# Patient Record
Sex: FEMALE | Race: WHITE | Employment: FULL TIME | URBAN - METROPOLITAN AREA
[De-identification: names, ages, dates, MRNs, and addresses within clinical notes are randomized per-mention and may not be internally consistent; named-entity substitution may affect disease eponyms.]

---

## 2017-02-07 ENCOUNTER — OFFICE VISIT (OUTPATIENT)
Dept: FAMILY MEDICINE CLINIC | Age: 22
End: 2017-02-07

## 2017-02-07 VITALS
OXYGEN SATURATION: 98 % | SYSTOLIC BLOOD PRESSURE: 120 MMHG | BODY MASS INDEX: 46.61 KG/M2 | TEMPERATURE: 98.2 F | RESPIRATION RATE: 18 BRPM | WEIGHT: 290 LBS | DIASTOLIC BLOOD PRESSURE: 82 MMHG | HEIGHT: 66 IN | HEART RATE: 78 BPM

## 2017-02-07 DIAGNOSIS — J20.9 ACUTE BRONCHITIS, UNSPECIFIED ORGANISM: ICD-10-CM

## 2017-02-07 DIAGNOSIS — R68.89 FLU-LIKE SYMPTOMS: Primary | ICD-10-CM

## 2017-02-07 LAB
FLUAV+FLUBV AG NOSE QL IA.RAPID: NEGATIVE POS/NEG
FLUAV+FLUBV AG NOSE QL IA.RAPID: NEGATIVE POS/NEG
VALID INTERNAL CONTROL?: YES

## 2017-02-07 RX ORDER — DOXYCYCLINE 100 MG/1
100 CAPSULE ORAL 2 TIMES DAILY
Qty: 20 CAP | Refills: 0 | Status: SHIPPED | OUTPATIENT
Start: 2017-02-07 | End: 2017-02-17

## 2017-02-07 NOTE — PROGRESS NOTES
Pt here c/o cough, congestion, and sinus pressure x 4 days. Reports that cough has been productive with thick mucus. Also c/o chills, body aches, and fatigue. Has been taking Dayquil and Theraflu. Also completed Zpack last week.

## 2017-02-07 NOTE — PROGRESS NOTES
Pt here c/o cough, congestion, and sinus pressure x 4 days. Reports that cough has been productive with thick mucus. Also c/o chills, body aches, and fatigue. Has been taking Dayquil and Theraflu. Also completed Zpack last week. Subjective:   Arjun Brantley is a 24 y.o. female who complains of congestion, sore throat and generalized sinus pain for 4 days, gradually worsening since that time. She denies a history of shortness of breath and wheezing. Evaluation to date: pt finished a zpak last week  Treatment to date: OTC products. Patient does not smoke cigarettes. Relevant PMH: No pertinent additional PMH. Patient Active Problem List   Diagnosis Code    Obesity E66.9    Dysmenorrhea N94.6    Constipation K59.00    Chronic pelvic pain in female R10.2, G89.29    Panic attacks F41.0    Galactorrhea O92.6    Hyperprolactinemia (HCC) E22.1    Pituitary adenoma ([facility]) D35.2     Allergies   Allergen Reactions    Latex Swelling     Skin peels    Aspirin Nausea and Vomiting    Betadine [Povidone-Iodine] Other (comments)     Skin peels        Review of Systems  Pertinent items are noted in HPI. Objective:     Visit Vitals    /82 (BP 1 Location: Right arm, BP Patient Position: Sitting)    Pulse 78    Temp 98.2 °F (36.8 °C) (Oral)    Resp 18    Ht 5' 6\" (1.676 m)    Wt 290 lb (131.5 kg)    SpO2 98%    BMI 46.81 kg/m2     General:  alert, cooperative, no distress   Eyes: conjunctivae/corneas clear. PERRL, EOM's intact. Fundi benign   Ears: normal TM's and external ear canals AU   Sinuses: tenderness over bilateral maxillary and frontal   Mouth:  Lips, mucosa, and tongue normal. Teeth and gums normal   Neck: supple, symmetrical, trachea midline and no adenopathy. Heart: S1 and S2 normal, no murmurs noted. Lungs: clear to auscultation bilaterally        Assessment/Plan:   sinusitis and bronchitis  Suggested symptomatic OTC remedies. Antibiotics per orders. RTC prn.     ICD-10-CM ICD-9-CM    1. Flu-like symptoms R68.89 780.99 AMB POC ALINE INFLUENZA A/B TEST   2. Acute bronchitis, unspecified organism J20.9 466.0 ROBITUSSIN A-C  mg/5 mL syrup      doxycycline (MONODOX) 100 mg capsule     Encounter Diagnoses   Name Primary?  Flu-like symptoms Yes    Acute bronchitis, unspecified organism      Orders Placed This Encounter    AMB POC ALINE INFLUENZA A/B TEST    ROBITUSSIN A-C  mg/5 mL syrup    doxycycline (MONODOX) 100 mg capsule   .

## 2017-02-07 NOTE — MR AVS SNAPSHOT
Visit Information Date & Time Provider Department Dept. Phone Encounter #  
 2/7/2017 10:20 AM Chavo Low MD [address] [facility] [phone] [id number] Your Appointments 5/4/2017 10:45 AM  
LAB with CDE NURSE [facility] Appt Note: LABS  
 [address] [facility] [address]  
[phone]  
  
   
 [address] [facility] [address]  
  
    
 5/11/2017 10:45 AM  
ROUTINE CARE with Ignacia Mancilla MD  
[facility] Appt Note: 6MO FUDM  
 [address] [facility] [address]  
[phone]  
  
   
 [address] [place] [address] Upcoming Health Maintenance Date Due  
 HPV AGE 9Y-34Y (1 of 3 - Female 3 Dose Series) 11/28/2006 Pneumococcal 19-64 Medium Risk (1 of 1 - PPSV23) 11/28/2014 INFLUENZA AGE 9 TO ADULT 8/1/2016 DTaP/Tdap/Td series (1 - Tdap) 11/28/2016 PAP AKA CERVICAL CYTOLOGY 11/28/2016 Allergies as of 2/7/2017  Review Complete On: 2/7/2017 By: Chavo Low MD  
  
 Severity Noted Reaction Type Reactions Latex  07/15/2010    Swelling Skin peels Aspirin  06/27/2011    Nausea and Vomiting Betadine [Povidone-iodine]  05/26/2011    Other (comments) Skin peels Current Immunizations  Never Reviewed No immunizations on file. Not reviewed this visit You Were Diagnosed With   
  
 Codes Comments Flu-like symptoms    -  Primary ICD-10-CM: R68.89 ICD-9-CM: 780.99 Acute bronchitis, unspecified organism     ICD-10-CM: J20.9 ICD-9-CM: 466.0 Vitals BP Pulse Temp Resp Height(growth percentile) Weight(growth percentile) 120/82 (BP 1 Location: Right arm, BP Patient Position: Sitting) 78 98.2 °F (36.8 °C) (Oral) 18 5' 6\" (1.676 m) 290 lb (131.5 kg) SpO2 BMI OB Status Smoking Status 98% 46.81 kg/m2 Injection Current Every Day Smoker Vitals History BMI and BSA Data Body Mass Index Body Surface Area  
 46.81 kg/m 2 2.47 m 2 Preferred Pharmacy Pharmacy Name Phone [facility] [address]  [phone] Your Updated Medication List  
  
   
This list is accurate as of: 2/7/17 11:06 AM.  Always use your most recent med list.  
  
  
  
  
 albuterol 2.5 mg /3 mL (0.083 %) nebulizer solution Commonly known as:  PROVENTIL VENTOLIN  
3 mL by Nebulization route every four (4) hours as needed for Wheezing. aspirin-acetaminophen-caffeine 250-250-65 mg per tablet Commonly known as:  EXCEDRIN ES Take 1 Tab by mouth. azithromycin 250 mg tablet Commonly known as:  Vikram Pinto Take 2 tablets today, then take 1 tablet daily  
  
 clonazePAM 1 mg tablet Commonly known as:  Dixon Boop Take 1 Tab by mouth daily as needed. Must last 30 days. Ok to fill 1/21/17 cyclobenzaprine 10 mg tablet Commonly known as:  FLEXERIL Take 1 Tab by mouth nightly. diphenhydrAMINE 50 mg tablet Commonly known as:  BENADRYL Take 1 tablet 1 hr prior to MRI  
  
 doxycycline 100 mg capsule Commonly known as:  Amanda Brian Take 1 Cap by mouth two (2) times a day for 10 days. lisinopril 10 mg tablet Commonly known as:  PRINIVIL, ZESTRIL  
TAKE ONE TABLET BY MOUTH DAILY  
  
 loratadine 10 mg Cap Take 10 mg by mouth daily. medroxyPROGESTERone 150 mg/mL Syrg Commonly known as:  DEPO-PROVERA  
150 mg by IntraMUSCular route once. nystatin-triamcinolone topical cream  
Commonly known as:  MYCOLOG II  
APPLY TO AFFECTED AREA(S) TWO TIMES A DAY  
  
 omeprazole 10 mg capsule Commonly known as:  PRILOSEC  
TAKE ONE CAPSULE BY MOUTH ONCE A DAY  
  
 propranolol 20 mg tablet Commonly known as:  INDERAL  
TAKE ONE TABLET BY MOUTH TWICE A DAY  
  
 ROBITUSSIN A-C  mg/5 mL syrup Generic drug:  codeine-guaiFENesin Take 10 mL by mouth every four (4) hours as needed for Cough. Max Daily Amount: 60 mL. Prescriptions Printed Refills ROBITUSSIN A-C  mg/5 mL syrup 1 Sig: Take 10 mL by mouth every four (4) hours as needed for Cough. Max Daily Amount: 60 mL. Class: Print Route: Oral  
  
Prescriptions Sent to Pharmacy Refills  
 doxycycline (MONODOX) 100 mg capsule 0 Sig: Take 1 Cap by mouth two (2) times a day for 10 days. Class: Normal  
 [facility] [address], [address] Ph #: [phone] Route: Oral  
  
We Performed the Following AMB POC ALINE INFLUENZA A/B TEST [33474 CPT(R)] Introducing [facility] SERVICES! Dear Nichelle : 
Thank you for requesting a [employer] account. Our records indicate that you already have an active [employer] account. You can access your account anytime at [email or web address] Did you know that you can access your hospital and ER discharge instructions at any time in Mir[employer]? You can also review all of your test results from your hospital stay or ER visit. Additional Information If you have questions, please visit the Frequently Asked Questions section of the [employer] website at [email or web address] Remember, [employer] is NOT to be used for urgent needs. For medical emergencies, dial 911. Now available from your iPhone and Android! Please provide this summary of care documentation to your next provider. Your primary care clinician is listed as Rylee Goldberg. If you have any questions after today's visit, please call [phone].

## 2017-02-14 RX ORDER — FLUCONAZOLE 150 MG/1
150 TABLET ORAL DAILY
Qty: 2 TAB | Refills: 0 | Status: SHIPPED | OUTPATIENT
Start: 2017-02-14 | End: 2017-03-20 | Stop reason: ALTCHOICE

## 2017-02-23 ENCOUNTER — OFFICE VISIT (OUTPATIENT)
Dept: OBGYN CLINIC | Age: 22
End: 2017-02-23

## 2017-02-23 VITALS
DIASTOLIC BLOOD PRESSURE: 80 MMHG | HEIGHT: 66 IN | BODY MASS INDEX: 47.09 KG/M2 | WEIGHT: 293 LBS | SYSTOLIC BLOOD PRESSURE: 130 MMHG

## 2017-02-23 DIAGNOSIS — Z20.2 POSSIBLE EXPOSURE TO STD: ICD-10-CM

## 2017-02-23 DIAGNOSIS — Z01.419 WELL WOMAN EXAM WITH ROUTINE GYNECOLOGICAL EXAM: Primary | ICD-10-CM

## 2017-02-23 DIAGNOSIS — Z72.51 UNPROTECTED SEXUAL INTERCOURSE: ICD-10-CM

## 2017-02-23 DIAGNOSIS — N89.8 VAGINAL ITCHING: ICD-10-CM

## 2017-02-23 LAB
HCG URINE, QL. (POC): NEGATIVE
VALID INTERNAL CONTROL?: YES

## 2017-02-23 RX ORDER — MEDROXYPROGESTERONE ACETATE 150 MG/ML
150 INJECTION, SUSPENSION INTRAMUSCULAR ONCE
Qty: 1 SYRINGE | Refills: 4
Start: 2017-02-23 | End: 2017-02-23

## 2017-02-23 NOTE — PATIENT INSTRUCTIONS
Exposure to Sexually Transmitted Infections: Care Instructions  Your Care Instructions  Sexually transmitted infections (STIs) are those diseases spread by sexual contact. There are at least 20 different STIs, including chlamydia, gonorrhea, syphilis, and human immunodeficiency virus (HIV), which causes AIDS. Bacteria-caused STIs can be treated and cured. STIs caused by viruses, such as HIV, can be treated but not cured. Some STIs can reduce a woman's chances of getting pregnant in the future. STIs are spread during sexual contact, such as vaginal intercourse and oral or anal sex. Follow-up care is a key part of your treatment and safety. Be sure to make and go to all appointments, and call your doctor if you are having problems. Its also a good idea to know your test results and keep a list of the medicines you take. How can you care for yourself at home? · Your doctor may have given you a shot of antibiotics. If your doctor prescribed antibiotic pills, take them as directed. Do not stop taking them just because you feel better. You need to take the full course of antibiotics. · Do not have sexual contact while you have symptoms of an STI or are being treated for an STI. · Tell your sex partner (or partners) that he or she will need treatment. · If you are a woman, do not douche. Douching changes the normal balance of bacteria in the vagina and may spread an infection up into your reproductive organs. To prevent exposure to STIs in the future  · Use latex condoms every time you have sex. Use them from the beginning to the end of sexual contact. · Talk to your partner before you have sex. Find out if he or she has or is at risk for any STI. Keep in mind that a person may be able to spread an STI even if he or she does not have symptoms. · Do not have sex if you are being treated for an STI. · Do not have sex with anyone who has symptoms of an STI, such as sores on the genitals or mouth.   · Having one sex partner (who does not have STIs and does not have sex with anyone else) is a good way to avoid STIs. When should you call for help? Call your doctor now or seek immediate medical care if:  · You have new pain in your belly or pelvis. · You have symptoms of a urinary tract infection. These may include:  ¨ Pain or burning when you urinate. ¨ A frequent need to urinate without being able to pass much urine. ¨ Pain in the flank, which is just below the rib cage and above the waist on either side of the back. ¨ Blood in your urine. ¨ A fever. · You have new or worsening pain or swelling in the scrotum. Watch closely for changes in your health, and be sure to contact your doctor if:  · You have unusual vaginal bleeding. · You have a discharge from the vagina or penis. · You have any new symptoms, such as sores, bumps, rashes, blisters, or warts. · You have itching, tingling, pain, or burning in the genital or anal area. · You think you may have an STI. Where can you learn more? Go to http://sathya-bianka.info/. Enter Y109 in the search box to learn more about \"Exposure to Sexually Transmitted Infections: Care Instructions. \"  Current as of: May 27, 2016  Content Version: 11.1  © 4614-9025 [employer]. Care instructions adapted under license by [email or web address] (which disclaims liability or warranty for this information). If you have questions about a medical condition or this instruction, always ask your healthcare professional. [place] [address] any warranty or liability for your use of this information.

## 2017-02-23 NOTE — MR AVS SNAPSHOT
Visit Information Date & Time Provider Department Dept. Phone Encounter #  
 2/23/2017 10:20 AM Boo Linda MD [facility] [phone] [id number] Your Appointments 5/4/2017 10:45 AM  
LAB with CDE NURSE [facility]) Appt Note: LABS  
 [address] [facility] [address]  
[phone]  
  
   
 [phone]  
  
    
 5/11/2017 10:45 AM  
ROUTINE CARE with Ryan Almeida MD  
[facility]) Appt Note: 6MO FUDM  
 [address] [facility] [address]  
[phone]  
  
   
 [address] [place] [address] Upcoming Health Maintenance Date Due  
 HPV AGE 9Y-34Y (1 of 3 - Female 3 Dose Series) 11/28/2006 INFLUENZA AGE 9 TO ADULT 8/1/2016 PAP AKA CERVICAL CYTOLOGY 11/28/2016 Allergies as of 2/23/2017  Review Complete On: 2/23/2017 By: Boo Linda MD  
  
 Severity Noted Reaction Type Reactions Latex  07/15/2010    Swelling Skin peels Aspirin  06/27/2011    Nausea and Vomiting Betadine [Povidone-iodine]  05/26/2011    Other (comments) Skin peels Current Immunizations  Never Reviewed No immunizations on file. Not reviewed this visit You Were Diagnosed With   
  
 Codes Comments Well woman exam with routine gynecological exam    -  Primary ICD-10-CM: L57.806 ICD-9-CM: V72.31 Possible exposure to STD     ICD-10-CM: Z20.2 ICD-9-CM: V01.6 Unprotected sexual intercourse     ICD-10-CM: Z72.51 
ICD-9-CM: V69.2 Vaginal itching     ICD-10-CM: L29.8 ICD-9-CM: 698.1 Vitals BP  
  
  
  
  
  
 130/80 BMI and BSA Data Body Mass Index Body Surface Area  
 47.29 kg/m 2 2.49 m 2 Preferred Pharmacy Pharmacy Name Phone [facility] [address], [address] [phone] Your Updated Medication List  
  
   
This list is accurate as of: 2/23/17 10:38 AM.  Always use your most recent med list.  
  
  
  
  
 albuterol 2.5 mg /3 mL (0.083 %) nebulizer solution Commonly known as:  PROVENTIL VENTOLIN  
3 mL by Nebulization route every four (4) hours as needed for Wheezing. aspirin-acetaminophen-caffeine 250-250-65 mg per tablet Commonly known as:  EXCEDRIN ES Take 1 Tab by mouth. azithromycin 250 mg tablet Commonly known as:  Shaniqua Abdirahman Take 2 tablets today, then take 1 tablet daily  
  
 clonazePAM 1 mg tablet Commonly known as:  Padmaja Blaze Take 1 Tab by mouth daily as needed. Must last 30 days. Ok to fill 1/21/17 cyclobenzaprine 10 mg tablet Commonly known as:  FLEXERIL Take 1 Tab by mouth nightly. diphenhydrAMINE 50 mg tablet Commonly known as:  BENADRYL Take 1 tablet 1 hr prior to MRI  
  
 fluconazole 150 mg tablet Commonly known as:  DIFLUCAN Take 1 Tab by mouth daily. Repeat in 3 days if needed. lisinopril 10 mg tablet Commonly known as:  PRINIVIL, ZESTRIL  
TAKE ONE TABLET BY MOUTH DAILY  
  
 loratadine 10 mg Cap Take 10 mg by mouth daily. medroxyPROGESTERone 150 mg/mL Syrg Commonly known as:  DEPO-PROVERA  
150 mg by IntraMUSCular route once. nystatin-triamcinolone topical cream  
Commonly known as:  MYCOLOG II  
APPLY TO AFFECTED AREA(S) TWO TIMES A DAY  
  
 omeprazole 10 mg capsule Commonly known as:  PRILOSEC  
TAKE ONE CAPSULE BY MOUTH ONCE A DAY  
  
 propranolol 20 mg tablet Commonly known as:  INDERAL  
TAKE ONE TABLET BY MOUTH TWICE A DAY  
  
 ROBITUSSIN A-C  mg/5 mL syrup Generic drug:  codeine-guaiFENesin Take 10 mL by mouth every four (4) hours as needed for Cough. Max Daily Amount: 60 mL. We Performed the Following AMB POC URINE PREGNANCY TEST, VISUAL COLOR COMPARISON [21010 CPT(R)] HEP B SURFACE AG [id number] CPT(R)] HEPATITIS C AB [60200 CPT(R)] HIV 1/2 AG/AB, 4TH GENERATION,W RFLX CONFIRM [[id number] Custom] NUSWAB VAGINOSIS + CANDIDA [[id number] Custom] PAP IG, CT-NG TV [address] HPV [id number], [id number]) [id number] CPT(R)] RPR [73932 CPT(R)] Patient Instructions Exposure to Sexually Transmitted Infections: Care Instructions Your Care Instructions Sexually transmitted infections (STIs) are those diseases spread by sexual contact. There are at least 20 different STIs, including chlamydia, gonorrhea, syphilis, and human immunodeficiency virus (HIV), which causes AIDS. Bacteria-caused STIs can be treated and cured. STIs caused by viruses, such as HIV, can be treated but not cured. Some STIs can reduce a woman's chances of getting pregnant in the future. STIs are spread during sexual contact, such as vaginal intercourse and oral or anal sex. Follow-up care is a key part of your treatment and safety. Be sure to make and go to all appointments, and call your doctor if you are having problems. Its also a good idea to know your test results and keep a list of the medicines you take. How can you care for yourself at home? · Your doctor may have given you a shot of antibiotics. If your doctor prescribed antibiotic pills, take them as directed. Do not stop taking them just because you feel better. You need to take the full course of antibiotics. · Do not have sexual contact while you have symptoms of an STI or are being treated for an STI. · Tell your sex partner (or partners) that he or she will need treatment. · If you are a woman, do not douche. Douching changes the normal balance of bacteria in the vagina and may spread an infection up into your reproductive organs. To prevent exposure to STIs in the future · Use latex condoms every time you have sex. Use them from the beginning to the end of sexual contact. · Talk to your partner before you have sex.  Find out if he or she has or is at risk for any STI. Keep in mind that a person may be able to spread an STI even if he or she does not have symptoms. · Do not have sex if you are being treated for an STI. · Do not have sex with anyone who has symptoms of an STI, such as sores on the genitals or mouth. · Having one sex partner (who does not have STIs and does not have sex with anyone else) is a good way to avoid STIs. When should you call for help? Call your doctor now or seek immediate medical care if: 
· You have new pain in your belly or pelvis. · You have symptoms of a urinary tract infection. These may include: 
¨ Pain or burning when you urinate. ¨ A frequent need to urinate without being able to pass much urine. ¨ Pain in the flank, which is just below the rib cage and above the waist on either side of the back. ¨ Blood in your urine. ¨ A fever. · You have new or worsening pain or swelling in the scrotum. Watch closely for changes in your health, and be sure to contact your doctor if: 
· You have unusual vaginal bleeding. · You have a discharge from the vagina or penis. · You have any new symptoms, such as sores, bumps, rashes, blisters, or warts. · You have itching, tingling, pain, or burning in the genital or anal area. · You think you may have an STI. Where can you learn more? Go to http://sathya-bianka.info/. Enter M927 in the search box to learn more about \"Exposure to Sexually Transmitted Infections: Care Instructions. \" Current as of: May 27, 2016 Content Version: 11.1 © 6750-4809 Healthwise, Incorporated. Care instructions adapted under license by [employer] (which disclaims liability or warranty for this information). If you have questions about a medical condition or this instruction, always ask your healthcare professional. [address] any warranty or liability for your use of this information. Introducing [facility] SERVICES! Dear Alvina Johnson: Thank you for requesting a [employer] account. Our records indicate that you already have an active [employer] account. You can access your account anytime at [email or web address] Did you know that you can access your hospital and ER discharge instructions at any time in [employer]? You can also review all of your test results from your hospital stay or ER visit. Additional Information If you have questions, please visit the Frequently Asked Questions section of the [employer] website at [email or web address] Remember, [employer] is NOT to be used for urgent needs. For medical emergencies, dial 911. Now available from your iPhone and Android! Please provide this summary of care documentation to your next provider. Your primary care clinician is listed as Rylee Goldberg. If you have any questions after today's visit, please call [phone].

## 2017-02-23 NOTE — PROGRESS NOTES
Annual exam ages 21-44    Baldev Sorensen is a No obstetric history on file. ,  24 y.o. female WHITE [place]  No LMP recorded. Patient has had an injection. .    She presents for her annual checkup. She is having significant vaginal itchinf, std exposure, and she wants a UPT today . With regard to the Gardasil vaccine, she has received all 3 injections. Menstrual status:    Her periods are spotting in flow. She is using essentially none pads or tampons per day, minimal to none using Depoprovera. She denies dysmenorrhea. She reports no premenstrual symptoms. Contraception:    The current method of family planning is Depo-Provera injections. Sexual history:     She  reports that she currently engages in sexual activity and has had male partners. She reports using the following method of birth control/protection: Injection. .    Medical conditions:    Since her last annual GYN exam about one year ago, she has not the following changes in her health history: none. Pap and Mammogram History:    She has never had a pap smear. The patient has never had a mammogram.    Breast Cancer History/Substance Abuse: negative    Past Medical History:   Diagnosis Date    Dysmenorrhea     Major depressive disorder, single episode, unspecified     Morbid obesity ([address]     Pituitary abnormality ([facility] [address]     Seasonal allergies     Unspecified symptom associated with female genital organs      Past Surgical History:   Procedure Laterality Date    HX WISDOM TEETH EXTRACTION         Current Outpatient Prescriptions   Medication Sig Dispense Refill    clonazePAM (KLONOPIN) 1 mg tablet Take 1 Tab by mouth daily as needed. Must last 30 days. Ok to fill 1/21/17 30 Tab 2    medroxyPROGESTERone (DEPO-PROVERA) 150 mg/mL syrg 150 mg by IntraMUSCular route once.       omeprazole (PRILOSEC) 10 mg capsule TAKE ONE CAPSULE BY MOUTH ONCE A DAY 30 capsule 4    fluconazole (DIFLUCAN) 150 mg tablet Take 1 Tab by mouth daily. Repeat in 3 days if needed. 2 Tab 0    ROBITUSSIN A-C  mg/5 mL syrup Take 10 mL by mouth every four (4) hours as needed for Cough. Max Daily Amount: 60 mL. 200 mL 1    azithromycin (ZITHROMAX) 250 mg tablet Take 2 tablets today, then take 1 tablet daily 6 Tab 0    diphenhydrAMINE (BENADRYL) 50 mg tablet Take 1 tablet 1 hr prior to MRI 1 Tab 0    aspirin-acetaminophen-caffeine (EXCEDRIN ES) 250-250-65 mg per tablet Take 1 Tab by mouth.  nystatin-triamcinolone (MYCOLOG II) topical cream APPLY TO AFFECTED AREA(S) TWO TIMES A DAY 30 g 0    albuterol (PROVENTIL VENTOLIN) 2.5 mg /3 mL (0.083 %) nebulizer solution 3 mL by Nebulization route every four (4) hours as needed for Wheezing. 24 Each 1    loratadine 10 mg cap Take 10 mg by mouth daily.  cyclobenzaprine (FLEXERIL) 10 mg tablet Take 1 Tab by mouth nightly. (Patient taking differently: Take 10 mg by mouth as needed.) 30 Tab 1    lisinopril (PRINIVIL, ZESTRIL) 10 mg tablet TAKE ONE TABLET BY MOUTH DAILY 30 Tab 3    propranolol (INDERAL) 20 mg tablet TAKE ONE TABLET BY MOUTH TWICE A DAY 60 Tab 4     Allergies: Latex; Aspirin; and Betadine [povidone-iodine]     Tobacco History:  reports that she has been smoking Cigarettes. She has a 0.50 pack-year smoking history. She has never used smokeless tobacco.  Alcohol Abuse:  reports that she does not drink alcohol. Drug Abuse:  reports that she does not use illicit drugs.     Family Medical/Cancer History:   Family History   Problem Relation Age of Onset    Alcohol abuse Father     Cancer Paternal Grandmother     Other Mother      endometriosis   [facility] Migraines Mother      tumer in brain        Review of Systems - History obtained from the patient  Constitutional: negative for weight loss, fever, night sweats  HEENT: negative for hearing loss, earache, congestion, snoring, sorethroat  CV: negative for chest pain, palpitations, edema  Resp: negative for cough, shortness of breath, wheezing  GI: negative for change in bowel habits, abdominal pain, black or bloody stools  : negative for frequency, dysuria, hematuria, vaginal discharge  MSK: negative for back pain, joint pain, muscle pain  Breast: negative for breast lumps, nipple discharge, galactorrhea  Skin :negative for itching, rash, hives  Neuro: negative for dizziness, headache, confusion, weakness  Psych: negative for anxiety, depression, change in mood  Heme/lymph: negative for bleeding, bruising, pallor    Physical Exam    Visit Vitals    /80    Ht 5' 6\" (1.676 m)    Wt 293 lb (132.9 kg)    BMI 47.29 kg/m2       Constitutional  · Appearance: well-nourished, well developed, alert, in no acute distress    HENT  · Head and Face: appears normal    Neck  · Inspection/Palpation: normal appearance, no masses or tenderness  · Lymph Nodes: no lymphadenopathy present  · Thyroid: gland size normal, nontender, no nodules or masses present on palpation    Chest  · Respiratory Effort: breathing unlabored    Breasts  · Inspection of Breasts: breasts symmetrical, no skin changes, no discharge present, nipple appearance normal, no skin retraction present  · Palpation of Breasts and Axillae: no masses present on palpation, no breast tenderness  · Axillary Lymph Nodes: no lymphadenopathy present    Gastrointestinal  · Abdominal Examination: abdomen non-tender to palpation, normal bowel sounds, no masses present  · Liver and spleen: no hepatomegaly present, spleen not palpable  · Hernias: no hernias identified    Genitourinary  · External Genitalia: normal appearance for age, + discharge present, no tenderness present, no inflammatory lesions present, no masses present, no atrophy present  · Vagina: normal vaginal vault without central or paravaginal defects, + discharge present, no inflammatory lesions present, no masses present  · Bladder: non-tender to palpation  · Urethra: appears normal  · Cervix: normal   · Uterus: normal size, shape and consistency  · Adnexa: no adnexal tenderness present, no adnexal masses present  · Perineum: perineum within normal limits, no evidence of trauma, no rashes or skin lesions present  · Anus: anus within normal limits, no hemorrhoids present  · Inguinal Lymph Nodes: no lymphadenopathy present    Skin  · General Inspection: no rash, no lesions identified    Neurologic/Psychiatric  · Mental Status:  · Orientation: grossly oriented to person, place and time  · Mood and Affect: mood normal, affect appropriate    . Assessment:  Routine gynecologic examination  Her current medical status is satisfactory with no evidence of significant gynecologic issues.   STD testing  + discharge- will f/u with nuswab    Plan:  Counseled re: diet, exercise, healthy lifestyle  Return for yearly wellness visits

## 2017-02-24 LAB
HBV SURFACE AG SERPL QL IA: NEGATIVE
HCG INTACT+B SERPL-ACNC: <1 MIU/ML
HCV AB S/CO SERPL IA: <0.1 S/CO RATIO (ref 0–0.9)
HIV 1+2 AB+HIV1 P24 AG SERPL QL IA: NON REACTIVE
RPR SER QL: NON REACTIVE

## 2017-02-26 LAB
C TRACH RRNA CVX QL NAA+PROBE: NEGATIVE
CYTOLOGIST CVX/VAG CYTO: NORMAL
CYTOLOGY CVX/VAG DOC THIN PREP: NORMAL
DX ICD CODE: NORMAL
LABCORP, 190119: NORMAL
Lab: NORMAL
N GONORRHOEA RRNA CVX QL NAA+PROBE: NEGATIVE
OTHER STN SPEC: NORMAL
PATH REPORT.FINAL DX SPEC: NORMAL
STAT OF ADQ CVX/VAG CYTO-IMP: NORMAL
T VAGINALIS RRNA SPEC QL NAA+PROBE: NEGATIVE

## 2017-02-27 LAB
A VAGINAE DNA VAG QL NAA+PROBE: NORMAL SCORE
BVAB2 DNA VAG QL NAA+PROBE: NORMAL SCORE
C ALBICANS DNA VAG QL NAA+PROBE: NEGATIVE
C GLABRATA DNA VAG QL NAA+PROBE: NEGATIVE
MEGA1 DNA VAG QL NAA+PROBE: NORMAL SCORE

## 2017-03-07 ENCOUNTER — OFFICE VISIT (OUTPATIENT)
Dept: OBGYN CLINIC | Age: 22
End: 2017-03-07

## 2017-03-07 DIAGNOSIS — Z20.2 POSSIBLE EXPOSURE TO STD: ICD-10-CM

## 2017-03-07 DIAGNOSIS — N89.8 VAGINAL LESION: ICD-10-CM

## 2017-03-07 DIAGNOSIS — N89.8 VAGINAL DISCHARGE: Primary | ICD-10-CM

## 2017-03-07 NOTE — PROGRESS NOTES
Chief Complaint   Vaginitis; Vaginal Discharge; and Exposure to STD      HPI  24 y.o. female complains of clear vaginal discharge for a few days. .No LMP recorded. Patient has had an injection. She admits to additional symptoms at this time. Itching and burning when the urine hits the outside  The patient  denies aggravating factors  She denies exposure to new chemicals ot hygenic agents  Previous treatment included:  None  Pt wants std testing today    Past Medical History:   Diagnosis Date    Dysmenorrhea     Major depressive disorder, single episode, unspecified     Morbid obesity ([address]     Pituitary abnormality ([address]     Seasonal allergies     Unspecified symptom associated with female genital organs      Past Surgical History:   Procedure Laterality Date    HX WISDOM TEETH EXTRACTION       Social History     Occupational History    Not on file. Social History Main Topics    Smoking status: Current Every Day Smoker     Packs/day: 0.50     Years: 1.00     Types: Cigarettes    Smokeless tobacco: Never Used    Alcohol use No      Comment: former user just sips now and then   [place] Drug use: No    Sexual activity: Yes     Partners: Male     Birth control/ protection: Injection     Family History   Problem Relation Age of Onset    Alcohol abuse Father     Cancer Paternal Grandmother     Other Mother      endometriosis    Migraines Mother      tumer in brain        Allergies   Allergen Reactions    Latex Swelling     Skin peels    Aspirin Nausea and Vomiting    Betadine [Povidone-Iodine] Other (comments)     Skin peels     Prior to Admission medications    Medication Sig Start Date End Date Taking? Authorizing Provider   fluconazole (DIFLUCAN) 150 mg tablet Take 1 Tab by mouth daily. Repeat in 3 days if needed. 2/14/17   Lexis Goldberg MD   ROBITUSSIN A-C  mg/5 mL syrup Take 10 mL by mouth every four (4) hours as needed for Cough.  Max Daily Amount: 60 mL. 2/7/17   Lexis Solano MD Yusuf   azithromycin (ZITHROMAX) 250 mg tablet Take 2 tablets today, then take 1 tablet daily 1/20/17   Sj Ballesteros NP   clonazePAM (KLONOPIN) 1 mg tablet Take 1 Tab by mouth daily as needed. Must last 30 days. Ok to fill 1/21/17 12/22/16   Sj Ballesteros NP   diphenhydrAMINE (BENADRYL) 50 mg tablet Take 1 tablet 1 hr prior to MRI 10/18/16   Anastacia Kiran MD   aspirin-acetaminophen-caffeine [facility]) 232-233-00 mg per tablet Take 1 Tab by mouth. Historical Provider   nystatin-triamcinolone (MYCOLOG II) topical cream APPLY TO AFFECTED AREA(S) TWO TIMES A DAY 8/31/16   Yolanda Goldberg MD   albuterol (PROVENTIL VENTOLIN) 2.5 mg /3 mL (0.083 %) nebulizer solution 3 mL by Nebulization route every four (4) hours as needed for Wheezing. 2/25/16   Sj Ballesteros NP   loratadine 10 mg cap Take 10 mg by mouth daily. Historical Provider   cyclobenzaprine (FLEXERIL) 10 mg tablet Take 1 Tab by mouth nightly. Patient taking differently: Take 10 mg by mouth as needed.  8/25/15   Yolanda Goldberg MD   lisinopril (PRINIVIL, ZESTRIL) 10 mg tablet TAKE ONE TABLET BY MOUTH DAILY 7/13/15   Yolanda Goldberg MD   propranolol (INDERAL) 20 mg tablet TAKE ONE TABLET BY MOUTH TWICE A DAY 6/10/15   Nancy Oseguera MD   omeprazole (PRILOSEC) 10 mg capsule TAKE ONE CAPSULE BY MOUTH ONCE A DAY 1/1/15   Nancy Oseguera MD                      Review of Systems - History obtained from the patient  Constitutional: negative for weight loss, fever, night sweats  Breast: negative for breast lumps, nipple discharge, galactorrhea  GI: negative for change in bowel habits, abdominal pain, black or bloody stools  : negative for frequency, dysuria, hematuria  MSK: negative for back pain, joint pain, muscle pain  Skin: negative for itching, rash, hives  Neuro: negative for dizziness, headache, confusion, weakness  Psych: negative for anxiety, depression, change in mood  Heme/lymph: negative for bleeding, bruising, pallor       Objective:    Physical Exam:   PHYSICAL EXAMINATION    Constitutional  · Appearance: well-nourished, well developed, alert, in no acute distress    HENT  · Head and Face: appears normal    Genitourinary  · External Genitalia: normal appearance for age, + discharge present, no tenderness present, no inflammatory lesions present, no masses present, no atrophy present  · Vagina:  + discharge present, otherwise normal vaginal vault without central or paravaginal defects, no inflammatory lesions present, no masses present  · Bladder: non-tender to palpation  · Urethra: appears normal  · Cervix: normal   · Uterus: normal size, shape and consistency  · Adnexa: no adnexal tenderness present, no adnexal masses present  · Perineum: perineum within normal limits, no evidence of trauma, no rashes or skin lesions present  · Anus: anus within normal limits, no hemorrhoids present  · Inguinal Lymph Nodes: no lymphadenopathy present    Skin  · General Inspection: no rash, no lesions identified    Neurologic/Psychiatric  · Mental Status:  · Orientation: grossly oriented to person, place and time  · Mood and Affect: mood normal, affect appropriate    Assessment:   Vaginitis  Vulvar lesion- likely trauma from recent intercourse, will send hsv culture, rec bid sitz baths, abstinence x 2 weeks while area heals. Vaginal irritation/discharge- will f/u with nuswab and gc/[facility]     Plan:     ROV prn if symptoms persist or worsen.

## 2017-03-07 NOTE — PATIENT INSTRUCTIONS
Pelvic Exam: Care Instructions  Your Care Instructions    When your doctor examines all of your pelvic organs, it's called a pelvic exam. Two good reasons to have this kind of exam are to check for sexually transmitted infections (STIs) and to get a Pap test. A Pap test is also called a Pap smear. It checks for early changes that can lead to cancer of the cervix. Sometimes a pelvic exam is part of a regular checkup. In this case, you can do some things to make your test results as accurate as possible. · Try to schedule the exam when you don't have your period. · Don't use douches, tampons, or vaginal medicines, sprays, or powders for 24 hours before your exam.  · Don't have sex for 24 hours before your exam.  Other times, women have this kind of exam at any time of the month. This is because they have pelvic pain, bleeding, or discharge. Or they may have another pelvic problem. Before your exam, it's important to share some information with your doctor. For example, if you are a survivor of rape or sexual abuse, you can talk about any concerns you may have. Your doctor will also want to know if you are pregnant or use birth control. And he or she will want to hear about any problems, surgeries, or procedures you have had in your pelvic area. You will also need to tell your doctor when your last period was. Follow-up care is a key part of your treatment and safety. Be sure to make and go to all appointments, and call your doctor if you are having problems. It's also a good idea to know your test results and keep a list of the medicines you take. How is a pelvic exam done? · During a pelvic exam, you will:  ¨ Take off your clothes below the waist. You will get a paper or cloth cover to put over the lower half of your body. Aubrey Gale on your back on an exam table. Your feet will be raised above you. Stirrups will support your feet. · The doctor will:  Gabriella Marcos you to relax your knees.  Your knees need to lean out, toward the walls. ¨ Check the opening of your vagina for sores or swelling. ¨ Gently put a tool called a speculum into your vagina. It opens the vagina a little bit. You will feel some pressure. But if you are relaxed, it will not hurt. It lets your doctor see inside the vagina. ¨ Use a small brush, spatula, or swab to get a sample of cells, if you are having a Pap test or culture. The doctor then removes the speculum. ¨ Put on gloves and put one or two fingers of one hand into your vagina. The other hand goes on your lower belly. This lets your doctor feel your pelvic organs. You will probably feel some pressure. Try to stay relaxed. ¨ Put one gloved finger into your rectum and one into your vagina, if needed. This can also help check your pelvic organs. This exam takes about 10 minutes. At the end, you will get a washcloth or tissue to clean your vaginal area. It's normal to have some discharge after this exam. You can then get dressed. Some test results may be ready right away. But results from a culture or a Pap test may take several days or a few weeks. Why should you have a pelvic exam?  · You want to have recommended screening tests. This includes a Pap test.  · You think you have a vaginal infection. Signs include itching, burning, or unusual discharge. · You might have been exposed to a sexually transmitted infection (STI), such as chlamydia or herpes. · You have vaginal bleeding that is not part of your normal menstrual period. · You have pain in your belly or pelvis. · You have been sexually assaulted. A pelvic exam lets your doctor collect evidence and check for STIs. · You are pregnant. · You are having trouble getting pregnant. What are the risks of a pelvic exam?  There are no risks from a pelvic exam.  When should you call for help?   Watch closely for changes in your health, and be sure to contact your doctor if:  · You have heavy bleeding or discharge from your vagina after the exam.  Where can you learn more? Go to http://sathya-bianka.info/. Enter Z307 in the search box to learn more about \"Pelvic Exam: Care Instructions. \"  Current as of: February 25, 2016  Content Version: 11.1  © 0857-2281 [employer]. Care instructions adapted under license by [employer] (which disclaims liability or warranty for this information). If you have questions about a medical condition or this instruction, always ask your healthcare professional. [address] any warranty or liability for your use of this information.

## 2017-03-07 NOTE — MR AVS SNAPSHOT
Visit Information Date & Time Provider Department Dept. Phone Encounter #  
 3/7/2017 10:40 AM MD Andrea Mensah [phone] [id number] Your Appointments 5/4/2017 10:45 AM  
LAB with CDE NURSE [facility] [address] [place] [address]) Appt Note: LABS  
 [address] [facility] [address]  
[phone]  
  
   
 [phone]  
  
    
 5/11/2017 10:45 AM  
ROUTINE CARE with Alba Garces MD  
[facility] [address] [place] [address]) Appt Note: 6MO FUDM  
 [address] [facility] 506 [facility]  
  
   
 [phone]  
  
    
 2/26/2018  9:00 AM  
ESTABLISHED PATIENT with Tamia Barley, MD  
[place] ([address] [facility]) Appt Note: ae   FW  
 [address] [facility] 305 [facility] [address]  
[facility] [address] Upcoming Health Maintenance Date Due  
 HPV AGE 9Y-34Y (1 of 3 - Female 3 Dose Series) 11/28/2006 INFLUENZA AGE 9 TO ADULT 8/1/2016 PAP AKA CERVICAL CYTOLOGY 2/23/2020 Allergies as of 3/7/2017  Review Complete On: 3/7/2017 By: Kal Sheridan Severity Noted Reaction Type Reactions Latex  07/15/2010    Swelling Skin peels Aspirin  06/27/2011    Nausea and Vomiting Betadine [Povidone-iodine]  05/26/2011    Other (comments) Skin peels Current Immunizations  Never Reviewed No immunizations on file. Not reviewed this visit You Were Diagnosed With   
  
 Codes Comments Vaginal discharge    -  Primary ICD-10-CM: N89.8 ICD-9-CM: 623.5 Possible exposure to STD     ICD-10-CM: Z20.2 ICD-9-CM: V01.6 Vitals OB Status Smoking Status Injection Current Every Day Smoker Preferred Pharmacy Pharmacy Name Phone SEGUNDO JULES [facility] [address], [address] [phone] Your Updated Medication List  
  
   
This list is accurate as of: 3/7/17 10:56 AM.  Always use your most recent med list.  
  
  
  
  
 albuterol 2.5 mg /3 mL (0.083 %) nebulizer solution Commonly known as:  PROVENTIL VENTOLIN  
3 mL by Nebulization route every four (4) hours as needed for Wheezing. aspirin-acetaminophen-caffeine 250-250-65 mg per tablet Commonly known as:  EXCEDRIN ES Take 1 Tab by mouth. azithromycin 250 mg tablet Commonly known as:  Zohra Bunkers Take 2 tablets today, then take 1 tablet daily  
  
 clonazePAM 1 mg tablet Commonly known as:  Milka Lamar Take 1 Tab by mouth daily as needed. Must last 30 days. Ok to fill 1/21/17 cyclobenzaprine 10 mg tablet Commonly known as:  FLEXERIL Take 1 Tab by mouth nightly. diphenhydrAMINE 50 mg tablet Commonly known as:  BENADRYL Take 1 tablet 1 hr prior to MRI  
  
 fluconazole 150 mg tablet Commonly known as:  DIFLUCAN Take 1 Tab by mouth daily. Repeat in 3 days if needed. lisinopril 10 mg tablet Commonly known as:  PRINIVIL, ZESTRIL  
TAKE ONE TABLET BY MOUTH DAILY  
  
 loratadine 10 mg Cap Take 10 mg by mouth daily. nystatin-triamcinolone topical cream  
Commonly known as:  MYCOLOG II  
APPLY TO AFFECTED AREA(S) TWO TIMES A DAY  
  
 omeprazole 10 mg capsule Commonly known as:  PRILOSEC  
TAKE ONE CAPSULE BY MOUTH ONCE A DAY  
  
 propranolol 20 mg tablet Commonly known as:  INDERAL  
TAKE ONE TABLET BY MOUTH TWICE A DAY  
  
 ROBITUSSIN A-C  mg/5 mL syrup Generic drug:  codeine-guaiFENesin Take 10 mL by mouth every four (4) hours as needed for Cough. Max Daily Amount: 60 mL. We Performed the Following AMB POC URINALYSIS DIP STICK AUTO W/O MICRO [94180 CPT(R)] [address] [id number] Custom] Patient Instructions Pelvic Exam: Care Instructions Your Care Instructions When your doctor examines all of your pelvic organs, it's called a pelvic exam. Two good reasons to have this kind of exam are to check for sexually transmitted infections (STIs) and to get a Pap test. A Pap test is also called a Pap smear. It checks for early changes that can lead to cancer of the cervix. Sometimes a pelvic exam is part of a regular checkup. In this case, you can do some things to make your test results as accurate as possible. · Try to schedule the exam when you don't have your period. · Don't use douches, tampons, or vaginal medicines, sprays, or powders for 24 hours before your exam. 
· Don't have sex for 24 hours before your exam. 
Other times, women have this kind of exam at any time of the month. This is because they have pelvic pain, bleeding, or discharge. Or they may have another pelvic problem. Before your exam, it's important to share some information with your doctor. For example, if you are a survivor of rape or sexual abuse, you can talk about any concerns you may have. Your doctor will also want to know if you are pregnant or use birth control. And he or she will want to hear about any problems, surgeries, or procedures you have had in your pelvic area. You will also need to tell your doctor when your last period was. Follow-up care is a key part of your treatment and safety. Be sure to make and go to all appointments, and call your doctor if you are having problems. It's also a good idea to know your test results and keep a list of the medicines you take. How is a pelvic exam done? · During a pelvic exam, you will: ¨ Take off your clothes below the waist. You will get a paper or cloth cover to put over the lower half of your body. Chris [place]aton[place] on your back on an exam table. Your feet will be raised above you. Stirrups will support your feet. · The doctor will: ¨ Ask you to relax your knees. Your knees need to lean out, toward the walls. ¨ Check the opening of your vagina for sores or swelling. ¨ Gently put a tool called a speculum into your vagina. It opens the vagina a little bit. You will feel some pressure. But if you are relaxed, it will not hurt. It lets your doctor see inside the vagina. ¨ Use a small brush, spatula, or swab to get a sample of cells, if you are having a Pap test or culture. The doctor then removes the speculum. ¨ Put on gloves and put one or two fingers of one hand into your vagina. The other hand goes on your lower belly. This lets your doctor feel your pelvic organs. You will probably feel some pressure. Try to stay relaxed. ¨ Put one gloved finger into your rectum and one into your vagina, if needed. This can also help check your pelvic organs. This exam takes about 10 minutes. At the end, you will get a washcloth or tissue to clean your vaginal area. It's normal to have some discharge after this exam. You can then get dressed. Some test results may be ready right away. But results from a culture or a Pap test may take several days or a few weeks. Why should you have a pelvic exam? 
· You want to have recommended screening tests. This includes a Pap test. 
· You think you have a vaginal infection. Signs include itching, burning, or unusual discharge. · You might have been exposed to a sexually transmitted infection (STI), such as chlamydia or herpes. · You have vaginal bleeding that is not part of your normal menstrual period. · You have pain in your belly or pelvis. · You have been sexually assaulted. A pelvic exam lets your doctor collect evidence and check for STIs. · You are pregnant. · You are having trouble getting pregnant. What are the risks of a pelvic exam? 
There are no risks from a pelvic exam. 
When should you call for help?  
Watch closely for changes in your health, and be sure to contact your doctor if: 
· You have heavy bleeding or discharge from your vagina after the exam. 
 Where can you learn more? Go to http://sathya-bianka.info/. Enter C398 in the search box to learn more about \"Pelvic Exam: Care Instructions. \" Current as of: February 25, 2016 Content Version: 11.1 © 7364-3595 Healthwise, Incorporated. Care instructions adapted under license by [employer] (which disclaims liability or warranty for this information). If you have questions about a medical condition or this instruction, always ask your healthcare professional. [address] any warranty or liability for your use of this information. Introducing [facility]! Dear Mercedes Elliott: 
Thank you for requesting a NICO account. Our records indicate that you already have an active NICO account. You can access your account anytime at [email or web address] [email or web address] Did you know that you can access your hospital and ER discharge instructions at any time in NICO? You can also review all of your test results from your hospital stay or ER visit. Additional Information If you have questions, please visit the Frequently Asked Questions section of the NICO website at [email or web address] Remember, NICO is NOT to be used for urgent needs. For medical emergencies, dial 911. Now available from your iPhone and Android! Please provide this summary of care documentation to your next provider. Your primary care clinician is listed as Rylee Goldberg. If you have any questions after today's visit, please call [phone].

## 2017-03-08 LAB
BILIRUB UR QL STRIP: NEGATIVE
GLUCOSE UR-MCNC: NEGATIVE MG/DL
KETONES P FAST UR STRIP-MCNC: NEGATIVE MG/DL
PH UR STRIP: 4.6 [PH] (ref 4.6–8)
PROT UR QL STRIP: NEGATIVE MG/DL
SP GR UR STRIP: 1 (ref 1–1.03)
UA UROBILINOGEN AMB POC: NORMAL (ref 0.2–1)
URINALYSIS CLARITY POC: NORMAL
URINALYSIS COLOR POC: NORMAL
URINE BLOOD POC: NEGATIVE
URINE LEUKOCYTES POC: NEGATIVE
URINE NITRITES POC: NEGATIVE

## 2017-03-09 ENCOUNTER — PATIENT MESSAGE (OUTPATIENT)
Dept: OBGYN CLINIC | Age: 22
End: 2017-03-09

## 2017-03-09 LAB
C TRACH RRNA SPEC QL NAA+PROBE: NEGATIVE
N GONORRHOEA RRNA SPEC QL NAA+PROBE: NEGATIVE
T VAGINALIS RRNA SPEC QL NAA+PROBE: POSITIVE

## 2017-03-09 RX ORDER — METRONIDAZOLE 500 MG/1
2000 TABLET ORAL
Qty: 4 TAB | Refills: 0 | Status: SHIPPED | OUTPATIENT
Start: 2017-03-09 | End: 2017-03-09

## 2017-03-16 LAB — HSV SPEC CULT: NORMAL

## 2017-03-16 RX ORDER — AMOXICILLIN 500 MG/1
500 CAPSULE ORAL 3 TIMES DAILY
Qty: 30 CAP | Refills: 0 | Status: SHIPPED | OUTPATIENT
Start: 2017-03-16 | End: 2017-03-26

## 2017-03-17 ENCOUNTER — PATIENT MESSAGE (OUTPATIENT)
Dept: FAMILY MEDICINE CLINIC | Age: 22
End: 2017-03-17

## 2017-03-19 LAB
HSV1 DNA SPEC QL NAA+PROBE: NEGATIVE
HSV2 DNA SPEC QL NAA+PROBE: NEGATIVE
SPECIMEN STATUS REPORT, ROLRST: NORMAL

## 2017-03-20 ENCOUNTER — TELEPHONE (OUTPATIENT)
Dept: OBGYN CLINIC | Age: 22
End: 2017-03-20

## 2017-03-20 RX ORDER — AZITHROMYCIN 1 G/1
1 POWDER, FOR SUSPENSION ORAL
Qty: 1 PACKET | Refills: 0 | Status: SHIPPED | OUTPATIENT
Start: 2017-03-20 | End: 2017-03-20

## 2017-03-20 NOTE — TELEPHONE ENCOUNTER
This nurse advised Lorrie at the pharmacy that it is ok per MD order to change to two 500 mg tabs instead of the powder. Lorrie verified the change.

## 2017-03-20 NOTE — TELEPHONE ENCOUNTER
Message left at 10:21am      24year old patient last seen in the office on 3/17/17    [employer] [place] calling to say that they do not have the powder form for the Zithromax and would it be ok to have the patient take two 500 mg tabs instead.  ( it would also save the patient $20.00) Please advise

## 2017-03-23 RX ORDER — FLUCONAZOLE 150 MG/1
150 TABLET ORAL DAILY
Qty: 2 TAB | Refills: 0 | Status: SHIPPED | OUTPATIENT
Start: 2017-03-23 | End: 2017-04-10

## 2017-03-24 ENCOUNTER — PATIENT MESSAGE (OUTPATIENT)
Dept: OBGYN CLINIC | Age: 22
End: 2017-03-24

## 2017-04-10 RX ORDER — CIPROFLOXACIN 500 MG/1
500 TABLET ORAL 2 TIMES DAILY
Qty: 6 TAB | Refills: 0 | Status: SHIPPED | OUTPATIENT
Start: 2017-04-10 | End: 2017-04-13

## 2017-04-10 NOTE — TELEPHONE ENCOUNTER
From: Celestine Boone  Sent: 2017 2:35 PM EDT  To: Volodymyr Valentin NP  Subject: RE: RE: RE: Non-Urgent Medical Question    Jeffrey rodriguez! Hope all is well! Can you please send me in a script for a uti im having frequent urination and some burning with urination.     ----- Message -----  From: Volodymyr Valentin NP  Sent: 3/17/17, 12:36 PM  To: Mala Mejia  Subject: RE: RE: Non-Urgent Medical Question    Done! Hope you guys feel better! Volodymyr Valentin NP      ----- Message -----   From: Celestine Boone   Sent: 3/17/2017 11:14 AM EDT   To: Volodymyr Valentin NP  Subject: RE: RE: Non-Urgent Medical Question    Haha no problem i understand completely its Stapleton Gale and her  is [birth year]   ----- Message -----  From: Volodymyr Valentin NP  Sent: 3/17/17, 11:13 AM  To: Mala Mejia  Subject: RE: Non-Urgent Medical Question    [id number] [address] What is her name and ? I know you guys by sight but can't remember those types of details :(   Thanks,  Volodymyr Valentin NP        ----- Message -----   From: Celestine Boone   Sent: 3/17/2017 10:52 AM EDT   To: Volodymyr Valentin NP  Subject: Non-Urgent Medical Question    Goodmorning! Yarelis Valenzuela called me in a script for amoxicillin because im not feeling well at all . my mom has all the same things wrong but hers is more progessed then i am. She has fatigue sore throat cough alot of mucus.  I had messaged  asking the same but i forgot she doesnt work

## 2017-04-17 ENCOUNTER — OFFICE VISIT (OUTPATIENT)
Dept: OBGYN CLINIC | Age: 22
End: 2017-04-17

## 2017-04-17 DIAGNOSIS — N89.8 VAGINAL ITCHING: ICD-10-CM

## 2017-04-17 DIAGNOSIS — Z20.2 POSSIBLE EXPOSURE TO STD: Primary | ICD-10-CM

## 2017-04-17 RX ORDER — FLUCONAZOLE 150 MG/1
150 TABLET ORAL DAILY
Qty: 3 TAB | Refills: 0 | Status: SHIPPED | OUTPATIENT
Start: 2017-04-17 | End: 2017-04-20

## 2017-04-17 NOTE — MR AVS SNAPSHOT
Visit Information Date & Time Provider Department Dept. Phone Encounter #  
 4/17/2017 10:30 AM Lamberto Lang MD [employer] [phone] [id number] Your Appointments 5/4/2017 10:45 AM  
LAB with CDE [facility]UR[facility] [address] [place] [address]) Appt Note: LABS  
 [address] [facility] [address]  
[phone]  
  
   
 [address] [facility] [address]  
  
    
 5/11/2017 10:45 AM  
ROUTINE CARE with Michael Burdick MD  
[facility] [address] [place] [address]) Appt Note: 6MO FUDM  
 [address] [facility] 506 [facility]  
  
   
 [address] [facility] [address]  
  
    
 2/26/2018  9:00 AM  
ESTABLISHED PATIENT with Lamberto Lang MD  
[employer] ([address] [place] [address]) Appt Note: ae   FW  
 [address] [facility] [address] [facility] [phone]  
[address] 70 [facility] Upcoming Health Maintenance Date Due  
 HPV AGE 9Y-34Y (1 of 3 - Female 3 Dose Series) 11/28/2006 INFLUENZA AGE 9 TO ADULT 8/1/2016 PAP AKA CERVICAL CYTOLOGY 2/23/2020 Allergies as of 4/17/2017  Review Complete On: 4/17/2017 By: Alvarado Meade Severity Noted Reaction Type Reactions Latex  07/15/2010    Swelling Skin peels Aspirin  06/27/2011    Nausea and Vomiting Betadine [Povidone-iodine]  05/26/2011    Other (comments) Skin peels Current Immunizations  Never Reviewed No immunizations on file. Not reviewed this visit You Were Diagnosed With   
  
 Codes Comments Possible exposure to STD    -  Primary ICD-10-CM: Z20.2 ICD-9-CM: V01.6 Vaginal itching     ICD-10-CM: L29.8 ICD-9-CM: 698.1 Vitals OB Status Smoking Status Injection Current Every Day Smoker Preferred Pharmacy Pharmacy Name Phone [facility] [address], [address] [phone] Your Updated Medication List  
  
   
This list is accurate as of: 4/17/17 11:20 AM.  Always use your most recent med list.  
  
  
  
  
 albuterol 2.5 mg /3 mL (0.083 %) nebulizer solution Commonly known as:  PROVENTIL VENTOLIN  
3 mL by Nebulization route every four (4) hours as needed for Wheezing. aspirin-acetaminophen-caffeine 250-250-65 mg per tablet Commonly known as:  EXCEDRIN ES Take 1 Tab by mouth.  
  
 clonazePAM 1 mg tablet Commonly known as:  Jeani Moulding Take 1 Tab by mouth daily as needed. Must last 30 days. Ok to fill 1/21/17 cyclobenzaprine 10 mg tablet Commonly known as:  FLEXERIL Take 1 Tab by mouth nightly. diphenhydrAMINE 50 mg tablet Commonly known as:  BENADRYL Take 1 tablet 1 hr prior to MRI  
  
 lisinopril 10 mg tablet Commonly known as:  PRINIVIL, ZESTRIL  
TAKE ONE TABLET BY MOUTH DAILY  
  
 loratadine 10 mg Cap Take 10 mg by mouth daily. nystatin-triamcinolone topical cream  
Commonly known as:  MYCOLOG II  
APPLY TO AFFECTED AREA(S) TWO TIMES A DAY  
  
 omeprazole 10 mg capsule Commonly known as:  PRILOSEC  
TAKE ONE CAPSULE BY MOUTH ONCE A DAY  
  
 propranolol 20 mg tablet Commonly known as:  INDERAL  
TAKE ONE TABLET BY MOUTH TWICE A DAY  
  
 ROBITUSSIN A-C  mg/5 mL syrup Generic drug:  codeine-guaiFENesin Take 10 mL by mouth every four (4) hours as needed for Cough. Max Daily Amount: 60 mL. We Performed the Following [address] [id number] Custom] Introducing [facility]! Dear Ely Spencer: 
Thank you for requesting a [employer] account. Our records indicate that you already have an active [employer] account. You can access your account anytime at [email or web address] Did you know that you can access your hospital and ER discharge instructions at any time in [employer]? You can also review all of your test results from your hospital stay or ER visit. Additional Information If you have questions, please visit the Frequently Asked Questions section of the [employer] website at [email or web address] Remember, [employer] is NOT to be used for urgent needs. For medical emergencies, dial 911. Now available from your iPhone and Android! Please provide this summary of care documentation to your next provider. Your primary care clinician is listed as Rylee Goldberg. If you have any questions after today's visit, please call [phone].

## 2017-04-17 NOTE — PROGRESS NOTES
Chief Complaint   Vaginitis and Exposure to STD      HPI  Hilton Fang is a 24 y.o. female who presents for the evaluation of possible STI. No LMP- Amenorrhea on Depo  She also reports vaginal itching. She denies  symptoms at this time. The patient  admits to risk factors for sexually-transmitted infection. She admits to a history of having unprotected intercourse. STD exposure: partner previously diagnosed with chlamydia  Previous STD history: trichomonas    Past Medical History:   Diagnosis Date    Dysmenorrhea     Major depressive disorder, single episode, unspecified     Morbid obesity ([address]     Pituitary abnormality ([address]     Seasonal allergies     Unspecified symptom associated with female genital organs      Past Surgical History:   Procedure Laterality Date    HX WISDOM TEETH EXTRACTION       Social History     Occupational History    Not on file. Social History Main Topics    Smoking status: Current Every Day Smoker     Packs/day: 0.50     Years: 1.00     Types: Cigarettes    Smokeless tobacco: Never Used    Alcohol use No      Comment: former user just sips now and then   [facility] Drug use: No    Sexual activity: Yes     Partners: Male     Birth control/ protection: Injection     Family History   Problem Relation Age of Onset    Alcohol abuse Father     Cancer Paternal Grandmother     Other Mother      endometriosis    Migraines Mother      tumer in brain       Allergies   Allergen Reactions    Latex Swelling     Skin peels    Aspirin Nausea and Vomiting    Betadine [Povidone-Iodine] Other (comments)     Skin peels     Prior to Admission medications    Medication Sig Start Date End Date Taking? Authorizing Provider   ROBITUSSIN A-C  mg/5 mL syrup Take 10 mL by mouth every four (4) hours as needed for Cough. Max Daily Amount: 60 mL. 2/7/17   Jigna Goldberg MD   clonazePAM (KLONOPIN) 1 mg tablet Take 1 Tab by mouth daily as needed. Must last 30 days.   Ok to fill 1/21/17 12/22/16   Fredi Rodriguez NP   diphenhydrAMINE (BENADRYL) 50 mg tablet Take 1 tablet 1 hr prior to MRI 10/18/16   Kevin Teague MD   aspirin-acetaminophen-caffeine [facility]) [phone] mg per tablet Take 1 Tab by mouth. Historical Provider   nystatin-triamcinolone (MYCOLOG II) topical cream APPLY TO AFFECTED AREA(S) TWO TIMES A DAY 8/31/16   Caprice Goldberg MD   albuterol (PROVENTIL VENTOLIN) 2.5 mg /3 mL (0.083 %) nebulizer solution 3 mL by Nebulization route every four (4) hours as needed for Wheezing. 2/25/16   Fredi Rodriguez NP   loratadine 10 mg cap Take 10 mg by mouth daily. Historical Provider   cyclobenzaprine (FLEXERIL) 10 mg tablet Take 1 Tab by mouth nightly. Patient taking differently: Take 10 mg by mouth as needed. 8/25/15   Caprice Goldberg MD   lisinopril (PRINIVIL, ZESTRIL) 10 mg tablet TAKE ONE TABLET BY MOUTH DAILY 7/13/15   Caprice Goldberg MD   propranolol (INDERAL) 20 mg tablet TAKE ONE TABLET BY MOUTH TWICE A DAY 6/10/15   Naomi Beckman MD   omeprazole (PRILOSEC) 10 mg capsule TAKE ONE CAPSULE BY MOUTH ONCE A DAY 1/1/15   Naomi Beckman MD        Review of Systems: History obtained from the patient  Constitutional: negative for weight loss, fever, night sweats  Breast: negative for breast lumps, nipple discharge, galactorrhea  GI: negative for change in bowel habits, abdominal pain, black or bloody stools  : negative for frequency, dysuria, hematuria, vaginal discharge  MSK: negative for back pain, joint pain, muscle pain  Skin: negative for itching, rash, hives  Psych: negative for anxiety, depression, change in mood    Objective: There were no vitals taken for this visit.     PHYSICAL EXAMINATION    Constitutional  · Appearance: well-nourished, well developed, alert, in no acute distress    Gastrointestinal  · Abdominal Examination: abdomen non-tender to palpation, normal bowel sounds, no masses present  · Liver and spleen: no hepatomegaly present, spleen not palpable  · Hernias: no hernias identified    Genitourinary  · External Genitalia: normal appearance for age, no discharge present, no tenderness present, no inflammatory lesions present, no masses present, no atrophy present  · Vagina: normal vaginal vault without central or paravaginal defects, + discharge present, no inflammatory lesions present, no masses present  · Bladder: non-tender to palpation  · Urethra: appears normal  · Cervix: normal   · Uterus: normal size, shape and consistency  · Adnexa: no adnexal tenderness present, no adnexal masses present  · Perineum: perineum within normal limits, no evidence of trauma, no rashes or skin lesions present  · Anus: anus within normal limits, no hemorrhoids present  · Inguinal Lymph Nodes: no lymphadenopathy present    Skin  · General Inspection: no rash, no lesions identified    Neurologic/Psychiatric  · Mental Status:  · Orientation: grossly oriented to person, place and time  · Mood and Affect: mood normal, affect appropriate          Assessment:   Vaginitis, likely yeast, will treat with diflucan and f/u with nuswab    Plan:   GC and chlamydia DNA  probe sent to lab. We discussed STI issues including treatment options, the necessity of treating partners and prevention of transmission. ROV prn if symptoms persist or worsen.

## 2017-04-21 LAB
A VAGINAE DNA VAG QL NAA+PROBE: ABNORMAL SCORE
BVAB2 DNA VAG QL NAA+PROBE: ABNORMAL SCORE
C ALBICANS DNA VAG QL NAA+PROBE: NEGATIVE
C GLABRATA DNA VAG QL NAA+PROBE: NEGATIVE
C TRACH RRNA SPEC QL NAA+PROBE: NEGATIVE
MEGA1 DNA VAG QL NAA+PROBE: ABNORMAL SCORE
N GONORRHOEA RRNA SPEC QL NAA+PROBE: NEGATIVE
T VAGINALIS RRNA SPEC QL NAA+PROBE: NEGATIVE

## 2017-04-21 RX ORDER — METRONIDAZOLE 500 MG/1
500 TABLET ORAL 2 TIMES DAILY
Qty: 14 TAB | Refills: 0 | Status: SHIPPED | OUTPATIENT
Start: 2017-04-21 | End: 2017-04-28

## 2017-04-21 NOTE — TELEPHONE ENCOUNTER
From: Ilsa Knutson  To:  Shivani Kang  Sent: 3/24/2017 2:49 PM EDT  Subject: question    Dr. Viktoriya Barrera said that was fine but nothing oral

## 2017-04-28 ENCOUNTER — TELEPHONE (OUTPATIENT)
Dept: ENDOCRINOLOGY | Age: 22
End: 2017-04-28

## 2017-04-28 DIAGNOSIS — Z13.1 SCREENING FOR DIABETES MELLITUS (DM): ICD-10-CM

## 2017-04-28 DIAGNOSIS — E22.1 HYPERPROLACTINEMIA (HCC): ICD-10-CM

## 2017-04-28 DIAGNOSIS — D35.2 PITUITARY ADENOMA (HCC): Primary | ICD-10-CM

## 2017-05-02 ENCOUNTER — TELEPHONE (OUTPATIENT)
Dept: ENDOCRINOLOGY | Age: 22
End: 2017-05-02

## 2017-05-02 DIAGNOSIS — N91.2 AMENORRHEA: Primary | ICD-10-CM

## 2017-05-02 NOTE — TELEPHONE ENCOUNTER
Patient would like a pregnancy test added to lab orders. Patient would like a call once order has been placed.

## 2017-05-11 ENCOUNTER — OFFICE VISIT (OUTPATIENT)
Dept: ENDOCRINOLOGY | Age: 22
End: 2017-05-11

## 2017-05-11 VITALS
SYSTOLIC BLOOD PRESSURE: 126 MMHG | DIASTOLIC BLOOD PRESSURE: 73 MMHG | HEIGHT: 66 IN | WEIGHT: 293 LBS | TEMPERATURE: 97.5 F | BODY MASS INDEX: 47.09 KG/M2 | RESPIRATION RATE: 16 BRPM | HEART RATE: 81 BPM

## 2017-05-11 DIAGNOSIS — D35.2 PITUITARY ADENOMA (HCC): Primary | ICD-10-CM

## 2017-05-11 DIAGNOSIS — E22.1 HYPERPROLACTINEMIA (HCC): ICD-10-CM

## 2017-05-11 DIAGNOSIS — N64.3 GALACTORRHEA NOT ASSOCIATED WITH CHILDBIRTH: ICD-10-CM

## 2017-05-11 NOTE — PROGRESS NOTES
Ling Benitez MD        [address] 78 [phone] Fax [phone]               Patient Information  Date:5/11/2017  Name : Nila Mejia 24 y.o.     YOB: 1995         Referred by: Zoran Camejo MD         Chief Complaint   Patient presents with    Pituitary Problem       History of Present Illness: Deepak Quezada is a 24 y.o. female here for fu    She was referred by Zoran Camejo MD for evaluation and management of galactorrhea. She was on oral contraceptives in July 2016 for dysmenorrhea. She was switched to Medroxyprogesterone injection. MRI showed small adenoma 7 mm   She thinks she might be pregnant - on Depot provera  Early morning sickness, more galactorrhea  No new meds          Wt Readings from Last 3 Encounters:   05/11/17 300 lb 4.8 oz (136.2 kg)   02/23/17 293 lb (132.9 kg)   02/07/17 290 lb (131.5 kg)       BP Readings from Last 3 Encounters:   05/11/17 126/73   02/23/17 130/80   02/07/17 120/82           Past Medical History:   Diagnosis Date    Dysmenorrhea     Major depressive disorder, single episode, unspecified     Morbid obesity ([address]     Pituitary abnormality ([address]     Seasonal allergies     Unspecified symptom associated with female genital organs      Current Outpatient Prescriptions   Medication Sig    MELATONIN PO Take  by mouth as needed.  clonazePAM (KLONOPIN) 1 mg tablet Take 1 Tab by mouth daily as needed. Must last 30 days. Ok to fill 1/21/17    diphenhydrAMINE (BENADRYL) 50 mg tablet Take 1 tablet 1 hr prior to MRI    aspirin-acetaminophen-caffeine (EXCEDRIN ES) 250-250-65 mg per tablet Take 1 Tab by mouth.  albuterol (PROVENTIL VENTOLIN) 2.5 mg /3 mL (0.083 %) nebulizer solution 3 mL by Nebulization route every four (4) hours as needed for Wheezing.     omeprazole (PRILOSEC) 10 mg capsule TAKE ONE CAPSULE BY MOUTH ONCE A DAY    nystatin-triamcinolone (MYCOLOG II) topical cream APPLY TO AFFECTED AREA(S) TWO TIMES A DAY    loratadine 10 mg cap Take 10 mg by mouth daily.  lisinopril (PRINIVIL, ZESTRIL) 10 mg tablet TAKE ONE TABLET BY MOUTH DAILY    propranolol (INDERAL) 20 mg tablet TAKE ONE TABLET BY MOUTH TWICE A DAY     No current facility-administered medications for this visit. Allergies   Allergen Reactions    Latex Swelling     Skin peels    Aspirin Nausea and Vomiting    Betadine [Povidone-Iodine] Other (comments)     Skin peels         Review of Systems:  - Constitutional Symptoms: no fevers, no chills,   - Eyes: no blurry vision no double vision  - Cardiovascular: no chest pain ,no palpitations  - Neurological: no numbness, tingling, no  headaches  - Psychiatric: no depression no  anxiety  - Endocrine: no heat or cold intolerance    Physical Examination:   Blood pressure 126/73, pulse 81, temperature 97.5 °F (36.4 °C), temperature source Oral, resp. rate 16, height 5' 6\" (1.676 m), weight 300 lb 4.8 oz (136.2 kg). Estimated body mass index is 48.47 kg/(m^2) as calculated from the following:    Height as of this encounter: 5' 6\" (1.676 m). -   Weight as of this encounter: 300 lb 4.8 oz (136.2 kg). - General: pleasant, no distress, good eye contact  - HEENT: no pallor, no periorbital edema, EOMI  - Neck: supple,  - Cardiovascular: regular, normal rate, normal S1 and S2,   - Respiratory: clear to auscultation bilaterally  - Integumentary: ,no edema,  - Neurological: ,alert and oriented  - Psychiatric: normal mood and affect  - Skin: color, texture, turgor normal.       Data Reviewed:     [] Glucose records reviewed. [] See glucose records for details (to be scanned). [] A1C  [] Reviewed labs      Assessment/Plan:     1. Pituitary adenoma ([address]    2.  Hyperprolactinemia ([facility]    3. Galactorrhea not associated with childbirth          She was referred here for evaluation and management of hyperprolactinemia and galactorrhea. She denies pregnancy, estrogen can increase prolactin which is when she had severe galactorrhea  while on oral contraceptives. 7 mm Pituitary microadenoma - could be incidental finding, prolactin is very minimally elevated   Prolactin is stable   TSH nl   Wants to have Pregnancy test     Screening for pituitary hormones - neg            There are no Patient Instructions on file for this visit. Follow-up Disposition:  Return in about 6 months (around 11/11/2017). Thank you for allowing me to participate in the care of this patient.     Solomon Hooker MD      Patient verbalized understanding

## 2017-05-11 NOTE — MR AVS SNAPSHOT
Visit Information Date & Time Provider Department Dept. Phone Encounter #  
 5/11/2017 10:45 AM Ryan Fragoso MD [facility] [phone] [id number] Follow-up Instructions Return in about 6 months (around 11/11/2017). Your Appointments 2/26/2018  9:00 AM  
ESTABLISHED PATIENT with Becky Nickerson MD  
Andrea Bridges ([facility]) Appt Note: ae   FW  
 [address] [facility] [address] [facility] access [phone]  
[facility] [address] Upcoming Health Maintenance Date Due  
 HPV AGE 9Y-34Y (1 of 3 - Female 3 Dose Series) 11/28/2006 Pneumococcal 19-64 Medium Risk (1 of 1 - PPSV23) 11/28/2014 DTaP/Tdap/Td series (1 - Tdap) 11/28/2016 INFLUENZA AGE 9 TO ADULT 8/1/2017 PAP AKA CERVICAL CYTOLOGY 2/23/2020 Allergies as of 5/11/2017  Review Complete On: 5/11/2017 By: Yeni Arizmendi LPN Severity Noted Reaction Type Reactions Latex  07/15/2010    Swelling Skin peels Aspirin  06/27/2011    Nausea and Vomiting Betadine [Povidone-iodine]  05/26/2011    Other (comments) Skin peels Current Immunizations  Never Reviewed No immunizations on file. Not reviewed this visit You Were Diagnosed With   
  
 Codes Comments Pituitary adenoma ([facility]    -  Primary ICD-10-CM: D35.2 ICD-9-CM: 227.3 Vitals BP Pulse Temp Resp Height(growth percentile) Weight(growth percentile) 126/73 (BP 1 Location: Right arm, BP Patient Position: Sitting) 81 97.5 °F (36.4 °C) (Oral) 16 5' 6\" (1.676 m) 300 lb 4.8 oz (136.2 kg) BMI OB Status Smoking Status 48.47 kg/m2 Injection Current Every Day Smoker BMI and BSA Data Body Mass Index Body Surface Area  
 48.47 kg/m 2 2.52 m 2 Preferred Pharmacy Pharmacy Name Phone [facility] [address], [address] [phone] Your Updated Medication List  
  
   
This list is accurate as of: 5/11/17 11:34 AM.  Always use your most recent med list.  
  
  
  
  
 albuterol 2.5 mg /3 mL (0.083 %) nebulizer solution Commonly known as:  PROVENTIL VENTOLIN  
3 mL by Nebulization route every four (4) hours as needed for Wheezing. aspirin-acetaminophen-caffeine 250-250-65 mg per tablet Commonly known as:  EXCEDRIN ES Take 1 Tab by mouth.  
  
 clonazePAM 1 mg tablet Commonly known as:  Mena Ambrose Take 1 Tab by mouth daily as needed. Must last 30 days. Ok to fill 1/21/17  
  
 diphenhydrAMINE 50 mg tablet Commonly known as:  BENADRYL Take 1 tablet 1 hr prior to MRI  
  
 lisinopril 10 mg tablet Commonly known as:  PRINIVIL, ZESTRIL  
TAKE ONE TABLET BY MOUTH DAILY  
  
 loratadine 10 mg Cap Take 10 mg by mouth daily. MELATONIN PO Take  by mouth as needed. nystatin-triamcinolone topical cream  
Commonly known as:  MYCOLOG II  
APPLY TO AFFECTED AREA(S) TWO TIMES A DAY  
  
 omeprazole 10 mg capsule Commonly known as:  PRILOSEC  
TAKE ONE CAPSULE BY MOUTH ONCE A DAY  
  
 propranolol 20 mg tablet Commonly known as:  INDERAL  
TAKE ONE TABLET BY MOUTH TWICE A DAY Follow-up Instructions Return in about 6 months (around 11/11/2017). Introducing [facility] & HEALTH SERVICES! Dear Noe Claros: 
Thank you for requesting a [employer] account. Our records indicate that you already have an active YellowBrck account. You can access your account anytime at [email or web address] Did you know that you can access your hospital and ER discharge instructions at any time in [employer]Brck? You can also review all of your test results from your hospital stay or ER visit. Additional Information If you have questions, please visit the Frequently Asked Questions section of the YellowBrck website at [email or web address] Remember, YellowBrck is NOT to be used for urgent needs.  For medical emergencies, dial 911. Now available from your iPhone and Android! Please provide this summary of care documentation to your next provider. Your primary care clinician is listed as Rylee Goldberg. If you have any questions after today's visit, please call [phone].

## 2017-05-11 NOTE — PROGRESS NOTES
Taina Leija is a 24 y.o. female here for   Chief Complaint   Patient presents with    Pituitary Problem       Wt Readings from Last 3 Encounters:   02/23/17 293 lb (132.9 kg)   02/07/17 290 lb (131.5 kg)   12/22/16 297 lb (134.7 kg)     Temp Readings from Last 3 Encounters:   02/07/17 98.2 °F (36.8 °C) (Oral)   12/22/16 97.8 °F (36.6 °C)   11/11/16 97.3 °F (36.3 °C) (Oral)     BP Readings from Last 3 Encounters:   02/23/17 130/80   02/07/17 120/82   12/22/16 124/88     Pulse Readings from Last 3 Encounters:   02/07/17 78   12/22/16 88   11/11/16 83

## 2017-05-12 ENCOUNTER — PATIENT MESSAGE (OUTPATIENT)
Dept: OBGYN CLINIC | Age: 22
End: 2017-05-12

## 2017-05-12 ENCOUNTER — PATIENT MESSAGE (OUTPATIENT)
Dept: FAMILY MEDICINE CLINIC | Age: 22
End: 2017-05-12

## 2017-05-12 LAB — B-HCG SERPL QL: NEGATIVE MIU/ML

## 2017-05-12 RX ORDER — CIPROFLOXACIN 500 MG/1
500 TABLET ORAL 2 TIMES DAILY
Qty: 6 TAB | Refills: 0 | Status: SHIPPED | OUTPATIENT
Start: 2017-05-12 | End: 2017-05-15

## 2017-05-12 NOTE — TELEPHONE ENCOUNTER
From: Juan Greco  Sent: 5/12/2017 12:11 PM EDT  To: Holli Abreu NP  Subject: RE: RE: Non-Urgent Medical Question      Yes please and thank you  ----- Message -----  From: Holli Abreu NP  Sent: 5/12/17 12:10 PM  To: Mala Mejia  Subject: RE: Non-Urgent Medical Question    Sounds like it could be a UTI, should I send in a medication to the pharmacy to see if it clears it up?       ----- Message -----   From: Juan Greco   Sent: 5/12/2017 10:02 AM EDT   To: Holli Abreu NP  Subject: Non-Urgent Medical Question    Hey dr. Victor! I hope all is well! Im having a few issues at the moment. Im peeing a lot and eula had some dull back pain. And i recently got a weird pain in my clitoris (tmi sorry) but then i had to pee only a few minutes after i have no discharge or anything but my urine is dark and has a strong smell especially in the mornings im not sure if its a uti or what ?!

## 2017-05-16 ENCOUNTER — TELEPHONE (OUTPATIENT)
Dept: FAMILY MEDICINE CLINIC | Age: 22
End: 2017-05-16

## 2017-05-16 NOTE — TELEPHONE ENCOUNTER
----- Message from Amauri Estrella. Jackie sent at 5/16/2017 11:56 AM EDT -----  Regarding: Non-Urgent Medical Question  Contact: [phone]  Jeffrey Arita can you please call me. I need to talk to you.

## 2017-05-16 NOTE — TELEPHONE ENCOUNTER
Called pt, New number [phone]. Reviewed recent lab results with pt from Dr. Amanda Corral. Pt is concerned that nausea and vomiting are unexplained along with spotting after sex.

## 2017-05-17 RX ORDER — FLUCONAZOLE 150 MG/1
150 TABLET ORAL DAILY
Qty: 2 TAB | Refills: 0 | Status: SHIPPED | OUTPATIENT
Start: 2017-05-17 | End: 2017-05-18

## 2017-05-24 ENCOUNTER — TELEPHONE (OUTPATIENT)
Dept: OBGYN CLINIC | Age: 22
End: 2017-05-24

## 2017-05-31 ENCOUNTER — OFFICE VISIT (OUTPATIENT)
Dept: OBGYN CLINIC | Age: 22
End: 2017-05-31

## 2017-05-31 DIAGNOSIS — R11.2 NAUSEA AND VOMITING, INTRACTABILITY OF VOMITING NOT SPECIFIED, UNSPECIFIED VOMITING TYPE: Primary | ICD-10-CM

## 2017-05-31 LAB
HCG URINE, QL. (POC): NEGATIVE
VALID INTERNAL CONTROL?: YES

## 2017-05-31 NOTE — PROGRESS NOTES
Pelvic Pain evaluation    Teri Williamson is a 24 y.o. female who complains of pelvic pain. The pain is described as aching and cramping, and is 6/10 in intensity. Pain is located in the suprapubic without radiation. The pain started a few weeks ago. Her symptoms have been unchanged since. Aggravating factors: none  Alleviating factors: none. Associated symptoms: bloating, spotting, nausea, and vomiting. .   The patient denies diarrhea. She has a previous hisotry of chlamydia, testing less than 1 month ago was negative. Ultrasound followup    Teri Williamson is a 24 y.o. female is here today to review the results of her ultrasound evaluation. Her U/S evaluation is performed because of a previous encounter revealing cramping which was identified a few weeks ago. She is here for an initial ultrasound study. The sonogram: within normal limits. See detailed report for more information. Past Medical History:   Diagnosis Date    Dysmenorrhea     Major depressive disorder, single episode, unspecified     Morbid obesity ([address]     Pituitary abnormality ([address]     Seasonal allergies     Unspecified symptom associated with female genital organs      Past Surgical History:   Procedure Laterality Date    HX WISDOM TEETH EXTRACTION       Social History     Occupational History    Not on file.      Social History Main Topics    Smoking status: Current Every Day Smoker     Packs/day: 0.50     Years: 1.00     Types: Cigarettes    Smokeless tobacco: Never Used    Alcohol use No      Comment: former user just sips now and then   [employer] Drug use: No    Sexual activity: Yes     Partners: Male     Birth control/ protection: Injection     Family History   Problem Relation Age of Onset    Alcohol abuse Father     Cancer Paternal Grandmother     Other Mother      endometriosis    Migraines Mother      tumer in brain       Allergies   Allergen Reactions    Latex Swelling     Skin peels    Aspirin Nausea and Vomiting    Betadine [Povidone-Iodine] Other (comments)     Skin peels     Prior to Admission medications    Medication Sig Start Date End Date Taking? Authorizing Provider   MELATONIN PO Take  by mouth as needed. Historical Provider   clonazePAM (KLONOPIN) 1 mg tablet Take 1 Tab by mouth daily as needed. Must last 30 days. Ok to fill 1/21/17 12/22/16   Tod Fuentes NP   diphenhydrAMINE (BENADRYL) 50 mg tablet Take 1 tablet 1 hr prior to MRI 10/18/16   Rajeev Avalos MD   aspirin-acetaminophen-caffeine [facility]) 747-122-66 mg per tablet Take 1 Tab by mouth. Historical Provider   nystatin-triamcinolone (MYCOLOG II) topical cream APPLY TO AFFECTED AREA(S) TWO TIMES A DAY 8/31/16   Mati Goldberg MD   albuterol (PROVENTIL VENTOLIN) 2.5 mg /3 mL (0.083 %) nebulizer solution 3 mL by Nebulization route every four (4) hours as needed for Wheezing. 2/25/16   Tod Fuentes NP   loratadine 10 mg cap Take 10 mg by mouth daily. Historical Provider   lisinopril (PRINIVIL, ZESTRIL) 10 mg tablet TAKE ONE TABLET BY MOUTH DAILY 7/13/15   Mati Goldberg MD   propranolol (INDERAL) 20 mg tablet TAKE ONE TABLET BY MOUTH TWICE A DAY 6/10/15   Sarai Schwab MD   omeprazole (PRILOSEC) 10 mg capsule TAKE ONE CAPSULE BY MOUTH ONCE A DAY 1/1/15   Sarai Schwab MD        Review of Systems: History obtained from the patient  Constitutional: negative for weight loss, fever, night sweats  Breast: negative for breast lumps, nipple discharge, galactorrhea  GI: negative for change in bowel habits, abdominal pain, black or bloody stools  : negative for frequency, dysuria, hematuria, vaginal discharge  MSK: negative for back pain, joint pain, muscle pain  Skin: negative for itching, rash, hives  Psych: negative for anxiety, depression, change in mood      Objective: There were no vitals taken for this visit.     Physical Exam:     Constitutional  · Appearance: well-nourished, well developed, alert, in no acute distress    Gastrointestinal  · Abdominal Examination: abdomen non-tender to palpation, normal bowel sounds, no masses present  · Liver and spleen: no hepatomegaly present, spleen not palpable  · Hernias: no hernias identified    Genitourinary  · External Genitalia: normal appearance for age, no discharge present, no tenderness present, no inflammatory lesions present, no masses present, no atrophy present  · Vagina: normal vaginal vault without central or paravaginal defects, no discharge present, no inflammatory lesions present, no masses present  · Bladder: non-tender to palpation  · Urethra: appears normal  · Cervix: normal   · Uterus: normal size, shape and consistency  · Adnexa: no adnexal tenderness present, no adnexal masses present  · Perineum: perineum within normal limits, no evidence of trauma, no rashes or skin lesions present  · Anus: anus within normal limits, no hemorrhoids present  · Inguinal Lymph Nodes: no lymphadenopathy present    Skin  · General Inspection: no rash, no lesions identified    Neurologic/Psychiatric  · Mental Status:  · Orientation: grossly oriented to person, place and time  · Mood and Affect: mood normal, affect appropriate    Assessment/Plan:  Pelvic Pain- discussed differential.  Discussed f/u with pcp due to nausea/vomiting/etc.  Normal ultrasound. Silver nitrate applied to cervix for postcoital spotting. If the recurs then will rto for repeat gc/chl.  Rec ibuprofen/warm compresses for pelvic discomfort. RTO prn if symptoms persist or worsen. Instructions given to pt. Handouts given to pt.

## 2017-05-31 NOTE — MR AVS SNAPSHOT
Visit Information Date & Time Provider Department Dept. Phone Encounter #  
 5/31/2017  1:10 PM MD Andrea Gomez [phone] [id number] Your Appointments 2/26/2018  9:00 AM  
ESTABLISHED PATIENT with MD Andrea Gomez ([facility]) Appt Note: ae   FW  
 [address] [facility] [address] [facility] [address]  
[address] Upcoming Health Maintenance Date Due  
 HPV AGE 9Y-34Y (1 of 3 - Female 3 Dose Series) 11/28/2006 INFLUENZA AGE 9 TO ADULT 8/1/2017 PAP AKA CERVICAL CYTOLOGY 2/23/2020 Allergies as of 5/31/2017  Review Complete On: 5/11/2017 By: Ambrose Chapa MD  
  
 Severity Noted Reaction Type Reactions Latex  07/15/2010    Swelling Skin peels Aspirin  06/27/2011    Nausea and Vomiting Betadine [Povidone-iodine]  05/26/2011    Other (comments) Skin peels Current Immunizations  Never Reviewed No immunizations on file. Not reviewed this visit Vitals OB Status Smoking Status Injection Current Every Day Smoker Preferred Pharmacy Pharmacy Name Phone [facility] [address], [address] [phone] Your Updated Medication List  
  
   
This list is accurate as of: 5/31/17  1:12 PM.  Always use your most recent med list.  
  
  
  
  
 albuterol 2.5 mg /3 mL (0.083 %) nebulizer solution Commonly known as:  PROVENTIL VENTOLIN  
3 mL by Nebulization route every four (4) hours as needed for Wheezing. aspirin-acetaminophen-caffeine 250-250-65 mg per tablet Commonly known as:  EXCEDRIN ES Take 1 Tab by mouth.  
  
 clonazePAM 1 mg tablet Commonly known as:  Christie Luisana Take 1 Tab by mouth daily as needed. Must last 30 days. Ok to fill 1/21/17  
  
 diphenhydrAMINE 50 mg tablet Commonly known as:  BENADRYL Take 1 tablet 1 hr prior to MRI  
  
 lisinopril 10 mg tablet Commonly known as:  PRINIVIL, ZESTRIL  
TAKE ONE TABLET BY MOUTH DAILY  
  
 loratadine 10 mg Cap Take 10 mg by mouth daily. MELATONIN PO Take  by mouth as needed. nystatin-triamcinolone topical cream  
Commonly known as:  MYCOLOG II  
APPLY TO AFFECTED AREA(S) TWO TIMES A DAY  
  
 omeprazole 10 mg capsule Commonly known as:  PRILOSEC  
TAKE ONE CAPSULE BY MOUTH ONCE A DAY  
  
 propranolol 20 mg tablet Commonly known as:  INDERAL  
TAKE ONE TABLET BY MOUTH TWICE A DAY Introducing [facility] & [facility] SERVICES! Dear Bibiana Johnson: 
Thank you for requesting a [employer] account. Our records indicate that you already have an active [employer] account. You can access your account anytime at [email or web address] Did you know that you can access your hospital and ER discharge instructions at any time in [employer]? You can also review all of your test results from your hospital stay or ER visit. Additional Information If you have questions, please visit the Frequently Asked Questions section of the [employer] website at [email or web address] Remember, [employer] is NOT to be used for urgent needs. For medical emergencies, dial 911. Now available from your iPhone and Android! Please provide this summary of care documentation to your next provider. Your primary care clinician is listed as Rylee Goldberg. If you have any questions after today's visit, please call [phone].

## 2017-06-05 RX ORDER — FLUCONAZOLE 150 MG/1
150 TABLET ORAL DAILY
Qty: 1 TAB | Refills: 0 | Status: SHIPPED | OUTPATIENT
Start: 2017-06-05 | End: 2017-06-06

## 2017-06-05 NOTE — TELEPHONE ENCOUNTER
From: Tree Chung  Sent: 6/5/2017 9:59 AM EDT  To: Surinder Michael De La Cruz  Subject: RE: RE: RE: RE: RE: RE: RE: RE: RE: RE: Non-Urgent Medical Question      Did you ever send in the medicine for the yeast infection?  ----- Message -----  From: Tahir Harris MD  Sent: 6/1/17 10:01 AM  To: Mala Mejia  Subject: RE: RE: RE: RE: RE: RE: RE: RE: RE: Non-Urgent Medical Question    Some people may have spotting after an exam. You should not be passing tissue, it is likely your discharge that you are mistaking for tissue.    ----- Message -----   From: Tree Chung   Sent: 6/1/2017 9:16 AM EDT   To: Tahir Harris MD  Subject: RE: RE: RE: RE: RE: RE: RE: RE: RE: Non-Urgent Medical Question    Is the bleeding passing some tissue normal after the exam?     ----- Message -----  From: Lowell Robledo  Sent: 5/31/17 2:46 PM  To: Mala Mejia  Subject: RE: RE: RE: RE: RE: RE: RE: RE: Non-Urgent Medical Question    No     ----- Message -----   From: Tree Chung   Sent: 5/31/2017 2:44 PM EDT   To: Tahir Harris MD  Subject: RE: RE: RE: RE: RE: RE: RE: RE: Non-Urgent Medical Question      Are there any side effects from the thing she put on my cervix  ----- Message -----  From: Lowell Robledo  Sent: 5/31/17 2:43 PM  To: Mala Mejia  Subject: RE: RE: RE: RE: RE: RE: RE: Non-Urgent Medical Question    Some people do having more cramping after an exam but feeling faint is not a common reaction from the exam     ----- Message -----   From: Tree Chung   Sent: 5/31/2017 2:37 PM EDT   To: Tahir Harris MD  Subject: RE: RE: RE: RE: RE: RE: RE: Non-Urgent Medical Question    Quentin cuellar is it normal to feel a little faint and very crampy after    ----- Message -----  From: Lowell Robledo  Sent: 5/26/17 10:03 AM  To:  Mala Mejia  Subject: RE: RE: RE: RE: RE: RE: Non-Urgent Medical Question    [id number] [address] we will see you on Wednesday     ----- Message -----   From: Tree Chung   Sent: 5/26/2017 9:58 AM EDT   To: Genevieve Shipley MD  Subject: RE: RE: RE: RE: RE: RE: Non-Urgent Medical Question    I havent taken a blood but im still experiencing the nausea vomitting headaches fatigue celia gained weight and my stomach is getting hard. Everything is still going on im even having extreme cravings and stuff. Some spotting here and there. Spotting after intercourse and an orgasm but theres no pain. Celia taken urine tests but all say negative i wasnt spotting till that thing came out i do have an ultra sound on wednesday and then a follow up right after and i think im due in mari may be july i got my last shot at the beginning of april    ----- Message -----  From: NoWait Route  Sent: 5/26/17 9:49 AM  To: Mala Mejia  Subject: RE: RE: RE: RE: RE: Non-Urgent Medical Question    [id number] [address] like I said that can be from several different things. . Have you taken a pregnancy test? When are you due for your next depo? You have an appt with an ultrasound next week right?     ----- Message -----   From: Celestine Boone   Sent: 5/26/2017 9:30 AM EDT   To: Genevieve Shipley MD  Subject: RE: RE: RE: RE: RE: Non-Urgent Medical Question    Im also spotting    ----- Message -----  From: NoWait Route  Sent: 5/26/17 7:28 AM  To: Mala Mejia  Subject: RE: RE: RE: RE: Non-Urgent Medical Question    It could be a lot if different things like the depo shot, UTI, infection, etc. You can make an appt with the work in doctor as Dr. Jovany Bettencourt is out of the office today    ----- Message -----   From: Celestine Boone   Sent: 5/25/2017 5:15 PM EDT   To: Genevieve Shipley MD  Subject: RE: RE: RE: RE: Non-Urgent Medical Question    Brendantarik Macario cuellar! Weird question. I just peed and some weird stuff came out i have a picture also this morning i had a few stringly like clots in my pee? I was cramping yesterday and today celia been dizzy with a headache.  Can i send y'all the pictures    ----- Message -----  From: Chepe Route  Sent: 5/15/17 8:30 AM  To: Mala Mejia  Subject: RE: RE: RE: Non-Urgent Medical Question    Yea whatever works best for you     ----- Message -----   From: Fermin Bolanos   Sent: 5/15/2017 8:22 AM EDT   To: Aslhey Steinberg MD  Subject: RE: RE: RE: Non-Urgent Medical Question    Go back to her or can i go to my primary care? Cause theyre A LOT cheaper on bills lol    ----- Message -----  From: Forest Newton  Sent: 5/15/17 8:20 AM  To: Mala Mejia  Subject: RE: RE: Non-Urgent Medical Question    I would have that doctor repeat your pregnancy test then we will go from there     ----- Message -----   From: Fermin Bolanos   Sent: 5/15/2017 7:48 AM EDT   To: Ashley Steinberg MD  Subject: RE: RE: Non-Urgent Medical Question    Xiomara Bustamante my hcg level was a 6 when my other doctor did it. And she told me to redo it in a few days should i just go ahead with the internal ultrasound? The main thing thats freaking me out is after intercourse im passing tissue and spotting    ----- Message -----  From: Forest Newton  Sent: 5/15/17 7:36 AM  To: Mala Mejia  Subject: RE: Non-Urgent Medical Question    You can make an appt with an ultrasound and we can go from there     ----- Message -----   From: Fermin Bolanos   Sent: 5/12/2017 8:01 PM EDT   To: Ashley Steinberg MD  Subject: Non-Urgent Medical Question    I forgot to also tell you after intercourse or an orgasm im spotting and when i pee i have small tissue like blood clots coming out? ! The spotting isnt from a tear its coming from inside inside. Im constantly tired and thirsty and hungry and i was checked for diabetes and i dont have that. Dizzy spells occasionally also. My boobs are leaking more then normal. And my prolactin is only a little elevated and i had constipation last week.

## 2017-06-13 RX ORDER — FLUCONAZOLE 150 MG/1
150 TABLET ORAL DAILY
Qty: 3 TAB | Refills: 0 | Status: SHIPPED | OUTPATIENT
Start: 2017-06-13 | End: 2017-06-16

## 2017-06-27 ENCOUNTER — OFFICE VISIT (OUTPATIENT)
Dept: OBGYN CLINIC | Age: 22
End: 2017-06-27

## 2017-06-27 DIAGNOSIS — R30.0 DYSURIA: ICD-10-CM

## 2017-06-27 DIAGNOSIS — N89.8 VAGINAL IRRITATION: Primary | ICD-10-CM

## 2017-06-27 DIAGNOSIS — Z20.2 STD EXPOSURE: ICD-10-CM

## 2017-06-27 DIAGNOSIS — N89.8 VAGINAL LESION: ICD-10-CM

## 2017-06-27 RX ORDER — TERCONAZOLE 4 MG/G
1 CREAM VAGINAL
Qty: 1 TUBE | Refills: 0 | Status: SHIPPED | OUTPATIENT
Start: 2017-06-27 | End: 2017-07-04

## 2017-06-27 NOTE — MR AVS SNAPSHOT
Visit Information Date & Time Provider Department Dept. Phone Encounter #  
 6/27/2017  2:20 PM MD Andrea Esposito [phone] [id number] Your Appointments 2/26/2018  9:00 AM  
ESTABLISHED PATIENT with MD Andrea Esposito ([address]) Appt Note: ae   FW  
 [address] [facility] [address] [facility] [id number]  
[facility] [address] Upcoming Health Maintenance Date Due  
 HPV AGE 9Y-34Y (1 of 3 - Female 3 Dose Series) 11/28/2006 INFLUENZA AGE 9 TO ADULT 8/1/2017 PAP AKA CERVICAL CYTOLOGY 2/23/2020 Allergies as of 6/27/2017  Review Complete On: 6/27/2017 By: Tahir Harris MD  
  
 Severity Noted Reaction Type Reactions Latex  07/15/2010    Swelling Skin peels Aspirin  06/27/2011    Nausea and Vomiting Betadine [Povidone-iodine]  05/26/2011    Other (comments) Skin peels Current Immunizations  Never Reviewed No immunizations on file. Not reviewed this visit You Were Diagnosed With   
  
 Codes Comments STD exposure    -  Primary ICD-10-CM: Z20.2 ICD-9-CM: V01.6 Vaginal irritation     ICD-10-CM: N89.8 ICD-9-CM: 623.9 Dysuria     ICD-10-CM: R30.0 ICD-9-CM: 933. 1 Vitals OB Status Smoking Status Injection Current Every Day Smoker Preferred Pharmacy Pharmacy Name Phone [facility] [address] , [address]  [phone] Your Updated Medication List  
  
   
This list is accurate as of: 6/27/17  2:42 PM.  Always use your most recent med list.  
  
  
  
  
 albuterol 2.5 mg /3 mL (0.083 %) nebulizer solution Commonly known as:  PROVENTIL VENTOLIN  
3 mL by Nebulization route every four (4) hours as needed for Wheezing. aspirin-acetaminophen-caffeine 250-250-65 mg per tablet Commonly known as:  EXCEDRIN ES  
 Take 1 Tab by mouth.  
  
 clonazePAM 1 mg tablet Commonly known as:  Jon Schlossman Take 1 Tab by mouth daily as needed. Must last 30 days. Ok to fill 1/21/17  
  
 diphenhydrAMINE 50 mg tablet Commonly known as:  BENADRYL Take 1 tablet 1 hr prior to MRI  
  
 lisinopril 10 mg tablet Commonly known as:  PRINIVIL, ZESTRIL  
TAKE ONE TABLET BY MOUTH DAILY  
  
 loratadine 10 mg Cap Take 10 mg by mouth daily. MELATONIN PO Take  by mouth as needed. nystatin-triamcinolone topical cream  
Commonly known as:  MYCOLOG II  
APPLY TO AFFECTED AREA(S) TWO TIMES A DAY  
  
 omeprazole 10 mg capsule Commonly known as:  PRILOSEC  
TAKE ONE CAPSULE BY MOUTH ONCE A DAY  
  
 propranolol 20 mg tablet Commonly known as:  INDERAL  
TAKE ONE TABLET BY MOUTH TWICE A DAY We Performed the Following AMB POC URINALYSIS DIP STICK AUTO W/O MICRO [16331 CPT(R)] HEP B SURFACE AG [id number] CPT(R)] HEPATITIS C AB [13781 CPT(R)] HIV 1/2 AG/AB, 4TH GENERATION,W RFLX CONFIRM [[id number] Custom] [address] [id number] Custom] RPR [75931 CPT(R)] Introducing [facility] & HEALTH SERVICES! Dear Hinton Nyhan: 
Thank you for requesting a Connecture account. Our records indicate that you already have an active Connecture account. You can access your account anytime at [email or web address] Did you know that you can access your hospital and ER discharge instructions at any time in Connecture? You can also review all of your test results from your hospital stay or ER visit. Additional Information If you have questions, please visit the Frequently Asked Questions section of the Connecture website at [email or web address] Remember, Connecture is NOT to be used for urgent needs. For medical emergencies, dial 911. Now available from your iPhone and Android! Please provide this summary of care documentation to your next provider. Your primary care clinician is listed as Rylee Goldberg. If you have any questions after today's visit, please call [phone].

## 2017-06-27 NOTE — PROGRESS NOTES
Chief Complaint   Vaginitis and Exposure to STD      HPI  24 y.o. female complains of scant vaginal discharge. .No LMP recorded. Patient has had an injection. She admits to additional symptoms at this time. Itching, irritation, and odor one time after IC  The patient  denies aggravating factors  She denies exposure to new chemicals ot hygenic agents  Previous treatment included:  None  Pt also wants her urine checked because she has pan around hr urethra before urination sometimes    Past Medical History:   Diagnosis Date    Dysmenorrhea     Major depressive disorder, single episode, unspecified     Morbid obesity ([address]     Pituitary abnormality ([facility] [address]     Seasonal allergies     Unspecified symptom associated with female genital organs      Past Surgical History:   Procedure Laterality Date    HX WISDOM TEETH EXTRACTION       Social History     Occupational History    Not on file. Social History Main Topics    Smoking status: Current Every Day Smoker     Packs/day: 0.50     Years: 1.00     Types: Cigarettes    Smokeless tobacco: Never Used    Alcohol use No      Comment: former user just sips now and then   [employer] Drug use: No    Sexual activity: Yes     Partners: Male     Birth control/ protection: Injection     Family History   Problem Relation Age of Onset    Alcohol abuse Father     Cancer Paternal Grandmother     Other Mother      endometriosis    Migraines Mother      tumer in brain        Allergies   Allergen Reactions    Latex Swelling     Skin peels    Aspirin Nausea and Vomiting    Betadine [Povidone-Iodine] Other (comments)     Skin peels     Prior to Admission medications    Medication Sig Start Date End Date Taking? Authorizing Provider   MELATONIN PO Take  by mouth as needed. Historical Provider   clonazePAM (KLONOPIN) 1 mg tablet Take 1 Tab by mouth daily as needed. Must last 30 days.   Ok to fill 1/21/17 12/22/16   Marika Lockwood NP   diphenhydrAMINE (BENADRYL) 50 mg tablet Take 1 tablet 1 hr prior to MRI 10/18/16   Nicolas Adler MD   aspirin-acetaminophen-caffeine [facility]) [phone] mg per tablet Take 1 Tab by mouth. Historical Provider   nystatin-triamcinolone (MYCOLOG II) topical cream APPLY TO AFFECTED AREA(S) TWO TIMES A DAY 8/31/16   Leanne Goldberg MD   albuterol (PROVENTIL VENTOLIN) 2.5 mg /3 mL (0.083 %) nebulizer solution 3 mL by Nebulization route every four (4) hours as needed for Wheezing. 2/25/16   Arsen Mendoza NP   loratadine 10 mg cap Take 10 mg by mouth daily. Historical Provider   lisinopril (PRINIVIL, ZESTRIL) 10 mg tablet TAKE ONE TABLET BY MOUTH DAILY 7/13/15   Leanne Goldberg MD   propranolol (INDERAL) 20 mg tablet TAKE ONE TABLET BY MOUTH TWICE A DAY 6/10/15   Diana Rivera MD   omeprazole (PRILOSEC) 10 mg capsule TAKE ONE CAPSULE BY MOUTH ONCE A DAY 1/1/15   Diana Rivera MD                      Review of Systems - History obtained from the patient  Constitutional: negative for weight loss, fever, night sweats  Breast: negative for breast lumps, nipple discharge, galactorrhea  GI: negative for change in bowel habits, abdominal pain, black or bloody stools  : negative for frequency, dysuria, hematuria  MSK: negative for back pain, joint pain, muscle pain  Skin: negative for itching, rash, hives  Neuro: negative for dizziness, headache, confusion, weakness  Psych: negative for anxiety, depression, change in mood  Heme/lymph: negative for bleeding, bruising, pallor       Objective: There were no vitals taken for this visit.     Physical Exam:   PHYSICAL EXAMINATION    Constitutional  · Appearance: well-nourished, well developed, alert, in no acute distress    HENT  · Head and Face: appears normal    Genitourinary  · External Genitalia: normal appearance for age, + discharge present,  tenderness to clitoral fuller present, erythema present throughout, no atrophy present  · Vagina:  + discharge present, otherwise normal vaginal vault without central or paravaginal defects, no inflammatory lesions present, no masses present  · Bladder: non-tender to palpation  · Urethra: appears normal  · Cervix: normal   · Uterus: normal size, shape and consistency  · Adnexa: no adnexal tenderness present, no adnexal masses present  · Perineum: perineum within normal limits, no evidence of trauma, no rashes or skin lesions present  · Anus: anus within normal limits, no hemorrhoids present  · Inguinal Lymph Nodes: no lymphadenopathy present    Skin  · General Inspection: no rash, no lesions identified    Neurologic/Psychiatric  · Mental Status:  · Orientation: grossly oriented to person, place and time  · Mood and Affect: mood normal, affect appropriate    Assessment/Plan:   Vaginitis, will f/u with nuswab, repeat std testing. Exam consistent with yeast, will treat with terazol, HSV culture sent from clitoral fuller which is the area of most discomfort. Dysuria- will send urine for culture  Requests to switch back to seasonale for aub on depoprovera. ROV prn if symptoms persist or worsen.

## 2017-06-28 LAB
HBV SURFACE AG SERPL QL IA: NEGATIVE
HCV AB S/CO SERPL IA: <0.1 S/CO RATIO (ref 0–0.9)
HIV 1+2 AB+HIV1 P24 AG SERPL QL IA: NON REACTIVE
RPR SER QL: NON REACTIVE

## 2017-06-29 ENCOUNTER — OFFICE VISIT (OUTPATIENT)
Dept: FAMILY MEDICINE CLINIC | Age: 22
End: 2017-06-29

## 2017-06-29 VITALS
TEMPERATURE: 99.3 F | HEIGHT: 66 IN | BODY MASS INDEX: 47.09 KG/M2 | SYSTOLIC BLOOD PRESSURE: 151 MMHG | OXYGEN SATURATION: 99 % | WEIGHT: 293 LBS | RESPIRATION RATE: 16 BRPM | HEART RATE: 99 BPM | DIASTOLIC BLOOD PRESSURE: 86 MMHG

## 2017-06-29 DIAGNOSIS — J45.901 ASTHMA EXACERBATION: Primary | ICD-10-CM

## 2017-06-29 RX ORDER — LEVONORGESTREL AND ETHINYL ESTRADIOL 0.15-0.03
1 KIT ORAL DAILY
Qty: 1 PACKAGE | Refills: 4 | Status: SHIPPED | OUTPATIENT
Start: 2017-06-29 | End: 2018-04-03

## 2017-06-29 RX ORDER — ALBUTEROL SULFATE 0.83 MG/ML
2.5 SOLUTION RESPIRATORY (INHALATION)
Qty: 24 EACH | Refills: 2 | Status: SHIPPED | OUTPATIENT
Start: 2017-06-29 | End: 2019-11-08

## 2017-06-29 RX ORDER — PREDNISONE 20 MG/1
TABLET ORAL
Qty: 10 TAB | Refills: 0 | Status: SHIPPED | OUTPATIENT
Start: 2017-06-29 | End: 2017-06-29 | Stop reason: SDUPTHER

## 2017-06-29 RX ORDER — PREDNISONE 20 MG/1
TABLET ORAL
Qty: 10 TAB | Refills: 0 | Status: SHIPPED | OUTPATIENT
Start: 2017-06-29 | End: 2017-07-03 | Stop reason: SDUPTHER

## 2017-06-29 NOTE — PATIENT INSTRUCTIONS

## 2017-06-29 NOTE — MR AVS SNAPSHOT
Visit Information Date & Time Provider Department Dept. Phone Encounter #  
 6/29/2017 10:00 AM Drea Morocho NP [address] [facility] [phone] [id number] Your Appointments 2/26/2018  9:00 AM  
ESTABLISHED PATIENT with MD Andrea Brito ([facility]) Appt Note: ae   FW  
 [address] [facility] [address] [place] [address]  
[place] [address] Upcoming Health Maintenance Date Due  
 HPV AGE 9Y-34Y (1 of 3 - Female 3 Dose Series) 11/28/2006 Pneumococcal 19-64 Medium Risk (1 of 1 - PPSV23) 11/28/2014 DTaP/Tdap/Td series (1 - Tdap) 11/28/2016 INFLUENZA AGE 9 TO ADULT 8/1/2017 PAP AKA CERVICAL CYTOLOGY 2/23/2020 Allergies as of 6/29/2017  Review Complete On: 6/29/2017 By: Drea Morocho NP Severity Noted Reaction Type Reactions Latex  07/15/2010    Swelling Skin peels Aspirin  06/27/2011    Nausea and Vomiting Betadine [Povidone-iodine]  05/26/2011    Other (comments) Skin peels Current Immunizations  Never Reviewed No immunizations on file. Not reviewed this visit You Were Diagnosed With   
  
 Codes Comments Asthma exacerbation    -  Primary ICD-10-CM: A87.322 ICD-9-CM: 112.90 Vitals BP Pulse Temp Resp Height(growth percentile) Weight(growth percentile) 151/86 99 99.3 °F (37.4 °C) (Oral) 16 5' 6\" (1.676 m) 302 lb (137 kg) SpO2 BMI OB Status Smoking Status 99% 48.74 kg/m2 Injection Current Every Day Smoker BMI and BSA Data Body Mass Index Body Surface Area 48.74 kg/m 2 2.53 m 2 Preferred Pharmacy Pharmacy Name Phone Ang Barbara [address], [address] [phone] Your Updated Medication List  
  
   
This list is accurate as of: 6/29/17 10:36 AM.  Always use your most recent med list.  
  
  
  
  
 albuterol 2.5 mg /3 mL (0.083 %) nebulizer solution Commonly known as:  PROVENTIL VENTOLIN  
3 mL by Nebulization route every four (4) hours as needed for Wheezing. aspirin-acetaminophen-caffeine 250-250-65 mg per tablet Commonly known as:  EXCEDRIN ES Take 1 Tab by mouth.  
  
 clonazePAM 1 mg tablet Commonly known as:  Darnella Sermons Take 1 Tab by mouth daily as needed. Must last 30 days. Ok to fill 1/21/17  
  
 diphenhydrAMINE 50 mg tablet Commonly known as:  BENADRYL Take 1 tablet 1 hr prior to MRI  
  
 levonorgestrel-ethinyl estradiol 0.15 mg-30 mcg 3mpk Commonly known as:  Glean Rani Take 1 Tab by mouth daily. lisinopril 10 mg tablet Commonly known as:  PRINIVIL, ZESTRIL  
TAKE ONE TABLET BY MOUTH DAILY  
  
 loratadine 10 mg Cap Take 10 mg by mouth daily. MELATONIN PO Take  by mouth as needed. nystatin-triamcinolone topical cream  
Commonly known as:  MYCOLOG II  
APPLY TO AFFECTED AREA(S) TWO TIMES A DAY  
  
 omeprazole 10 mg capsule Commonly known as:  PRILOSEC  
TAKE ONE CAPSULE BY MOUTH ONCE A DAY  
  
 predniSONE 20 mg tablet Commonly known as:  Euel Salaam Take 1 po TID x 2 days, then 1 po BID x 2 days, then 1 po every day  
  
 propranolol 20 mg tablet Commonly known as:  INDERAL  
TAKE ONE TABLET BY MOUTH TWICE A DAY  
  
 terconazole 0.4 % vaginal cream  
Commonly known as:  TERAZOL 7 Insert 1 Applicator into vagina nightly for 7 days. Prescriptions Printed Refills  
 albuterol (PROVENTIL VENTOLIN) 2.5 mg /3 mL (0.083 %) nebulizer solution 2 Sig: 3 mL by Nebulization route every four (4) hours as needed for Wheezing. Class: Print Route: Nebulization Prescriptions Sent to Pharmacy Refills  
 predniSONE (DELTASONE) 20 mg tablet 0 Sig: Take 1 po TID x 2 days, then 1 po BID x 2 days, then 1 po every day  Class: Normal  
 Pharmacy: [facility] [address] [place] [address] GARY  #: [phone] Patient Instructions Asthma Attack: Care Instructions Your Care Instructions During an asthma attack, the airways swell and narrow. This makes it hard to breathe. Severe asthma attacks can be life-threatening, but you can help prevent them by keeping your asthma under control and treating symptoms before they get bad. Symptoms include being short of breath, having chest tightness, coughing, and wheezing. Noting and treating these symptoms can also help you avoid future trips to the emergency room. The doctor has checked you carefully, but problems can develop later. If you notice any problems or new symptoms, get medical treatment right away. Follow-up care is a key part of your treatment and safety. Be sure to make and go to all appointments, and call your doctor if you are having problems. It's also a good idea to know your test results and keep a list of the medicines you take. How can you care for yourself at home? · Follow your asthma action plan to prevent and treat attacks. If you don't have an asthma action plan, work with your doctor to create one. · Take your asthma medicines exactly as prescribed. Talk to your doctor right away if you have any questions about how to take them. ¨ Use your quick-relief medicine when you have symptoms of an attack. Quick-relief medicine is usually an albuterol inhaler. Some people need to use quick-relief medicine before they exercise. ¨ Take your controller medicine every day, not just when you have symptoms. Controller medicine is usually an inhaled corticosteroid. The goal is to prevent problems before they occur. Don't use your controller medicine to treat an attack that has already started. It doesn't work fast enough to help. ¨ If your doctor prescribed corticosteroid pills to use during an attack, take them exactly as prescribed.  It may take hours for the pills to work, but they may make the episode shorter and help you breathe better. ¨ Keep your quick-relief medicine with you at all times. · Talk to your doctor before using other medicines. Some medicines, such as aspirin, can cause asthma attacks in some people. · If you have a peak flow meter, use it to check how well you are breathing. This can help you predict when an asthma attack is going to occur. Then you can take medicine to prevent the asthma attack or make it less severe. · Do not smoke or allow others to smoke around you. Avoid smoky places. Smoking makes asthma worse. If you need help quitting, talk to your doctor about stop-smoking programs and medicines. These can increase your chances of quitting for good. · Learn what triggers an asthma attack for you, and avoid the triggers when you can. Common triggers include colds, smoke, air pollution, dust, pollen, mold, pets, cockroaches, stress, and cold air. · Avoid colds and the flu. Get a pneumococcal vaccine shot. If you have had one before, ask your doctor if you need a second dose. Get a flu vaccine every fall. If you must be around people with colds or the flu, wash your hands often. When should you call for help? Call 911 anytime you think you may need emergency care. For example, call if: 
· You have severe trouble breathing. Call your doctor now or seek immediate medical care if: 
· Your symptoms do not get better after you have followed your asthma action plan. · You have new or worse trouble breathing. · Your coughing and wheezing get worse. · You cough up dark brown or bloody mucus (sputum). · You have a new or higher fever. Watch closely for changes in your health, and be sure to contact your doctor if: 
· You need to use quick-relief medicine on more than 2 days a week (unless it is just for exercise). · You cough more deeply or more often, especially if you notice more mucus or a change in the color of your mucus. · You are not getting better as expected. Where can you learn more? Go to http://sathya-bianka.info/. Enter J317 in the search box to learn more about \"Asthma Attack: Care Instructions. \" Current as of: March 25, 2017 Content Version: 11.3 © 1571-0334 [employer]. Care instructions adapted under license by [employer] (which disclaims liability or warranty for this information). If you have questions about a medical condition or this instruction, always ask your healthcare professional. [address] any warranty or liability for your use of this information. Introducing [facility] & [facility]! Dear Claudia Morrell: 
Thank you for requesting a Tradier account. Our records indicate that you already have an active Tradier account. You can access your account anytime at [email or web address] Did you know that you can access your hospital and ER discharge instructions at any time in Tradier? You can also review all of your test results from your hospital stay or ER visit. Additional Information If you have questions, please visit the Frequently Asked Questions section of the Tradier website at [email or web address] Remember, Tradier is NOT to be used for urgent needs. For medical emergencies, dial 911. Now available from your iPhone and Android! Please provide this summary of care documentation to your next provider. Your primary care clinician is listed as Rylee Goldberg. If you have any questions after today's visit, please call [phone].

## 2017-06-29 NOTE — LETTER
6/29/2017 10:35 AM 
 
Ms. Fermin Bolanos [address] [address] Please excuse Ms. Mejia from work today due to illness. Sincerely, Checo Espinoza NP

## 2017-06-29 NOTE — PROGRESS NOTES
Chief Complaint   Patient presents with    Cough     Was around mold x 5 days     she is a 24y.o. year old female who presents for evalution. Was around mold last weekend, at friend's house for 2 days and lots of mold throughout house. Then came home and in apartment had water leak and had water damage and mold in wall as well. Went back to apartment last night, woke up this morning and feels extreme chest tightness. Dry cough for past 2 days. Terrible headaches as well. Bad sore throat. Reviewed PmHx, RxHx, FmHx, SocHx, AllgHx and updated and dated in the chart. Review of Systems - negative except as listed above in the HPI    Objective:     Vitals:    06/29/17 1017   BP: 151/86   Pulse: 99   Resp: 16   Temp: 99.3 °F (37.4 °C)   TempSrc: Oral   SpO2: 99%   Weight: 302 lb (137 kg)   Height: 5' 6\" (1.676 m)     Physical Examination: General appearance - alert, well appearing, and in no distress  Ears - bilateral TM's and external ear canals normal  Nose - mucosal congestion, mucosal erythema and sinus tenderness noted maxillary and frontal   Mouth - mucous membranes moist, pharynx normal without lesions and erythematous  Neck - supple, no significant adenopathy  Chest - clear to auscultation, no wheezes, rales or rhonchi, symmetric air entry, coarse breath sounds   Heart - normal rate, regular rhythm, normal S1, S2, no murmurs, rubs, clicks or gallops    Assessment/ Plan:   Jihan was seen today for cough. Diagnoses and all orders for this visit:    Asthma exacerbation  -     albuterol (PROVENTIL VENTOLIN) 2.5 mg /3 mL (0.083 %) nebulizer solution; 3 mL by Nebulization route every four (4) hours as needed for Wheezing.  -     predniSONE (DELTASONE) 20 mg tablet; Take 1 po TID x 2 days, then 1 po BID x 2 days, then 1 po every day  New rxs. Cont allergy medication and may use Benadryl at night time. F/U prn     Pt voiced understanding regarding plan of care.      Follow-up Disposition:  Return if symptoms worsen or fail to improve. I have discussed the diagnosis with the patient and the intended plan as seen in the above orders. The patient has received an after-visit summary and questions were answered concerning future plans.      Medication Side Effects and Warnings were discussed with patient    Arsen Mendoza NP

## 2017-06-29 NOTE — PROGRESS NOTES
1. Have you been to the ER, urgent care clinic since your last visit? Hospitalized since your last visit? No    2. Have you seen or consulted any other health care providers outside of the [address] [place] [address] since your last visit? Include any pap smears or colon screening.  No     Chief Complaint   Patient presents with    Cough     Was around mold x 5 days

## 2017-06-30 LAB
A VAGINAE DNA VAG QL NAA+PROBE: ABNORMAL SCORE
BVAB2 DNA VAG QL NAA+PROBE: ABNORMAL SCORE
C ALBICANS DNA VAG QL NAA+PROBE: NEGATIVE
C GLABRATA DNA VAG QL NAA+PROBE: NEGATIVE
C TRACH RRNA SPEC QL NAA+PROBE: NEGATIVE
HSV1 DNA SPEC QL NAA+PROBE: NEGATIVE
HSV2 DNA SPEC QL NAA+PROBE: NEGATIVE
MEGA1 DNA VAG QL NAA+PROBE: ABNORMAL SCORE
N GONORRHOEA RRNA SPEC QL NAA+PROBE: NEGATIVE
SPECIMEN STATUS REPORT, ROLRST: NORMAL
T VAGINALIS RRNA SPEC QL NAA+PROBE: NEGATIVE

## 2017-07-01 ENCOUNTER — PATIENT MESSAGE (OUTPATIENT)
Dept: FAMILY MEDICINE CLINIC | Age: 22
End: 2017-07-01

## 2017-07-01 DIAGNOSIS — J45.901 ASTHMA EXACERBATION: ICD-10-CM

## 2017-07-01 RX ORDER — METRONIDAZOLE 500 MG/1
500 TABLET ORAL 2 TIMES DAILY
Qty: 14 TAB | Refills: 0 | Status: SHIPPED | OUTPATIENT
Start: 2017-07-01 | End: 2017-07-08

## 2017-07-03 RX ORDER — PREDNISONE 20 MG/1
TABLET ORAL
Qty: 11 TAB | Refills: 0 | Status: SHIPPED | OUTPATIENT
Start: 2017-07-03 | End: 2018-04-03

## 2017-07-03 NOTE — TELEPHONE ENCOUNTER
From: Cordell Mejia  To: Jaime Hudson NP  Sent: 7/1/2017 6:54 PM EDT  Subject: Non-Urgent Medical Question    Hey !  Can you send the other prednisone to [facility] their saying they never got one

## 2017-07-31 RX ORDER — FLUCONAZOLE 150 MG/1
150 TABLET ORAL DAILY
Qty: 2 TAB | Refills: 1 | Status: SHIPPED | OUTPATIENT
Start: 2017-07-31 | End: 2017-08-01

## 2017-07-31 RX ORDER — FLUCONAZOLE 150 MG/1
150 TABLET ORAL DAILY
Qty: 1 TAB | Refills: 2 | Status: SHIPPED | OUTPATIENT
Start: 2017-07-31 | End: 2017-07-31 | Stop reason: ALTCHOICE

## 2017-09-06 ENCOUNTER — OFFICE VISIT (OUTPATIENT)
Dept: OBGYN CLINIC | Age: 22
End: 2017-09-06

## 2017-09-06 DIAGNOSIS — N93.9 ABNORMAL UTERINE BLEEDING (AUB): Primary | ICD-10-CM

## 2017-09-06 RX ORDER — DROSPIRENONE AND ETHINYL ESTRADIOL 0.03MG-3MG
1 KIT ORAL DAILY
Qty: 3 PACKAGE | Refills: 4 | Status: SHIPPED | OUTPATIENT
Start: 2017-09-06 | End: 2019-02-06 | Stop reason: SDUPTHER

## 2017-09-06 RX ORDER — FLUCONAZOLE 150 MG/1
150 TABLET ORAL DAILY
Qty: 3 TAB | Refills: 0 | Status: SHIPPED | OUTPATIENT
Start: 2017-09-06 | End: 2017-09-09

## 2017-09-06 NOTE — MR AVS SNAPSHOT
Visit Information Date & Time Provider Department Dept. Phone Encounter #  
 9/6/2017  2:40 PM MD Andrea Baker [phone] [id number] Your Appointments 2/26/2018  9:00 AM  
ESTABLISHED PATIENT with MD Andrea Baker ([facility]) Appt Note: ae   FW  
 [address] [facility] [address] [facility]ly [facility] [id number]  
[facility] 31 [address] Upcoming Health Maintenance Date Due  
 HPV AGE 9Y-34Y (1 of 3 - Female 3 Dose Series) 11/28/2006 INFLUENZA AGE 9 TO ADULT 8/1/2017 PAP AKA CERVICAL CYTOLOGY 2/23/2020 Allergies as of 9/6/2017  Review Complete On: 9/6/2017 By: Romayne Bennetts Severity Noted Reaction Type Reactions Latex  07/15/2010    Swelling Skin peels Aspirin  06/27/2011    Nausea and Vomiting Betadine [Povidone-iodine]  05/26/2011    Other (comments) Skin peels Current Immunizations  Never Reviewed No immunizations on file. Not reviewed this visit You Were Diagnosed With   
  
 Codes Comments Abnormal uterine bleeding (AUB)    -  Primary ICD-10-CM: N93.9 ICD-9-CM: 626.9 Vitals OB Status Smoking Status Injection Current Every Day Smoker Preferred Pharmacy Pharmacy Name Phone Michelet Nunez [address], [address]  [phone] Your Updated Medication List  
  
   
This list is accurate as of: 9/6/17  3:11 PM.  Always use your most recent med list.  
  
  
  
  
 albuterol 2.5 mg /3 mL (0.083 %) nebulizer solution Commonly known as:  PROVENTIL VENTOLIN  
3 mL by Nebulization route every four (4) hours as needed for Wheezing. aspirin-acetaminophen-caffeine 250-250-65 mg per tablet Commonly known as:  EXCEDRIN ES Take 1 Tab by mouth.  
  
 clonazePAM 1 mg tablet Commonly known as:  Lucendia Ashleyark  
 Take 1 Tab by mouth daily as needed. Must last 30 days. Ok to fill 1/21/17  
  
 diphenhydrAMINE 50 mg tablet Commonly known as:  BENADRYL Take 1 tablet 1 hr prior to MRI  
  
 levonorgestrel-ethinyl estradiol 0.15 mg-30 mcg 3mpk Commonly known as:  Pattie Pali Take 1 Tab by mouth daily. lisinopril 10 mg tablet Commonly known as:  PRINIVIL, ZESTRIL  
TAKE ONE TABLET BY MOUTH DAILY  
  
 loratadine 10 mg Cap Take 10 mg by mouth daily. MELATONIN PO Take  by mouth as needed. nystatin-triamcinolone topical cream  
Commonly known as:  MYCOLOG II  
APPLY TO AFFECTED AREA(S) TWO TIMES A DAY  
  
 omeprazole 10 mg capsule Commonly known as:  PRILOSEC  
TAKE ONE CAPSULE BY MOUTH ONCE A DAY  
  
 predniSONE 20 mg tablet Commonly known as:  Sofia Look Take 1 po TID x 2 days, then 1 po BID x 2 days, then 1 po every day  
  
 propranolol 20 mg tablet Commonly known as:  INDERAL  
TAKE ONE TABLET BY MOUTH TWICE A DAY We Performed the Following AMB POC URINE PREGNANCY TEST, VISUAL COLOR COMPARISON [78103 CPT(R)] Introducing [facility] SERVICES! Dear Burgess Calabrese: 
Thank you for requesting a [employer] account. Our records indicate that you already have an active [employer] account. You can access your account anytime at [email or web address] Did you know that you can access your hospital and ER discharge instructions at any time in [employer]? You can also review all of your test results from your hospital stay or ER visit. Additional Information If you have questions, please visit the Frequently Asked Questions section of the [employer] website at [email or web address] Remember, [employer] is NOT to be used for urgent needs. For medical emergencies, dial 911. Now available from your iPhone and Android! Please provide this summary of care documentation to your next provider. Your primary care clinician is listed as Rylee Goldberg. If you have any questions after today's visit, please call [phone].

## 2017-09-06 NOTE — PROGRESS NOTES
Amenorrhea note      Arpit Heard is a 24 y.o. female who complains of absence of menses. Her current method of family planning is none. The patient is sexually active. She developed this problem approximately a few weeks ago. She reports missing some of her OCP's and then stopping them and still has not had any bleeding. She does report bleeding 2 weeks ago which she describes as dark blood and not c/w her periods. Associated symptoms include breast tenderness and fatigue. Alleviating factors: none    Aggravating factors: none      UPT in the office today was negative. She also reports vaginal itching. She declines STD testing. Her relevant past medical history:   Past Medical History:   Diagnosis Date    Dysmenorrhea     Major depressive disorder, single episode, unspecified     Morbid obesity ([address]     Pituitary abnormality ([address]     Seasonal allergies     Unspecified symptom associated with female genital organs         Past Surgical History:   Procedure Laterality Date    HX WISDOM TEETH EXTRACTION       Social History     Occupational History    Not on file. Social History Main Topics    Smoking status: Current Every Day Smoker     Packs/day: 0.50     Years: 1.00     Types: Cigarettes    Smokeless tobacco: Never Used    Alcohol use No      Comment: former user just sips now and then   Kathryn Stefani Drug use: No    Sexual activity: Yes     Partners: Male     Birth control/ protection: Injection     Family History   Problem Relation Age of Onset    Alcohol abuse Father     Cancer Paternal Grandmother     Other Mother      endometriosis    Migraines Mother      tumer in brain       Allergies   Allergen Reactions    Latex Swelling     Skin peels    Aspirin Nausea and Vomiting    Betadine [Povidone-Iodine] Other (comments)     Skin peels     Prior to Admission medications    Medication Sig Start Date End Date Taking?  Authorizing Provider   clonazePAM (KLONOPIN) 1 mg tablet Take 1 Tab by mouth daily as needed. Must last 30 days. Ok to fill 1/21/17 8/28/17   Minda Hunter NP   predniSONE (DELTASONE) 20 mg tablet Take 1 po TID x 2 days, then 1 po BID x 2 days, then 1 po every day 7/3/17   Minda Hunter NP   levonorgestrel-ethinyl estradiol (SEASONALE) 0.15 mg-30 mcg 3MPk Take 1 Tab by mouth daily. 6/29/17   Rizwana Victoria MD   albuterol (PROVENTIL VENTOLIN) 2.5 mg /3 mL (0.083 %) nebulizer solution 3 mL by Nebulization route every four (4) hours as needed for Wheezing. 6/29/17   Minda Hunter NP   MELATONIN PO Take  by mouth as needed. Historical Provider   diphenhydrAMINE (BENADRYL) 50 mg tablet Take 1 tablet 1 hr prior to MRI 10/18/16   Denver Shi, MD   aspirin-acetaminophen-caffeine (EXCEDRIN ES) 390-768-33 mg per tablet Take 1 Tab by mouth. Historical Provider   nystatin-triamcinolone (MYCOLOG II) topical cream APPLY TO AFFECTED AREA(S) TWO TIMES A DAY 8/31/16   Gayla Goldberg MD   loratadine 10 mg cap Take 10 mg by mouth daily.     Historical Provider   lisinopril (PRINIVIL, ZESTRIL) 10 mg tablet TAKE ONE TABLET BY MOUTH DAILY 7/13/15   Gayla Goldberg MD   propranolol (INDERAL) 20 mg tablet TAKE ONE TABLET BY MOUTH TWICE A DAY 6/10/15   Kaylene King MD   omeprazole (PRILOSEC) 10 mg capsule TAKE ONE CAPSULE BY MOUTH ONCE A DAY 1/1/15   Kaylene King MD        Review of Systems - History obtained from the patient  Constitutional: negative for weight loss, fever, night sweats  HEENT: negative for hearing loss, earache, congestion, snoring, sorethroat  CV: negative for chest pain, palpitations, edema  Resp: negative for cough, shortness of breath, wheezing  Breast: negative for breast lumps, nipple discharge, galactorrhea  GI: negative for change in bowel habits, abdominal pain, black or bloody stools  : negative for frequency, dysuria, hematuria  MSK: negative for back pain, joint pain, muscle pain  Skin: negative for itching, rash, hives  Neuro: negative for dizziness, headache, confusion, weakness  Psych: negative for anxiety, depression, change in mood  Heme/lymph: negative for bleeding, bruising, pallor      Objective: There were no vitals taken for this visit. PHYSICAL EXAMINATION    Constitutional  · Appearance: well-nourished, well developed, alert, in no acute distress    HENT  · Head and Face: appears normal    Neck  · Inspection/Palpation: normal appearance, no masses or tenderness  · Lymph Nodes: no lymphadenopathy present  · Thyroid: gland size normal, nontender, no nodules or masses present on palpation    Gastrointestinal  · Abdominal Examination: abdomen non-tender to palpation, normal bowel sounds, no masses present  · Liver and spleen: no hepatomegaly present, spleen not palpable  · Hernias: no hernias identified    Skin  · General Inspection: no rash, no lesions identified    Neurologic/Psychiatric  · Mental Status:  · Orientation: grossly oriented to person, place and time  · Mood and Affect: mood normal, affect appropriate    Assessment/Plan:   MIssed menses/- discussed with pt the bleeding that she had 2 weeks ago for 4 days was her cycle. It may have been unlike her normal cycles due to her inconsistent usage of ocps. Will restart ocps and rec repeat upt in 2 weeks    Instructions given to pt. Handouts given to pt.

## 2017-09-08 LAB
HCG URINE, QL. (POC): NEGATIVE
VALID INTERNAL CONTROL?: YES

## 2017-09-11 ENCOUNTER — PATIENT MESSAGE (OUTPATIENT)
Dept: FAMILY MEDICINE CLINIC | Age: 22
End: 2017-09-11

## 2017-09-11 RX ORDER — NITROFURANTOIN 25; 75 MG/1; MG/1
100 CAPSULE ORAL 2 TIMES DAILY
Qty: 14 CAP | Refills: 0 | Status: SHIPPED | OUTPATIENT
Start: 2017-09-11 | End: 2017-11-02

## 2017-09-11 NOTE — TELEPHONE ENCOUNTER
From: Joann Butler  Sent: 9/11/2017 1:59 PM EDT  To: Tiffanie Zhang NP  Subject: RE: RE: Non-Urgent Medical Question    I dont know i had unprotected sex twice this weekend i dont know    ----- Message -----  From: Tiffanie Zhang NP  Sent: 9/11/17 1:57 PM  To: Mala Mejia  Subject: RE: Non-Urgent Medical Question    Great job on getting those strips - you have a huge infection. Are you pregnant???? Let me know before I send something in. Tiffanie Zhang NP      ----- Message -----   From: Annalee Mejia   Sent: 9/11/2017 1:50 PM EDT   To: Tiffanie Zhang NP  Subject: Non-Urgent Medical Question    Jeffrey rodriguez i got those urinary track test strips like u said before i took one this this am and this is it i sent it to my obgyn and they told me i needed to come to u to see if i need a antibiotic? But the test said i have one. Heres the pictures.

## 2017-09-22 RX ORDER — FLUCONAZOLE 150 MG/1
150 TABLET ORAL EVERY OTHER DAY
Qty: 3 TAB | Refills: 0 | Status: SHIPPED | OUTPATIENT
Start: 2017-09-22 | End: 2017-09-28

## 2017-09-22 RX ORDER — CLINDAMYCIN HYDROCHLORIDE 300 MG/1
300 CAPSULE ORAL 2 TIMES DAILY
Qty: 14 CAP | Refills: 0 | Status: SHIPPED | OUTPATIENT
Start: 2017-09-22 | End: 2017-09-29

## 2017-10-11 ENCOUNTER — OFFICE VISIT (OUTPATIENT)
Dept: OBGYN CLINIC | Age: 22
End: 2017-10-11

## 2017-10-11 DIAGNOSIS — B37.31 YEAST VAGINITIS: Primary | ICD-10-CM

## 2017-10-11 DIAGNOSIS — Z20.2 POSSIBLE EXPOSURE TO STD: ICD-10-CM

## 2017-10-11 DIAGNOSIS — N93.8 DUB (DYSFUNCTIONAL UTERINE BLEEDING): ICD-10-CM

## 2017-10-11 DIAGNOSIS — R30.0 DYSURIA: ICD-10-CM

## 2017-10-11 DIAGNOSIS — B37.2 YEAST INFECTION OF THE SKIN: ICD-10-CM

## 2017-10-11 RX ORDER — TERCONAZOLE 4 MG/G
1 CREAM VAGINAL
Qty: 1 TUBE | Refills: 0 | Status: SHIPPED | OUTPATIENT
Start: 2017-10-11 | End: 2017-10-18

## 2017-10-11 RX ORDER — NYSTATIN AND TRIAMCINOLONE ACETONIDE 100000; 1 [USP'U]/G; MG/G
CREAM TOPICAL 2 TIMES DAILY
Qty: 30 G | Refills: 0 | Status: SHIPPED | OUTPATIENT
Start: 2017-10-11 | End: 2018-04-03

## 2017-10-11 NOTE — MR AVS SNAPSHOT
Visit Information Date & Time Provider Department Dept. Phone Encounter #  
 10/11/2017  1:50 PM [facility] [phone] [id number] Your Appointments 2/26/2018  9:00 AM  
ESTABLISHED PATIENT with MD Andrea Randolph Cyrus ([facility]) Appt Note: ae   FW  
 [address] [facility] [address] [place]ak [address]  
[address] Upcoming Health Maintenance Date Due  
 HPV AGE 9Y-34Y (1 of 3 - Female 3 Dose Series) 11/28/2006 INFLUENZA AGE 9 TO ADULT 8/1/2017 PAP AKA CERVICAL CYTOLOGY 2/23/2020 Allergies as of 10/11/2017  Review Complete On: 10/11/2017 By: Carly Jaramillo Severity Noted Reaction Type Reactions Latex  07/15/2010    Swelling Skin peels Aspirin  06/27/2011    Nausea and Vomiting Betadine [Povidone-iodine]  05/26/2011    Other (comments) Skin peels Current Immunizations  Never Reviewed No immunizations on file. Not reviewed this visit You Were Diagnosed With   
  
 Codes Comments Yeast vaginitis    -  Primary ICD-10-CM: B37.3 ICD-9-CM: 112.1 Possible exposure to STD     ICD-10-CM: Z20.2 ICD-9-CM: V01.6 DUB (dysfunctional uterine bleeding)     ICD-10-CM: N93.8 ICD-9-CM: 626.8 Yeast infection of the skin     ICD-10-CM: B37.2 ICD-9-CM: 112.3 Dysuria     ICD-10-CM: R30.0 ICD-9-CM: 914. 1 Vitals OB Status Smoking Status Having regular periods Current Every Day Smoker Preferred Pharmacy Pharmacy Name Phone Roxana Mireles [address], [address] [phone] Your Updated Medication List  
  
   
This list is accurate as of: 10/11/17  2:38 PM.  Always use your most recent med list.  
  
  
  
  
 albuterol 2.5 mg /3 mL (0.083 %) nebulizer solution Commonly known as:  PROVENTIL VENTOLIN  
 3 mL by Nebulization route every four (4) hours as needed for Wheezing. aspirin-acetaminophen-caffeine 250-250-65 mg per tablet Commonly known as:  EXCEDRIN ES Take 1 Tab by mouth.  
  
 clonazePAM 1 mg tablet Commonly known as:  Shanique Cabello Take 1 Tab by mouth daily as needed. Must last 30 days. Ok to fill 1/21/17  
  
 diphenhydrAMINE 50 mg tablet Commonly known as:  BENADRYL Take 1 tablet 1 hr prior to MRI  
  
 drospirenone-ethinyl estradiol 3-0.03 mg Tab Commonly known as:  Anne Andersonlich Take 1 Tab by mouth daily. levonorgestrel-ethinyl estradiol 0.15 mg-30 mcg 3mpk Commonly known as:  Rameshaugie Guzmant Take 1 Tab by mouth daily. lisinopril 10 mg tablet Commonly known as:  PRINIVIL, ZESTRIL  
TAKE ONE TABLET BY MOUTH DAILY  
  
 loratadine 10 mg Cap Take 10 mg by mouth daily. MELATONIN PO Take  by mouth as needed. nitrofurantoin (macrocrystal-monohydrate) 100 mg capsule Commonly known as:  MACROBID Take 1 Cap by mouth two (2) times a day. nystatin-triamcinolone topical cream  
Commonly known as:  MYCOLOG II Apply  to affected area two (2) times a day. omeprazole 10 mg capsule Commonly known as:  PRILOSEC  
TAKE ONE CAPSULE BY MOUTH ONCE A DAY  
  
 predniSONE 20 mg tablet Commonly known as:  Susen Arbour Take 1 po TID x 2 days, then 1 po BID x 2 days, then 1 po every day  
  
 propranolol 20 mg tablet Commonly known as:  INDERAL  
TAKE ONE TABLET BY MOUTH TWICE A DAY  
  
 terconazole 0.4 % vaginal cream  
Commonly known as:  TERAZOL 7 Insert 1 Applicator into vagina nightly for 7 days. Prescriptions Sent to Pharmacy Refills  
 terconazole (TERAZOL 7) 0.4 % vaginal cream 0 Sig: Insert 1 Applicator into vagina nightly for 7 days. Class: Normal  
 Pharmacy: [facility] [address] Ph #: [phone]  Route: Vaginal  
 nystatin-triamcinolone (MYCOLOG II) topical cream 0  
 Sig: Apply  to affected area two (2) times a day. Class: Normal  
 Pharmacy: [facility] [address], [address] Ph #: [phone] Route: Topical  
  
We Performed the Following BETA HCG, QT [id number] CPT(R)] CT/NG/T.VAGINALIS AMPLIFICATION [id number] CPT(R)] CULTURE, URINE [id number] CPT(R)] HEP B SURFACE AG [id number] CPT(R)] HEPATITIS C AB [68773 CPT(R)] HIV 1/2 AG/AB, 4TH GENERATION,W RFLX CONFIRM [[id number] Custom] NUSWAB VAGINOSIS + CANDIDA [[id number] Custom] RPR [41888 CPT(R)] Introducing [facility] SERVICES! Dear Roberto Luna: 
Thank you for requesting a [employer] account. Our records indicate that you already have an active [employer] account. You can access your account anytime at [email or web address] Did you know that you can access your hospital and ER discharge instructions at any time in [employer]? You can also review all of your test results from your hospital stay or ER visit. Additional Information If you have questions, please visit the Frequently Asked Questions section of the [employer] website at [email or web address] Remember, [employer] is NOT to be used for urgent needs. For medical emergencies, dial 911. Now available from your iPhone and Android! Please provide this summary of care documentation to your next provider. Your primary care clinician is listed as Rylee Goldberg. If you have any questions after today's visit, please call [phone].

## 2017-10-11 NOTE — PROGRESS NOTES
Chief Complaint   Vaginitis; Urinary Pain; and Exposure to STD      HPI  24 y.o. female complains of clear vaginal discharge for a few days. .No LMP recorded. She admits to additional symptoms at this time. Itching and irritation  The patient  denies aggravating factors  She admits to exposure to new chemicals ot hygenic agents- recent antibiotic use  She also wants std testing today. Will do quant beta also. She c/o dysuria- urine cx sent    Past Medical History:   Diagnosis Date    Dysmenorrhea     Major depressive disorder, single episode, unspecified     Morbid [facility] ([facility]     Pituitary abnormality ([facility]     Seasonal allergies     Unspecified symptom associated with female genital organs      Past Surgical History:   Procedure Laterality Date    HX WISDOM TEETH EXTRACTION       Social History     Occupational History    Not on file. Social History Main Topics    Smoking status: Current Every Day Smoker     Packs/day: 0.50     Years: 1.00     Types: Cigarettes    Smokeless tobacco: Never Used    Alcohol use No      Comment: former user just sips now and then   Nga Bueno Drug use: No    Sexual activity: Yes     Partners: Male     Birth control/ protection: Injection     Family History   Problem Relation Age of Onset    Alcohol abuse Father     Cancer Paternal Grandmother     Other Mother      endometriosis    Migraines Mother      tumer in brain        Allergies   Allergen Reactions    Latex Swelling     Skin peels    Aspirin Nausea and Vomiting    Betadine [Povidone-Iodine] Other (comments)     Skin peels     Prior to Admission medications    Medication Sig Start Date End Date Taking? Authorizing Provider   terconazole (TERAZOL 7) 0.4 % vaginal cream Insert 1 Applicator into vagina nightly for 7 days. 10/11/17 10/18/17 Yes Dionicio Cogan, MD   nystatin-triamcinolone (MYCOLOG II) topical cream Apply  to affected area two (2) times a day.  10/11/17  Yes Dionicio Cogan, MD   nitrofurantoin, macrocrystal-monohydrate, (MACROBID) 100 mg capsule Take 1 Cap by mouth two (2) times a day. 9/11/17   Araceli Blizzard, NP   drospirenone-ethinyl estradiol (JEANETTE) 3-0.03 mg tab Take 1 Tab by mouth daily. 9/6/17   Jordyn Pineda MD   clonazePAM (KLONOPIN) 1 mg tablet Take 1 Tab by mouth daily as needed. Must last 30 days. Ok to fill 1/21/17 8/28/17   Araceli Blizzard, NP   predniSONE (DELTASONE) 20 mg tablet Take 1 po TID x 2 days, then 1 po BID x 2 days, then 1 po every day 7/3/17   Araceli Blizzard, NP   levonorgestrel-ethinyl estradiol (SEASONALE) 0.15 mg-30 mcg 3MPk Take 1 Tab by mouth daily. 6/29/17   Jordyn Pineda MD   albuterol (PROVENTIL VENTOLIN) 2.5 mg /3 mL (0.083 %) nebulizer solution 3 mL by Nebulization route every four (4) hours as needed for Wheezing. 6/29/17   Araceli Blizzard, NP   MELATONIN PO Take  by mouth as needed. Historical Provider   diphenhydrAMINE (BENADRYL) 50 mg tablet Take 1 tablet 1 hr prior to MRI 10/18/16   Fermin Roque MD   aspirin-acetaminophen-caffeine (EXCEDRIN ES) 143-104-04 mg per tablet Take 1 Tab by mouth. Historical Provider   loratadine 10 mg cap Take 10 mg by mouth daily.     Historical Provider   lisinopril (PRINIVIL, ZESTRIL) 10 mg tablet TAKE ONE TABLET BY MOUTH DAILY 7/13/15   Bijan Goldberg MD   propranolol (INDERAL) 20 mg tablet TAKE ONE TABLET BY MOUTH TWICE A DAY 6/10/15   Cheyenne Ascencio MD   omeprazole (PRILOSEC) 10 mg capsule TAKE ONE CAPSULE BY MOUTH ONCE A DAY 1/1/15   Cheyenne Ascencio MD                      Review of Systems - History obtained from the patient  Constitutional: negative for weight loss, fever, night sweats  Breast: negative for breast lumps, nipple discharge, galactorrhea  GI: negative for change in bowel habits, abdominal pain, black or bloody stools  : negative for frequency, dysuria, hematuria  MSK: negative for back pain, joint pain, muscle pain  Skin: negative for itching, rash, hives  Neuro: negative for dizziness, headache, confusion, weakness  Psych: negative for anxiety, depression, change in mood  Heme/lymph: negative for bleeding, bruising, pallor       Objective:    Physical Exam:   PHYSICAL EXAMINATION    Constitutional  · Appearance: well-nourished, well developed, alert, in no acute distress    HENT  · Head and Face: appears normal    Genitourinary  · External Genitalia: normal appearance for age, + discharge present, no tenderness present,+ bilateral erythema  · Vagina:  + discharge present, otherwise normal vaginal vault without central or paravaginal defects, no inflammatory lesions present, no masses present  · Bladder: non-tender to palpation  · Urethra: appears normal  · Cervix: normal   · Uterus: normal size, shape and consistency  · Adnexa: no adnexal tenderness present, no adnexal masses present  · Perineum: perineum within normal limits, no evidence of trauma, no rashes or skin lesions present  · Anus: anus within normal limits, no hemorrhoids present  · Inguinal Lymph Nodes: no lymphadenopathy present    Skin  · General Inspection: no rash, no lesions identified    Neurologic/Psychiatric  · Mental Status:  · Orientation: grossly oriented to person, place and time  · Mood and Affect: mood normal, affect appropriate    Assessment:   Continued galactorrhea- encouraged to f/u with endocrine due to elevated prolactin. AUB- may be related to elevated prolactin and/or anovulation- will start ocps once cycle starts. Requests hcg  Vulvovaginitis- likely yeast, will treat with mycolog and terazol, will f/u with nuswab. Requests std testing. Dysuria- urine culture sent    Plan:   ROV prn if symptoms persist or worsen.

## 2017-10-14 LAB
BACTERIA UR CULT: ABNORMAL
C TRACH RRNA SPEC QL NAA+PROBE: NEGATIVE
N GONORRHOEA RRNA SPEC QL NAA+PROBE: NEGATIVE
T VAGINALIS RRNA SPEC QL NAA+PROBE: NEGATIVE

## 2017-10-14 RX ORDER — AMOXICILLIN AND CLAVULANATE POTASSIUM 500; 125 MG/1; MG/1
1 TABLET, FILM COATED ORAL 2 TIMES DAILY
Qty: 14 TAB | Refills: 0 | Status: SHIPPED | OUTPATIENT
Start: 2017-10-14 | End: 2017-10-21

## 2017-10-30 ENCOUNTER — OFFICE VISIT (OUTPATIENT)
Dept: FAMILY MEDICINE CLINIC | Age: 22
End: 2017-10-30

## 2017-10-30 VITALS
WEIGHT: 288 LBS | RESPIRATION RATE: 18 BRPM | BODY MASS INDEX: 46.28 KG/M2 | TEMPERATURE: 97.6 F | OXYGEN SATURATION: 98 % | HEIGHT: 66 IN | DIASTOLIC BLOOD PRESSURE: 83 MMHG | HEART RATE: 90 BPM | SYSTOLIC BLOOD PRESSURE: 125 MMHG

## 2017-10-30 DIAGNOSIS — F41.0 PANIC ATTACKS: ICD-10-CM

## 2017-10-30 DIAGNOSIS — F41.9 ANXIETY: ICD-10-CM

## 2017-10-30 DIAGNOSIS — J02.0 STREPTOCOCCAL SORE THROAT: ICD-10-CM

## 2017-10-30 DIAGNOSIS — J02.9 SORE THROAT: Primary | ICD-10-CM

## 2017-10-30 LAB
BARBITURATES UR POC: NEGATIVE
BENZODIAZEPINES UR POC: NEGATIVE
COCAINE QL URINE POC: NEGATIVE
LOT EXP DATE POC: NORMAL
LOT NUMBER POC: NORMAL
MARIJUANA (THC) QL URINE POC: NEGATIVE
MDMA/ECSTASY UR POC: NEGATIVE
METHADONE QL URINE POC: NO
METHAMPHETAMINE QL URINE POC: NEGATIVE
NTI OTHER MICRO TRANSPORT 977598: NO
OPIATES QL URINE POC: NEGATIVE
OXYCODONE UR POC: NEGATIVE
S PYO AG THROAT QL: NEGATIVE
VALID INTERNAL CONTROL?: YES
VALID INTERNAL CONTROL?: YES

## 2017-10-30 RX ORDER — CLONAZEPAM 1 MG/1
1 TABLET ORAL
Qty: 30 TAB | Refills: 2 | Status: SHIPPED | OUTPATIENT
Start: 2017-10-30 | End: 2019-11-08

## 2017-10-30 RX ORDER — AZITHROMYCIN 250 MG/1
TABLET, FILM COATED ORAL
Qty: 6 TAB | Refills: 0 | Status: SHIPPED | OUTPATIENT
Start: 2017-10-30 | End: 2017-11-04

## 2017-10-30 NOTE — MR AVS SNAPSHOT
Visit Information Date & Time Provider Department Dept. Phone Encounter #  
 10/30/2017 10:40 AM Kasi White MD [address] [facility] [phone] [id number] Your Appointments 2/26/2018  9:00 AM  
ESTABLISHED PATIENT with MD Andrea Stewart ([facility]) Appt Note: ae   FW  
 [address] [facility] [address] [facility] [address]  
[facility] [address] Upcoming Health Maintenance Date Due  
 HPV AGE 9Y-34Y (1 of 3 - Female 3 Dose Series) 11/28/2006 Pneumococcal 19-64 Medium Risk (1 of 1 - PPSV23) 11/28/2014 DTaP/Tdap/Td series (1 - Tdap) 11/28/2016 INFLUENZA AGE 9 TO ADULT 8/1/2017 PAP AKA CERVICAL CYTOLOGY 2/23/2020 Allergies as of 10/30/2017  Review Complete On: 10/30/2017 By: Kasi White MD  
  
 Severity Noted Reaction Type Reactions Latex  07/15/2010    Swelling Skin peels Aspirin  06/27/2011    Nausea and Vomiting Betadine [Povidone-iodine]  05/26/2011    Other (comments) Skin peels Current Immunizations  Never Reviewed No immunizations on file. Not reviewed this visit You Were Diagnosed With   
  
 Codes Comments Sore throat    -  Primary ICD-10-CM: J02.9 ICD-9-CM: 442 Streptococcal sore throat     ICD-10-CM: J02.0 ICD-9-CM: 034.0 Anxiety     ICD-10-CM: F41.9 ICD-9-CM: 300.00 Panic attacks     ICD-10-CM: F41.0 ICD-9-CM: 300.01 Vitals BP Pulse Temp Resp Height(growth percentile) Weight(growth percentile) 125/83 (BP 1 Location: Left arm, BP Patient Position: Sitting) 90 97.6 °F (36.4 °C) (Oral) 18 5' 6\" (1.676 m) 288 lb (130.6 kg) LMP SpO2 BMI OB Status Smoking Status (LMP Unknown) 98% 46.48 kg/m2 Having regular periods Current Every Day Smoker Vitals History BMI and BSA Data  Body Mass Index Body Surface Area  
 46.48 kg/m 2 2.47 m 2  
  
  
 Preferred Pharmacy Pharmacy Name Phone Maico Helm [address], [address] [phone] Your Updated Medication List  
  
   
This list is accurate as of: 10/30/17 12:11 PM.  Always use your most recent med list.  
  
  
  
  
 albuterol 2.5 mg /3 mL (0.083 %) nebulizer solution Commonly known as:  PROVENTIL VENTOLIN  
3 mL by Nebulization route every four (4) hours as needed for Wheezing. aspirin-acetaminophen-caffeine 250-250-65 mg per tablet Commonly known as:  EXCEDRIN ES Take 1 Tab by mouth. azithromycin 250 mg tablet Commonly known as:  Aidan Oliver Please take as directed  
  
 clonazePAM 1 mg tablet Commonly known as:  Cesilia Alston Take 1 Tab by mouth daily as needed. Must last 30 days. diphenhydrAMINE 50 mg tablet Commonly known as:  BENADRYL Take 1 tablet 1 hr prior to MRI  
  
 drospirenone-ethinyl estradiol 3-0.03 mg Tab Commonly known as:  Renato What Cheer Take 1 Tab by mouth daily. levonorgestrel-ethinyl estradiol 0.15 mg-30 mcg 3mpk Commonly known as:  Terryann Ralphs Take 1 Tab by mouth daily. lisinopril 10 mg tablet Commonly known as:  PRINIVIL, ZESTRIL  
TAKE ONE TABLET BY MOUTH DAILY  
  
 loratadine 10 mg Cap Take 10 mg by mouth daily. MELATONIN PO Take  by mouth as needed. nitrofurantoin (macrocrystal-monohydrate) 100 mg capsule Commonly known as:  MACROBID Take 1 Cap by mouth two (2) times a day. nystatin-triamcinolone topical cream  
Commonly known as:  MYCOLOG II Apply  to affected area two (2) times a day. omeprazole 10 mg capsule Commonly known as:  PRILOSEC  
TAKE ONE CAPSULE BY MOUTH ONCE A DAY  
  
 predniSONE 20 mg tablet Commonly known as:  Denilson Gelineau Take 1 po TID x 2 days, then 1 po BID x 2 days, then 1 po every day  
  
 propranolol 20 mg tablet Commonly known as:  INDERAL  
TAKE ONE TABLET BY MOUTH TWICE A DAY Prescriptions Printed Refills  
 clonazePAM (KLONOPIN) 1 mg tablet 2 Sig: Take 1 Tab by mouth daily as needed. Must last 30 days. Class: Print Route: Oral  
  
Prescriptions Sent to Pharmacy Refills  
 azithromycin (ZITHROMAX Z-MAYO) 250 mg tablet 0 Sig: Please take as directed Class: Normal  
 Pharmacy: Demarcus Duty [address], [address]  #: [phone] We Performed the Following AMB POC ALERE ICUP DX DRUG SCREEN 10 [id number] CPT(R)] AMB POC RAPID STREP A [83386 CPT(R)] Introducing [facility] SERVICES! Dear Stone Ronnie: 
Thank you for requesting a Retidoc account. Our records indicate that you already have an active Retidoc account. You can access your account anytime at [email or web address] Did you know that you can access your hospital and ER discharge instructions at any time in Retidoc? You can also review all of your test results from your hospital stay or ER visit. Additional Information If you have questions, please visit the Frequently Asked Questions section of the Retidoc website at [email or web address] Remember, Retidoc is NOT to be used for urgent needs. For medical emergencies, dial 911. Now available from your iPhone and Android! Please provide this summary of care documentation to your next provider. Your primary care clinician is listed as Rylee Goldberg. If you have any questions after today's visit, please call [phone].

## 2017-10-30 NOTE — PROGRESS NOTES
Pt here with c/o sore throat and enlarged lymph nodes x 3 days. Has been taking Theraflu OTC. Pt has been afebrile. Pt also requesting klonopin refill    Subjective:   Du Llanes is a 24 y.o. female who complains of sore throat and swollen glands for 4 days. She denies a history of shortness of breath and wheezing. Patient does smoke cigarettes. Relevant PMH: No pertinent additional PMH. Objective:      Visit Vitals    /83 (BP 1 Location: Left arm, BP Patient Position: Sitting)    Pulse 90    Temp 97.6 °F (36.4 °C) (Oral)    Resp 18    Ht 5' 6\" (1.676 m)    Wt 288 lb (130.6 kg)    LMP  (LMP Unknown)    SpO2 98%    BMI 46.48 kg/m2      Appears alert, well appearing, and in no distress and oriented to person, place, and time. Ears: bilateral TM's and external ear canals normal  Oropharynx: erythematous and exudate noted  Neck: bilateral symmetric anterior adenopathy  Lungs: clear to auscultation, no wheezes, rales or rhonchi, symmetric air entry  The abdomen is soft without tenderness or hepatosplenomegaly. Rapid Strep test is not done    Assessment/Plan:   strep pharyngitis  Per orders. Gargle, use acetaminophen or other OTC analgesic, and take Rx fully as prescribed. Call if other family members develop similar symptoms. See prn. ICD-10-CM ICD-9-CM    1. Sore throat J02.9 462 AMB POC RAPID STREP A   2. Streptococcal sore throat J02.0 034.0 azithromycin (ZITHROMAX Z-MAYO) 250 mg tablet     Encounter Diagnoses   Name Primary?  Sore throat Yes    Streptococcal sore throat      Orders Placed This Encounter    AMB POC RAPID STREP A    azithromycin (ZITHROMAX Z-MAYO) 250 mg tablet   .   Anxiety well controlled, UDS neg, medication refilled

## 2017-10-30 NOTE — PROGRESS NOTES
Pt here with mother c/o sore throat and enlarged lymph nodes x 3 days. Has been taking Theraflu OTC. Pt has been afebrile.

## 2017-11-02 ENCOUNTER — OFFICE VISIT (OUTPATIENT)
Dept: OBGYN CLINIC | Age: 22
End: 2017-11-02

## 2017-11-02 VITALS
SYSTOLIC BLOOD PRESSURE: 116 MMHG | RESPIRATION RATE: 16 BRPM | DIASTOLIC BLOOD PRESSURE: 68 MMHG | BODY MASS INDEX: 46.61 KG/M2 | WEIGHT: 290 LBS | HEIGHT: 66 IN

## 2017-11-02 DIAGNOSIS — R30.0 DYSURIA: Primary | ICD-10-CM

## 2017-11-02 DIAGNOSIS — B37.31 YEAST VAGINITIS: ICD-10-CM

## 2017-11-02 RX ORDER — TERCONAZOLE 4 MG/G
1 CREAM VAGINAL
Qty: 1 TUBE | Refills: 0 | Status: SHIPPED | OUTPATIENT
Start: 2017-11-02 | End: 2017-11-09

## 2017-11-02 RX ORDER — CIPROFLOXACIN 250 MG/1
250 TABLET, FILM COATED ORAL EVERY 12 HOURS
Qty: 20 TAB | Refills: 0 | Status: SHIPPED | OUTPATIENT
Start: 2017-11-02 | End: 2017-11-12

## 2017-11-02 NOTE — MR AVS SNAPSHOT
Visit Information Date & Time Provider Department Dept. Phone Encounter #  
 11/2/2017  2:00 PM MD Andrea Lakhani [phone] [id number] Your Appointments 11/13/2017 10:45 AM  
ROUTINE CARE with Raegan Joya MD  
[facility]) Appt Note: pt request fu via mychart [address] [place] [address] [facility] 506 [facility]  
  
   
 [address] [facility] [address]  
  
    
 2/26/2018  9:00 AM  
ESTABLISHED PATIENT with MD Andrea Lakhani ([facility]) Appt Note: ae   FW  
 [address] [facility] [address] [facility] 99 [address]  
[address] Upcoming Health Maintenance Date Due  
 HPV AGE 9Y-34Y (1 of 3 - Female 3 Dose Series) 11/28/2006 INFLUENZA AGE 9 TO ADULT 8/1/2017 PAP AKA CERVICAL CYTOLOGY 2/23/2020 Allergies as of 11/2/2017  Review Complete On: 11/2/2017 By: Lobo Larry RN Severity Noted Reaction Type Reactions Latex  07/15/2010    Swelling Skin peels Aspirin  06/27/2011    Nausea and Vomiting Betadine [Povidone-iodine]  05/26/2011    Other (comments) Skin peels Current Immunizations  Never Reviewed No immunizations on file. Not reviewed this visit You Were Diagnosed With   
  
 Codes Comments Dysuria    -  Primary ICD-10-CM: R30.0 ICD-9-CM: 112. 1 Vitals BP Resp Height(growth percentile) Weight(growth percentile) LMP BMI  
 116/68 (BP 1 Location: Right arm, BP Patient Position: Sitting) 16 5' 6\" (1.676 m) 290 lb (131.5 kg) (LMP Unknown) 46.81 kg/m2 OB Status Smoking Status Having regular periods Current Every Day Smoker BMI and BSA Data Body Mass Index Body Surface Area  
 46.81 kg/m 2 2.47 m 2 Preferred Pharmacy Pharmacy Name Phone Rasheeda Calvert [address], [address] [phone] Your Updated Medication List  
  
   
This list is accurate as of: 11/2/17  2:10 PM.  Always use your most recent med list.  
  
  
  
  
 albuterol 2.5 mg /3 mL (0.083 %) nebulizer solution Commonly known as:  PROVENTIL VENTOLIN  
3 mL by Nebulization route every four (4) hours as needed for Wheezing. aspirin-acetaminophen-caffeine 250-250-65 mg per tablet Commonly known as:  EXCEDRIN ES Take 1 Tab by mouth. azithromycin 250 mg tablet Commonly known as:  Micheline Aiyana Please take as directed  
  
 clonazePAM 1 mg tablet Commonly known as:  Zetta Rae Take 1 Tab by mouth daily as needed. Must last 30 days. diphenhydrAMINE 50 mg tablet Commonly known as:  BENADRYL Take 1 tablet 1 hr prior to MRI  
  
 drospirenone-ethinyl estradiol 3-0.03 mg Tab Commonly known as:  [place] Show Take 1 Tab by mouth daily. levonorgestrel-ethinyl estradiol 0.15 mg-30 mcg 3mpk Commonly known as:  Lynnetta Dirk Take 1 Tab by mouth daily. lisinopril 10 mg tablet Commonly known as:  PRINIVIL, ZESTRIL  
TAKE ONE TABLET BY MOUTH DAILY  
  
 loratadine 10 mg Cap Take 10 mg by mouth daily. MELATONIN PO Take  by mouth as needed. nitrofurantoin (macrocrystal-monohydrate) 100 mg capsule Commonly known as:  MACROBID Take 1 Cap by mouth two (2) times a day. nystatin-triamcinolone topical cream  
Commonly known as:  MYCOLOG II Apply  to affected area two (2) times a day. omeprazole 10 mg capsule Commonly known as:  PRILOSEC  
TAKE ONE CAPSULE BY MOUTH ONCE A DAY  
  
 predniSONE 20 mg tablet Commonly known as:  Jose Gayle Take 1 po TID x 2 days, then 1 po BID x 2 days, then 1 po every day  
  
 propranolol 20 mg tablet Commonly known as:  INDERAL  
TAKE ONE TABLET BY MOUTH TWICE A DAY We Performed the Following CULTURE, URINE [id number] CPT(R)] Patient Instructions Urinary Tract Infection in Women: Care Instructions Your Care Instructions A urinary tract infection, or UTI, is a general term for an infection anywhere between the kidneys and the urethra (where urine comes out). Most UTIs are bladder infections. They often cause pain or burning when you urinate. UTIs are caused by bacteria and can be cured with antibiotics. Be sure to complete your treatment so that the infection goes away. Follow-up care is a key part of your treatment and safety. Be sure to make and go to all appointments, and call your doctor if you are having problems. It's also a good idea to know your test results and keep a list of the medicines you take. How can you care for yourself at home? · Take your antibiotics as directed. Do not stop taking them just because you feel better. You need to take the full course of antibiotics. · Drink extra water and other fluids for the next day or two. This may help wash out the bacteria that are causing the infection. (If you have kidney, heart, or liver disease and have to limit fluids, talk with your doctor before you increase your fluid intake.) · Avoid drinks that are carbonated or have caffeine. They can irritate the bladder. · Urinate often. Try to empty your bladder each time. · To relieve pain, take a hot bath or lay a heating pad set on low over your lower belly or genital area. Never go to sleep with a heating pad in place. To prevent UTIs · Drink plenty of water each day. This helps you urinate often, which clears bacteria from your system. (If you have kidney, heart, or liver disease and have to limit fluids, talk with your doctor before you increase your fluid intake.) · Urinate when you need to. · Urinate right after you have sex. · Change sanitary pads often. · Avoid douches, bubble baths, feminine hygiene sprays, and other feminine hygiene products that have deodorants. · After going to the bathroom, wipe from front to back. When should you call for help? Call your doctor now or seek immediate medical care if: 
? · Symptoms such as fever, chills, nausea, or vomiting get worse or appear for the first time. ? · You have new pain in your back just below your rib cage. This is called flank pain. ? · There is new blood or pus in your urine. ? · You have any problems with your antibiotic medicine. ? Watch closely for changes in your health, and be sure to contact your doctor if: 
? · You are not getting better after taking an antibiotic for 2 days. ? · Your symptoms go away but then come back. Where can you learn more? Go to http://sathya-bianka.info/. Enter I948 in the search box to learn more about \"Urinary Tract Infection in Women: Care Instructions. \" Current as of: May 12, 2017 Content Version: 11.4 © 4585-9514 [employer]. Care instructions adapted under license by [employer] (which disclaims liability or warranty for this information). If you have questions about a medical condition or this instruction, always ask your healthcare professional. [place] [address] any warranty or liability for your use of this information. Introducing [facility] & HEALTH SERVICES! Dear Flavia Barajas: 
Thank you for requesting a Samanage account. Our records indicate that you already have an active Samanage account. You can access your account anytime at [email or web address] Did you know that you can access your hospital and ER discharge instructions at any time in Samanage? You can also review all of your test results from your hospital stay or ER visit. Additional Information If you have questions, please visit the Frequently Asked Questions section of the Samanage website at [email or web address] Remember, Samanage is NOT to be used for urgent needs. For medical emergencies, dial 911. Now available from your iPhone and Android! Please provide this summary of care documentation to your next provider. Your primary care clinician is listed as Rylee Goldberg. If you have any questions after today's visit, please call [phone].

## 2017-11-02 NOTE — PROGRESS NOTES
UTI note    Nathaniel Khan is a No obstetric history on file. ,  24 y.o. female [facility] who presents today with had concerns including Urinary Pain. Kirill Livier Her symptoms started several weeks ago, unchanged since that time. Discomfort is in the suprapubic area and does not radiate. Symptoms are not alleviated by hydration. Symptoms are exacerbated with activity. Patient's other complaints: vaginal itching    Patient denies any other problems at this time. . There is not a history of trauma to the genital area. Patient does have a history of recurrent UTI. She does not have a history of pyelonephritis. She has a history of  has a past medical history of Dysmenorrhea; Major depressive disorder, single episode, unspecified; Morbid obesity ([address] Pituitary abnormality ([address] Seasonal allergies; and Unspecified symptom associated with female genital organs. She also has no past medical history of Overbite. with the following surgical history  has a past surgical history that includes wisdom teeth extraction. .  . She has been evaluated for her current complaints. Last urine culture : +UTI treated with augmentin. Symptoms initially improved but have since returned.     Results for orders placed or performed in visit on 06/27/17   AMB POC URINALYSIS DIP STICK AUTO W/O MICRO     Status: None   Result Value Ref Range Status    Color (UA POC)       Clarity (UA POC)       Glucose (UA POC) Negative Negative Final    Bilirubin (UA POC) Negative Negative Final    Ketones (UA POC) Negative Negative Final    Specific gravity (UA POC) 1.001 1.001 - 1.035 Final    Blood (UA POC) Negative Negative Final    pH (UA POC) 4.6 4.6 - 8.0 Final    Protein (UA POC) Negative Negative mg/dL Final    Urobilinogen (UA POC) 0.2 mg/dL 0.2 - 1 Final    Nitrites (UA POC) Negative Negative Final    Leukocyte esterase (UA POC) Negative Negative Final   Results for orders placed or performed in visit on 12/22/16   AMB POC URINALYSIS DIP STICK AUTO W/ MICRO     Status: None   Result Value Ref Range Status    Color (UA POC) Yellow  Final    Clarity (UA POC) Cloudy  Final    Glucose (UA POC) Negative Negative Final    Bilirubin (UA POC) Negative Negative Final    Ketones (UA POC) Negative Negative Final    Specific gravity (UA POC) 1.025 1.001 - 1.035 Final    Blood (UA POC) Negative Negative Final    pH (UA POC) 5.5 4.6 - 8.0 Final    Protein (UA POC) Negative Negative mg/dL Final    Urobilinogen (UA POC) 0.2 mg/dL 0.2 - 1 Final    Nitrites (UA POC) Negative Negative Final    Leukocyte esterase (UA POC) Trace Negative Final       Past Medical History:   Diagnosis Date    Dysmenorrhea     Major depressive disorder, single episode, unspecified     Morbid obesity (HCC)     Pituitary abnormality (HCC)     Seasonal allergies     Unspecified symptom associated with female genital organs      Past Surgical History:   Procedure Laterality Date    HX WISDOM TEETH EXTRACTION       Social History     Occupational History    Not on file. Social History Main Topics    Smoking status: Current Every Day Smoker     Packs/day: 0.50     Years: 1.00     Types: Cigarettes    Smokeless tobacco: Never Used    Alcohol use No      Comment: former user just sips now and then   Obdulio Holms Drug use: No    Sexual activity: Yes     Partners: Male     Birth control/ protection: Injection     Family History   Problem Relation Age of Onset    Alcohol abuse Father     Cancer Paternal Grandmother     Other Mother      endometriosis    Migraines Mother      tumer in brain       Allergies   Allergen Reactions    Latex Swelling     Skin peels    Aspirin Nausea and Vomiting    Betadine [Povidone-Iodine] Other (comments)     Skin peels     Prior to Admission medications    Medication Sig Start Date End Date Taking?  Authorizing Provider   azithromycin (ZITHROMAX Z-MAYO) 250 mg tablet Please take as directed 10/30/17 11/4/17  Jag Reyes MD   clonazePAM (KLONOPIN) 1 mg tablet Take 1 Tab by mouth daily as needed. Must last 30 days. 10/30/17   René Goldberg MD   nystatin-triamcinolone (MYCOLOG II) topical cream Apply  to affected area two (2) times a day. 10/11/17   Aleena Davis MD   nitrofurantoin, macrocrystal-monohydrate, (MACROBID) 100 mg capsule Take 1 Cap by mouth two (2) times a day. 9/11/17   Navarro Rodriguez, SABINE   drospirenone-ethinyl estradiol (JEANETTE) 3-0.03 mg tab Take 1 Tab by mouth daily. 9/6/17   Aleena Davis MD   predniSONE (DELTASONE) 20 mg tablet Take 1 po TID x 2 days, then 1 po BID x 2 days, then 1 po every day 7/3/17   Navarro Rodriguez NP   levonorgestrel-ethinyl estradiol (SEASONALE) 0.15 mg-30 mcg 3MPk Take 1 Tab by mouth daily. 6/29/17   Aleena Davis MD   albuterol (PROVENTIL VENTOLIN) 2.5 mg /3 mL (0.083 %) nebulizer solution 3 mL by Nebulization route every four (4) hours as needed for Wheezing. 6/29/17   Navarro Rodriguez NP   MELATONIN PO Take  by mouth as needed. Historical Provider   diphenhydrAMINE (BENADRYL) 50 mg tablet Take 1 tablet 1 hr prior to MRI 10/18/16   Liza Holm MD   aspirin-acetaminophen-caffeine (EXCEDRIN ES) 652-861-45 mg per tablet Take 1 Tab by mouth. Historical Provider   loratadine 10 mg cap Take 10 mg by mouth daily.     Historical Provider   lisinopril (PRINIVIL, ZESTRIL) 10 mg tablet TAKE ONE TABLET BY MOUTH DAILY 7/13/15   René Goldberg MD   propranolol (INDERAL) 20 mg tablet TAKE ONE TABLET BY MOUTH TWICE A DAY 6/10/15   Nahum Perez MD   omeprazole (PRILOSEC) 10 mg capsule TAKE ONE CAPSULE BY MOUTH ONCE A DAY 1/1/15   Nahum Perez MD        Review of Systems: History obtained from the patient  Constitutional: negative for weight loss, fever, night sweats  Breast: negative for breast lumps, nipple discharge, galactorrhea  GI: negative for change in bowel habits, abdominal pain, black or bloody stools  : see HPI, negative for vaginal discharge  MSK: negative for back pain, joint pain, muscle pain  Skin: negative for itching, rash, hives  Psych: negative for anxiety, depression, change in mood      Objective:  Visit Vitals    /68 (BP 1 Location: Right arm, BP Patient Position: Sitting)    Resp 16    Ht 5' 6\" (1.676 m)    Wt 290 lb (131.5 kg)    LMP  (LMP Unknown)    BMI 46.81 kg/m2       Physical Exam:   PHYSICAL EXAMINATION    Constitutional  · Appearance: well-nourished, well developed, alert, in no acute distress    Gastrointestinal  · Abdominal Examination: abdomen non-tender to palpation, normal bowel sounds, no masses present    Skin  · General Inspection: no rash, no lesions identified    Neurologic/Psychiatric  · Mental Status:  · Orientation: grossly oriented to person, place and time  · Mood and Affect: mood normal, affect appropriate    Assessment/Plan:   UTI- will repeat culture and treat with Cipro will start in 1 day ( after completion of z-shasha)  Yeast- will treat with diflucan (declined terazol))

## 2017-11-02 NOTE — PATIENT INSTRUCTIONS

## 2017-11-04 LAB — BACTERIA UR CULT: NO GROWTH

## 2017-11-13 ENCOUNTER — OFFICE VISIT (OUTPATIENT)
Dept: ENDOCRINOLOGY | Age: 22
End: 2017-11-13

## 2017-11-13 VITALS
BODY MASS INDEX: 46.03 KG/M2 | HEART RATE: 88 BPM | DIASTOLIC BLOOD PRESSURE: 84 MMHG | HEIGHT: 66 IN | TEMPERATURE: 96.4 F | WEIGHT: 286.4 LBS | RESPIRATION RATE: 16 BRPM | SYSTOLIC BLOOD PRESSURE: 129 MMHG | OXYGEN SATURATION: 97 %

## 2017-11-13 DIAGNOSIS — N64.3 GALACTORRHEA: ICD-10-CM

## 2017-11-13 DIAGNOSIS — E22.1 HYPERPROLACTINEMIA (HCC): ICD-10-CM

## 2017-11-13 DIAGNOSIS — D35.2 PITUITARY ADENOMA (HCC): Primary | ICD-10-CM

## 2017-11-13 NOTE — PROGRESS NOTES
Flavio Khan MD        [address] 78 [phone] Fax [phone]               Patient Information  Date:11/13/2017  Name : Nicky Mejia 24 y.o.     YOB: 1995         Referred by: Valorie Baker MD         Chief Complaint   Patient presents with    Other       History of Present Illness: Mai Slaughter is a 24 y.o. female here for fu    She was referred by Valorie Baker MD for evaluation and management of galactorrhea. She was on oral contraceptives in July 2016 for dysmenorrhea. She was switched to Medroxyprogesterone injection. MRI showed small adenoma 7 mm   more galactorrhea  On OCPs    Lost weight , stopped sodas          Wt Readings from Last 3 Encounters:   11/13/17 286 lb 6.4 oz (129.9 kg)   11/02/17 290 lb (131.5 kg)   10/30/17 288 lb (130.6 kg)       BP Readings from Last 3 Encounters:   11/13/17 129/84   11/02/17 116/68   10/30/17 125/83           Past Medical History:   Diagnosis Date    Dysmenorrhea     Major depressive disorder, single episode, unspecified     Morbid obesity ([address]     Pituitary abnormality ([address]     Seasonal allergies     Unspecified symptom associated with female genital organs      Current Outpatient Prescriptions   Medication Sig    nystatin-triamcinolone (MYCOLOG II) topical cream Apply  to affected area two (2) times a day.  drospirenone-ethinyl estradiol (JEANETTE) 3-0.03 mg tab Take 1 Tab by mouth daily.  levonorgestrel-ethinyl estradiol (SEASONALE) 0.15 mg-30 mcg 3MPk Take 1 Tab by mouth daily.  albuterol (PROVENTIL VENTOLIN) 2.5 mg /3 mL (0.083 %) nebulizer solution 3 mL by Nebulization route every four (4) hours as needed for Wheezing.  MELATONIN PO Take  by mouth as needed.     diphenhydrAMINE (BENADRYL) 50 mg tablet Take 1 tablet 1 hr prior to MRI    aspirin-acetaminophen-caffeine (EXCEDRIN ES) 250-250-65 mg per tablet Take 1 Tab by mouth.  loratadine 10 mg cap Take 10 mg by mouth daily.  omeprazole (PRILOSEC) 10 mg capsule TAKE ONE CAPSULE BY MOUTH ONCE A DAY    clonazePAM (KLONOPIN) 1 mg tablet Take 1 Tab by mouth daily as needed. Must last 30 days.  predniSONE (DELTASONE) 20 mg tablet Take 1 po TID x 2 days, then 1 po BID x 2 days, then 1 po every day    lisinopril (PRINIVIL, ZESTRIL) 10 mg tablet TAKE ONE TABLET BY MOUTH DAILY    propranolol (INDERAL) 20 mg tablet TAKE ONE TABLET BY MOUTH TWICE A DAY     No current facility-administered medications for this visit. Allergies   Allergen Reactions    Latex Swelling     Skin peels    Aspirin Nausea and Vomiting    Betadine [Povidone-Iodine] Other (comments)     Skin peels         Review of Systems:  - Constitutional Symptoms: no fevers, no chills,   - Eyes: no blurry vision no double vision  - Cardiovascular: no chest pain ,no palpitations  - Neurological: no numbness, tingling, no  headaches  - Psychiatric: no depression no  anxiety  - Endocrine: no heat or cold intolerance    Physical Examination:   Blood pressure 129/84, pulse 88, temperature 96.4 °F (35.8 °C), temperature source Oral, resp. rate 16, height 5' 6\" (1.676 m), weight 286 lb 6.4 oz (129.9 kg), SpO2 97 %. Estimated body mass index is 46.23 kg/(m^2) as calculated from the following:    Height as of this encounter: 5' 6\" (1.676 m). -   Weight as of this encounter: 286 lb 6.4 oz (129.9 kg). - General: pleasant, no distress, good eye contact  - HEENT: no pallor, no periorbital edema, EOMI  - Neck: supple,  - Cardiovascular: regular, normal rate, normal S1 and S2,   - Respiratory: clear to auscultation bilaterally  - Integumentary: ,no edema,  - Neurological: ,alert and oriented  - Psychiatric: normal mood and affect  - Skin: color, texture, turgor normal.       Data Reviewed:     [] Glucose records reviewed. [] See glucose records for details (to be scanned).   [] A1C  [] Reviewed labs      Assessment/Plan:     1. Pituitary adenoma ([facility]    2. Galactorrhea    3. Hyperprolactinemia ([facility]          She was referred here for evaluation and management of hyperprolactinemia and galactorrhea. She denies pregnancy, estrogen can increase prolactin which is when she had severe galactorrhea  while on oral contraceptives. 7 mm Pituitary microadenoma - could be incidental finding, prolactin is very minimally elevated   Screening for pituitary hormones - neg  OCPs can increase prolactin     Obesity Body mass index is 46.23 kg/(m^2). Commended on weight loss          Patient Instructions   Night 1- Swab inside of cheeks between 11p-midnight for four (4) minutes, label with name, date of birth, collection date and collection time. Place swab in the freezer. Night 5- Swab inside of cheeks between 11p-midnight for four (4) minutes, label with name, date of birth, collection date and collection time. Place swab in the freezer. Swabs are to remain frozen until you can drop them off to [facility] with orders or our lab in the office      Follow-up Disposition:  Return in about 6 months (around 5/13/2018). Thank you for allowing me to participate in the care of this patient.     Mary Tenorio MD      Patient verbalized understanding

## 2017-11-13 NOTE — PATIENT INSTRUCTIONS
Night 1- Swab inside of cheeks between 11p-midnight for four (4) minutes, label with name, date of birth, collection date and collection time. Place swab in the freezer. Night 5- Swab inside of cheeks between 11p-midnight for four (4) minutes, label with name, date of birth, collection date and collection time. Place swab in the freezer.      Swabs are to remain frozen until you can drop them off to [facility] with orders or our lab in the office

## 2017-11-13 NOTE — MR AVS SNAPSHOT
Visit Information Date & Time Provider Department Dept. Phone Encounter #  
 11/13/2017 10:45 AM Hermila Hussein MD [facility] [phone] [id number] Follow-up Instructions Return in about 6 months (around 5/13/2018). Your Appointments 2/26/2018  9:00 AM  
ESTABLISHED PATIENT with MD Andrea Gray ([facility]) Appt Note: ae   FW  
 [address] [facility] [address] [facility] [phone]  
[facility] 31 [address]  [address] Upcoming Health Maintenance Date Due  
 HPV AGE 9Y-34Y (1 of 3 - Female 3 Dose Series) 11/28/2006 Pneumococcal 19-64 Medium Risk (1 of 1 - PPSV23) 11/28/2014 DTaP/Tdap/Td series (1 - Tdap) 11/28/2016 Influenza Age 5 to Adult 8/1/2017 PAP AKA CERVICAL CYTOLOGY 2/23/2020 Allergies as of 11/13/2017  Review Complete On: 11/13/2017 By: Ankit Grullon LPN Severity Noted Reaction Type Reactions Latex  07/15/2010    Swelling Skin peels Aspirin  06/27/2011    Nausea and Vomiting Betadine [Povidone-iodine]  05/26/2011    Other (comments) Skin peels Current Immunizations  Never Reviewed No immunizations on file. Not reviewed this visit You Were Diagnosed With   
  
 Codes Comments Pituitary adenoma ([facility]    -  Primary ICD-10-CM: D35.2 ICD-9-CM: 227.3 Galactorrhea     ICD-10-CM: O92.6 ICD-9-CM: 611.6 Hyperprolactinemia ([facility]     ICD-10-CM: E22.1 ICD-9-CM: 253.1 Vitals BP Pulse Temp Resp Height(growth percentile) Weight(growth percentile) 129/84 (BP 1 Location: Left arm) 88 96.4 °F (35.8 °C) (Oral) 16 5' 6\" (1.676 m) 286 lb 6.4 oz (129.9 kg) LMP SpO2 BMI OB Status Smoking Status (LMP Unknown) 97% 46.23 kg/m2 Having regular periods Current Every Day Smoker BMI and BSA Data Body Mass Index Body Surface Area  
 46.23 kg/m 2 2.46 m 2 Preferred Pharmacy Pharmacy Name Phone [facility] [address], [address] [phone] Your Updated Medication List  
  
   
This list is accurate as of: 11/13/17 11:39 AM.  Always use your most recent med list.  
  
  
  
  
 albuterol 2.5 mg /3 mL (0.083 %) nebulizer solution Commonly known as:  PROVENTIL VENTOLIN  
3 mL by Nebulization route every four (4) hours as needed for Wheezing. aspirin-acetaminophen-caffeine 250-250-65 mg per tablet Commonly known as:  EXCEDRIN ES Take 1 Tab by mouth.  
  
 clonazePAM 1 mg tablet Commonly known as:  Delfino Brocks Take 1 Tab by mouth daily as needed. Must last 30 days. diphenhydrAMINE 50 mg tablet Commonly known as:  BENADRYL Take 1 tablet 1 hr prior to MRI  
  
 drospirenone-ethinyl estradiol 3-0.03 mg Tab Commonly known as:  Philis Quitter Take 1 Tab by mouth daily. levonorgestrel-ethinyl estradiol 0.15 mg-30 mcg 3mpk Commonly known as:  Avanell Clayton Take 1 Tab by mouth daily. lisinopril 10 mg tablet Commonly known as:  PRINIVIL, ZESTRIL  
TAKE ONE TABLET BY MOUTH DAILY  
  
 loratadine 10 mg Cap Take 10 mg by mouth daily. MELATONIN PO Take  by mouth as needed. nystatin-triamcinolone topical cream  
Commonly known as:  MYCOLOG II Apply  to affected area two (2) times a day. omeprazole 10 mg capsule Commonly known as:  PRILOSEC  
TAKE ONE CAPSULE BY MOUTH ONCE A DAY  
  
 predniSONE 20 mg tablet Commonly known as:  Pam Chill Take 1 po TID x 2 days, then 1 po BID x 2 days, then 1 po every day  
  
 propranolol 20 mg tablet Commonly known as:  INDERAL  
TAKE ONE TABLET BY MOUTH TWICE A DAY Follow-up Instructions Return in about 6 months (around 5/13/2018). Patient Instructions Night 1- Swab inside of cheeks between 11p-midnight for four (4) minutes, label with name, date of birth, collection date and collection time. Place swab in the freezer. Night 5- Swab inside of cheeks between 11p-midnight for four (4) minutes, label with name, date of birth, collection date and collection time. Place swab in the freezer. Swabs are to remain frozen until you can drop them off to [facility] with orders or our lab in the office Introducing [facility] SERVICES! Dear Raji Ly: 
Thank you for requesting a [employer] account. Our records indicate that you already have an active [employer] account. You can access your account anytime at [email or web address] Did you know that you can access your hospital and ER discharge instructions at any time in [employer]? You can also review all of your test results from your hospital stay or ER visit. Additional Information If you have questions, please visit the Frequently Asked Questions section of the [employer] website at [email or web address] Remember, [employer] is NOT to be used for urgent needs. For medical emergencies, dial 911. Now available from your iPhone and Android! Please provide this summary of care documentation to your next provider. Your primary care clinician is listed as Rylee Goldberg. If you have any questions after today's visit, please call [phone].

## 2017-11-20 ENCOUNTER — PATIENT MESSAGE (OUTPATIENT)
Dept: FAMILY MEDICINE CLINIC | Age: 22
End: 2017-11-20

## 2017-11-22 ENCOUNTER — TELEPHONE (OUTPATIENT)
Dept: ENDOCRINOLOGY | Age: 22
End: 2017-11-22

## 2017-11-22 NOTE — TELEPHONE ENCOUNTER
----- Message from Coby Horton MD sent at 11/21/2017  5:39 PM EST -----  Prolactin is very minimally elevated , no change in therapy    Continue OCPS

## 2017-11-22 NOTE — TELEPHONE ENCOUNTER
Attempted to call. Unsuccessful. Left msg for Mala Mejia to give us a call back at the office. A callback number was left.

## 2017-11-27 RX ORDER — FLUCONAZOLE 150 MG/1
150 TABLET ORAL DAILY
Qty: 2 TAB | Refills: 0 | Status: SHIPPED | OUTPATIENT
Start: 2017-11-27 | End: 2018-04-03

## 2017-11-27 NOTE — TELEPHONE ENCOUNTER
From: Jacquelyn Foreman  Sent: 11/23/2017 9:04 PM EST  To: David Guerrero NP  Subject: RE: RE: Non-Urgent Medical Question      Can you please send me in the pills for a yeast infection. I think all the antibiotics messed up my system. I know usually its 3 in a week time frame and they clear up.  ----- Message -----  From: David Gurerero NP  Sent: 11/20/17 11:33 AM  To: Mala Mejia  Subject: RE: Non-Urgent Medical Question    You just did a z-pack from Dr. Rufino Salgado. I'd use some OTC to help control your symptoms and if you're not having fevers over 101 it is unlikely you need antibiotics. Feel better and happy Thanksgiving! David Guerrero NP      ----- Message -----   From: Diony Mejia   Sent: 11/20/2017 7:24 AM EST   To: David Guerrero NP  Subject: Non-Urgent Medical Question    Goodmoryessenia Poe if i need an appointment or not im congested as hell dry scratchy throat sniffles and an extremely mucusy cough and my chest is killing me i attempted 250mg cipro and its only getting corse!

## 2017-12-18 ENCOUNTER — TELEPHONE (OUTPATIENT)
Dept: OBGYN CLINIC | Age: 22
End: 2017-12-18

## 2017-12-18 NOTE — TELEPHONE ENCOUNTER
Patient is a FW patient last seen for yeast infection on 11-2-17 by FW and had AE on 02/23/17. Patient states that for several days she has had odor free stringy yellow vaginal discharge. Patient admits to having unprotected sex on OCP and denies change of partner. She has noticed 2 lesions on her vaginal lips that are flat and painless. Sorebreasts, nausea with vomiting, and shorter than normal menstrual cycle last month. Patient states that she is freaking out and wants to be seen this afternoon or tomorrow. Dr. Shahrzad Reyes is out. Dr. Shahrzad Reyes does not have any appointments available until 12/27/17. Please advise.

## 2017-12-18 NOTE — TELEPHONE ENCOUNTER
Looks like I have opening today at 3:00, as well as a few openings tomorrow. Any available appt is OK. She can also go to [facility], [facility], etc, if that is more convenient for her.

## 2017-12-18 NOTE — TELEPHONE ENCOUNTER
Patient called back and was advised of MD recommendations and was placed on the schedule to be seen at 11:20am on 12/19/17 ( ok per MG)    Patient verbalized understanding.

## 2017-12-19 ENCOUNTER — OFFICE VISIT (OUTPATIENT)
Dept: OBGYN CLINIC | Age: 22
End: 2017-12-19

## 2017-12-19 VITALS
BODY MASS INDEX: 45.96 KG/M2 | SYSTOLIC BLOOD PRESSURE: 114 MMHG | HEART RATE: 94 BPM | HEIGHT: 66 IN | WEIGHT: 286 LBS | DIASTOLIC BLOOD PRESSURE: 73 MMHG

## 2017-12-19 DIAGNOSIS — N89.8 VAGINAL DISCHARGE: Primary | ICD-10-CM

## 2017-12-19 DIAGNOSIS — R35.0 URINARY FREQUENCY: ICD-10-CM

## 2017-12-19 DIAGNOSIS — N90.89 VULVAR LESION: ICD-10-CM

## 2017-12-19 LAB
BILIRUB UR QL STRIP: NEGATIVE
GLUCOSE UR-MCNC: NEGATIVE MG/DL
KETONES P FAST UR STRIP-MCNC: NEGATIVE MG/DL
PH BODY FLUID, POCT, PHBFPOCT: 4.5
PH UR STRIP: 4.6 [PH] (ref 4.6–8)
PROT UR QL STRIP: NEGATIVE
SOURCE POCT, SRCEPOCT: NORMAL
SP GR UR STRIP: 1 (ref 1–1.03)
UA UROBILINOGEN AMB POC: NORMAL (ref 0.2–1)
URINALYSIS CLARITY POC: CLEAR
URINALYSIS COLOR POC: YELLOW
URINE BLOOD POC: NEGATIVE
URINE LEUKOCYTES POC: NEGATIVE
URINE NITRITES POC: NEGATIVE
WET MOUNT POCT, WMPOCT: NEGATIVE

## 2017-12-19 NOTE — PATIENT INSTRUCTIONS
Safer Sex: Care Instructions  Your Care Instructions  Safer sex is a way to reduce your risk of getting an infection spread through sex. It can also help prevent pregnancy. Most infections that are spread through sex, also called sexually transmitted infections or STIs, can be cured. But some can decrease your chances of getting pregnant if they are not treated early. Others, such as herpes, have no cure. And some, such as HIV, can be deadly. Several products can help you practice safer sex and reduce your chance of STIs. One of the best is a condom. There are condoms for men and for women. The female condom is a tube of soft plastic with a closed end that is placed deep into the vagina. You can use a special rubber sheet (dental dam) for protection during oral sex. Latex gloves can keep your hands from touching blood, semen, or other body fluids that can carry infections. Remember that birth control methods such as diaphragms, IUDs, foams, and birth control pills do not stop you from getting STIs. Follow-up care is a key part of your treatment and safety. Be sure to make and go to all appointments, and call your doctor if you are having problems. It's also a good idea to know your test results and keep a list of the medicines you take. How can you care for yourself at home? · Think about getting shots to prevent hepatitis A and hepatitis B. These two diseases can be spread through sex. You also can get hepatitis A if you eat infected food. · Use condoms or female condoms each time and every time you have sex. · Learn the right way to use a male condom:  ¨ Condoms come in several sizes. Make sure you use the right size. A condom that is too small can break easily. A condom that is too big can slip off during sex. Use a new condom each time you have sex. ¨ Be careful not to poke a hole in the condom when you open the wrapper. ¨ Squeeze the tip of the condom to keep out air.   ¨ Pull down the loose skin (foreskin) from the head of an uncircumcised penis. ¨ While squeezing the tip of the condom, unroll it all the way down to the base of the firm penis. ¨ Never use petroleum jelly (such as Vaseline), grease, hand lotion, baby oil, or anything with oil in it. These products can make holes in the condom. ¨ After sex, hold the condom on your penis as you remove your penis from your partner. This will keep semen from spilling out of the condom. · Learn to use a female condom:  ¨ You can put in a female condom up to 8 hours before sex. ¨ Squeeze the smaller ring at the closed end and insert it deep into the vagina. The larger ring at the open end should stay outside the vagina. ¨ During sex, make sure the penis goes into the condom. ¨ After the penis is removed, close the open end of the condom by twisting it. Remove the condom. · Do not use a female condom and male condom at the same time. · Do not have sex with anyone who has symptoms of an STI, such as sores on the genitals or mouth. The herpes virus that causes cold sores can spread to and from the penis and vagina. · Do not drink a lot of alcohol or use drugs before sex. This can cause you to let down your guard and not practice safer sex. · Having one sex partner (who does not have STIs and does not have sex with anyone else) is a sure way to avoid STIs. · Talk to your partner before you have sex. Find out if he or she has or is at risk for any STI. Keep in mind that a person may be able to spread an STI even if he or she does not have symptoms. You and your partner may want to get an HIV test. You should get tested again 6 months later. Where can you learn more? Go to http://sathya-bianka.info/. Enter W053 in the search box to learn more about \"Safer Sex: Care Instructions. \"  Current as of: March 20, 2017  Content Version: 11.4  © 3578-7078 [employer].  Care instructions adapted under license by [employer] (which disclaims liability or warranty for this information). If you have questions about a medical condition or this instruction, always ask your healthcare professional. [address] any warranty or liability for your use of this information.

## 2017-12-19 NOTE — PROGRESS NOTES
Problem Visit-Complete    Chief Complaint   Vaginitis and Vulvar Abnormaility      HPI  Tisha Hinojosa is a 25 y.o. female who presents for the evaluation of vaginal d/c and lesion on mons and some on thighs. Patient's last menstrual period was 11/22/2017 (exact date). The patient complains of vulvovaginal irritation. It is located vagina. The symptoms are moderate. They started 2-3 wks ago. Since then they have become unchanged. Associated symptoms: urinary frequency -- does admit to drinking fair amount of caffeine  Aggravating factors: none. Alleviating factors: none. Pt's \"ex\" (who is still her current sexual partner) had \"bumps\" - he has not been evaluated for this. Also concerned because she did not take pills properly. Starting placebo week. The patient denies fever. Past Medical History:   Diagnosis Date    Dysmenorrhea     Major depressive disorder, single episode, unspecified     Morbid obesity ([address]     Pituitary abnormality ([address]     Seasonal allergies     Unspecified symptom associated with female genital organs      Past Surgical History:   Procedure Laterality Date    HX WISDOM TEETH EXTRACTION       Social History     Occupational History    Not on file.      Social History Main Topics    Smoking status: Current Every Day Smoker     Packs/day: 0.50     Years: 1.00     Types: Cigarettes    Smokeless tobacco: Never Used    Alcohol use No      Comment: former user just sips now and then   [facility] Drug use: No    Sexual activity: Yes     Partners: Male     Birth control/ protection: Pill     Family History   Problem Relation Age of Onset    Alcohol abuse Father     Cancer Paternal Grandmother     Other Mother      endometriosis    Migraines Mother      tumer in brain       Allergies   Allergen Reactions    Latex Swelling     Skin peels    Aspirin Nausea and Vomiting    Betadine [Povidone-Iodine] Other (comments)     Skin peels     Prior to Admission medications Medication Sig Start Date End Date Taking? Authorizing Provider   fluconazole (DIFLUCAN) 150 mg tablet Take 1 Tab by mouth daily. Repeat in 3 days if needed 11/27/17  Yes Calin Gan NP   clonazePAM (KLONOPIN) 1 mg tablet Take 1 Tab by mouth daily as needed. Must last 30 days. 10/30/17  Yes Matthew Miller MD   nystatin-triamcinolone (MYCOLOG II) topical cream Apply  to affected area two (2) times a day. 10/11/17  Yes Tian Lang MD   drospirenone-ethinyl estradiol (JEANETTE) 3-0.03 mg tab Take 1 Tab by mouth daily. 9/6/17  Yes Tian Lang MD   predniSONE (DELTASONE) 20 mg tablet Take 1 po TID x 2 days, then 1 po BID x 2 days, then 1 po every day 7/3/17  Yes Calin Gan NP   albuterol (PROVENTIL VENTOLIN) 2.5 mg /3 mL (0.083 %) nebulizer solution 3 mL by Nebulization route every four (4) hours as needed for Wheezing. 6/29/17  Yes Calin Gan NP   MELATONIN PO Take  by mouth as needed. Yes Historical Provider   diphenhydrAMINE (BENADRYL) 50 mg tablet Take 1 tablet 1 hr prior to MRI 10/18/16  Yes Cristhian Duarte MD   aspirin-acetaminophen-caffeine (EXCEDRIN ES) 250-250-65 mg per tablet Take 1 Tab by mouth. Yes Historical Provider   loratadine 10 mg cap Take 10 mg by mouth daily. Yes Historical Provider   omeprazole (PRILOSEC) 10 mg capsule TAKE ONE CAPSULE BY MOUTH ONCE A DAY 1/1/15  Yes Rosemarie Pradhan MD   levonorgestrel-ethinyl estradiol (SEASONALE) 0.15 mg-30 mcg 3MPk Take 1 Tab by mouth daily.  6/29/17   Tian Lang MD   lisinopril (PRINIVIL, ZESTRIL) 10 mg tablet TAKE ONE TABLET BY MOUTH DAILY 7/13/15   [facility] MD Yusuf   propranolol (INDERAL) 20 mg tablet TAKE ONE TABLET BY MOUTH TWICE A DAY 6/10/15   Rosemarie Pradhan MD            Objective:  Visit Vitals    /73    Pulse 94    Ht 5' 6\" (1.676 m)    Wt 286 lb (129.7 kg)    LMP 11/22/2017 (Exact Date)  Comment: Short and light    BMI 46.16 kg/m2       Physical Exam:     Constitutional  · Appearance: well-nourished, well developed, alert, in no acute distress    HENT  · Head and Face: appears normal      Gastrointestinal  · Abdominal Examination: abdomen non-tender to palpation, no masses present  · Liver and spleen: no hepatomegaly present, spleen not palpable  · Hernias: no hernias identified    Genitourinary  · External Genitalia: shaven with scattered lesions over mons associated with hair follicles, c/w razor burn  · Vagina: normal vaginal vault without central or paravaginal defects, small amount of yellow mucinous discharge without odor present, no inflammatory lesions present, no masses present  · Bladder: non-tender to palpation  · Urethra: appears normal  · Cervix: normal   · Uterus: normal size, shape and consistency  · Adnexa: no adnexal tenderness present, no adnexal masses present  · Perineum: perineum within normal limits, no evidence of trauma, no rashes or skin lesions present  · Anus: anus within normal limits, no hemorrhoids present  · Inguinal Lymph Nodes: no lymphadenopathy present    Skin  · General Inspection: no rash, 2 razor burn type bumps on left inner thigh    Neurologic/Psychiatric  · Mental Status:  · Orientation: grossly oriented to person, place and time  · Mood and Affect: mood normal, affect appropriate    Results for orders placed or performed in visit on 12/19/17   AMB POC SMEAR, STAIN & INTERPRET, WET MOUNT   Result Value Ref Range    Wet mount (POC) negative    AMB POC PH, BODY FLUID EXCEPT BLOOD   Result Value Ref Range    pH,Body fluid (POC) 4.5     Source     AMB POC URINALYSIS DIP STICK AUTO W/O MICRO   Result Value Ref Range    Color (UA POC) Yellow     Clarity (UA POC) Clear     Glucose (UA POC) Negative Negative    Bilirubin (UA POC) Negative Negative    Ketones (UA POC) Negative Negative    Specific gravity (UA POC) 1.001 1.001 - 1.035    Blood (UA POC) Negative Negative    pH (UA POC) 4.6 4.6 - 8.0    Protein (UA POC) Negative Negative    Urobilinogen (UA POC) normal 0.2 - 1    Nitrites (UA POC) Negative Negative    Leukocyte esterase (UA POC) Negative Negative         Assessment:  Urinary frequency - UA negative. Razor burn  Vaginal discharge. WP/KOH negative  Took OCPs wrong    Plan: Adv to decrease caffeine intake. Advised to stop shaving. Can use OTC hydrocortisone prn. Discussed small possibility of molluscum. This will typically run its course, but RTO if sx worsen. Nuswab plus  If no period with placebo wk, recheck UPT. Orders Placed This Encounter    NUSWAB VAGINITIS PLUS    AMB POC SMEAR, STAIN & INTERPRET, WET MOUNT    AMB POC PH, BODY FLUID EXCEPT BLOOD    AMB POC URINALYSIS DIP STICK AUTO W/O MICRO     .

## 2017-12-23 LAB
A VAGINAE DNA VAG QL NAA+PROBE: NORMAL SCORE
BVAB2 DNA VAG QL NAA+PROBE: NORMAL SCORE
C ALBICANS DNA VAG QL NAA+PROBE: NEGATIVE
C GLABRATA DNA VAG QL NAA+PROBE: NEGATIVE
C TRACH RRNA SPEC QL NAA+PROBE: NEGATIVE
MEGA1 DNA VAG QL NAA+PROBE: NORMAL SCORE
N GONORRHOEA RRNA SPEC QL NAA+PROBE: NEGATIVE
T VAGINALIS RRNA SPEC QL NAA+PROBE: NEGATIVE

## 2017-12-28 ENCOUNTER — TELEPHONE (OUTPATIENT)
Dept: OBGYN CLINIC | Age: 22
End: 2017-12-28

## 2017-12-28 RX ORDER — METRONIDAZOLE 500 MG/1
500 TABLET ORAL 2 TIMES DAILY
Qty: 14 TAB | Refills: 0 | Status: SHIPPED | OUTPATIENT
Start: 2017-12-28 | End: 2018-01-04

## 2017-12-28 NOTE — TELEPHONE ENCOUNTER
Spoke with patient and advised that rx has been forwarded to Consuelo Ren at [facility]. Rx confirmed by pharmacy.

## 2017-12-28 NOTE — TELEPHONE ENCOUNTER
May you eRx Flagyl for pt since it was al;ready an option for her:     Entered by Joan Mckeon MD at 12/28/2017  9:16 AM   Read by Moshe Vaughan at 12/28/2017 11:46 AM   Vaginal swab is negative for STDs - chlamydia, gonorrhea, and trichomonas. It is indeterminate for BV.  If you are still having symptoms we can treat with either a vaginal insert (Metrogel) for 5nights or Flagyl which is a pill that you take twice a day for 7 days.  NO alcohol while you are taking either of these.  Please let us know what you would like to do.      Thanks,   Leonie Company

## 2018-01-07 ENCOUNTER — PATIENT MESSAGE (OUTPATIENT)
Dept: OBGYN CLINIC | Age: 23
End: 2018-01-07

## 2018-01-08 RX ORDER — FLUCONAZOLE 150 MG/1
150 TABLET ORAL DAILY
Qty: 3 TAB | Refills: 0 | Status: SHIPPED | OUTPATIENT
Start: 2018-01-08 | End: 2018-01-11

## 2018-01-08 NOTE — TELEPHONE ENCOUNTER
From: Chaya Mejia  To: Tony Vann MD  Sent: 1/7/2018 2:00 PM EST  Subject: Non-Urgent Medical Question    Good afternoon . i was on an antibiotic for being sick and i noticed itching and a white discharge so i started monistat. Now 9 days later (i used the 7 day.) I still have a white discharge and the itch is intensifying? Monistat worked wonders last time but seems like it didnt help me at all this time. Dilan if since the monistat didnt work if i need to be seen or what.

## 2018-02-05 ENCOUNTER — OFFICE VISIT (OUTPATIENT)
Dept: FAMILY MEDICINE CLINIC | Age: 23
End: 2018-02-05

## 2018-02-05 VITALS
DIASTOLIC BLOOD PRESSURE: 86 MMHG | SYSTOLIC BLOOD PRESSURE: 140 MMHG | TEMPERATURE: 98.5 F | RESPIRATION RATE: 16 BRPM | BODY MASS INDEX: 46.61 KG/M2 | HEART RATE: 110 BPM | OXYGEN SATURATION: 99 % | HEIGHT: 66 IN | WEIGHT: 290 LBS

## 2018-02-05 DIAGNOSIS — F32.A DEPRESSION, UNSPECIFIED DEPRESSION TYPE: Primary | ICD-10-CM

## 2018-02-05 DIAGNOSIS — J02.9 SORE THROAT: ICD-10-CM

## 2018-02-05 PROBLEM — E66.01 OBESITY, MORBID (HCC): Status: ACTIVE | Noted: 2018-02-05

## 2018-02-05 RX ORDER — BUPROPION HYDROCHLORIDE 150 MG/1
150 TABLET ORAL
Qty: 30 TAB | Refills: 5 | Status: ON HOLD | OUTPATIENT
Start: 2018-02-05 | End: 2018-12-31 | Stop reason: SDUPTHER

## 2018-02-05 NOTE — PATIENT INSTRUCTIONS
Recovering From Depression: Care Instructions  Your Care Instructions    Taking good care of yourself is important as you recover from depression. In time, your symptoms will fade as your treatment takes hold. Do not give up. Instead, focus your energy on getting better. Your mood will improve. It just takes some time. Focus on things that can help you feel better, such as being with friends and family, eating well, and getting enough rest. But take things slowly. Do not do too much too soon. You will begin to feel better gradually. Follow-up care is a key part of your treatment and safety. Be sure to make and go to all appointments, and call your doctor if you are having problems. It's also a good idea to know your test results and keep a list of the medicines you take. How can you care for yourself at home? Be realistic  · If you have a large task to do, break it up into smaller steps you can handle, and just do what you can. · You may want to put off important decisions until your depression has lifted. If you have plans that will have a major impact on your life, such as marriage, divorce, or a job change, try to wait a bit. Talk it over with friends and loved ones who can help you look at the overall picture first.  · Reaching out to people for help is important. Do not isolate yourself. Let your family and friends help you. Find someone you can trust and confide in, and talk to that person. · Be patient, and be kind to yourself. Remember that depression is not your fault and is not something you can overcome with willpower alone. Treatment is necessary for depression, just like for any other illness. Feeling better takes time, and your mood will improve little by little. Stay active  · Stay busy and get outside. Take a walk, or try some other light exercise. · Talk with your doctor about an exercise program. Exercise can help with mild depression. · Go to a movie or concert.  Take part in a Jain activity or other social gathering. Go to a [employer] game. · Ask a friend to have dinner with you. Take care of yourself  · Eat a balanced diet with plenty of fresh fruits and vegetables, whole grains, and lean protein. If you have lost your appetite, eat small snacks rather than large meals. · Avoid drinking alcohol or using illegal drugs. Do not take medicines that have not been prescribed for you. They may interfere with medicines you may be taking for depression, or they may make your depression worse. · Take your medicines exactly as they are prescribed. You may start to feel better within 1 to 3 weeks of taking antidepressant medicine. But it can take as many as 6 to 8 weeks to see more improvement. If you have questions or concerns about your medicines, or if you do not notice any improvement by 3 weeks, talk to your doctor. · If you have any side effects from your medicine, tell your doctor. Antidepressants can make you feel tired, dizzy, or nervous. Some people have dry mouth, constipation, headaches, sexual problems, or diarrhea. Many of these side effects are mild and will go away on their own after you have been taking the medicine for a few weeks. Some may last longer. Talk to your doctor if side effects are bothering you too much. You might be able to try a different medicine. · Get enough sleep. If you have problems sleeping:  ¨ Go to bed at the same time every night, and get up at the same time every morning. ¨ Keep your bedroom dark and quiet. ¨ Do not exercise after 5:00 p.m. ¨ Avoid drinks with caffeine after 5:00 p.m. · Avoid sleeping pills unless they are prescribed by the doctor treating your depression. Sleeping pills may make you groggy during the day, and they may interact with other medicine you are taking. · If you have any other illnesses, such as diabetes, heart disease, or high blood pressure, make sure to continue with your treatment.  Tell your doctor about all of the medicines you take, including those with or without a prescription. · Keep the numbers for these national suicide hotlines: 4-326-715-TALK (1-404.253.8755) and 1-580-BISMHPP (7-688.702.4753). If you or someone you know talks about suicide or feeling hopeless, get help right away. When should you call for help? Call 911 anytime you think you may need emergency care. For example, call if:  ? · You feel like hurting yourself or someone else. ? · Someone you know has depression and is about to attempt or is attempting suicide. ?Call your doctor now or seek immediate medical care if:  ? · You hear voices. ? · Someone you know has depression and:  ¨ Starts to give away his or her possessions. ¨ Uses illegal drugs or drinks alcohol heavily. ¨ Talks or writes about death, including writing suicide notes or talking about guns, knives, or pills. ¨ Starts to spend a lot of time alone. ¨ Acts very aggressively or suddenly appears calm. ? Watch closely for changes in your health, and be sure to contact your doctor if:  ? · You do not get better as expected. Where can you learn more? Go to http://sathya-bianka.info/. Enter L239 in the search box to learn more about \"Recovering From Depression: Care Instructions. \"  Current as of: May 12, 2017  Content Version: 11.4  © 4378-6764 [employer]. Care instructions adapted under license by [employer] (which disclaims liability or warranty for this information). If you have questions about a medical condition or this instruction, always ask your healthcare professional. [address] any warranty or liability for your use of this information.

## 2018-02-05 NOTE — MR AVS SNAPSHOT
[address] [id number] 
[phone] Patient: Maikel Godwin MRN: [id number] :1995 Visit Information Date & Time Provider Department Dept. Phone Encounter #  
 2018  1:30 PM Issa Trevino NP [address] [facility] [phone] [id number] Follow-up Instructions Return if symptoms worsen or fail to improve. Your Appointments 2018  9:00 AM  
ESTABLISHED PATIENT with MD Andrea Odell ([facility]) Appt Note: ae   FW  
 [address] [facility] [address] [id number]  
[address] 100[address] [facility] 2018  9:45 AM  
ROUTINE CARE with Taylor Chew MD  
Care Diabetes [facility] Endocrinology [facility]) Appt Note: f/u 6 month  
 [address] [facility] [address]  
[phone]  
  
   
 [address] [place] [address] Upcoming Health Maintenance Date Due  
 HPV AGE 9Y-34Y (1 of 3 - Female 3 Dose Series) 2006 Pneumococcal 19-64 Medium Risk (1 of 1 - PPSV23) 2014 DTaP/Tdap/Td series (1 - Tdap) 2016 Influenza Age 5 to Adult 2017 PAP AKA CERVICAL CYTOLOGY 2020 Allergies as of 2018  Review Complete On: 2018 By: Terrance Sanderson LPN Severity Noted Reaction Type Reactions Latex  07/15/2010    Swelling Skin peels Aspirin  2011    Nausea and Vomiting Betadine [Povidone-iodine]  2011    Other (comments) Skin peels Current Immunizations  Never Reviewed No immunizations on file. Not reviewed this visit You Were Diagnosed With   
  
 Codes Comments Depression, unspecified depression type    -  Primary ICD-10-CM: F32.9 ICD-9-CM: 013 Sore throat     ICD-10-CM: J02.9 ICD-9-CM: 199 Vitals BP Pulse Temp Resp Height(growth percentile) Weight(growth percentile) 140/86 (!) 110 98.5 °F (36.9 °C) (Oral) 16 5' 6\" (1.676 m) 290 lb (131.5 kg) LMP SpO2 BMI OB Status Smoking Status 12/18/2017 (Exact Date) 99% 46.81 kg/m2 Having regular periods Current Every Day Smoker Vitals History BMI and BSA Data Body Mass Index Body Surface Area  
 46.81 kg/m 2 2.47 m 2 Preferred Pharmacy Pharmacy Name Phone [facility] [address], [address] [phone] Your Updated Medication List  
  
   
This list is accurate as of: 2/5/18  2:04 PM.  Always use your most recent med list.  
  
  
  
  
 albuterol 2.5 mg /3 mL (0.083 %) nebulizer solution Commonly known as:  PROVENTIL VENTOLIN  
3 mL by Nebulization route every four (4) hours as needed for Wheezing. aspirin-acetaminophen-caffeine 250-250-65 mg per tablet Commonly known as:  EXCEDRIN ES Take 1 Tab by mouth. buPROPion  mg tablet Commonly known as:  Bedelia Lexington Take 1 Tab by mouth every morning. clonazePAM 1 mg tablet Commonly known as:  Suann Toribio Take 1 Tab by mouth daily as needed. Must last 30 days. diphenhydrAMINE 50 mg tablet Commonly known as:  BENADRYL Take 1 tablet 1 hr prior to MRI  
  
 drospirenone-ethinyl estradiol 3-0.03 mg Tab Commonly known as:  Mary Lazier Take 1 Tab by mouth daily. fluconazole 150 mg tablet Commonly known as:  DIFLUCAN Take 1 Tab by mouth daily. Repeat in 3 days if needed  
  
 levonorgestrel-ethinyl estradiol 0.15 mg-30 mcg 3mpk Commonly known as:  Midge Peter Take 1 Tab by mouth daily. lisinopril 10 mg tablet Commonly known as:  PRINIVIL, ZESTRIL  
TAKE ONE TABLET BY MOUTH DAILY  
  
 loratadine 10 mg Cap Take 10 mg by mouth daily. MELATONIN PO Take  by mouth as needed. norgestimate-ethinyl estradiol 0.25-35 mg-mcg Tab Commonly known as:  Zeeshan Trevino Take 1 Tab by mouth daily. nystatin-triamcinolone topical cream  
Commonly known as:  MYCOLOG II Apply  to affected area two (2) times a day. omeprazole 10 mg capsule Commonly known as:  PRILOSEC  
TAKE ONE CAPSULE BY MOUTH ONCE A DAY  
  
 predniSONE 20 mg tablet Commonly known as:  Wendie Bora Take 1 po TID x 2 days, then 1 po BID x 2 days, then 1 po every day  
  
 propranolol 20 mg tablet Commonly known as:  INDERAL  
TAKE ONE TABLET BY MOUTH TWICE A DAY Prescriptions Sent to Pharmacy Refills buPROPion XL (WELLBUTRIN XL) 150 mg tablet 5 Sig: Take 1 Tab by mouth every morning. Class: Normal  
 Pharmacy: [facility] [address] [facility] , [address] [facility] #: [phone] Route: Oral  
  
Follow-up Instructions Return if symptoms worsen or fail to improve. Patient Instructions Recovering From Depression: Care Instructions Your Care Instructions Taking good care of yourself is important as you recover from depression. In time, your symptoms will fade as your treatment takes hold. Do not give up. Instead, focus your energy on getting better. Your mood will improve. It just takes some time. Focus on things that can help you feel better, such as being with friends and family, eating well, and getting enough rest. But take things slowly. Do not do too much too soon. You will begin to feel better gradually. Follow-up care is a key part of your treatment and safety. Be sure to make and go to all appointments, and call your doctor if you are having problems. It's also a good idea to know your test results and keep a list of the medicines you take. How can you care for yourself at home? Be realistic · If you have a large task to do, break it up into smaller steps you can handle, and just do what you can.  
· You may want to put off important decisions until your depression has lifted. If you have plans that will have a major impact on your life, such as marriage, divorce, or a job change, try to wait a bit. Talk it over with friends and loved ones who can help you look at the overall picture first. 
· Reaching out to people for help is important. Do not isolate yourself. Let your family and friends help you. Find someone you can trust and confide in, and talk to that person. · Be patient, and be kind to yourself. Remember that depression is not your fault and is not something you can overcome with willpower alone. Treatment is necessary for depression, just like for any other illness. Feeling better takes time, and your mood will improve little by little. Stay active · Stay busy and get outside. Take a walk, or try some other light exercise. · Talk with your doctor about an exercise program. Exercise can help with mild depression. · Go to a movie or concert. Take part in a Zoroastrianism activity or other social gathering. Go to a ball game. · Ask a friend to have dinner with you. Take care of yourself · Eat a balanced diet with plenty of fresh fruits and vegetables, whole grains, and lean protein. If you have lost your appetite, eat small snacks rather than large meals. · Avoid drinking alcohol or using illegal drugs. Do not take medicines that have not been prescribed for you. They may interfere with medicines you may be taking for depression, or they may make your depression worse. · Take your medicines exactly as they are prescribed. You may start to feel better within 1 to 3 weeks of taking antidepressant medicine. But it can take as many as 6 to 8 weeks to see more improvement. If you have questions or concerns about your medicines, or if you do not notice any improvement by 3 weeks, talk to your doctor. · If you have any side effects from your medicine, tell your doctor. Antidepressants can make you feel tired, dizzy, or nervous.  Some people have dry mouth, constipation, headaches, sexual problems, or diarrhea. Many of these side effects are mild and will go away on their own after you have been taking the medicine for a few weeks. Some may last longer. Talk to your doctor if side effects are bothering you too much. You might be able to try a different medicine. · Get enough sleep. If you have problems sleeping: ¨ Go to bed at the same time every night, and get up at the same time every morning. ¨ Keep your bedroom dark and quiet. ¨ Do not exercise after 5:00 p.m. ¨ Avoid drinks with caffeine after 5:00 p.m. · Avoid sleeping pills unless they are prescribed by the doctor treating your depression. Sleeping pills may make you groggy during the day, and they may interact with other medicine you are taking. · If you have any other illnesses, such as diabetes, heart disease, or high blood pressure, make sure to continue with your treatment. Tell your doctor about all of the medicines you take, including those with or without a prescription. · Keep the numbers for these national suicide hotlines: 9-189-987-TALK (4-718.141.9940) and 1-955-VYPPMOI (3-367.283.9695). If you or someone you know talks about suicide or feeling hopeless, get help right away. When should you call for help? Call 911 anytime you think you may need emergency care. For example, call if: 
? · You feel like hurting yourself or someone else. ? · Someone you know has depression and is about to attempt or is attempting suicide. ?Call your doctor now or seek immediate medical care if: 
? · You hear voices. ? · Someone you know has depression and: 
¨ Starts to give away his or her possessions. ¨ Uses illegal drugs or drinks alcohol heavily. ¨ Talks or writes about death, including writing suicide notes or talking about guns, knives, or pills. ¨ Starts to spend a lot of time alone. ¨ Acts very aggressively or suddenly appears calm. ?Watch closely for changes in your health, and be sure to contact your doctor if: 
? · You do not get better as expected. Where can you learn more? Go to http://sathya-bianka.info/. Enter G806 in the search box to learn more about \"Recovering From Depression: Care Instructions. \" Current as of: May 12, 2017 Content Version: 11.4 © 2871-7648 [employer]. Care instructions adapted under license by [employer] (which disclaims liability or warranty for this information). If you have questions about a medical condition or this instruction, always ask your healthcare professional. [address] [address] any warranty or liability for your use of this information. Introducing [facility] & HEALTH SERVICES! Dear Jihan: 
Thank you for requesting a [employer] account. Our records indicate that you already have an active [employer] account. You can access your account anytime at [email or web address] Did you know that you can access your hospital and ER discharge instructions at any time in [employer]? You can also review all of your test results from your hospital stay or ER visit. Additional Information If you have questions, please visit the Frequently Asked Questions section of the [employer] website at [email or web address] Remember, [employer] is NOT to be used for urgent needs. For medical emergencies, dial 911. Now available from your iPhone and Android! Please provide this summary of care documentation to your next provider. Your primary care clinician is listed as Rylee Goldberg. If you have any questions after today's visit, please call [phone].

## 2018-02-05 NOTE — PROGRESS NOTES
Chief Complaint   Patient presents with    Medication Evaluation     Antidepressant     she is a 25y.o. year old female who presents for evalution. Pt was on Wellbutrin previously throughout high school, was very helpful. Stopped taking it a few years ago. Now has been feeling issues with depression and anxiety. Requesting to be restarted on medication. Also has slightly sore throat, would like checked today. Reviewed PmHx, RxHx, FmHx, SocHx, AllgHx and updated and dated in the chart. Review of Systems - negative except as listed above in the HPI    Objective:     Vitals:    02/05/18 1335   BP: 140/86   Pulse: (!) 110   Resp: 16   Temp: 98.5 °F (36.9 °C)   TempSrc: Oral   SpO2: 99%   Weight: 290 lb (131.5 kg)   Height: 5' 6\" (1.676 m)     Physical Examination: General appearance - alert, well appearing, and in no distress  Mental status - alert, oriented to person, place, and time, depressed mood  Ears - bilateral TM's and external ear canals normal  Nose - normal nontender sinuses, mucosal congestion and mucosal pallor  Mouth - mucous membranes moist, pharynx normal without lesions and erythematous  Neck - supple, no significant adenopathy  Chest - clear to auscultation, no wheezes, rales or rhonchi, symmetric air entry  Heart - normal rate, regular rhythm, normal S1, S2, no murmurs, rubs, clicks or gallops    Assessment/ Plan:   Diagnoses and all orders for this visit:    1. Depression, unspecified depression type  -     buPROPion XL (WELLBUTRIN XL) 150 mg tablet; Take 1 Tab by mouth every morning. New rx. Positive thinking, rely on support system. F/U prn    2. Sore throat   Supportive care. May use OTC throat lozenges for pain relief. Recommend salt water gargles and drinking cold fluids. Pt voiced understanding regarding plan of care. Follow-up Disposition:  Return if symptoms worsen or fail to improve.     I have discussed the diagnosis with the patient and the intended plan as seen in the above orders. The patient has received an after-visit summary and questions were answered concerning future plans.      Medication Side Effects and Warnings were discussed with burton Tellez NP

## 2018-02-05 NOTE — PROGRESS NOTES
1. Have you been to the ER, urgent care clinic since your last visit? Hospitalized since your last visit? No    2. Have you seen or consulted any other health care providers outside of the [address] [place] [address] since your last visit? Include any pap smears or colon screening.  No     Chief Complaint   Patient presents with    Medication Evaluation     Antidepressant

## 2018-02-26 ENCOUNTER — OFFICE VISIT (OUTPATIENT)
Dept: OBGYN CLINIC | Age: 23
End: 2018-02-26

## 2018-02-26 VITALS
SYSTOLIC BLOOD PRESSURE: 134 MMHG | WEIGHT: 289 LBS | BODY MASS INDEX: 46.45 KG/M2 | DIASTOLIC BLOOD PRESSURE: 82 MMHG | HEIGHT: 66 IN

## 2018-02-26 DIAGNOSIS — N92.6 MISSED MENSES: ICD-10-CM

## 2018-02-26 DIAGNOSIS — Z20.2 POSSIBLE EXPOSURE TO STD: ICD-10-CM

## 2018-02-26 DIAGNOSIS — Z01.419 ENCOUNTER FOR GYNECOLOGICAL EXAMINATION (GENERAL) (ROUTINE) WITHOUT ABNORMAL FINDINGS: Primary | ICD-10-CM

## 2018-02-26 DIAGNOSIS — N89.8 VAGINAL DISCHARGE: ICD-10-CM

## 2018-02-26 DIAGNOSIS — Z32.02 PREGNANCY EXAMINATION OR TEST, NEGATIVE RESULT: ICD-10-CM

## 2018-02-26 LAB
HCG URINE, QL. (POC): NEGATIVE
VALID INTERNAL CONTROL?: YES

## 2018-02-26 NOTE — PROGRESS NOTES
Annual exam ages 21-44    Baldev Sorensen is a No obstetric history on file. ,  25 y.o. female [facility] No LMP recorded. .    She presents for her annual checkup. She is having significant vaginal discharge and missed menses on OCP's. She wants std testing today. With regard to the Gardasil vaccine, she has not received it yet. Menstrual status:    Her periods are moderate in flow. She is using three to five pads or tampons per day, usually regular and last 26-30 days. She denies dysmenorrhea. She reports no premenstrual symptoms. Contraception:    The current method of family planning is OCP (estrogen/progesterone). Sexual history:     She  reports that she currently engages in sexual activity and has had male partners. She reports using the following method of birth control/protection: Pill. .    Medical conditions:    Since her last annual GYN exam about one year ago, she has not the following changes in her health history: none. Pap and Mammogram History:    Her most recent Pap smear was normal, obtained 1 year(s) ago. The patient has never had a mammogram.    Breast Cancer History/Substance Abuse: negative    Past Medical History:   Diagnosis Date    Dysmenorrhea     Major depressive disorder, single episode, unspecified     Morbid obesity ([facility] [address]     Pap smear for cervical cancer screening 2/23/17 neg    Pituitary abnormality (HCC)     Seasonal allergies     Unspecified symptom associated with female genital organs      Past Surgical History:   Procedure Laterality Date    HX WISDOM TEETH EXTRACTION         Current Outpatient Prescriptions   Medication Sig Dispense Refill    buPROPion XL (WELLBUTRIN XL) 150 mg tablet Take 1 Tab by mouth every morning. 30 Tab 5    norgestimate-ethinyl estradiol (ORTHO-CYCLEN, SPRINTEC) 0.25-35 mg-mcg tab Take 1 Tab by mouth daily. 1 Package 6    clonazePAM (KLONOPIN) 1 mg tablet Take 1 Tab by mouth daily as needed.  Must last 30 days. 30 Tab 2    fluconazole (DIFLUCAN) 150 mg tablet Take 1 Tab by mouth daily. Repeat in 3 days if needed 2 Tab 0    nystatin-triamcinolone (MYCOLOG II) topical cream Apply  to affected area two (2) times a day. 30 g 0    drospirenone-ethinyl estradiol (JEANETTE) 3-0.03 mg tab Take 1 Tab by mouth daily. 3 Package 4    predniSONE (DELTASONE) 20 mg tablet Take 1 po TID x 2 days, then 1 po BID x 2 days, then 1 po every day 11 Tab 0    levonorgestrel-ethinyl estradiol (SEASONALE) 0.15 mg-30 mcg 3MPk Take 1 Tab by mouth daily. 1 Package 4    albuterol (PROVENTIL VENTOLIN) 2.5 mg /3 mL (0.083 %) nebulizer solution 3 mL by Nebulization route every four (4) hours as needed for Wheezing. 24 Each 2    MELATONIN PO Take  by mouth as needed.  diphenhydrAMINE (BENADRYL) 50 mg tablet Take 1 tablet 1 hr prior to MRI 1 Tab 0    aspirin-acetaminophen-caffeine (EXCEDRIN ES) 250-250-65 mg per tablet Take 1 Tab by mouth.  loratadine 10 mg cap Take 10 mg by mouth daily.  lisinopril (PRINIVIL, ZESTRIL) 10 mg tablet TAKE ONE TABLET BY MOUTH DAILY 30 Tab 3    propranolol (INDERAL) 20 mg tablet TAKE ONE TABLET BY MOUTH TWICE A DAY 60 Tab 4    omeprazole (PRILOSEC) 10 mg capsule TAKE ONE CAPSULE BY MOUTH ONCE A DAY 30 capsule 4     Allergies: Latex; Aspirin; and Betadine [povidone-iodine]     Tobacco History:  reports that she has been smoking Cigarettes. She has a 0.50 pack-year smoking history. She has never used smokeless tobacco.  Alcohol Abuse:  reports that she does not drink alcohol. Drug Abuse:  reports that she does not use illicit drugs.     Family Medical/Cancer History:   Family History   Problem Relation Age of Onset    Alcohol abuse Father     Cancer Paternal Grandmother     Other Mother      endometriosis   [facility] Migraines Mother      tumer in brain        Review of Systems - History obtained from the patient  Constitutional: negative for weight loss, fever, night sweats  HEENT: negative for hearing loss, earache, congestion, snoring, sorethroat  CV: negative for chest pain, palpitations, edema  Resp: negative for cough, shortness of breath, wheezing  GI: negative for change in bowel habits, abdominal pain, black or bloody stools  : negative for frequency, dysuria, hematuria, vaginal discharge  MSK: negative for back pain, joint pain, muscle pain  Breast: negative for breast lumps, nipple discharge, galactorrhea  Skin :negative for itching, rash, hives  Neuro: negative for dizziness, headache, confusion, weakness  Psych: negative for anxiety, depression, change in mood  Heme/lymph: negative for bleeding, bruising, pallor    Physical Exam    Visit Vitals    /82    Ht 5' 6\" (1.676 m)    Wt 289 lb (131.1 kg)    BMI 46.65 kg/m2       Constitutional  · Appearance: well-nourished, well developed, alert, in no acute distress    HENT  · Head and Face: appears normal    Neck  · Inspection/Palpation: normal appearance, no masses or tenderness  · Lymph Nodes: no lymphadenopathy present  · Thyroid: gland size normal, nontender, no nodules or masses present on palpation    Chest  · Respiratory Effort: breathing unlabored    Breasts  · Inspection of Breasts: breasts symmetrical, no skin changes, no discharge present, nipple appearance normal, no skin retraction present  · Palpation of Breasts and Axillae: no masses present on palpation, no breast tenderness  · Axillary Lymph Nodes: no lymphadenopathy present    Gastrointestinal  · Abdominal Examination: abdomen non-tender to palpation, normal bowel sounds, no masses present  · Liver and spleen: no hepatomegaly present, spleen not palpable  · Hernias: no hernias identified    Genitourinary  · External Genitalia: normal appearance for age, no discharge present, no tenderness present, no inflammatory lesions present, no masses present, no atrophy present  · Vagina: normal vaginal vault without central or paravaginal defects, + discharge present, no inflammatory lesions present, no masses present  · Bladder: non-tender to palpation  · Urethra: appears normal  · Cervix: normal   · Uterus: normal size, shape and consistency  · Adnexa: no adnexal tenderness present, no adnexal masses present  · Perineum: perineum within normal limits, no evidence of trauma, no rashes or skin lesions present  · Anus: anus within normal limits, no hemorrhoids present  · Inguinal Lymph Nodes: no lymphadenopathy present    Skin  · General Inspection: no rash, no lesions identified    Neurologic/Psychiatric  · Mental Status:  · Orientation: grossly oriented to person, place and time  · Mood and Affect: mood normal, affect appropriate    Assessment:  Routine gynecologic examination  Her current medical status is satisfactory with no evidence of significant gynecologic issues.   Reports increased discharge- will f/u with nuswab    Plan:  Counseled re: diet, exercise, healthy lifestyle  Return for yearly wellness visits  Gardisil counseling provided  Rec screening mammo at either 35 or 40

## 2018-02-26 NOTE — MR AVS SNAPSHOT
[address] [facility] [address] [facility] 
[phone] Patient: Laury Headings MRN: [id number] :1995 Visit Information Date & Time Provider Department Dept. Phone Encounter #  
 2018  9:00 AM Marielena Samuels [phone] [id number] Your Appointments 2018  9:45 AM  
ROUTINE CARE with Sofia Medel MD  
[facility] [address] [place] [address]) Appt Note: f/u 6 month  
 [address] [facility] [address]  
[phone]  
  
   
 [address] [place] [id number] Upcoming Health Maintenance Date Due  
 HPV AGE 9Y-34Y (1 of 3 - Female 3 Dose Series) 2006 Influenza Age 5 to Adult 2017 PAP AKA CERVICAL CYTOLOGY 2020 Allergies as of 2018  Review Complete On: 2018 By: Jaja Salgado Severity Noted Reaction Type Reactions Latex  07/15/2010    Swelling Skin peels Aspirin  2011    Nausea and Vomiting Betadine [Povidone-iodine]  2011    Other (comments) Skin peels Current Immunizations  Never Reviewed No immunizations on file. Not reviewed this visit You Were Diagnosed With   
  
 Codes Comments Possible exposure to STD    -  Primary ICD-10-CM: Z20.2 ICD-9-CM: V01.6 Vaginal discharge     ICD-10-CM: N89.8 ICD-9-CM: 623.5 Missed menses     ICD-10-CM: N92.6 ICD-9-CM: 626.4 Vitals BP Height(growth percentile) Weight(growth percentile) BMI OB Status Smoking Status 134/82 5' 6\" (1.676 m) 289 lb (131.1 kg) 46.65 kg/m2 Having regular periods Current Every Day Smoker BMI and BSA Data Body Mass Index Body Surface Area  
 46.65 kg/m 2 2.47 m 2 Preferred Pharmacy Pharmacy Name Phone [facility] [address], [address] [phone] Your Updated Medication List  
  
   
 This list is accurate as of 2/26/18  9:22 AM.  Always use your most recent med list.  
  
  
  
  
 albuterol 2.5 mg /3 mL (0.083 %) nebulizer solution Commonly known as:  PROVENTIL VENTOLIN  
3 mL by Nebulization route every four (4) hours as needed for Wheezing. aspirin-acetaminophen-caffeine 250-250-65 mg per tablet Commonly known as:  EXCEDRIN ES Take 1 Tab by mouth. buPROPion  mg tablet Commonly known as:  Kourtney Sauers Take 1 Tab by mouth every morning. clonazePAM 1 mg tablet Commonly known as:  Kimberly Bimler Take 1 Tab by mouth daily as needed. Must last 30 days. diphenhydrAMINE 50 mg tablet Commonly known as:  BENADRYL Take 1 tablet 1 hr prior to MRI  
  
 drospirenone-ethinyl estradiol 3-0.03 mg Tab Commonly known as:  Corea Slate Take 1 Tab by mouth daily. fluconazole 150 mg tablet Commonly known as:  DIFLUCAN Take 1 Tab by mouth daily. Repeat in 3 days if needed  
  
 levonorgestrel-ethinyl estradiol 0.15 mg-30 mcg 3mpk Commonly known as:  Myrl Plunk Take 1 Tab by mouth daily. lisinopril 10 mg tablet Commonly known as:  PRINIVIL, ZESTRIL  
TAKE ONE TABLET BY MOUTH DAILY  
  
 loratadine 10 mg Cap Take 10 mg by mouth daily. MELATONIN PO Take  by mouth as needed. norgestimate-ethinyl estradiol 0.25-35 mg-mcg Tab Commonly known as:  Carren Calender Take 1 Tab by mouth daily. nystatin-triamcinolone topical cream  
Commonly known as:  MYCOLOG II Apply  to affected area two (2) times a day. omeprazole 10 mg capsule Commonly known as:  PRILOSEC  
TAKE ONE CAPSULE BY MOUTH ONCE A DAY  
  
 predniSONE 20 mg tablet Commonly known as:  Mukesh Dears Take 1 po TID x 2 days, then 1 po BID x 2 days, then 1 po every day  
  
 propranolol 20 mg tablet Commonly known as:  INDERAL  
TAKE ONE TABLET BY MOUTH TWICE A DAY We Performed the Following AMB POC URINE PREGNANCY TEST, VISUAL COLOR COMPARISON [17488 CPT(R)] HEP B SURFACE AG [id number] CPT(R)] HEPATITIS C AB [72224 CPT(R)] HIV 1/2 AG/AB, 4TH GENERATION,W RFLX CONFIRM [id number] CPT(R)] [address] [id number] Custom] RPR [12845 CPT(R)] Patient Instructions Pelvic Exam: Care Instructions Your Care Instructions When your doctor examines all of your pelvic organs, it's called a pelvic exam. Two good reasons to have this kind of exam are to check for sexually transmitted infections (STIs) and to get a Pap test. A Pap test is also called a Pap smear. It checks for early changes that can lead to cancer of the cervix. Sometimes a pelvic exam is part of a regular checkup. In this case, you can do some things to make your test results as accurate as possible. · Try to schedule the exam when you don't have your period. · Don't use douches, tampons, or vaginal medicines, sprays, or powders for 24 hours before your exam. 
· Don't have sex for 24 hours before your exam. 
Other times, women have this kind of exam at any time of the month. This is because they have pelvic pain, bleeding, or discharge. Or they may have another pelvic problem. Before your exam, it's important to share some information with your doctor. For example, if you are a survivor of rape or sexual abuse, you can talk about any concerns you may have. Your doctor will also want to know if you are pregnant or use birth control. And he or she will want to hear about any problems, surgeries, or procedures you have had in your pelvic area. You will also need to tell your doctor when your last period was. Follow-up care is a key part of your treatment and safety. Be sure to make and go to all appointments, and call your doctor if you are having problems. It's also a good idea to know your test results and keep a list of the medicines you take. How is a pelvic exam done? · During a pelvic exam, you will: ¨ Take off your clothes below the waist. You will get a paper or cloth cover to put over the lower half of your body. Rojas Isidros on your back on an exam table. Your feet will be raised above you. Stirrups will support your feet. · The doctor will: ¨ Ask you to relax your knees. Your knees need to lean out, toward the walls. ¨ Check the opening of your vagina for sores or swelling. ¨ Gently put a tool called a speculum into your vagina. It opens the vagina a little bit. You will feel some pressure. But if you are relaxed, it will not hurt. It lets your doctor see inside the vagina. ¨ Use a small brush, spatula, or swab to get a sample of cells, if you are having a Pap test or culture. The doctor then removes the speculum. ¨ Put on gloves and put one or two fingers of one hand into your vagina. The other hand goes on your lower belly. This lets your doctor feel your pelvic organs. You will probably feel some pressure. Try to stay relaxed. ¨ Put one gloved finger into your rectum and one into your vagina, if needed. This can also help check your pelvic organs. This exam takes about 10 minutes. At the end, you will get a washcloth or tissue to clean your vaginal area. It's normal to have some discharge after this exam. You can then get dressed. Some test results may be ready right away. But results from a culture or a Pap test may take several days or a few weeks. Why should you have a pelvic exam? 
· You want to have recommended screening tests. This includes a Pap test. 
· You think you have a vaginal infection. Signs include itching, burning, or unusual discharge. · You might have been exposed to a sexually transmitted infection (STI), such as chlamydia or herpes. · You have vaginal bleeding that is not part of your normal menstrual period. · You have pain in your belly or pelvis. · You have been sexually assaulted. A pelvic exam lets your doctor collect evidence and check for STIs. · You are pregnant. · You are having trouble getting pregnant. What are the risks of a pelvic exam? 
There are no risks from a pelvic exam. 
When should you call for help? Watch closely for changes in your health, and be sure to contact your doctor if you have any problems. Where can you learn more? Go to http://sathya-bianka.info/. Enter S144 in the search box to learn more about \"Pelvic Exam: Care Instructions. \" Current as of: October 13, 2016 Content Version: 11.4 © 5565-3358 [employer]. Care instructions adapted under license by [employer] (which disclaims liability or warranty for this information). If you have questions about a medical condition or this instruction, always ask your healthcare professional. [address] any warranty or liability for your use of this information. Introducing [facility]! Dear Deanne Santamaria: 
Thank you for requesting a Dtime account. Our records indicate that you already have an active Dtime account. You can access your account anytime at [email or web address] Did you know that you can access your hospital and ER discharge instructions at any time in Dtime? You can also review all of your test results from your hospital stay or ER visit. Additional Information If you have questions, please visit the Frequently Asked Questions section of the Dtime website at [email or web address] Remember, Dtime is NOT to be used for urgent needs. For medical emergencies, dial 911. Now available from your iPhone and Android! Please provide this summary of care documentation to your next provider. Your primary care clinician is listed as Rylee Goldberg. If you have any questions after today's visit, please call [phone].

## 2018-02-26 NOTE — PATIENT INSTRUCTIONS
Pelvic Exam: Care Instructions  Your Care Instructions    When your doctor examines all of your pelvic organs, it's called a pelvic exam. Two good reasons to have this kind of exam are to check for sexually transmitted infections (STIs) and to get a Pap test. A Pap test is also called a Pap smear. It checks for early changes that can lead to cancer of the cervix. Sometimes a pelvic exam is part of a regular checkup. In this case, you can do some things to make your test results as accurate as possible. · Try to schedule the exam when you don't have your period. · Don't use douches, tampons, or vaginal medicines, sprays, or powders for 24 hours before your exam.  · Don't have sex for 24 hours before your exam.  Other times, women have this kind of exam at any time of the month. This is because they have pelvic pain, bleeding, or discharge. Or they may have another pelvic problem. Before your exam, it's important to share some information with your doctor. For example, if you are a survivor of rape or sexual abuse, you can talk about any concerns you may have. Your doctor will also want to know if you are pregnant or use birth control. And he or she will want to hear about any problems, surgeries, or procedures you have had in your pelvic area. You will also need to tell your doctor when your last period was. Follow-up care is a key part of your treatment and safety. Be sure to make and go to all appointments, and call your doctor if you are having problems. It's also a good idea to know your test results and keep a list of the medicines you take. How is a pelvic exam done? · During a pelvic exam, you will:  ¨ Take off your clothes below the waist. You will get a paper or cloth cover to put over the lower half of your body. Marlise Be[place] on your back on an exam table. Your feet will be raised above you. Stirrups will support your feet. · The doctor will:  Claudia Wrayers you to relax your knees.  Your knees need to lean out, toward the walls. ¨ Check the opening of your vagina for sores or swelling. ¨ Gently put a tool called a speculum into your vagina. It opens the vagina a little bit. You will feel some pressure. But if you are relaxed, it will not hurt. It lets your doctor see inside the vagina. ¨ Use a small brush, spatula, or swab to get a sample of cells, if you are having a Pap test or culture. The doctor then removes the speculum. ¨ Put on gloves and put one or two fingers of one hand into your vagina. The other hand goes on your lower belly. This lets your doctor feel your pelvic organs. You will probably feel some pressure. Try to stay relaxed. ¨ Put one gloved finger into your rectum and one into your vagina, if needed. This can also help check your pelvic organs. This exam takes about 10 minutes. At the end, you will get a washcloth or tissue to clean your vaginal area. It's normal to have some discharge after this exam. You can then get dressed. Some test results may be ready right away. But results from a culture or a Pap test may take several days or a few weeks. Why should you have a pelvic exam?  · You want to have recommended screening tests. This includes a Pap test.  · You think you have a vaginal infection. Signs include itching, burning, or unusual discharge. · You might have been exposed to a sexually transmitted infection (STI), such as chlamydia or herpes. · You have vaginal bleeding that is not part of your normal menstrual period. · You have pain in your belly or pelvis. · You have been sexually assaulted. A pelvic exam lets your doctor collect evidence and check for STIs. · You are pregnant. · You are having trouble getting pregnant. What are the risks of a pelvic exam?  There are no risks from a pelvic exam.  When should you call for help? Watch closely for changes in your health, and be sure to contact your doctor if you have any problems. Where can you learn more?   Go to http://sathya-bianka.info/. Enter I485 in the search box to learn more about \"Pelvic Exam: Care Instructions. \"  Current as of: October 13, 2016  Content Version: 11.4  © 3324-9605 Healthwise, [employer]. Care instructions adapted under license by [employer] (which disclaims liability or warranty for this information). If you have questions about a medical condition or this instruction, always ask your healthcare professional. [place] [address] any warranty or liability for your use of this information.

## 2018-03-01 LAB
A VAGINAE DNA VAG QL NAA+PROBE: ABNORMAL SCORE
BVAB2 DNA VAG QL NAA+PROBE: ABNORMAL SCORE
C ALBICANS DNA VAG QL NAA+PROBE: NEGATIVE
C GLABRATA DNA VAG QL NAA+PROBE: NEGATIVE
C TRACH RRNA SPEC QL NAA+PROBE: NEGATIVE
MEGA1 DNA VAG QL NAA+PROBE: ABNORMAL SCORE
N GONORRHOEA RRNA SPEC QL NAA+PROBE: NEGATIVE
T VAGINALIS RRNA SPEC QL NAA+PROBE: POSITIVE

## 2018-03-01 RX ORDER — METRONIDAZOLE 500 MG/1
2000 TABLET ORAL
Qty: 4 TAB | Refills: 0 | Status: SHIPPED | OUTPATIENT
Start: 2018-03-01 | End: 2018-03-01

## 2018-04-03 ENCOUNTER — OFFICE VISIT (OUTPATIENT)
Dept: OBGYN CLINIC | Age: 23
End: 2018-04-03

## 2018-04-03 VITALS
BODY MASS INDEX: 46.8 KG/M2 | SYSTOLIC BLOOD PRESSURE: 134 MMHG | DIASTOLIC BLOOD PRESSURE: 82 MMHG | WEIGHT: 291.2 LBS | HEIGHT: 66 IN

## 2018-04-03 DIAGNOSIS — Z86.19 HISTORY OF TRICHOMONIASIS: Primary | ICD-10-CM

## 2018-04-03 DIAGNOSIS — N89.8 VAGINAL DISCHARGE: ICD-10-CM

## 2018-04-03 NOTE — PROGRESS NOTES
Chief Complaint   Follow-up      HPI  Bg Hendricks is a 25 y.o. female who presents for the follow up/MARGARET visit for STI. Patient's last menstrual period was 03/10/2018 (approximate). She tested positive for trichomonas on 2/26/18. She states she completed treatment and partner took medication but threw up 5 minutes after taking it due to having the flu. She denies symptoms at this time but c/o white stringy discharge and minor irritation. The patient  admits to risk factors for sexually-transmitted infection. She admits to a history of having unprotected intercourse. STD exposure: denies knowledge of risky exposure and sexual contact with individual with uncertain background. Previous STD history: trichomonas, none    Past Medical History:   Diagnosis Date    Dysmenorrhea     Major depressive disorder, single episode, unspecified     Morbid obesity ([address]     Pap smear for cervical cancer screening 2/23/17 neg    Pituitary abnormality (HCC)     Seasonal allergies     Unspecified symptom associated with female genital organs      Past Surgical History:   Procedure Laterality Date    HX WISDOM TEETH EXTRACTION       Social History     Occupational History    Not on file.      Social History Main Topics    Smoking status: Current Every Day Smoker     Packs/day: 0.50     Years: 1.00     Types: Cigarettes    Smokeless tobacco: Never Used    Alcohol use No      Comment: former user just sips now and then   [facility] Drug use: No    Sexual activity: Yes     Partners: Male     Birth control/ protection: Pill     Family History   Problem Relation Age of Onset    Alcohol abuse Father     Cancer Paternal Grandmother     Other Mother      endometriosis    Migraines Mother      tumer in brain       Allergies   Allergen Reactions    Latex Swelling     Skin peels    Aspirin Nausea and Vomiting    Betadine [Povidone-Iodine] Other (comments)     Skin peels     Prior to Admission medications    Medication Sig Start Date End Date Taking? Authorizing Provider   buPROPion XL (WELLBUTRIN XL) 150 mg tablet Take 1 Tab by mouth every morning. 2/5/18  Yes Richard Salgado NP   clonazePAM (KLONOPIN) 1 mg tablet Take 1 Tab by mouth daily as needed. Must last 30 days. 10/30/17  Yes Arvin Jones MD   drospirenone-ethinyl estradiol (JEANETTE) 3-0.03 mg tab Take 1 Tab by mouth daily. 9/6/17  Yes Julio César Underwood MD   albuterol (PROVENTIL VENTOLIN) 2.5 mg /3 mL (0.083 %) nebulizer solution 3 mL by Nebulization route every four (4) hours as needed for Wheezing. 6/29/17  Yes Richard Salgado NP   diphenhydrAMINE (BENADRYL) 50 mg tablet Take 1 tablet 1 hr prior to MRI 10/18/16  Yes Jacques Lozano MD   omeprazole (PRILOSEC) 10 mg capsule TAKE ONE CAPSULE BY MOUTH ONCE A DAY 1/1/15  Yes Robi Salazar MD   norgestimate-ethinyl estradiol (ORTHO-CYCLEN, [facility]) 0.25-35 mg-mcg tab Take 1 Tab by mouth daily. 1/15/18   Julio César Underwood MD   fluconazole (DIFLUCAN) 150 mg tablet Take 1 Tab by mouth daily. Repeat in 3 days if needed 11/27/17   Richard Salgado NP   nystatin-triamcinolone [facility]) topical cream Apply  to affected area two (2) times a day. 10/11/17   Julio César Underwood MD   predniSONE (DELTASONE) 20 mg tablet Take 1 po TID x 2 days, then 1 po BID x 2 days, then 1 po every day 7/3/17   Richard Salgado NP   levonorgestrel-ethinyl estradiol (SEASONALE) 0.15 mg-30 mcg 3MPk Take 1 Tab by mouth daily. 6/29/17   Julio César Underwood MD   MELATONIN PO Take  by mouth as needed. Historical Provider   aspirin-acetaminophen-caffeine (EXCEDRIN ES) 250-250-65 mg per tablet Take 1 Tab by mouth. Historical Provider   loratadine 10 mg cap Take 10 mg by mouth daily.     Historical Provider   lisinopril (PRINIVIL, ZESTRIL) 10 mg tablet TAKE ONE TABLET BY MOUTH DAILY 7/13/15   Tu Goldberg MD   propranolol (INDERAL) 20 mg tablet TAKE ONE TABLET BY MOUTH TWICE A DAY 6/10/15   Robi Salazar MD        Review of Systems: History obtained from the patient  Constitutional: negative for weight loss, fever, night sweats  Breast: negative for breast lumps, nipple discharge, galactorrhea  GI: negative for change in bowel habits, abdominal pain, black or bloody stools  : negative for frequency, dysuria, hematuria, vaginal discharge  MSK: negative for back pain, joint pain, muscle pain  Skin: negative for itching, rash, hives  Psych: negative for anxiety, depression, change in mood    Objective:  Visit Vitals    /82    Ht 5' 6\" (1.676 m)    Wt 291 lb 3.2 oz (132.1 kg)    LMP 03/10/2018 (Approximate)    BMI 47 kg/m2       PHYSICAL EXAMINATION    Constitutional  · Appearance: well-nourished, well developed, alert, in no acute distress    Gastrointestinal  · Abdominal Examination: abdomen non-tender to palpation, normal bowel sounds, no masses present  · Liver and spleen: no hepatomegaly present, spleen not palpable  · Hernias: no hernias identified    Genitourinary  · External Genitalia: normal appearance for age, no discharge present, no tenderness present, no inflammatory lesions present, no masses present, no atrophy present  · Vagina: normal vaginal vault without central or paravaginal defects, no discharge present, no inflammatory lesions present, no masses present  · Bladder: non-tender to palpation  · Urethra: appears normal  · Cervix: normal   · Uterus: normal size, shape and consistency  · Adnexa: no adnexal tenderness present, no adnexal masses present  · Perineum: perineum within normal limits, no evidence of trauma, no rashes or skin lesions present  · Anus: anus within normal limits, no hemorrhoids present  · Inguinal Lymph Nodes: no lymphadenopathy present    Skin  · General Inspection: no rash, no lesions identified    Neurologic/Psychiatric  · Mental Status:  · Orientation: grossly oriented to person, place and time  · Mood and Affect: mood normal, affect appropriate      .     Assesment:   Hx of trichomonas    Plan: nuswab plus  We discussed STI issues including treatment options, the necessity of treating partners and prevention of transmission. ROV prn if symptoms persist or worsen.

## 2018-04-06 RX ORDER — METRONIDAZOLE 500 MG/1
2000 TABLET ORAL ONCE
Qty: 4 TAB | Refills: 0 | Status: SHIPPED | OUTPATIENT
Start: 2018-04-06 | End: 2018-04-06

## 2018-04-16 ENCOUNTER — TELEPHONE (OUTPATIENT)
Dept: FAMILY MEDICINE CLINIC | Age: 23
End: 2018-04-16

## 2018-04-16 ENCOUNTER — PATIENT MESSAGE (OUTPATIENT)
Dept: FAMILY MEDICINE CLINIC | Age: 23
End: 2018-04-16

## 2018-04-16 RX ORDER — METRONIDAZOLE 500 MG/1
500 TABLET ORAL 2 TIMES DAILY
Qty: 14 TAB | Refills: 0 | Status: SHIPPED | OUTPATIENT
Start: 2018-04-16 | End: 2018-04-23

## 2018-04-16 NOTE — TELEPHONE ENCOUNTER
From: Ericka Mejia  To: Jeannette Church NP  Sent: 4/16/2018 3:02 PM EDT  Subject: Non-Urgent Medical Question    Can you please call me [phone]

## 2018-04-16 NOTE — TELEPHONE ENCOUNTER
Spoke with pt on phone. Worried she has trich or BV, has fishy smell and lots of discharge. Just finished cycle. Advised pt new rx sent to pharm.

## 2018-04-16 NOTE — TELEPHONE ENCOUNTER
----- Message from Amauri Mejia sent at 4/16/2018  3:02 PM EDT -----  Regarding: Non-Urgent Medical Question  Contact: [phone]  Can you please call me [phone]

## 2018-05-09 ENCOUNTER — LAB ONLY (OUTPATIENT)
Dept: OBGYN CLINIC | Age: 23
End: 2018-05-09

## 2018-05-09 DIAGNOSIS — N92.6 MISSED MENSES: Primary | ICD-10-CM

## 2018-05-10 LAB — HCG INTACT+B SERPL-ACNC: 1114 MIU/ML

## 2018-05-14 ENCOUNTER — OFFICE VISIT (OUTPATIENT)
Dept: ENDOCRINOLOGY | Age: 23
End: 2018-05-14

## 2018-05-14 VITALS
HEART RATE: 78 BPM | BODY MASS INDEX: 46.45 KG/M2 | WEIGHT: 289 LBS | TEMPERATURE: 98.1 F | OXYGEN SATURATION: 100 % | RESPIRATION RATE: 14 BRPM | HEIGHT: 66 IN | DIASTOLIC BLOOD PRESSURE: 62 MMHG | SYSTOLIC BLOOD PRESSURE: 133 MMHG

## 2018-05-14 DIAGNOSIS — Z13.1 SCREENING FOR DIABETES MELLITUS: ICD-10-CM

## 2018-05-14 DIAGNOSIS — E22.1 HYPERPROLACTINEMIA (HCC): ICD-10-CM

## 2018-05-14 DIAGNOSIS — D35.2 PITUITARY ADENOMA (HCC): Primary | ICD-10-CM

## 2018-05-14 NOTE — LETTER
NOTIFICATION RETURN TO WORK / SCHOOL 
 
5/14/2018 10:51 AM 
 
Ms. Machelle Marc [facility] [address] [address] To Whom It May Concern: 
 
Machelle Marc is currently under the care of [address] [address] [address] She will return to work/school on: 05/14/2018 after appointment If there are questions or concerns please have the patient contact our office. Sincerely, Carly Medina MD

## 2018-05-14 NOTE — PROGRESS NOTES
Machelle Marc is a 25 y.o. female here for   Chief Complaint   Patient presents with    Pituitary Problem       1. Have you been to the ER, urgent care clinic since your last visit? Hospitalized since your last visit? -no    2. Have you seen or consulted any other health care providers outside of the [address] [place] [address] since your last visit?   Include any pap smears or colon screening.-no    Wt Readings from Last 3 Encounters:   04/03/18 291 lb 3.2 oz (132.1 kg)   02/26/18 289 lb (131.1 kg)   02/05/18 290 lb (131.5 kg)     Temp Readings from Last 3 Encounters:   02/05/18 98.5 °F (36.9 °C) (Oral)   11/13/17 96.4 °F (35.8 °C) (Oral)   10/30/17 97.6 °F (36.4 °C) (Oral)     BP Readings from Last 3 Encounters:   04/03/18 134/82   02/26/18 134/82   02/05/18 140/86     Pulse Readings from Last 3 Encounters:   02/05/18 (!) 110   12/19/17 94   11/13/17 88

## 2018-05-14 NOTE — PROGRESS NOTES
Zachariah Schulz MD        [address] [phone] Fax [phone]               Patient Information  Date:5/14/2018  Name : Gene Mejia 25 y.o.     YOB: 1995         Referred by: Anselmo Cope MD         Chief Complaint   Patient presents with    Pituitary Problem       History of Present Illness: Tarsha Booth is a 25 y.o. female here for fu    She was referred by Anselmo Cope MD for evaluation and management of galactorrhea. She was on oral contraceptives in July 2016 for dysmenorrhea. She was switched to Medroxyprogesterone injection. MRI showed small adenoma 7 mm     She is 5 weeks pregnant, was on oral contraceptives  Beta-hCG was more than 1100  Intermittently has headaches  No diplopia  She has stopped sodas  Family history of diabetes  This is her first pregnancy        Wt Readings from Last 3 Encounters:   05/14/18 289 lb (131.1 kg)   04/03/18 291 lb 3.2 oz (132.1 kg)   02/26/18 289 lb (131.1 kg)       BP Readings from Last 3 Encounters:   05/14/18 133/62   04/03/18 134/82   02/26/18 134/82           Past Medical History:   Diagnosis Date    Dysmenorrhea     Major depressive disorder, single episode, unspecified     Morbid obesity ([address]     Pap smear for cervical cancer screening 2/23/17 neg    Pituitary abnormality (HCC)     Seasonal allergies     Unspecified symptom associated with female genital organs      Current Outpatient Prescriptions   Medication Sig    [id number] marcelo-folic-dha (PRENATAL DHA+COMPLETE PRENATAL) -300 mg-mcg-mg cmpk Take 1 Tab by mouth daily.  buPROPion XL (WELLBUTRIN XL) 150 mg tablet Take 1 Tab by mouth every morning.  clonazePAM (KLONOPIN) 1 mg tablet Take 1 Tab by mouth daily as needed. Must last 30 days.  drospirenone-ethinyl estradiol (JEANETTE) 3-0.03 mg tab Take 1 Tab by mouth daily.     albuterol (PROVENTIL VENTOLIN) 2.5 mg /3 mL (0.083 %) nebulizer solution 3 mL by Nebulization route every four (4) hours as needed for Wheezing.  MELATONIN PO Take  by mouth as needed.  diphenhydrAMINE (BENADRYL) 50 mg tablet Take 1 tablet 1 hr prior to MRI    aspirin-acetaminophen-caffeine (EXCEDRIN ES) 250-250-65 mg per tablet Take 1 Tab by mouth.  loratadine 10 mg cap Take 10 mg by mouth daily.  omeprazole (PRILOSEC) 10 mg capsule TAKE ONE CAPSULE BY MOUTH ONCE A DAY     No current facility-administered medications for this visit. Allergies   Allergen Reactions    Latex Swelling     Skin peels    Aspirin Nausea and Vomiting    Betadine [Povidone-Iodine] Other (comments)     Skin peels         Review of Systems:  - Constitutional Symptoms: no fevers, no chills,   - Eyes: no blurry vision no double vision  - Cardiovascular: no chest pain ,no palpitations  - Neurological: no numbness, tingling, no  headaches  - Psychiatric: no depression no  anxiety  - Endocrine: no heat or cold intolerance    Physical Examination:   Blood pressure 133/62, pulse 78, temperature 98.1 °F (36.7 °C), temperature source Oral, resp. rate 14, height 5' 6\" (1.676 m), weight 289 lb (131.1 kg), last menstrual period 03/10/2018, SpO2 100 %. Estimated body mass index is 46.65 kg/(m^2) as calculated from the following:    Height as of this encounter: 5' 6\" (1.676 m). -   Weight as of this encounter: 289 lb (131.1 kg). - General: pleasant, no distress, good eye contact  - HEENT: no pallor, no periorbital edema, EOMI  - Neck: supple,  - Cardiovascular: regular, normal rate, normal S1 and S2,   - Respiratory: clear to auscultation bilaterally  - Integumentary: ,no edema,  - Neurological: ,alert and oriented  - Psychiatric: normal mood and affect  - Skin: color, texture, turgor normal.       Data Reviewed:     [] Glucose records reviewed. [] See glucose records for details (to be scanned). [] A1C  [] Reviewed labs      Assessment/Plan:     1. Pituitary adenoma ([facility]    2. Hyperprolactinemia ([facility]    3. Screening for diabetes mellitus          She was referred here for evaluation and management of hyperprolactinemia and galactorrhea. 7 mm Pituitary microadenoma - could be incidental finding, prolactin is very minimally elevated   Screening for pituitary hormones - neg  OCPs can increase prolactin -she is off now  Prolactin increases with pregnancy, no need to monitor  Pituitary gland hyperplasia is a possibility during pregnancy, rarely adenoma can increase however given microadenoma compressive symptoms are rare, to call with symptoms of consistent double vision and severe headache. Reports headache: Less likely related to the pituitary adenoma, ophthalmology eval      Obesity Body mass index is 46.65 kg/(m^2). Risk of gestational diabetes discussed, diet: Low-carb diet and increase protein  Check A1c  Follow-up with obstetrician        Follow-up Disposition:  Return in about 3 months (around 8/14/2018). Thank you for allowing me to participate in the care of this patient.     Rama Stone MD      Patient verbalized understanding

## 2018-05-14 NOTE — PATIENT INSTRUCTIONS
Refills -Please call your pharmacy and have them send us a refill request.  Results - Allow up to a week for lab results to be processed and reviewed. Phone calls - Allow up to 24 hours for non-urgent calls to be returned. Prior Authorization - May take up to 2 weeks to process depending on your insurance. Forms - FMLA, DMV, Patient Assistance, etc. will take up to 2 weeks to process. Cancellations - Please notify the office in advance if you cannot keep your appointment. Samples - Will only be dispensed at visits as supplies are limited. If you are having a medical emergency, call 911.

## 2018-05-14 NOTE — MR AVS SNAPSHOT
[address] [facility] [address] [facility] [address] 
[phone] Patient: Tony Petersen MRN: [id number] :1995 Visit Information Date & Time Provider Department Dept. Phone Encounter #  
 2018  9:45 AM Bobby Giron MD [facility] Diabetes & Endocrinology [phone] [id number] Follow-up Instructions Return in about 3 months (around 2018). Your Appointments 5/15/2018  9:50 AM  
ESTABLISHED PATIENT with MD Andrea Little ([facility]) Appt Note: problem visit poss infection   FW  
 [address] [facility] [address]  
[address]  
  
    
 2018  2:00 PM  
ULTRASOUND with Uyen Bridges ([facility]) Appt Note: EOB/US + Dr. RODRIGUEZ/LMP /POS UPT/Scheduled thur cc  
 [address] [address]  
[address]  
  
    
  
 2018  2:30 PM  
OB VISIT with MD Andrea Little ([facility]) Appt Note: EOB/US + Dr. RODRIGUEZ/LMP /POS UPT/Scheduled thur cc  
 [address] [address]  
[address] Upcoming Health Maintenance Date Due  
 HPV Age 9Y-34Y (1 of 1 - Female 3 Dose Series) 2006 Pneumococcal 19-64 Medium Risk (1 of 1 - PPSV23) 2014 DTaP/Tdap/Td series (1 - Tdap) 2016 Influenza Age 5 to Adult 2018 PAP AKA CERVICAL CYTOLOGY 2020 Allergies as of 2018  Review Complete On: 2018 By: Bobby Giron MD  
  
 Severity Noted Reaction Type Reactions Latex  07/15/2010    Swelling Skin peels Aspirin  2011    Nausea and Vomiting Betadine [Povidone-iodine]  05/26/2011    Other (comments) Skin peels Current Immunizations  Never Reviewed No immunizations on file. Not reviewed this visit You Were Diagnosed With   
  
 Codes Comments Pituitary adenoma ([facility]    -  Primary ICD-10-CM: D35.2 ICD-9-CM: 227.3 Hyperprolactinemia ([facility]     ICD-10-CM: E22.1 ICD-9-CM: 253.1 Screening for diabetes mellitus     ICD-10-CM: Z13.1 ICD-9-CM: V77.1 Vitals BP Pulse Temp Resp Height(growth percentile) Weight(growth percentile) 133/62 (BP 1 Location: Right arm, BP Patient Position: Sitting) 78 98.1 °F (36.7 °C) (Oral) 14 5' 6\" (1.676 m) 289 lb (131.1 kg) LMP SpO2 BMI OB Status Smoking Status 03/10/2018 (Approximate) 100% 46.65 kg/m2 Pregnant Current Every Day Smoker Vitals History BMI and BSA Data Body Mass Index Body Surface Area  
 46.65 kg/m 2 2.47 m 2 Preferred Pharmacy Pharmacy Name Phone [facility] [address], [address]  [phone] Your Updated Medication List  
  
   
This list is accurate as of 5/14/18 10:51 AM.  Always use your most recent med list.  
  
  
  
  
 albuterol 2.5 mg /3 mL (0.083 %) nebulizer solution Commonly known as:  PROVENTIL VENTOLIN  
3 mL by Nebulization route every four (4) hours as needed for Wheezing. aspirin-acetaminophen-caffeine 250-250-65 mg per tablet Commonly known as:  EXCEDRIN ES Take 1 Tab by mouth. buPROPion  mg tablet Commonly known as:  Evelene Lan Take 1 Tab by mouth every morning. clonazePAM 1 mg tablet Commonly known as:  Rollene Backbone Take 1 Tab by mouth daily as needed. Must last 30 days. diphenhydrAMINE 50 mg tablet Commonly known as:  BENADRYL Take 1 tablet 1 hr prior to MRI  
  
 drospirenone-ethinyl estradiol 3-0.03 mg Tab Commonly known as:  Rochele Deng Take 1 Tab by mouth daily. loratadine 10 mg Cap Take 10 mg by mouth daily. MELATONIN PO Take  by mouth as needed. omeprazole 10 mg capsule Commonly known as:  PRILOSEC  
TAKE ONE CAPSULE BY MOUTH ONCE A DAY PRENATAL DHA+COMPLETE PRENATAL -300 mg-mcg-mg Cmpk Generic drug:  [id number] marcelo-folic-dha Take 1 Tab by mouth daily. Follow-up Instructions Return in about 3 months (around 8/14/2018). Patient Instructions Refills -Please call your pharmacy and have them send us a refill request. 
Results - Allow up to a week for lab results to be processed and reviewed. Phone calls - Allow up to 24 hours for non-urgent calls to be returned. Prior Authorization - May take up to 2 weeks to process depending on your insurance. Forms - FMLA, DMV, Patient Assistance, etc. will take up to 2 weeks to process. Cancellations - Please notify the office in advance if you cannot keep your appointment. Samples - Will only be dispensed at visits as supplies are limited. If you are having a medical emergency, call 911. Introducing [facility]! Dear Evette Bañuelos: 
Thank you for requesting a [employer] account. Our records indicate that you already have an active [employer] account. You can access your account anytime at [email or web address] Did you know that you can access your hospital and ER discharge instructions at any time in [employer]? You can also review all of your test results from your hospital stay or ER visit. Additional Information If you have questions, please visit the Frequently Asked Questions section of the [employer] website at [email or web address] Remember, [employer] is NOT to be used for urgent needs. For medical emergencies, dial 911. Now available from your iPhone and Android! Please provide this summary of care documentation to your next provider. Your primary care clinician is listed as Rylee Goldberg.  If you have any questions after today's visit, please call [phone].

## 2018-05-24 ENCOUNTER — OFFICE VISIT (OUTPATIENT)
Dept: OBGYN CLINIC | Age: 23
End: 2018-05-24

## 2018-05-24 VITALS
HEIGHT: 66 IN | WEIGHT: 284 LBS | BODY MASS INDEX: 45.64 KG/M2 | DIASTOLIC BLOOD PRESSURE: 70 MMHG | SYSTOLIC BLOOD PRESSURE: 122 MMHG

## 2018-05-24 DIAGNOSIS — Z32.01 PREGNANCY EXAMINATION OR TEST, POSITIVE RESULT: ICD-10-CM

## 2018-05-24 DIAGNOSIS — Z34.01 ENCOUNTER FOR SUPERVISION OF NORMAL FIRST PREGNANCY IN FIRST TRIMESTER: ICD-10-CM

## 2018-05-24 DIAGNOSIS — O99.211 OBESITY COMPLICATING PREGNANCY IN FIRST TRIMESTER: Primary | ICD-10-CM

## 2018-05-24 DIAGNOSIS — Z3A.01 LESS THAN 8 WEEKS GESTATION OF PREGNANCY: ICD-10-CM

## 2018-05-24 DIAGNOSIS — E66.9 OBESITY, UNSPECIFIED CLASSIFICATION, UNSPECIFIED OBESITY TYPE, UNSPECIFIED WHETHER SERIOUS COMORBIDITY PRESENT: ICD-10-CM

## 2018-05-24 PROBLEM — Z34.00 SUPERVISION OF NORMAL FIRST PREGNANCY: Status: ACTIVE | Noted: 2018-05-24

## 2018-05-24 LAB
ANTIBODY SCREEN, EXTERNAL: NEGATIVE
CHLAMYDIA, EXTERNAL: NEGATIVE
CYSTIC FIBROSIS, EXTERNAL: NORMAL
HBSAG, EXTERNAL: NEGATIVE
HCT, EXTERNAL: 40.7
HGB EVAL, EXTERNAL: NORMAL
HGB, EXTERNAL: 13.3
HIV, EXTERNAL: NEGATIVE
N. GONORRHEA, EXTERNAL: NEGATIVE
PLATELET CNT,   EXTERNAL: 288
RUBELLA, EXTERNAL: NORMAL
T. PALLIDUM, EXTERNAL: NEGATIVE
TYPE, ABO & RH, EXTERNAL: NORMAL
URINALYSIS, EXTERNAL: NEGATIVE

## 2018-05-24 NOTE — PROGRESS NOTES
Current pregnancy history:    Telma Patterson is a ,  25 y.o. female [facility] Patient's last menstrual period was 2018. .  She presents for the evaluation of amenorrhea and a positive pregnancy test.    LMP history:  The date of her LMP is certain. Her last menstrual period was normal and lasted for 4 to 5 days. A urine pregnancy test was positive a few weeks ago. She was not on the pill at conception. Based on her LMP, her EDC is 19 and her EGA is 6 weeks,4 days. Her menstrual cycles are regular and occur approximately every 28 days  and range from 3 to 5 days. The last menses lasted the usual number of days. Ultrasound data:  She had an  ultrasound done by the ultrasound tech today which revealed a viable sam pregnancy with a gestational age of 7 weeks and 4 days giving an [address] of 19. Pregnancy symptoms:    Since her LMP she has experienced  urinary frequency, breast tenderness, and nausea. She has not been vomiting over the last few weeks. Associated signs and symptoms which she denies: dysuria, discharge, vaginal bleeding. Relevant past pregnancy history:   She has the following pregnancy history: first pregnancy    Relevant past medical history:(relevant to this pregnancy): noncontributory. Substance history: negative for alcohol, tobacco and street drugs. Positive for nothing. Exposure history: There is/are no indoor cat/s in the home. The patient was instructed to not change the cat litter. She admits close contact with children on a regular basis. She has had chicken pox or the vaccine in the past.   Patient denies issues with domestic violence. Genetic Screening/Teratology Counseling: (Includes patient, baby's father, or anyone in either family with:)  3.  Patient's age >/= 28 at [address]?-- no  .   2.   Thalassemia ([facility], [place], [address], or  background): MCV<80?--no.     3.  Neural tube defect (meningomyelocele, spina bifida, anencephaly)?--no.   4.  Congenital heart defect?--yes, her uncle was born with TOF (possible cocaine exposure in utero) and had  demise. 5.  Down syndrome?--no.   6.  Laz-Sachs ([place], [place])?--no.   7.  Canavan's Disease?--no.   8.  Familial Dysautonomia?--no.   9.  Sickle cell disease or trait ()? --no   The patient has not been tested for sickle trait  10. Hemophilia or other blood disorders?--no. 11.  Muscular dystrophy?--no. 12.  Cystic fibrosis?--no. 13.  Mohave's Chorea?--no. 14.  Mental retardation/autism (if yes was person tested for Fragile X)?--no. 15.  Other inherited genetic or chromosomal disorder?--no. 12.  Maternal metabolic disorder (DM, PKU, etc)?--no. 17.  Patient or FOB with a child with a birth defect not listed above?--no.  17a. Patient or FOB with a birth defect themselves?--no. 18.  Recurrent pregnancy loss, or stillbirth?--no. 19.  Any medications since LMP other than prenatal vitamins (include vitamins, supplements, OTC meds, drugs, alcohol)?--no. 20.  Any other genetic/environmental exposure to discuss?--no. Infection History:  1. Lives with someone with TB or TB exposed?--no.   2.  Patient or partner has history of genital herpes?--no.  3.  Rash or viral illness since LMP?--no.    4.  History of STD (GC, CT, HPV, syphilis, HIV)? --no   5. Other: OTHER? Past Medical History:   Diagnosis Date    Dysmenorrhea     Major depressive disorder, single episode, unspecified     Morbid obesity ([facility] [address]     Pap smear for cervical cancer screening 17 neg    Pituitary abnormality (HCC)     Seasonal allergies     Unspecified symptom associated with female genital organs      Past Surgical History:   Procedure Laterality Date    HX WISDOM TEETH EXTRACTION       Social History     Occupational History    Not on file.      Social History Main Topics    Smoking status: Current Every Day Smoker     Packs/day: 0.50     Years: 1.00     Types: Cigarettes    Smokeless tobacco: Never Used    Alcohol use No      Comment: former user just sips now and then   Gris Martinez Drug use: No    Sexual activity: Yes     Partners: Male     Birth control/ protection: Pill     Family History   Problem Relation Age of Onset    Alcohol abuse Father     Cancer Paternal Grandmother     Other Mother      endometriosis   Gris Martinez Migraines Mother      tumer in brain     OB History    Para Term  AB Living   1        SAB TAB Ectopic Molar Multiple Live Births              # Outcome Date GA Lbr Gil/2nd Weight Sex Delivery Anes PTL Lv   1 Current               Obstetric Comments   Menarche:  10. LMP: current. # of Children:  0. Age at Delivery of First Child:  na.   Hysterectomy/oophorectomy:  NO/NO. Breast Bx:  no.  Hx of Breast Feeding:  na. BCP:  yes. Hormone therapy:  no.         Allergies   Allergen Reactions    Latex Swelling     Skin peels    Aspirin Nausea and Vomiting    Betadine [Povidone-Iodine] Other (comments)     Skin peels     Prior to Admission medications    Medication Sig Start Date End Date Taking? Authorizing Provider   [id number] marcelo-folic-dha (PRENATAL DHA+COMPLETE PRENATAL) [id number] mg-mcg-mg cmpk Take 1 Tab by mouth daily. Historical Provider   buPROPion XL (WELLBUTRIN XL) 150 mg tablet Take 1 Tab by mouth every morning. 18   Ez Castaneda NP   clonazePAM (KLONOPIN) 1 mg tablet Take 1 Tab by mouth daily as needed. Must last 30 days. 10/30/17   Gabriel Goldberg MD   drospirenone-ethinyl estradiol (JEANETTE) 3-0.03 mg tab Take 1 Tab by mouth daily. 17   Iván Hunter MD   albuterol (PROVENTIL VENTOLIN) 2.5 mg /3 mL (0.083 %) nebulizer solution 3 mL by Nebulization route every four (4) hours as needed for Wheezing. 17   Ez Castaneda NP   MELATONIN PO Take  by mouth as needed.     Historical Provider   diphenhydrAMINE (BENADRYL) 50 mg tablet Take 1 tablet 1 hr prior to MRI 10/18/16 Carly Medina MD   aspirin-acetaminophen-caffeine [facility]) [phone] mg per tablet Take 1 Tab by mouth. Historical Provider   loratadine 10 mg cap Take 10 mg by mouth daily.     Historical Provider   omeprazole (PRILOSEC) 10 mg capsule TAKE ONE CAPSULE BY MOUTH ONCE A DAY 1/1/15   Anisha Warren MD        Review of Systems: History obtained from the patient  Constitutional: negative for weight loss, fever, night sweats  HEENT: negative for hearing loss, earache, congestion, snoring, sore throat  CV: negative for chest pain, palpitations, edema  Resp: negative for cough, shortness of breath, wheezing  Breast: negative for breast lumps, nipple discharge, galactorrhea  GI: negative for change in bowel habits, abdominal pain, black or bloody stools  : negative for frequency, dysuria, hematuria, vaginal discharge  MSK: negative for back pain, joint pain, muscle pain  Skin: negative for itching, rash, hives  Neuro: negative for dizziness, headache, confusion, weakness  Psych: negative for anxiety, depression, change in mood  Heme/lymph: negative for bleeding, bruising, pallor    Objective:  Visit Vitals    /70    Ht 5' 6\" (1.676 m)    Wt 284 lb (128.8 kg)    LMP 04/08/2018    BMI 45.84 kg/m2       Physical Exam:     Constitutional  · Appearance: well-nourished, well developed, alert, in no acute distress    HENT  · Head  · Face: appears normal  · Eyes: appear normal  · Ears: normal  · Mouth: normal  · Lips: no lesions    Neck  · Inspection/Palpation: normal appearance, no masses or tenderness  · Lymph Nodes: no lymphadenopathy present  · Thyroid: gland size normal, nontender, no nodules or masses present on palpation    Chest  · Respiratory Effort: breathing unlabored    Gastrointestinal  · Abdominal Examination: abdomen non-tender to palpation, normal bowel sounds, no masses present  · Liver and spleen: no hepatomegaly present, spleen not palpable  · Hernias: no hernias identified    Skin  · General Inspection: no rash, no lesions identified    Neurologic/Psychiatric  · Mental Status:  · Orientation: grossly oriented to person, place and time  · Mood and Affect: mood normal, affect appropriate    Assessment:   Intrauterine pregnancy with the following problems identified: obeisty- will perform 1 hour glucola at next visit, FH of TOF- [facility] referral, h/o pituitary adenoma. Plan:     Offered CF testing, CVS, Nuchal Translucency, MSAFP, amnio, and discussed NIPT  Course of pregnancy discussed including visit schedule, routine U/S, glucola testing, etc.  Avoid alcoholic beverages and illicit/recreational drugs use  Take prenatal vitamins or folic acid daily. Hospital and practice style discussed with coverage system. Discussed nutrition, toxoplasmosis precautions, sexual activity, exercise, need for influenza vaccine, environmental and work hazards, travel advice, screen for domestic violence, need for seat belts. Discussed seafood, unpasteurized dairy products, deli meat, artificial sweeteners, and caffeine. Information on prenatal classes/breastfeeding given. Information on circumcision given  Patient encouraged not to smoke. Discussed current prescription drug use. Given medication list.  Discussed the use of over the counter medications and chemicals. Route of delivery discussed, including risks, benefits, and alternatives of  versus repeat LTCS. Pt understands risk of hemorrhage during pregnancy and post delivery and would accept blood products if necessary in life-threatening emergencies    Handouts given to pt.

## 2018-05-26 LAB — BACTERIA UR CULT: NORMAL

## 2018-05-28 LAB
C TRACH RRNA SPEC QL NAA+PROBE: NEGATIVE
N GONORRHOEA RRNA SPEC QL NAA+PROBE: NEGATIVE
T VAGINALIS RRNA SPEC QL NAA+PROBE: NEGATIVE

## 2018-05-30 LAB
ABO GROUP BLD: NORMAL
B19V IGG SER IA-ACNC: 9.2 INDEX (ref 0–0.8)
B19V IGM SER IA-ACNC: 0.4 INDEX (ref 0–0.8)
BLD GP AB SCN SERPL QL: NEGATIVE
CFTR MUT ANL BLD/T: NORMAL
ERYTHROCYTE [DISTWIDTH] IN BLOOD BY AUTOMATED COUNT: 13.5 % (ref 12.3–15.4)
GENE DIS ANL CARRIER INTERP-IMP: NORMAL
HBV SURFACE AG SERPL QL IA: NEGATIVE
HCT VFR BLD AUTO: 40.1 % (ref 34–46.6)
HGB A MFR BLD: 97.6 % (ref 96.4–98.8)
HGB A2 MFR BLD COLUMN CHROM: 2.4 % (ref 1.8–3.2)
HGB BLD-MCNC: 13.3 G/DL (ref 11.1–15.9)
HGB C MFR BLD: 0 %
HGB F MFR BLD: 0 % (ref 0–2)
HGB FRACT BLD-IMP: NORMAL
HGB OTHER MFR BLD HPLC: 0 %
HGB S BLD QL SOLY: NEGATIVE
HGB S MFR BLD: 0 %
HIV 1+2 AB+HIV1 P24 AG SERPL QL IA: NON REACTIVE
MCH RBC QN AUTO: 29.3 PG (ref 26.6–33)
MCHC RBC AUTO-ENTMCNC: 33.2 G/DL (ref 31.5–35.7)
MCV RBC AUTO: 88 FL (ref 79–97)
PLATELET # BLD AUTO: 288 X10E3/UL (ref 150–379)
RBC # BLD AUTO: 4.54 X10E6/UL (ref 3.77–5.28)
RH BLD: POSITIVE
RUBV IGG SERPL IA-ACNC: 4.75 INDEX
T PALLIDUM AB SER QL IA: NEGATIVE
WBC # BLD AUTO: 7 X10E3/UL (ref 3.4–10.8)

## 2018-06-21 ENCOUNTER — ROUTINE PRENATAL (OUTPATIENT)
Dept: OBGYN CLINIC | Age: 23
End: 2018-06-21

## 2018-06-21 VITALS — BODY MASS INDEX: 46.65 KG/M2 | WEIGHT: 289 LBS | SYSTOLIC BLOOD PRESSURE: 128 MMHG | DIASTOLIC BLOOD PRESSURE: 74 MMHG

## 2018-06-21 DIAGNOSIS — Z34.01 ENCOUNTER FOR SUPERVISION OF NORMAL FIRST PREGNANCY IN FIRST TRIMESTER: ICD-10-CM

## 2018-06-21 DIAGNOSIS — O99.210 OBESITY IN PREGNANCY: ICD-10-CM

## 2018-06-21 DIAGNOSIS — E66.01 MORBID OBESITY (HCC): ICD-10-CM

## 2018-06-21 DIAGNOSIS — O99.211 OBESITY COMPLICATING PREGNANCY IN FIRST TRIMESTER: Primary | ICD-10-CM

## 2018-06-21 DIAGNOSIS — Z3A.10 10 WEEKS GESTATION OF PREGNANCY: ICD-10-CM

## 2018-06-21 NOTE — PROGRESS NOTES
Pt doing well, see prenatal flowsheet.   [facility] appointment in 2 weeks, will rto that day for NIPS testing  Early glucola today

## 2018-06-21 NOTE — MR AVS SNAPSHOT
[address] 
[phone] Patient: Imani Tom MRN: [id number] :[id number] Visit Information Date & Time Provider Department Dept. Phone Encounter #  
 2018 10:20 AM MD Andrea Warren [phone] [id number] Your Appointments 2018 10:10 AM  
ESTABLISHED PATIENT with MD Andrea Warren ([facility] Appt Note: f/u sis [address] [place] [address] [facility] [address]  
[address] [facility]  
  
    
 2018  3:30 PM  
ROUTINE CARE with Gaye Calle MD  
Care Diabetes & Endocrinology [facility]) Appt Note: f/u 3 month  
 [address] [facility]  [address] [facility] [id number] [phone]  
  
   
 [address] [place] [address]  [address] [facility] [address] Upcoming Health Maintenance Date Due  
 HPV Age 9Y-34Y (1 of 1 - Female 3 Dose Series) 2006 Influenza Age 5 to Adult 2018 PAP AKA CERVICAL CYTOLOGY 2020 Allergies as of 2018  Review Complete On: 2018 By: Donavan Pillai Severity Noted Reaction Type Reactions Latex  07/15/2010    Swelling Skin peels Aspirin  2011    Nausea and Vomiting Betadine [Povidone-iodine]  2011    Other (comments) Skin peels Current Immunizations  Never Reviewed No immunizations on file. Not reviewed this visit You Were Diagnosed With   
  
 Codes Comments Encounter for supervision of normal first pregnancy in first trimester    -  Primary ICD-10-CM: Z34.01 
ICD-9-CM: V22.0 Vitals BP Weight(growth percentile) LMP BMI OB Status Smoking Status 128/74 289 lb (131.1 kg) 2018 46.65 kg/m2 Pregnant Current Every Day Smoker BMI and BSA Data  Body Mass Index Body Surface Area  
 46.65 kg/m 2 2.47 m 2  
  
  
 Preferred Pharmacy Pharmacy Name Phone [facility] [address], [address] [phone] Your Updated Medication List  
  
   
This list is accurate as of 6/21/18 10:37 AM.  Always use your most recent med list.  
  
  
  
  
 albuterol 2.5 mg /3 mL (0.083 %) nebulizer solution Commonly known as:  PROVENTIL VENTOLIN  
3 mL by Nebulization route every four (4) hours as needed for Wheezing. aspirin-acetaminophen-caffeine 250-250-65 mg per tablet Commonly known as:  EXCEDRIN ES Take 1 Tab by mouth. buPROPion  mg tablet Commonly known as:  Jeraline Rylan Take 1 Tab by mouth every morning. clonazePAM 1 mg tablet Commonly known as:  Michelle Uma Take 1 Tab by mouth daily as needed. Must last 30 days. diphenhydrAMINE 50 mg tablet Commonly known as:  BENADRYL Take 1 tablet 1 hr prior to MRI  
  
 drospirenone-ethinyl estradiol 3-0.03 mg Tab Commonly known as:  Dyann Burow Take 1 Tab by mouth daily. loratadine 10 mg Cap Take 10 mg by mouth daily. MELATONIN PO Take  by mouth as needed. omeprazole 10 mg capsule Commonly known as:  PRILOSEC  
TAKE ONE CAPSULE BY MOUTH ONCE A DAY PRENATAL DHA+COMPLETE PRENATAL -300 mg-mcg-mg Cmpk Generic drug:  [id number] marcelo-folic-dha Take 1 Tab by mouth daily. To-Do List   
 07/02/2018 9:30 AM  
  Appointment with ULTRASOUND 3 [facility] at [facility] ([phone]) [facility]! Dear Casandra Garibay: 
Thank you for requesting a [facility] account. Our records indicate that you already have an active [facility] account. You can access your account anytime at [email or web address] Did you know that you can access your hospital and ER discharge instructions at any time in [facility]? You can also review all of your test results from your hospital stay or ER visit. Additional Information If you have questions, please visit the Frequently Asked Questions section of the [email or web address] website at [email or web address] Remember, [employer] is NOT to be used for urgent needs. For medical emergencies, dial 911. Now available from your iPhone and Android! Please provide this summary of care documentation to your next provider. Your primary care clinician is listed as Rylee Goldberg. If you have any questions after today's visit, please call [phone].

## 2018-06-22 LAB — GLUCOSE 1H P 50 G GLC PO SERPL-MCNC: 75 MG/DL (ref 65–129)

## 2018-07-02 ENCOUNTER — HOSPITAL ENCOUNTER (OUTPATIENT)
Dept: PERINATAL CARE | Age: 23
Discharge: HOME | End: 2018-07-02
Attending: OBSTETRICS & GYNECOLOGY
Payer: MEDICAID

## 2018-07-02 PROCEDURE — 76813 OB US NUCHAL MEAS 1 GEST: CPT | Performed by: OBSTETRICS & GYNECOLOGY

## 2018-07-02 PROCEDURE — 76801 OB US < 14 WKS SINGLE FETUS: CPT | Performed by: OBSTETRICS & GYNECOLOGY

## 2018-07-02 PROCEDURE — 36416 COLLJ CAPILLARY BLOOD SPEC: CPT | Performed by: OBSTETRICS & GYNECOLOGY

## 2018-07-06 ENCOUNTER — TELEPHONE (OUTPATIENT)
Dept: LABOR AND DELIVERY | Age: 23
End: 2018-07-06

## 2018-07-17 ENCOUNTER — TELEPHONE (OUTPATIENT)
Dept: OBGYN CLINIC | Age: 23
End: 2018-07-17

## 2018-07-17 NOTE — TELEPHONE ENCOUNTER
This nurse called the patient back due to not hearing from the grandmother or patient. Patient is 25year old  14w2d pregnant patient last seen in the office on 18. Patient states she ha burning upon urination and itching and she has light cramping. This nurse advised patient to increase po fluids and patient was placed on the schedule to be seen at 11:10am by Lucas Dickey ( ok per MD) to R/o infection and patient is requesting std testing ? Maybe a new exposure    Patient verbalized understanding.

## 2018-07-17 NOTE — TELEPHONE ENCOUNTER
Call received at 9:15am      HIPAA verified to speak to Everton Tomasjorge., grandmother , who was not able to give the password. [address] mother advised to have patient to call or to get the password. 25year old patient last seen in the office on 6/21/18.       Grandmother to call the office back

## 2018-07-18 ENCOUNTER — ROUTINE PRENATAL (OUTPATIENT)
Dept: OBGYN CLINIC | Age: 23
End: 2018-07-18

## 2018-07-18 VITALS
SYSTOLIC BLOOD PRESSURE: 106 MMHG | BODY MASS INDEX: 46.77 KG/M2 | HEIGHT: 66 IN | DIASTOLIC BLOOD PRESSURE: 72 MMHG | WEIGHT: 291 LBS | HEART RATE: 94 BPM

## 2018-07-18 DIAGNOSIS — N89.8 VAGINA ITCHING: ICD-10-CM

## 2018-07-18 DIAGNOSIS — R30.0 DYSURIA: Primary | ICD-10-CM

## 2018-07-18 DIAGNOSIS — Z11.3 SCREENING FOR VENEREAL DISEASE: ICD-10-CM

## 2018-07-18 LAB
PH BODY FLUID, POCT, PHBFPOCT: 4.5
SOURCE POCT, SRCEPOCT: NORMAL
WET MOUNT POCT, WMPOCT: NEGATIVE

## 2018-07-18 RX ORDER — NITROFURANTOIN 25; 75 MG/1; MG/1
100 CAPSULE ORAL 2 TIMES DAILY
Qty: 14 CAP | Refills: 0 | Status: SHIPPED | OUTPATIENT
Start: 2018-07-18 | End: 2018-07-25

## 2018-07-18 NOTE — PATIENT INSTRUCTIONS
Safer Sex: Care Instructions  Your Care Instructions  Safer sex is a way to reduce your risk of getting an infection spread through sex. It can also help prevent pregnancy. Most infections that are spread through sex, also called sexually transmitted infections or STIs, can be cured. But some can decrease your chances of getting pregnant if they are not treated early. Others, such as herpes, have no cure. And some, such as HIV, can be deadly. Several products can help you practice safer sex and reduce your chance of STIs. One of the best is a condom. There are condoms for men and for women. The female condom is a tube of soft plastic with a closed end that is placed deep into the vagina. You can use a special rubber sheet (dental dam) for protection during oral sex. Latex gloves can keep your hands from touching blood, semen, or other body fluids that can carry infections. Remember that birth control methods such as diaphragms, IUDs, foams, and birth control pills do not stop you from getting STIs. Follow-up care is a key part of your treatment and safety. Be sure to make and go to all appointments, and call your doctor if you are having problems. It's also a good idea to know your test results and keep a list of the medicines you take. How can you care for yourself at home? · Think about getting shots to prevent hepatitis A and hepatitis B. These two diseases can be spread through sex. You also can get hepatitis A if you eat infected food. · Use condoms or female condoms each time and every time you have sex. · Learn the right way to use a male condom:  ¨ Condoms come in several sizes. Make sure you use the right size. A condom that is too small can break easily. A condom that is too big can slip off during sex. Use a new condom each time you have sex. ¨ Be careful not to poke a hole in the condom when you open the wrapper. ¨ Squeeze the tip of the condom to keep out air.   ¨ Pull down the loose skin (foreskin) from the head of an uncircumcised penis. ¨ While squeezing the tip of the condom, unroll it all the way down to the base of the firm penis. ¨ Never use petroleum jelly (such as Vaseline), grease, hand lotion, baby oil, or anything with oil in it. These products can make holes in the condom. ¨ After sex, hold the condom on your penis as you remove your penis from your partner. This will keep semen from spilling out of the condom. · Learn to use a female condom:  ¨ You can put in a female condom up to 8 hours before sex. ¨ Squeeze the smaller ring at the closed end and insert it deep into the vagina. The larger ring at the open end should stay outside the vagina. ¨ During sex, make sure the penis goes into the condom. ¨ After the penis is removed, close the open end of the condom by twisting it. Remove the condom. · Do not use a female condom and male condom at the same time. · Do not have sex with anyone who has symptoms of an STI, such as sores on the genitals or mouth. The herpes virus that causes cold sores can spread to and from the penis and vagina. · Do not drink a lot of alcohol or use drugs before sex. This can cause you to let down your guard and not practice safer sex. · Having one sex partner (who does not have STIs and does not have sex with anyone else) is a sure way to avoid STIs. · Talk to your partner before you have sex. Find out if he or she has or is at risk for any STI. Keep in mind that a person may be able to spread an STI even if he or she does not have symptoms. You and your partner may want to get an HIV test. You should get tested again 6 months later. Where can you learn more? Go to http://sathya-bianka.info/. Enter N213 in the search box to learn more about \"Safer Sex: Care Instructions. \"  Current as of: November 27, 2017  Content Version: 11.7  © 7776-9329 [employer].  Care instructions adapted under license by [facility] (which disclaims liability or warranty for this information). If you have questions about a medical condition or this instruction, always ask your healthcare professional. [place] [address] any warranty or liability for your use of this information. Urinary Tract Infection in Women: Care Instructions  Your Care Instructions    A urinary tract infection, or UTI, is a general term for an infection anywhere between the kidneys and the urethra (where urine comes out). Most UTIs are bladder infections. They often cause pain or burning when you urinate. UTIs are caused by bacteria and can be cured with antibiotics. Be sure to complete your treatment so that the infection goes away. Follow-up care is a key part of your treatment and safety. Be sure to make and go to all appointments, and call your doctor if you are having problems. It's also a good idea to know your test results and keep a list of the medicines you take. How can you care for yourself at home? · Take your antibiotics as directed. Do not stop taking them just because you feel better. You need to take the full course of antibiotics. · Drink extra water and other fluids for the next day or two. This may help wash out the bacteria that are causing the infection. (If you have kidney, heart, or liver disease and have to limit fluids, talk with your doctor before you increase your fluid intake.)  · Avoid drinks that are carbonated or have caffeine. They can irritate the bladder. · Urinate often. Try to empty your bladder each time. · To relieve pain, take a hot bath or lay a heating pad set on low over your lower belly or genital area. Never go to sleep with a heating pad in place. To prevent UTIs  · Drink plenty of water each day. This helps you urinate often, which clears bacteria from your system.  (If you have kidney, heart, or liver disease and have to limit fluids, talk with your doctor before you increase your fluid intake.)  · Urinate when you need to. · Urinate right after you have sex. · Change sanitary pads often. · Avoid douches, bubble baths, feminine hygiene sprays, and other feminine hygiene products that have deodorants. · After going to the bathroom, wipe from front to back. When should you call for help? Call your doctor now or seek immediate medical care if:    · Symptoms such as fever, chills, nausea, or vomiting get worse or appear for the first time.     · You have new pain in your back just below your rib cage. This is called flank pain.     · There is new blood or pus in your urine.     · You have any problems with your antibiotic medicine.    Watch closely for changes in your health, and be sure to contact your doctor if:    · You are not getting better after taking an antibiotic for 2 days.     · Your symptoms go away but then come back. Where can you learn more? Go to http://sathya-bianka.info/. Enter M292 in the search box to learn more about \"Urinary Tract Infection in Women: Care Instructions. \"  Current as of: May 12, 2017  Content Version: 11.7  © 3365-0960 [employer]. Care instructions adapted under license by [employer] (which disclaims liability or warranty for this information). If you have questions about a medical condition or this instruction, always ask your healthcare professional. [address] any warranty or liability for your use of this information.

## 2018-07-18 NOTE — PROGRESS NOTES
Problem Visit-Complete    Chief Complaint   Pregnancy Problem; Urinary Pain; and Itching      HPI  Neila Denver is a 25 y.o. female who presents for the evaluation of possible infection. Patient's last menstrual period was 04/08/2018. The patient complains of vulvovaginal burning and itching. It is located vaginally. The symptoms are moderated. They started 1 wk ago. Since then they have become unchanged. Associated symptoms: has lower abdominal cramping when her bladder is full as well as some mild cramping immediately after voiding. Aggravating factors: sx started after using scented wipes. Alleviating factors: none. Has not used any OTC meds, monistat. H/o trich several months ago. Recently used lubricant with applicator that she used when she had trich. Would like vaginal swab for STD screen. Tested negative in May at [facility] visit    The patient denies fever. Past Medical History:   Diagnosis Date    Dysmenorrhea     Major depressive disorder, single episode, unspecified     Morbid obesity ([address]     Pap smear for cervical cancer screening 2/23/17 neg    Pituitary abnormality (HCC)     Seasonal allergies     Unspecified symptom associated with female genital organs      Past Surgical History:   Procedure Laterality Date    HX WISDOM TEETH EXTRACTION       Social History     Occupational History    Not on file.      Social History Main Topics    Smoking status: Current Every Day Smoker     Packs/day: 0.50     Years: 1.00     Types: Cigarettes    Smokeless tobacco: Never Used    Alcohol use No      Comment: former user just sips now and then   Claudy Denisse Drug use: No    Sexual activity: Yes     Partners: Male     Birth control/ protection: Pill     Family History   Problem Relation Age of Onset    Alcohol abuse Father     Cancer Paternal Grandmother     Other Mother      endometriosis    Migraines Mother      tumer in brain       Allergies   Allergen Reactions    Latex Swelling Skin peels    Aspirin Nausea and Vomiting    Betadine [Povidone-Iodine] Other (comments)     Skin peels     Prior to Admission medications    Medication Sig Start Date End Date Taking? Authorizing Provider   [id number] marcelo-folic-dha (PRENATAL DHA+COMPLETE PRENATAL) [id number] mg-mcg-mg cmpk Take 1 Tab by mouth daily. Yes Historical Provider   omeprazole (PRILOSEC) 10 mg capsule TAKE ONE CAPSULE BY MOUTH ONCE A DAY 1/1/15  Yes Nahum Perez MD   buPROPion XL (WELLBUTRIN XL) 150 mg tablet Take 1 Tab by mouth every morning. 2/5/18   Caesar Band, NP   clonazePAM (KLONOPIN) 1 mg tablet Take 1 Tab by mouth daily as needed. Must last 30 days. 10/30/17   René Goldberg MD   drospirenone-ethinyl estradiol (JEANETTE) 3-0.03 mg tab Take 1 Tab by mouth daily. 9/6/17   Aleena Davis MD   albuterol (PROVENTIL VENTOLIN) 2.5 mg /3 mL (0.083 %) nebulizer solution 3 mL by Nebulization route every four (4) hours as needed for Wheezing. 6/29/17   Navarro Rodriguez, SABINE   MELATONIN PO Take  by mouth as needed. Historical Provider   diphenhydrAMINE (BENADRYL) 50 mg tablet Take 1 tablet 1 hr prior to MRI 10/18/16   Liza Holm MD   aspirin-acetaminophen-caffeine (EXCEDRIN ES) 055-991-98 mg per tablet Take 1 Tab by mouth. Historical Provider   loratadine 10 mg cap Take 10 mg by mouth daily.     Historical Provider          Objective:  Visit Vitals    /72    Pulse 94    Ht 5' 6\" (1.676 m)    Wt 291 lb (132 kg)    LMP 04/08/2018    BMI 46.97 kg/m2       Physical Exam:     Constitutional  · Appearance: well-nourished, well developed, alert, in no acute distress    HENT  · Head and Face: appears normal      Gastrointestinal  · Abdominal Examination: abdomen non-tender to palpation, normal bowel sounds, no masses present  · Liver and spleen: no hepatomegaly present, spleen not palpable  · Hernias: no hernias identified    Genitourinary  · External Genitalia: mild diffuse erythema otherwise normal appearance for age, no discharge present, no tenderness present, no masses present, no atrophy present  · Vagina: normal vaginal vault without central or paravaginal defects, scant white/mucinous discharge present, no inflammatory lesions present, no masses present  · Bladder: non-tender to palpation  · Urethra: appears normal  · Cervix: normal, no discharge or blood, lg/th/cl   · Uterus: n~14wk size, NT  · Adnexa: no adnexal tenderness present, no adnexal masses present  · Perineum: perineum within normal limits, no evidence of trauma, no rashes or skin lesions present  · Anus: anus within normal limits, no hemorrhoids present  · Inguinal Lymph Nodes: no lymphadenopathy present    Skin  · General Inspection: no rash, no lesions identified    Neurologic/Psychiatric  · Mental Status:  · Orientation: grossly oriented to person, place and time  · Mood and Affect: mood normal, affect appropriate    Results for orders placed or performed in visit on 07/18/18   AMB POC SMEAR, STAIN & INTERPRET, WET MOUNT   Result Value Ref Range    Wet mount (POC) negative    AMB POC PH, BODY FLUID EXCEPT BLOOD   Result Value Ref Range    pH,Body fluid (POC) 4.5     Source           Assessment:  Cramping with urination  Vulvovaginal itching. WP/KPH neg  H/o trich    Plan:   Ur cx  nuswab plus  Will tx for UTI -> eRX macrobid  Adv to throw away old lubricant, make sure toys have been disinfected      Orders Placed This Encounter    CULTURE, URINE    NUSWAB VAGINITIS PLUS    AMB POC SMEAR, STAIN & INTERPRET, WET MOUNT    AMB POC PH, BODY FLUID EXCEPT BLOOD    nitrofurantoin, macrocrystal-monohydrate, (MACROBID) 100 mg capsule     Sig: Take 1 Cap by mouth two (2) times a day for 7 days.      Dispense:  14 Cap     Refill:  0

## 2018-07-20 LAB
BACTERIA UR CULT: NORMAL
BACTERIA UR CULT: NORMAL

## 2018-07-27 ENCOUNTER — ROUTINE PRENATAL (OUTPATIENT)
Dept: OBGYN CLINIC | Age: 23
End: 2018-07-27

## 2018-07-27 VITALS — DIASTOLIC BLOOD PRESSURE: 78 MMHG | SYSTOLIC BLOOD PRESSURE: 116 MMHG | WEIGHT: 293 LBS | BODY MASS INDEX: 47.29 KG/M2

## 2018-07-27 DIAGNOSIS — Z34.02 ENCOUNTER FOR SUPERVISION OF NORMAL FIRST PREGNANCY IN SECOND TRIMESTER: ICD-10-CM

## 2018-07-27 DIAGNOSIS — O99.212 OBESITY COMPLICATING PREGNANCY IN SECOND TRIMESTER: Primary | ICD-10-CM

## 2018-07-27 DIAGNOSIS — Z3A.15 15 WEEKS GESTATION OF PREGNANCY: ICD-10-CM

## 2018-07-27 DIAGNOSIS — E66.01 MORBID OBESITY (HCC): ICD-10-CM

## 2018-07-27 LAB — AFP, MATERNAL, EXTERNAL: NORMAL

## 2018-08-01 LAB
AFP ADJ MOM SERPL: 1.48
AFP INTERP SERPL-IMP: NORMAL
AFP INTERP SERPL-IMP: NORMAL
AFP SERPL-MCNC: 27.9 NG/ML
AGE AT DELIVERY: 23.1 YR
COMMENT, 018013: NORMAL
GA METHOD: NORMAL
GA: 15 WEEKS
IDDM PATIENT QL: NO
MULTIPLE PREGNANCY: NO
NEURAL TUBE DEFECT RISK FETUS: 2886 %
RESULTS, 017004: NORMAL

## 2018-08-17 ENCOUNTER — OFFICE VISIT (OUTPATIENT)
Dept: FAMILY MEDICINE CLINIC | Age: 23
End: 2018-08-17

## 2018-08-17 VITALS
BODY MASS INDEX: 47.09 KG/M2 | HEIGHT: 66 IN | OXYGEN SATURATION: 98 % | TEMPERATURE: 98.4 F | SYSTOLIC BLOOD PRESSURE: 136 MMHG | HEART RATE: 99 BPM | WEIGHT: 293 LBS | RESPIRATION RATE: 20 BRPM | DIASTOLIC BLOOD PRESSURE: 85 MMHG

## 2018-08-17 DIAGNOSIS — Z33.1 IUP (INTRAUTERINE PREGNANCY), INCIDENTAL: ICD-10-CM

## 2018-08-17 DIAGNOSIS — R42 VERTIGO: Primary | ICD-10-CM

## 2018-08-17 NOTE — MR AVS SNAPSHOT
[address] [address] 
[phone] Patient: Ivonne Kidney MRN: [id number] :1995 Visit Information Date & Time Provider Department Dept. Phone Encounter #  
 2018  1:30 PM Lisa Luke MD [address] [facility] [phone] [id number] Follow-up Instructions Return if symptoms worsen or fail to improve. Your Appointments 2018  3:30 PM  
ROUTINE CARE with Lorraine Gil MD  
[facility] [address] [place] [address]) Appt Note: f/u 3 month  
 [address] [address]  
[phone]  
  
   
 [address] [address]  
  
    
  
 2018 10:10 AM  
OB VISIT with Mitch De Leon MD  
[facility] ([address]) Appt Note: 4 wk fob  
 [address] [facility] [address] [address]  
[facility] [address] Upcoming Health Maintenance Date Due  
 HPV Age 9Y-34Y (1 of 1 - Female 3 Dose Series) 2006 Pneumococcal 19-64 Medium Risk (1 of 1 - PPSV23) 2014 DTaP/Tdap/Td series (1 - Tdap) 2016 Influenza Age 5 to Adult 2018 PAP AKA CERVICAL CYTOLOGY 2020 Allergies as of 2018  Review Complete On: 2018 By: Lisa Luke MD  
  
 Severity Noted Reaction Type Reactions Latex  07/15/2010    Swelling Skin peels Aspirin  2011    Nausea and Vomiting Betadine [Povidone-iodine]  2011    Other (comments) Skin peels Current Immunizations  Never Reviewed No immunizations on file. Not reviewed this visit You Were Diagnosed With   
  
 Codes Comments Vertigo    -  Primary ICD-10-CM: W48 ICD-9-CM: 780.4 IUP (intrauterine pregnancy), incidental     ICD-10-CM: Z34.90 ICD-9-CM: V22.2 Vitals BP Pulse Temp Resp Height(growth percentile) Weight(growth percentile) 136/85 99 98.4 °F (36.9 °C) 20 5' 6\" (1.676 m) 301 lb (136.5 kg) LMP SpO2 BMI OB Status Smoking Status 04/08/2018 98% 48.58 kg/m2 Pregnant Current Every Day Smoker Vitals History BMI and BSA Data Body Mass Index Body Surface Area 48.58 kg/m 2 2.52 m 2 Preferred Pharmacy Pharmacy Name Phone Cathie Montano [address], [address] [phone] Your Updated Medication List  
  
   
This list is accurate as of 8/17/18  2:20 PM.  Always use your most recent med list.  
  
  
  
  
 albuterol 2.5 mg /3 mL (0.083 %) nebulizer solution Commonly known as:  PROVENTIL VENTOLIN  
3 mL by Nebulization route every four (4) hours as needed for Wheezing. aspirin-acetaminophen-caffeine 250-250-65 mg per tablet Commonly known as:  EXCEDRIN ES Take 1 Tab by mouth. buPROPion  mg tablet Commonly known as:  Layne Sizer Take 1 Tab by mouth every morning. clonazePAM 1 mg tablet Commonly known as:  Crump Abhishek Take 1 Tab by mouth daily as needed. Must last 30 days. diphenhydrAMINE 50 mg tablet Commonly known as:  BENADRYL Take 1 tablet 1 hr prior to MRI  
  
 drospirenone-ethinyl estradiol 3-0.03 mg Tab Commonly known as:  Henry Irwin Take 1 Tab by mouth daily. loratadine 10 mg Cap Take 10 mg by mouth daily. MELATONIN PO Take  by mouth as needed. omeprazole 10 mg capsule Commonly known as:  PRILOSEC  
TAKE ONE CAPSULE BY MOUTH ONCE A DAY PRENATAL DHA+COMPLETE PRENATAL -300 mg-mcg-mg Cmpk Generic drug:  [id number] marcelo-folic-dha Take 1 Tab by mouth daily. Follow-up Instructions Return if symptoms worsen or fail to improve. Introducing [facility] & HEALTH SERVICES! Dear Mercedes Elliott: 
Thank you for requesting a [employer] account.   Our records indicate that you already have an active [employer] account. You can access your account anytime at [email or web address]. [employer] Did you know that you can access your hospital and ER discharge instructions at any time in [employer]? You can also review all of your test results from your hospital stay or ER visit. Additional Information If you have questions, please visit the Frequently Asked Questions section of the [employer] website at [email or web address] Remember, [employer] is NOT to be used for urgent needs. For medical emergencies, dial 911. Now available from your iPhone and Android! Please provide this summary of care documentation to your next provider. Your primary care clinician is listed as Rylee Goldberg. If you have any questions after today's visit, please call [phone].

## 2018-08-17 NOTE — PROGRESS NOTES
Patient here for dizziness started about 1 week ago. Patient is 19 weeks pregnant. Excessive swelling in lower extremities. 1. Have you been to the ER, urgent care clinic since your last visit? Hospitalized since your last visit? No    2. Have you seen or consulted any other health care providers outside of the [address] [place] [address] since your last visit? Include any pap smears or colon screening. No         Chief Complaint   Patient presents with    Dizziness     19 weeks pregnant, dizziness started last week, real bad yesterday.  Leg Swelling     leg swelling recently, pain shooting while lying     She is a 25 y.o. female who presents for evalution. Reviewed PmHx, RxHx, FmHx, SocHx, AllgHx and updated and dated in the chart. Patient Active Problem List    Diagnosis    Supervision of normal first pregnancy     H/o prolactin adenoma  Morbid obesity- needs glucola at next visit  H/o depression  Uncle born with TOF,  demise      Obesity, morbid ([address]    Pituitary adenoma ([address]    Galactorrhea    Hyperprolactinemia ([address]    Panic attacks    Chronic pelvic pain in female    Constipation    Obesity    Dysmenorrhea       Review of Systems - negative except as listed above in the HPI    Objective:     Vitals:    18 1403   BP: 136/85   Pulse: 99   Resp: 20   Temp: 98.4 °F (36.9 °C)   SpO2: 98%   Weight: 301 lb (136.5 kg)   Height: 5' 6\" (1.676 m)     Physical Examination: General appearance - alert, well appearing, and in no distress  Ears - bilateral TM's and external ear canals normal  Neck - supple, no significant adenopathy  Chest - clear to auscultation, no wheezes, rales or rhonchi, symmetric air entry  Heart - normal rate, regular rhythm, normal S1, S2, no murmurs, rubs, clicks or gallops    Assessment/ Plan:   Diagnoses and all orders for this visit:    1. Vertigo  -? Anemia due to preg  -observe  -check CBC    2.  IUP (intrauterine pregnancy), incidental  -push PO Follow-up Disposition:  Return if symptoms worsen or fail to improve. I have discussed the diagnosis with the patient and the intended plan as seen in the above orders. The patient understands and agrees with the plan. The patient has received an after-visit summary and questions were answered concerning future plans. Medication Side Effects and Warnings were discussed with patient  Patient Labs were reviewed and or requested:  Patient Past Records were reviewed and or requested    Peggy Chen M.D. There are no Patient Instructions on file for this visit.

## 2018-08-18 LAB
BASOPHILS # BLD AUTO: 0 X10E3/UL (ref 0–0.2)
BASOPHILS NFR BLD AUTO: 0 %
EOSINOPHIL # BLD AUTO: 0.1 X10E3/UL (ref 0–0.4)
EOSINOPHIL NFR BLD AUTO: 1 %
ERYTHROCYTE [DISTWIDTH] IN BLOOD BY AUTOMATED COUNT: 14.4 % (ref 12.3–15.4)
HCT VFR BLD AUTO: 37.7 % (ref 34–46.6)
HGB BLD-MCNC: 12 G/DL (ref 11.1–15.9)
IMM GRANULOCYTES # BLD: 0 X10E3/UL (ref 0–0.1)
IMM GRANULOCYTES NFR BLD: 0 %
LYMPHOCYTES # BLD AUTO: 1.8 X10E3/UL (ref 0.7–3.1)
LYMPHOCYTES NFR BLD AUTO: 21 %
MCH RBC QN AUTO: 29.3 PG (ref 26.6–33)
MCHC RBC AUTO-ENTMCNC: 31.8 G/DL (ref 31.5–35.7)
MCV RBC AUTO: 92 FL (ref 79–97)
MONOCYTES # BLD AUTO: 0.7 X10E3/UL (ref 0.1–0.9)
MONOCYTES NFR BLD AUTO: 8 %
NEUTROPHILS # BLD AUTO: 6.2 X10E3/UL (ref 1.4–7)
NEUTROPHILS NFR BLD AUTO: 70 %
PLATELET # BLD AUTO: 274 X10E3/UL (ref 150–379)
RBC # BLD AUTO: 4.1 X10E6/UL (ref 3.77–5.28)
WBC # BLD AUTO: 8.8 X10E3/UL (ref 3.4–10.8)

## 2018-08-24 ENCOUNTER — ROUTINE PRENATAL (OUTPATIENT)
Dept: OBGYN CLINIC | Age: 23
End: 2018-08-24

## 2018-08-24 VITALS — DIASTOLIC BLOOD PRESSURE: 70 MMHG | WEIGHT: 293 LBS | BODY MASS INDEX: 48.91 KG/M2 | SYSTOLIC BLOOD PRESSURE: 116 MMHG

## 2018-08-24 DIAGNOSIS — Z34.02 ENCOUNTER FOR SUPERVISION OF NORMAL FIRST PREGNANCY IN SECOND TRIMESTER: ICD-10-CM

## 2018-08-24 DIAGNOSIS — E66.01 MORBID OBESITY (HCC): ICD-10-CM

## 2018-08-24 DIAGNOSIS — O99.212 OBESITY COMPLICATING PREGNANCY IN SECOND TRIMESTER: Primary | ICD-10-CM

## 2018-08-24 DIAGNOSIS — Z3A.19 19 WEEKS GESTATION OF PREGNANCY: ICD-10-CM

## 2018-08-24 NOTE — MR AVS SNAPSHOT
[address] [facility] [address] [facility] 
[phone] Patient: Swapna Chisholm MRN: [id number] :1995 Visit Information Date & Time Provider Department Dept. Phone Encounter #  
 2018 10:10 AM Reji Hernandez MD Andrea Bridges [phone] [id number] Your Appointments 2018  3:30 PM  
ROUTINE CARE with Virginia Bolton MD  
[facility]) Appt Note: f/u 3 month  
 [address] [facility] [address]  
[phone]  
  
   
 [address] [place] [address] Upcoming Health Maintenance Date Due  
 HPV Age 9Y-34Y (1 of 1 - Female 3 Dose Series) 2006 Influenza Age 5 to Adult 2018 PAP AKA CERVICAL CYTOLOGY 2020 Allergies as of 2018  Review Complete On: 2018 By: Geovani Lezama MD  
  
 Severity Noted Reaction Type Reactions Latex  07/15/2010    Swelling Skin peels Aspirin  2011    Nausea and Vomiting Betadine [Povidone-iodine]  2011    Other (comments) Skin peels Current Immunizations  Never Reviewed No immunizations on file. Not reviewed this visit Vitals BP Weight(growth percentile) LMP BMI OB Status Smoking Status 116/70 303 lb (137.4 kg) 2018 48.91 kg/m2 Pregnant Current Every Day Smoker BMI and BSA Data Body Mass Index Body Surface Area 48.91 kg/m 2 2.53 m 2 Preferred Pharmacy Pharmacy Name Phone [facility]UL[facility] [address]  [phone] Your Updated Medication List  
  
   
This list is accurate as of 18 10:33 AM.  Always use your most recent med list.  
  
  
  
  
 albuterol 2.5 mg /3 mL (0.083 %) nebulizer solution Commonly known as:  PROVENTIL VENTOLIN  
3 mL by Nebulization route every four (4) hours as needed for Wheezing. aspirin-acetaminophen-caffeine 250-250-65 mg per tablet Commonly known as:  EXCEDRIN ES Take 1 Tab by mouth. buPROPion  mg tablet Commonly known as:  Masoud Nancy Take 1 Tab by mouth every morning. clonazePAM 1 mg tablet Commonly known as:  Nathaniel Guru Take 1 Tab by mouth daily as needed. Must last 30 days. diphenhydrAMINE 50 mg tablet Commonly known as:  BENADRYL Take 1 tablet 1 hr prior to MRI  
  
 drospirenone-ethinyl estradiol 3-0.03 mg Tab Commonly known as:  Michaelyn Matter Take 1 Tab by mouth daily. loratadine 10 mg Cap Take 10 mg by mouth daily. MELATONIN PO Take  by mouth as needed. omeprazole 10 mg capsule Commonly known as:  PRILOSEC  
TAKE ONE CAPSULE BY MOUTH ONCE A DAY PRENATAL DHA+COMPLETE PRENATAL -300 mg-mcg-mg Cmpk Generic drug:  [id number] marcelo-folic-dha Take 1 Tab by mouth daily. Patient Instructions Weeks 18 to 22 of Your Pregnancy: Care Instructions Your Care Instructions Your baby is continuing to develop quickly. At this stage, babies can now suck their thumbs,  firmly with their hands, and open and close their eyelids. Sometime between 18 and 22 weeks, you will start to feel your baby move. At first, these small fetal movements feel like fluttering or \"butterflies. \" Some women say that they feel like gas bubbles. As the baby grows, these movements will become stronger. You may also notice that your baby kicks and hiccups. During this time, you may find that your nausea and fatigue are gone. Overall, you may feel better and have more energy than you did in your first trimester. But you may also have new discomforts now, such as sleep problems or leg cramps. This care sheet can help you ease these discomforts. Follow-up care is a key part of your treatment and safety.  Be sure to make and go to all appointments, and call your doctor if you are having problems. It's also a good idea to know your test results and keep a list of the medicines you take. How can you care for yourself at home? Ease sleep problems · Avoid caffeine in drinks or chocolate late in the day. · Get some exercise every day. · Take a warm shower or bath before bed. · Have a light snack or glass of milk at bedtime. · Do relaxation exercises in bed to calm your mind and body. · Support your legs and back with extra pillows. Try a pillow between your legs if you sleep on your side. · Do not use sleeping pills or alcohol. They could harm your baby. Ease leg cramps · Do not massage your calf during the cramp. · Sit on a firm bed or chair. Straighten your leg, and bend your foot (flex your ankle) slowly upward, toward your knee. Bend your toes up and down. · Stand on a cool, flat surface. Stretch your toes upward, and take small steps walking on your heels. · Use a heating pad or hot water bottle to help with muscle ache. Prevent leg cramps · Be sure to get enough calcium. If you are worried that you are not getting enough, talk to your doctor. · Exercise every day, and stretch your legs before bed. · Take a warm bath before bed, and try leg warmers at night. Where can you learn more? Go to http://sathya-bianka.info/. Enter T307 in the search box to learn more about \"Weeks 18 to 22 of Your Pregnancy: Care Instructions. \" Current as of: November 21, 2017 Content Version: 11.7 © 7583-0246 [employer]. Care instructions adapted under license by [employer] (which disclaims liability or warranty for this information). If you have questions about a medical condition or this instruction, always ask your healthcare professional. [place] [address] any warranty or liability for your use of this information. Introducing [facility] & HEALTH SERVICES! Dear Tennis Ramp: Thank you for requesting a [employer] account. Our records indicate that you already have an active [employer] account. You can access your account anytime at [email or web address]. [employer] Did you know that you can access your hospital and ER discharge instructions at any time in [employer]? You can also review all of your test results from your hospital stay or ER visit. Additional Information If you have questions, please visit the Frequently Asked Questions section of the [employer] website at [email or web address]. [employer]/. Remember, [employer] is NOT to be used for urgent needs. For medical emergencies, dial 911. Now available from your iPhone and Android! Please provide this summary of care documentation to your next provider. Your primary care clinician is listed as Rylee Goldberg. If you have any questions after today's visit, please call [phone].

## 2018-08-27 ENCOUNTER — HOSPITAL ENCOUNTER (OUTPATIENT)
Dept: PERINATAL CARE | Age: 23
Discharge: HOME | End: 2018-08-27
Attending: OBSTETRICS & GYNECOLOGY
Payer: MEDICAID

## 2018-08-27 PROCEDURE — 76811 OB US DETAILED SNGL FETUS: CPT | Performed by: OBSTETRICS & GYNECOLOGY

## 2018-09-04 ENCOUNTER — TELEPHONE (OUTPATIENT)
Dept: OBGYN CLINIC | Age: 23
End: 2018-09-04

## 2018-09-04 NOTE — TELEPHONE ENCOUNTER
Patient called with complaint of sore throat. Started today. She wanted to know what she was able to take. OTC meds discussed with patient. Advised to call if no improvement in symptoms in 1-2 days.

## 2018-09-07 ENCOUNTER — OFFICE VISIT (OUTPATIENT)
Dept: ENDOCRINOLOGY | Age: 23
End: 2018-09-07

## 2018-09-07 VITALS
WEIGHT: 293 LBS | RESPIRATION RATE: 16 BRPM | DIASTOLIC BLOOD PRESSURE: 64 MMHG | HEIGHT: 66 IN | BODY MASS INDEX: 47.09 KG/M2 | OXYGEN SATURATION: 99 % | TEMPERATURE: 97.5 F | HEART RATE: 85 BPM | SYSTOLIC BLOOD PRESSURE: 121 MMHG

## 2018-09-07 DIAGNOSIS — D35.2 PITUITARY ADENOMA (HCC): Primary | ICD-10-CM

## 2018-09-07 DIAGNOSIS — E22.1 HYPERPROLACTINEMIA (HCC): ICD-10-CM

## 2018-09-07 DIAGNOSIS — E66.01 OBESITY, MORBID (HCC): ICD-10-CM

## 2018-09-07 RX ORDER — ACETAMINOPHEN 325 MG/1
TABLET ORAL
COMMUNITY
End: 2021-08-12

## 2018-09-07 NOTE — MR AVS SNAPSHOT
[address] [facility] [address] [facility] [address] 
[phone] Patient: Neila Denver MRN: [id number] :1995 Visit Information Date & Time Provider Department Dept. Phone Encounter #  
 2018  3:30 PM Fermin Roque MD [facility] [phone] [id number] Follow-up Instructions Return in about 8 months (around 2019).  
  
 2018 11:20 AM  
OB VISIT with Jordyn Pineda MD  
[employer] ([address] [place] [address]) Appt Note: 4 wk fob  
 [address] [facility] [address] [facility] [address]  
[facility] [address] [place] [address] Upcoming Health Maintenance Date Due  
 HPV Age 9Y-34Y (1 of 1 - Female 3 Dose Series) 2006 Pneumococcal 19-64 Medium Risk (1 of 1 - PPSV23) 2014 DTaP/Tdap/Td series (1 - Tdap) 2016 Influenza Age 5 to Adult 2018 PAP AKA CERVICAL CYTOLOGY 2020 Allergies as of 2018  Review Complete On: 2018 By: Fermin Roque MD  
  
 Severity Noted Reaction Type Reactions Latex  07/15/2010    Swelling Skin peels Aspirin  2011    Nausea and Vomiting Betadine [Povidone-iodine]  2011    Other (comments) Skin peels Cat Dander  2018    Sneezing Current Immunizations  Never Reviewed No immunizations on file. Not reviewed this visit You Were Diagnosed With   
  
 Codes Comments Pituitary adenoma ([facility]    -  Primary ICD-10-CM: D35.2 ICD-9-CM: 227.3 Hyperprolactinemia ([address]     ICD-10-CM: E22.1 ICD-9-CM: 253.1 Vitals BP Pulse Temp Resp Height(growth percentile) Weight(growth percentile) 121/64 (BP 1 Location: Left arm, BP Patient Position: Sitting) 85 97.5 °F (36.4 °C) (Oral) 16 5' 6\" (1.676 m) 306 lb 12.8 oz (139.2 kg) LMP SpO2 BMI OB Status Smoking Status 04/08/2018 99% 49.52 kg/m2 Pregnant Current Every Day Smoker Vitals History BMI and BSA Data Body Mass Index Body Surface Area  
 49.52 kg/m 2 2.55 m 2 Preferred Pharmacy Pharmacy Name Phone [facility] [address], [address] [phone] Your Updated Medication List  
  
   
This list is accurate as of 9/7/18  4:02 PM.  Always use your most recent med list.  
  
  
  
  
 acetaminophen 325 mg tablet Commonly known as:  TYLENOL Take  by mouth every four (4) hours as needed for Pain. albuterol 2.5 mg /3 mL (0.083 %) nebulizer solution Commonly known as:  PROVENTIL VENTOLIN  
3 mL by Nebulization route every four (4) hours as needed for Wheezing. aspirin-acetaminophen-caffeine 250-250-65 mg per tablet Commonly known as:  EXCEDRIN ES Take 1 Tab by mouth. buPROPion  mg tablet Commonly known as:  Ettie Pickler Take 1 Tab by mouth every morning. clonazePAM 1 mg tablet Commonly known as:  Melburn End Take 1 Tab by mouth daily as needed. Must last 30 days. diphenhydrAMINE 50 mg tablet Commonly known as:  BENADRYL Take 1 tablet 1 hr prior to MRI  
  
 drospirenone-ethinyl estradiol 3-0.03 mg Tab Commonly known as:  Cydne Pacific Take 1 Tab by mouth daily. loratadine 10 mg Cap Take 10 mg by mouth daily. MELATONIN PO Take  by mouth as needed. MUCINEX -30 mg per tablet Generic drug:  guaiFENesin-dextromethorphan SR Take 1 Tab by mouth every twelve (12) hours as needed for Cough. omeprazole 10 mg capsule Commonly known as:  PRILOSEC  
TAKE ONE CAPSULE BY MOUTH ONCE A DAY PRENATAL DHA+COMPLETE PRENATAL -300 mg-mcg-mg Cmpk Generic drug:  [id number] marcelo-folic-dha Take 1 Tab by mouth daily. Follow-up Instructions Return in about 8 months (around 5/7/2019). Introducing [facility] & HEALTH SERVICES! Dear Nick Nair: Thank you for requesting a [employer] account. Our records indicate that you already have an active [employer] account. You can access your account anytime at [email or web address] Did you know that you can access your hospital and ER discharge instructions at any time in [employer]? You can also review all of your test results from your hospital stay or ER visit. Additional Information If you have questions, please visit the Frequently Asked Questions section of the [employer] website at [email or web address] Remember, [employer] is NOT to be used for urgent needs. For medical emergencies, dial 911. Now available from your iPhone and Android! Please provide this summary of care documentation to your next provider. Your primary care clinician is listed as Rylee Goldberg. If you have any questions after today's visit, please call [phone].

## 2018-09-07 NOTE — PROGRESS NOTES
Brisa Mcintyre MD        [address] 78 [phone] Fax [phone]               Patient Information  Date:9/7/2018  Name : Alec Mejia 25 y.o.     YOB: 1995         Referred by: Cece Castellon MD         Chief Complaint   Patient presents with    Pituitary Problem    Hyperprolactinemia       History of Present Illness: Genie Olson is a 25 y.o. female here for fu    She was referred by Cece Castellon MD for evaluation and management of galactorrhea. She was on oral contraceptives in July 2016 for dysmenorrhea. She was switched to Medroxyprogesterone injection. MRI showed small adenoma 7 mm      She is in her second trimester of pregnancy  Headaches have improved  No diplopia  Reports breast enlargement and tenderness  Family history of diabetes  This is her first pregnancy        Wt Readings from Last 3 Encounters:   09/07/18 306 lb 12.8 oz (139.2 kg)   08/24/18 303 lb (137.4 kg)   08/17/18 301 lb (136.5 kg)       BP Readings from Last 3 Encounters:   09/07/18 121/64   08/24/18 116/70   08/17/18 136/85           Past Medical History:   Diagnosis Date    Dysmenorrhea     Major depressive disorder, single episode, unspecified     Morbid obesity ([address]     Pap smear for cervical cancer screening 2/23/17 neg    Pituitary abnormality (HCC)     Seasonal allergies     Unspecified symptom associated with female genital organs      Current Outpatient Prescriptions   Medication Sig    guaiFENesin-dextromethorphan SR (MUCINEX DM) 600-30 mg per tablet Take 1 Tab by mouth every twelve (12) hours as needed for Cough.  acetaminophen (TYLENOL) 325 mg tablet Take  by mouth every four (4) hours as needed for Pain.  [id number] marcelo-folic-dha (PRENATAL DHA+COMPLETE PRENATAL) -300 mg-mcg-mg cmpk Take 1 Tab by mouth daily.     omeprazole (PRILOSEC) 10 mg capsule TAKE ONE CAPSULE BY MOUTH ONCE A DAY    buPROPion XL (WELLBUTRIN XL) 150 mg tablet Take 1 Tab by mouth every morning.  clonazePAM (KLONOPIN) 1 mg tablet Take 1 Tab by mouth daily as needed. Must last 30 days.  drospirenone-ethinyl estradiol (JEANETTE) 3-0.03 mg tab Take 1 Tab by mouth daily.  albuterol (PROVENTIL VENTOLIN) 2.5 mg /3 mL (0.083 %) nebulizer solution 3 mL by Nebulization route every four (4) hours as needed for Wheezing.  MELATONIN PO Take  by mouth as needed.  diphenhydrAMINE (BENADRYL) 50 mg tablet Take 1 tablet 1 hr prior to MRI    aspirin-acetaminophen-caffeine (EXCEDRIN ES) 250-250-65 mg per tablet Take 1 Tab by mouth.  loratadine 10 mg cap Take 10 mg by mouth daily. No current facility-administered medications for this visit. Allergies   Allergen Reactions    Latex Swelling     Skin peels    Aspirin Nausea and Vomiting    Betadine [Povidone-Iodine] Other (comments)     Skin peels    Cat Dander Sneezing         Review of Systems:  - Constitutional Symptoms: no fevers, no chills,   - Eyes: no blurry vision no double vision  - Cardiovascular: no chest pain ,no palpitations  - Neurological: no numbness, tingling, no  headaches  - Psychiatric: no depression no  anxiety  - Endocrine: no heat or cold intolerance    Physical Examination:   Blood pressure 121/64, pulse 85, temperature 97.5 °F (36.4 °C), temperature source Oral, resp. rate 16, height 5' 6\" (1.676 m), weight 306 lb 12.8 oz (139.2 kg), last menstrual period 04/08/2018, SpO2 99 %. Estimated body mass index is 49.52 kg/(m^2) as calculated from the following:    Height as of this encounter: 5' 6\" (1.676 m). -   Weight as of this encounter: 306 lb 12.8 oz (139.2 kg).   - General: pleasant, no distress, good eye contact  - HEENT: no pallor, no periorbital edema, EOMI  - Neck: supple,  - Cardiovascular: regular, normal rate, normal S1 and S2,   - Respiratory: clear to auscultation bilaterally  - Integumentary: ,no edema,  - Neurological: ,alert and oriented  - Psychiatric: normal mood and affect  - Skin: color, texture, turgor normal.       Data Reviewed:     [] Glucose records reviewed. [] See glucose records for details (to be scanned). [] A1C  [] Reviewed labs      Assessment/Plan:     1. Pituitary adenoma ([address]    2. Hyperprolactinemia (HCC)          7 mm Pituitary microadenoma - could be incidental finding, prolactin is very minimally elevated   Screening for pituitary hormones - neg  Prolactin increases during pregnancy  Pituitary gland hyperplasia is a possibility during pregnancy, rarely adenoma can increase however in her case given microadenoma compressive symptoms are rare, to call with symptoms of consistent double vision and severe headache. Obesity Body mass index is 49.52 kg/(m^2). Risk of gestational diabetes discussed, diet: Low-carb diet and increase protein          Follow-up Disposition: Not on File    Thank you for allowing me to participate in the care of this patient.     Mariann Sauer MD      Patient verbalized understanding

## 2018-09-07 NOTE — PROGRESS NOTES
Griselda Llanes is a 25 y.o. female here for   Chief Complaint   Patient presents with    Pituitary Problem    Hyperprolactinemia       1. Have you been to the ER, urgent care clinic since your last visit? Hospitalized since your last visit? -no    2. Have you seen or consulted any other health care providers outside of the [address] [place] [address] since your last visit?   Include any pap smears or colon screening.-no

## 2018-09-21 ENCOUNTER — ROUTINE PRENATAL (OUTPATIENT)
Dept: OBGYN CLINIC | Age: 23
End: 2018-09-21

## 2018-09-21 VITALS — DIASTOLIC BLOOD PRESSURE: 84 MMHG | BODY MASS INDEX: 49.87 KG/M2 | SYSTOLIC BLOOD PRESSURE: 126 MMHG | WEIGHT: 293 LBS

## 2018-09-21 DIAGNOSIS — N89.8 VAGINAL IRRITATION: ICD-10-CM

## 2018-09-21 DIAGNOSIS — Z34.02 ENCOUNTER FOR SUPERVISION OF NORMAL FIRST PREGNANCY IN SECOND TRIMESTER: Primary | ICD-10-CM

## 2018-09-21 DIAGNOSIS — N89.8 VAGINAL DISCHARGE: ICD-10-CM

## 2018-09-24 ENCOUNTER — HOSPITAL ENCOUNTER (OUTPATIENT)
Dept: PERINATAL CARE | Age: 23
Discharge: HOME | End: 2018-09-24
Attending: OBSTETRICS & GYNECOLOGY
Payer: MEDICAID

## 2018-09-24 PROCEDURE — 76816 OB US FOLLOW-UP PER FETUS: CPT | Performed by: OBSTETRICS & GYNECOLOGY

## 2018-10-19 ENCOUNTER — ROUTINE PRENATAL (OUTPATIENT)
Dept: OBGYN CLINIC | Age: 23
End: 2018-10-19

## 2018-10-19 VITALS — SYSTOLIC BLOOD PRESSURE: 124 MMHG | WEIGHT: 293 LBS | DIASTOLIC BLOOD PRESSURE: 84 MMHG | BODY MASS INDEX: 51.65 KG/M2

## 2018-10-19 DIAGNOSIS — Z34.02 ENCOUNTER FOR SUPERVISION OF NORMAL FIRST PREGNANCY IN SECOND TRIMESTER: Primary | ICD-10-CM

## 2018-10-19 DIAGNOSIS — Z23 NEED FOR TDAP VACCINATION: ICD-10-CM

## 2018-10-20 LAB
ERYTHROCYTE [DISTWIDTH] IN BLOOD BY AUTOMATED COUNT: 13.3 % (ref 12.3–15.4)
GLUCOSE 1H P 50 G GLC PO SERPL-MCNC: 88 MG/DL (ref 65–129)
HCT VFR BLD AUTO: 34.4 % (ref 34–46.6)
HGB BLD-MCNC: 11.5 G/DL (ref 11.1–15.9)
MCH RBC QN AUTO: 29.9 PG (ref 26.6–33)
MCHC RBC AUTO-ENTMCNC: 33.4 G/DL (ref 31.5–35.7)
MCV RBC AUTO: 89 FL (ref 79–97)
PLATELET # BLD AUTO: 242 X10E3/UL (ref 150–379)
RBC # BLD AUTO: 3.85 X10E6/UL (ref 3.77–5.28)
WBC # BLD AUTO: 8.4 X10E3/UL (ref 3.4–10.8)

## 2018-10-22 ENCOUNTER — HOSPITAL ENCOUNTER (OUTPATIENT)
Dept: PERINATAL CARE | Age: 23
Discharge: HOME | End: 2018-10-22
Attending: OBSTETRICS & GYNECOLOGY
Payer: MEDICAID

## 2018-10-22 PROCEDURE — 76816 OB US FOLLOW-UP PER FETUS: CPT | Performed by: OBSTETRICS & GYNECOLOGY

## 2018-10-22 NOTE — PROGRESS NOTES
M - Please see the Ultrasound report / consult note to be entered into this patient's record as a scanned document from my office. A text copy of this note is also provided below for convenience. Christiana Arreola M.D., Ph.D. 
Yudi Sumner 
 
-------------------------------- Indication: Obesity complicating pregnancy 3rd Tri 099.213.  
____________________________________________________________________________ History: Age: 25 years. : 1 Para: 0.  
Current Pregnancy: Blood group: O Rhesus D-positive. Pre- pregnancy data: Weight 285 lbs. Height 5 ft 6 ins. BMI 46.1. 
____________________________________________________________________________ Dating: 
Stated EDC:  EDC: 19 GA by stated EDC: 28w1d Current Scan on: 10/22/18 EDC: 19 GA by current scan: 29w3d Best Overall Assessment: 10/22/18 EDC: 19 Assessed GA: 42T3J The calculation of the gestational age by current scan was based on BPD, HC, AC and FL. The Best Overall Assessment is based on the stated EDC. 
____________________________________________________________________________ General Evaluation: 
Fetal heart activity: present. Fetal heart rate: 141 bpm.  
Presentation: BREECH. Fetal movement: visible. Amniotic Fluid: normal. BABAR  20.7 cm. Maximal vertical pocket 6.6 cm. Placenta: posterior. ____________________________________________________________________________ Anatomy Scan: 
[facility] gestation. Biometry: 
Fetal Measurements used for the estimation of the gestational age are bolded. BPD 73.2 mm 77th% 29w3d (28w4d to 30w1d) .2 mm 81st% 30w1d (27w1d to 33w1d) .9 mm 70th% 29w0d (28w2d to 29w5d) FL 55.7 mm 70th% 29w2d (27w2d to 31w3d) .7 mm 93rd% 30w1d 
EFW (lbs/oz) 3 lbs 0 ozs EFW (g) 1363 g  69th% Fetal Anatomy: 
Visualized with normal appearance: gastrointestinal tract, kidneys, bladder.  
 
Heart: The 4-chamber view was obtained but outflow tracts could not be adequately visualized. Summary of Ultrasound Findings: 
Transabdominal US. U/S machine: [employer] E8 Expert. U/S view: limited by body habitus. Impression: The evaluated fetal anatomy appears normal. 
 
____________________________________________________________________________ Fetal Wellbeing Assessment: 
Amniotic fluid: normal. BABAR: 20.7 cm. MVP: 6.6 cm. Q1: 4.9 cm. Q2: 6.6 cm. Q3: 4.7 cm. Q4: 4.5 cm.  
 
____________________________________________________________________________ Maternal Structures: 
Cervical length 43.6 mm. 
____________________________________________________________________________ Report Summary: 
Impression: A follow-up study was performed for fetal growth. Ms. Katie Angelo is obese. She is low risk for fetal Trisomy 21/18/13. She suffers from anxiety/depression but is not taking medications. She has a pituitary microadenoma (prolactinoma) followed by Dr. Jaylon Wise which was 7-mm diameter in October 2016. Ms. Katie Angelo takes prenatal vitamins and omeprazole. She has normal gestational diabetes screening. Please see 7-12-18 consultation note. Ms. Katie Angelo reports discomfort consistent with pubic symphysis pain. She reports good fetal movement and denies bleeding and loss of fluid. Single viable IUP with composite biometry consistent with dates is observed. EFW is appropriate for gestational age. The evaluated fetal anatomy appears normal. 
 
Normal fetal movements are observed. Amniotic fluid measurements are normal.  
 
Recommendations: Recommend ultrasound evaluation for fetal growth and amniotic fluid assessment in 4 weeks. ____________________________________________________________________________ Fetal Growth Overview: 
Date GA BPD [mm] HC [mm] AC [mm] FL [mm] HUM [mm] EFW GP 
07/02/18 12 + 1 ... ... ... ... ... ... . .. 
08/27/18 20 + 1 51.0 92nd 185.9 77th 158.2 71st 33.2 51st 36.0 >95th 375g , 0 lbs 13 ozs n/a% 09/24/18 24 + 1 63.8 92nd 236.9 86th 204.2 69th 44.6 56th 42.7 79th 760g , 1 lbs 11 ozs 61st%% 10/22/18 28 + 1 73.2 77th 276.2 81st 247.9 70th 55.7 70th . .. 1363g , 3 lbs 0 ozs 69th%%

## 2018-11-02 ENCOUNTER — ROUTINE PRENATAL (OUTPATIENT)
Dept: OBGYN CLINIC | Age: 23
End: 2018-11-02

## 2018-11-02 VITALS — DIASTOLIC BLOOD PRESSURE: 82 MMHG | WEIGHT: 293 LBS | SYSTOLIC BLOOD PRESSURE: 130 MMHG | BODY MASS INDEX: 51.97 KG/M2

## 2018-11-02 DIAGNOSIS — Z34.03 ENCOUNTER FOR SUPERVISION OF NORMAL FIRST PREGNANCY IN THIRD TRIMESTER: Primary | ICD-10-CM

## 2018-11-19 ENCOUNTER — ROUTINE PRENATAL (OUTPATIENT)
Dept: OBGYN CLINIC | Age: 23
End: 2018-11-19

## 2018-11-19 ENCOUNTER — HOSPITAL ENCOUNTER (OUTPATIENT)
Dept: PERINATAL CARE | Age: 23
Discharge: HOME | End: 2018-11-19
Attending: OBSTETRICS & GYNECOLOGY
Payer: MEDICAID

## 2018-11-19 VITALS — SYSTOLIC BLOOD PRESSURE: 126 MMHG | WEIGHT: 293 LBS | BODY MASS INDEX: 51.97 KG/M2 | DIASTOLIC BLOOD PRESSURE: 84 MMHG

## 2018-11-19 DIAGNOSIS — Z34.03 ENCOUNTER FOR SUPERVISION OF NORMAL FIRST PREGNANCY IN THIRD TRIMESTER: ICD-10-CM

## 2018-11-19 DIAGNOSIS — R30.0 DYSURIA: Primary | ICD-10-CM

## 2018-11-19 DIAGNOSIS — N89.8 VAGINAL DISCHARGE: ICD-10-CM

## 2018-11-19 DIAGNOSIS — Z20.2 POSSIBLE EXPOSURE TO STD: ICD-10-CM

## 2018-11-19 PROCEDURE — 76816 OB US FOLLOW-UP PER FETUS: CPT | Performed by: OBSTETRICS & GYNECOLOGY

## 2018-11-19 RX ORDER — TERCONAZOLE 4 MG/G
1 CREAM VAGINAL
Qty: 1 TUBE | Refills: 0 | Status: SHIPPED | OUTPATIENT
Start: 2018-11-19 | End: 2018-11-26

## 2018-11-19 NOTE — PROGRESS NOTES
Pt doing well, see prenatal flowsheet.   Pt reports dysuria and vaginal irritation- will send urine culture and nuswab incl gc/chl.

## 2018-11-20 LAB — BACTERIA UR CULT: NORMAL

## 2018-11-30 ENCOUNTER — ROUTINE PRENATAL (OUTPATIENT)
Dept: OBGYN CLINIC | Age: 23
End: 2018-11-30

## 2018-11-30 VITALS — SYSTOLIC BLOOD PRESSURE: 132 MMHG | WEIGHT: 293 LBS | DIASTOLIC BLOOD PRESSURE: 84 MMHG | BODY MASS INDEX: 52.78 KG/M2

## 2018-11-30 DIAGNOSIS — N89.8 VAGINAL DISCHARGE DURING PREGNANCY IN THIRD TRIMESTER: ICD-10-CM

## 2018-11-30 DIAGNOSIS — Z34.03 ENCOUNTER FOR SUPERVISION OF NORMAL FIRST PREGNANCY IN THIRD TRIMESTER: Primary | ICD-10-CM

## 2018-11-30 DIAGNOSIS — O26.893 VAGINAL DISCHARGE DURING PREGNANCY IN THIRD TRIMESTER: ICD-10-CM

## 2018-11-30 NOTE — PROGRESS NOTES
Pt doing well, see prenatal flowsheet. Reports discharge- will repeat nuswab, no pooling, nitrazine negative.

## 2018-11-30 NOTE — LETTER
11/30/2018 11:01 AM 
 
Ms. Anne Atkinson [address] [id number] Please excuse Ms. Mejia from work on 11/28/18 due to illness. Sincerely, Delvis Madden MD

## 2018-12-17 ENCOUNTER — TELEPHONE (OUTPATIENT)
Dept: OBGYN CLINIC | Age: 23
End: 2018-12-17

## 2018-12-17 ENCOUNTER — HOSPITAL ENCOUNTER (OUTPATIENT)
Dept: PERINATAL CARE | Age: 23
Discharge: HOME | End: 2018-12-17
Attending: OBSTETRICS & GYNECOLOGY
Payer: MEDICAID

## 2018-12-17 ENCOUNTER — ROUTINE PRENATAL (OUTPATIENT)
Dept: OBGYN CLINIC | Age: 23
End: 2018-12-17

## 2018-12-17 VITALS
DIASTOLIC BLOOD PRESSURE: 84 MMHG | BODY MASS INDEX: 47.09 KG/M2 | SYSTOLIC BLOOD PRESSURE: 136 MMHG | WEIGHT: 293 LBS | HEIGHT: 66 IN

## 2018-12-17 DIAGNOSIS — Z3A.36 36 WEEKS GESTATION OF PREGNANCY: Primary | ICD-10-CM

## 2018-12-17 DIAGNOSIS — Z34.03 ENCOUNTER FOR SUPERVISION OF NORMAL FIRST PREGNANCY IN THIRD TRIMESTER: ICD-10-CM

## 2018-12-17 LAB — GRBS, EXTERNAL: NEGATIVE

## 2018-12-17 PROCEDURE — 76818 FETAL BIOPHYS PROFILE W/NST: CPT | Performed by: OBSTETRICS & GYNECOLOGY

## 2018-12-17 PROCEDURE — 76816 OB US FOLLOW-UP PER FETUS: CPT | Performed by: OBSTETRICS & GYNECOLOGY

## 2018-12-17 NOTE — PROGRESS NOTES
Pt doing well, see prenatal flowsheet.   Saw [address] [place] [address] before this appt   GBS today

## 2018-12-17 NOTE — TELEPHONE ENCOUNTER
Call received at 8:47am    Maternal fetal medicine calling to say that the patient is upstairs and her appointment is running a little behind and she should be down in 10-15 minutes. This nurse advised Emili of above.

## 2018-12-21 ENCOUNTER — HOSPITAL ENCOUNTER (OUTPATIENT)
Dept: PERINATAL CARE | Age: 23
Discharge: HOME | End: 2018-12-21
Attending: OBSTETRICS & GYNECOLOGY
Payer: MEDICAID

## 2018-12-21 LAB — B-HEM STREP SPEC QL CULT: NEGATIVE

## 2018-12-21 PROCEDURE — 76818 FETAL BIOPHYS PROFILE W/NST: CPT | Performed by: OBSTETRICS & GYNECOLOGY

## 2018-12-22 ENCOUNTER — HOSPITAL ENCOUNTER (OUTPATIENT)
Age: 23
Discharge: HOME | End: 2018-12-22
Attending: OBSTETRICS & GYNECOLOGY | Admitting: OBSTETRICS & GYNECOLOGY
Payer: MEDICAID

## 2018-12-22 VITALS
SYSTOLIC BLOOD PRESSURE: 133 MMHG | WEIGHT: 293 LBS | HEART RATE: 89 BPM | OXYGEN SATURATION: 98 % | RESPIRATION RATE: 16 BRPM | DIASTOLIC BLOOD PRESSURE: 63 MMHG | HEIGHT: 66 IN | BODY MASS INDEX: 47.09 KG/M2 | TEMPERATURE: 98.5 F

## 2018-12-22 LAB
DAILY QC (YES/NO)?: YES
PH, VAGINAL FLUID: 5.5 (ref 5–6.1)

## 2018-12-22 PROCEDURE — 99218 HC RM OBSERVATION: CPT

## 2018-12-22 PROCEDURE — 59025 FETAL NON-STRESS TEST: CPT

## 2018-12-22 PROCEDURE — 83986 ASSAY PH BODY FLUID NOS: CPT | Performed by: OBSTETRICS & GYNECOLOGY

## 2018-12-22 PROCEDURE — 99283 EMERGENCY DEPT VISIT LOW MDM: CPT

## 2018-12-22 NOTE — PROGRESS NOTES
[id number] Patient arrived to the unit complaining of ROM. Received phone orders from Dr Cisco Blackburn to test with Nitrazine. Patient has confirmed intercourse the night prior. [address] [facility] [address] Cisco Blackburn updated on result of Nitrazine test. 5.5 pH. RN received telephone orders to do NST and discharge patient home with follow up instructions. 1803 NST reactive. Contractions occasionally on strip. Patient does not feel any cramping or pressure. Does confirm fetal movement. [phone] patient and boyfriend given discharge instructions and the opportunity for questions. No questions at this time. Patient instructed to keep follow up appointment. Patient ambulated off unit with partner and all belongings, confirms fetal movement.

## 2018-12-22 NOTE — DISCHARGE INSTRUCTIONS
KEEP UP COMING APPOINTMENTS     Week 37 of Your Pregnancy: Care Instructions  Your Care Instructions    You are near the end of your pregnancy--and you're probably pretty uncomfortable. It may be harder to walk around. Lying down probably isn't comfortable either. You may have trouble getting to sleep or staying asleep. Most women deliver their babies between 40 and 41 weeks. This is a good time to think about packing a bag for the hospital with items you'll need. Then you'll be ready when labor starts. Follow-up care is a key part of your treatment and safety. Be sure to make and go to all appointments, and call your doctor if you are having problems. It's also a good idea to know your test results and keep a list of the medicines you take. How can you care for yourself at home? Learn about breastfeeding  · Breastfeeding is best for your baby and good for you. · Breast milk has antibodies to help your baby fight infections. · Mothers who breastfeed often lose weight faster, because making milk burns calories. · Learning the best ways to hold your baby will make breastfeeding easier. · Let your partner bathe and diaper the baby to keep your partner from feeling left out. Snuggle together when you breastfeed. · You may want to learn how to use a breast pump and store your milk. · If you choose to bottle feed, make the feeding feel like breastfeeding so you can bond with your baby. Always hold your baby and the bottle. Do not prop bottles or let your baby fall asleep with a bottle. Learn about crying  · It is common for babies to cry for 1 to 3 hours a day. Some cry more, some cry less. · Babies don't cry to make you upset or because you are a bad parent. · Crying is how your baby communicates. Your baby may be hungry; have gas; need a diaper change; or feel cold, warm, tired, lonely, or tense. Sometimes babies cry for unknown reasons.   · If you respond to your baby's needs, he or she will learn to trust you.  · Try to stay calm when your baby cries. Your baby may get more upset if he or she senses that you are upset. Know how to care for your   · Your baby's umbilical cord stump will drop off on its own, usually between 1 and 2 weeks. To care for your baby's umbilical cord area:  ? Clean the area at the bottom of the cord 2 or 3 times a day. ? Pay special attention to the area where the cord attaches to the skin. ? Keep the diaper folded below the cord. ? Use a damp washcloth or cotton ball to sponge bathe your baby until the stump has come off. · Your baby's first dark stool is called meconium. After the meconium is passed, your baby will develop his or her own bowel pattern. ? Some babies, especially  babies, have several bowel movements a day. Others have one or two a day, or one every 2 to 3 days. ?  babies often have loose, yellow stools. Formula-fed babies have more formed stools. ? If your baby's stools look like little pellets, he or she is constipated. After 2 days of constipation, call your baby's doctor. · If your baby will be circumcised, you can care for him at home. ? Gently rinse his penis with warm water after every diaper change. Do not try to remove the film that forms on the penis. This film will go away on its own. Pat dry. ? Put petroleum ointment, such as Vaseline, on the area of the diaper that will touch your baby's penis. This will keep the diaper from sticking to your baby. ? Ask the doctor about giving your baby acetaminophen (Tylenol) for pain. Where can you learn more? Go to http://sathya-bianka.info/. Enter 68  97 in the search box to learn more about \"Week 37 of Your Pregnancy: Care Instructions. \"  Current as of: 2017  Content Version: 11.8  © 8848-8715 [employer]. Care instructions adapted under license by [employer] (which disclaims liability or warranty for this information).  If you have questions about a medical condition or this instruction, always ask your healthcare professional. [address] any warranty or liability for your use of this information. Counting Your Baby's Kicks: Care Instructions  Your Care Instructions    Counting your baby's kicks is one way your doctor can tell that your baby is healthy. Most women--especially in a first pregnancy--feel their baby move for the first time between 16 and 22 weeks. The movement may feel like flutters rather than kicks. Your baby may move more at certain times of the day. When you are active, you may notice less kicking than when you are resting. At your prenatal visits, your doctor will ask whether the baby is active. In your last trimester, your doctor may ask you to count the number of times you feel your baby move. Follow-up care is a key part of your treatment and safety. Be sure to make and go to all appointments, and call your doctor if you are having problems. It's also a good idea to know your test results and keep a list of the medicines you take. How do you count fetal kicks? · A common method of checking your baby's movement is to count the number of kicks or moves you feel in 1 hour. Ten movements (such as kicks, flutters, or rolls) in 1 hour are normal. Some doctors suggest that you count in the morning until you get to 10 movements. Then you can quit for that day and start again the next day. · Pick your baby's most active time of day to count. This may be any time from morning to evening. · If you do not feel 10 movements in an hour, your baby may be sleeping. Wait for the next hour and count again. When should you call for help? Call your doctor now or seek immediate medical care if:    · You noticed that your baby has stopped moving or is moving much less than normal.    Watch closely for changes in your health, and be sure to contact your doctor if you have any problems.   Where can you learn more? Go to http://sathya-bianka.info/. Enter Z579 in the search box to learn more about \"Counting Your Baby's Kicks: Care Instructions. \"  Current as of: November 21, 2017  Content Version: 11.8  © 9547-4630 [employer]. Care instructions adapted under license by [employer] (which disclaims liability or warranty for this information). If you have questions about a medical condition or this instruction, always ask your healthcare professional. [address] any warranty or liability for your use of this information. Carol Fairy Contractions: Care Instructions  Your Care Instructions    [place]ing Robison contractions prepare your uterus for labor. Think of them as a \"warm-up\" exercise that your body does. You may begin to feel them between the 28th and 30th weeks of your pregnancy. But they start as early as the 20th week. Jaya Robison contractions usually occur more often during the ninth month. They may go away when you are active and return when you rest. These contractions are like mild contractions of true labor, but they occur less often. (You feel fewer than 8 in an hour.) They don't cause your cervix to open. It may be hard for you to tell the difference between Carol Fairy contractions and true labor, especially in your first pregnancy. Follow-up care is a key part of your treatment and safety. Be sure to make and go to all appointments, and call your doctor if you are having problems. It's also a good idea to know your test results and keep a list of the medicines you take. How can you care for yourself at home? · Try a warm bath to help relieve muscle tension and reduce pain. · Change positions every 30 minutes. Take breaks if you must sit for a long time. Get up and walk around. · Drink plenty of water, enough so that your urine is light yellow or clear like water. · Taking short walks may help you feel better.   Your doctor needs to check any contractions that are getting stronger or closer together. Where can you learn more? Go to http://sathya-bianka.info/. Enter 130 [id number] [id number] in the search box to learn more about \"Ingram Robison Contractions: Care Instructions. \"  Current as of: 2017  Content Version: 11.8  © 9373-4717 [employer]. Care instructions adapted under license by [employer] (which disclaims liability or warranty for this information). If you have questions about a medical condition or this instruction, always ask your healthcare professional. [address] any warranty or liability for your use of this information. Pregnancy Precautions: Care Instructions  Your Care Instructions    There is no sure way to prevent labor before your due date ( labor) or to prevent most other pregnancy problems. But there are things you can do to increase your chances of a healthy pregnancy. Go to your appointments, follow your doctor's advice, and take good care of yourself. Eat well, and exercise (if your doctor agrees). And make sure to drink plenty of water. Follow-up care is a key part of your treatment and safety. Be sure to make and go to all appointments, and call your doctor if you are having problems. It's also a good idea to know your test results and keep a list of the medicines you take. How can you care for yourself at home? · Make sure you go to your prenatal appointments. At each visit, your doctor will check your blood pressure. Your doctor will also check to see if you have protein in your urine. High blood pressure and protein in urine are signs of preeclampsia. This condition can be dangerous for you and your baby. · Drink plenty of fluids, enough so that your urine is light yellow or clear like water. Dehydration can cause contractions.  If you have kidney, heart, or liver disease and have to limit fluids, talk with your doctor before you increase the amount of fluids you drink. · Tell your doctor right away if you notice any symptoms of an infection, such as:  ? Burning when you urinate. ? A foul-smelling discharge from your vagina. ? Vaginal itching. ? Unexplained fever. ? Unusual pain or soreness in your uterus or lower belly. · Eat a balanced diet. Include plenty of foods that are high in calcium and iron. ? Foods high in calcium include milk, cheese, yogurt, almonds, and broccoli. ? Foods high in iron include red meat, shellfish, poultry, eggs, beans, raisins, whole-grain bread, and leafy green vegetables. · Do not smoke. If you need help quitting, talk to your doctor about stop-smoking programs and medicines. These can increase your chances of quitting for good. · Do not drink alcohol or use illegal drugs. · Follow your doctor's directions about activity. Your doctor will let you know how much, if any, exercise you can do. · Ask your doctor if you can have sex. If you are at risk for early labor, your doctor may ask you to not have sex. · Take care to prevent falls. During pregnancy, your joints are loose, and your balance is off. Sports such as bicycling, skiing, or in-line skating can increase your risk of falling. And don't ride horses or motorcycles, dive, water ski, scuba dive, or parachute jump while you are pregnant. · Avoid getting very hot. Do not use saunas or hot tubs. Avoid staying out in the sun in hot weather for long periods. Take acetaminophen (Tylenol) to lower a high fever. · Do not take any over-the-counter or herbal medicines or supplements without talking to your doctor or pharmacist first.  When should you call for help? Call 911 anytime you think you may need emergency care.  For example, call if:    · You passed out (lost consciousness).     · You have severe vaginal bleeding.     · You have severe pain in your belly or pelvis.     · You have had fluid gushing or leaking from your vagina and you know or think the umbilical cord is bulging into your vagina. If this happens, immediately get down on your knees so your rear end (buttocks) is higher than your head. This will decrease the pressure on the cord until help arrives.   [facility] your doctor now or seek immediate medical care if:    · You have signs of preeclampsia, such as:  ? Sudden swelling of your face, hands, or feet. ? New vision problems (such as dimness or blurring). ? A severe headache.     · You have any vaginal bleeding.     · You have belly pain or cramping.     · You have a fever.     · You have had regular contractions (with or without pain) for an hour. This means that you have 8 or more within 1 hour or 4 or more in 20 minutes after you change your position and drink fluids.     · You have a sudden release of fluid from your vagina.     · You have low back pain or pelvic pressure that does not go away.     · You notice that your baby has stopped moving or is moving much less than normal.    Watch closely for changes in your health, and be sure to contact your doctor if you have any problems. Where can you learn more? Go to http://sathya-bianka.info/. Enter [id number] in the search box to learn more about \"Pregnancy Precautions: Care Instructions. \"  Current as of: November 21, 2017  Content Version: 11.8  © 6871-5417 "[employer]". Care instructions adapted under license by [employer] (which disclaims liability or warranty for this information). If you have questions about a medical condition or this instruction, always ask your healthcare professional. [place] [address] any warranty or liability for your use of this information.

## 2018-12-26 NOTE — PROGRESS NOTES
Pt presents to L&D c/o possible rupture of membranes. She is currently 37 weeks pregnant. She was seen by nursing in L&D and Nitrazine is negative and NST is reactive. She will be discharged home with labor and ROM precautions.

## 2018-12-27 ENCOUNTER — ROUTINE PRENATAL (OUTPATIENT)
Dept: OBGYN CLINIC | Age: 23
End: 2018-12-27

## 2018-12-27 ENCOUNTER — HOSPITAL ENCOUNTER (OUTPATIENT)
Age: 23
Discharge: HOME | End: 2018-12-27
Attending: OBSTETRICS & GYNECOLOGY | Admitting: OBSTETRICS & GYNECOLOGY
Payer: MEDICAID

## 2018-12-27 ENCOUNTER — HOSPITAL ENCOUNTER (OUTPATIENT)
Dept: PERINATAL CARE | Age: 23
Discharge: HOME | End: 2018-12-27
Attending: OBSTETRICS & GYNECOLOGY
Payer: MEDICAID

## 2018-12-27 VITALS
BODY MASS INDEX: 47.09 KG/M2 | HEIGHT: 66 IN | WEIGHT: 293 LBS | SYSTOLIC BLOOD PRESSURE: 138 MMHG | DIASTOLIC BLOOD PRESSURE: 84 MMHG

## 2018-12-27 VITALS
RESPIRATION RATE: 14 BRPM | TEMPERATURE: 98.1 F | DIASTOLIC BLOOD PRESSURE: 64 MMHG | SYSTOLIC BLOOD PRESSURE: 133 MMHG | OXYGEN SATURATION: 100 % | HEART RATE: 78 BPM

## 2018-12-27 DIAGNOSIS — Z34.03 ENCOUNTER FOR SUPERVISION OF NORMAL FIRST PREGNANCY IN THIRD TRIMESTER: Primary | ICD-10-CM

## 2018-12-27 PROCEDURE — 99218 HC RM OBSERVATION: CPT

## 2018-12-27 PROCEDURE — 96360 HYDRATION IV INFUSION INIT: CPT

## 2018-12-27 PROCEDURE — 74011258636 HC RX REV CODE- 258/636: Performed by: OBSTETRICS & GYNECOLOGY

## 2018-12-27 PROCEDURE — 99283 EMERGENCY DEPT VISIT LOW MDM: CPT

## 2018-12-27 PROCEDURE — 59025 FETAL NON-STRESS TEST: CPT

## 2018-12-27 PROCEDURE — 96366 THER/PROPH/DIAG IV INF ADDON: CPT

## 2018-12-27 PROCEDURE — 76819 FETAL BIOPHYS PROFIL W/O NST: CPT | Performed by: OBSTETRICS & GYNECOLOGY

## 2018-12-27 RX ORDER — SODIUM CHLORIDE, SODIUM LACTATE, POTASSIUM CHLORIDE, CALCIUM CHLORIDE 600; 310; 30; 20 MG/100ML; MG/100ML; MG/100ML; MG/100ML
125 INJECTION, SOLUTION INTRAVENOUS CONTINUOUS
Status: DISCONTINUED | OUTPATIENT
Start: 2018-12-27 | End: 2018-12-27 | Stop reason: HOSPADM

## 2018-12-27 RX ORDER — DEXTROSE MONOHYDRATE AND SODIUM CHLORIDE 5; .9 G/100ML; G/100ML
500 INJECTION, SOLUTION INTRAVENOUS ONCE
Status: COMPLETED | OUTPATIENT
Start: 2018-12-27 | End: 2018-12-27

## 2018-12-27 RX ADMIN — DEXTROSE MONOHYDRATE AND SODIUM CHLORIDE 500 ML: 5; .9 INJECTION, SOLUTION INTRAVENOUS at 17:30

## 2018-12-27 NOTE — DISCHARGE INSTRUCTIONS
Week 37 of Your Pregnancy: Care Instructions  Your Care Instructions    You are near the end of your pregnancy--and you're probably pretty uncomfortable. It may be harder to walk around. Lying down probably isn't comfortable either. You may have trouble getting to sleep or staying asleep. Most women deliver their babies between 40 and 41 weeks. This is a good time to think about packing a bag for the hospital with items you'll need. Then you'll be ready when labor starts. Follow-up care is a key part of your treatment and safety. Be sure to make and go to all appointments, and call your doctor if you are having problems. It's also a good idea to know your test results and keep a list of the medicines you take. How can you care for yourself at home? Learn about breastfeeding  · Breastfeeding is best for your baby and good for you. · Breast milk has antibodies to help your baby fight infections. · Mothers who breastfeed often lose weight faster, because making milk burns calories. · Learning the best ways to hold your baby will make breastfeeding easier. · Let your partner bathe and diaper the baby to keep your partner from feeling left out. Snuggle together when you breastfeed. · You may want to learn how to use a breast pump and store your milk. · If you choose to bottle feed, make the feeding feel like breastfeeding so you can bond with your baby. Always hold your baby and the bottle. Do not prop bottles or let your baby fall asleep with a bottle. Learn about crying  · It is common for babies to cry for 1 to 3 hours a day. Some cry more, some cry less. · Babies don't cry to make you upset or because you are a bad parent. · Crying is how your baby communicates. Your baby may be hungry; have gas; need a diaper change; or feel cold, warm, tired, lonely, or tense. Sometimes babies cry for unknown reasons. · If you respond to your baby's needs, he or she will learn to trust you.   · Try to stay calm when your baby cries. Your baby may get more upset if he or she senses that you are upset. Know how to care for your   · Your baby's umbilical cord stump will drop off on its own, usually between 1 and 2 weeks. To care for your baby's umbilical cord area:  ? Clean the area at the bottom of the cord 2 or 3 times a day. ? Pay special attention to the area where the cord attaches to the skin. ? Keep the diaper folded below the cord. ? Use a damp washcloth or cotton ball to sponge bathe your baby until the stump has come off. · Your baby's first dark stool is called meconium. After the meconium is passed, your baby will develop his or her own bowel pattern. ? Some babies, especially  babies, have several bowel movements a day. Others have one or two a day, or one every 2 to 3 days. ?  babies often have loose, yellow stools. Formula-fed babies have more formed stools. ? If your baby's stools look like little pellets, he or she is constipated. After 2 days of constipation, call your baby's doctor. · If your baby will be circumcised, you can care for him at home. ? Gently rinse his penis with warm water after every diaper change. Do not try to remove the film that forms on the penis. This film will go away on its own. Pat dry. ? Put petroleum ointment, such as Vaseline, on the area of the diaper that will touch your baby's penis. This will keep the diaper from sticking to your baby. ? Ask the doctor about giving your baby acetaminophen (Tylenol) for pain. Where can you learn more? Go to http://sathya-bianka.info/. Enter 68  [id number] in the search box to learn more about \"Week 37 of Your Pregnancy: Care Instructions. \"  Current as of: 2017  Content Version: 11.8  © 7288-2453 [employer]. Care instructions adapted under license by "[employer] (which disclaims liability or warranty for this information).  If you have questions about a medical condition or this instruction, always ask your healthcare professional. [address] any warranty or liability for your use of this information. Pregnancy Precautions: Care Instructions  Your Care Instructions    There is no sure way to prevent labor before your due date ( labor) or to prevent most other pregnancy problems. But there are things you can do to increase your chances of a healthy pregnancy. Go to your appointments, follow your doctor's advice, and take good care of yourself. Eat well, and exercise (if your doctor agrees). And make sure to drink plenty of water. Follow-up care is a key part of your treatment and safety. Be sure to make and go to all appointments, and call your doctor if you are having problems. It's also a good idea to know your test results and keep a list of the medicines you take. How can you care for yourself at home? · Make sure you go to your prenatal appointments. At each visit, your doctor will check your blood pressure. Your doctor will also check to see if you have protein in your urine. High blood pressure and protein in urine are signs of preeclampsia. This condition can be dangerous for you and your baby. · Drink plenty of fluids, enough so that your urine is light yellow or clear like water. Dehydration can cause contractions. If you have kidney, heart, or liver disease and have to limit fluids, talk with your doctor before you increase the amount of fluids you drink. · Tell your doctor right away if you notice any symptoms of an infection, such as:  ? Burning when you urinate. ? A foul-smelling discharge from your vagina. ? Vaginal itching. ? Unexplained fever. ? Unusual pain or soreness in your uterus or lower belly. · Eat a balanced diet. Include plenty of foods that are high in calcium and iron. ? Foods high in calcium include milk, cheese, yogurt, almonds, and broccoli. ?  Foods high in iron include red meat, shellfish, poultry, eggs, beans, raisins, whole-grain bread, and leafy green vegetables. · Do not smoke. If you need help quitting, talk to your doctor about stop-smoking programs and medicines. These can increase your chances of quitting for good. · Do not drink alcohol or use illegal drugs. · Follow your doctor's directions about activity. Your doctor will let you know how much, if any, exercise you can do. · Ask your doctor if you can have sex. If you are at risk for early labor, your doctor may ask you to not have sex. · Take care to prevent falls. During pregnancy, your joints are loose, and your balance is off. Sports such as bicycling, skiing, or in-line skating can increase your risk of falling. And don't ride horses or motorcycles, dive, water ski, scuba dive, or parachute jump while you are pregnant. · Avoid getting very hot. Do not use saunas or hot tubs. Avoid staying out in the sun in hot weather for long periods. Take acetaminophen (Tylenol) to lower a high fever. · Do not take any over-the-counter or herbal medicines or supplements without talking to your doctor or pharmacist first.  When should you call for help? Call 911 anytime you think you may need emergency care. For example, call if:    · You passed out (lost consciousness).     · You have severe vaginal bleeding.     · You have severe pain in your belly or pelvis.     · You have had fluid gushing or leaking from your vagina and you know or think the umbilical cord is bulging into your vagina. If this happens, immediately get down on your knees so your rear end (buttocks) is higher than your head. This will decrease the pressure on the cord until help arrives.   [facility] your doctor now or seek immediate medical care if:    · You have signs of preeclampsia, such as:  ? Sudden swelling of your face, hands, or feet. ? New vision problems (such as dimness or blurring).   ? A severe headache.     · You have any vaginal bleeding.     · You have belly pain or cramping.     · You have a fever.     · You have had regular contractions (with or without pain) for an hour. This means that you have 8 or more within 1 hour or 4 or more in 20 minutes after you change your position and drink fluids.     · You have a sudden release of fluid from your vagina.     · You have low back pain or pelvic pressure that does not go away.     · You notice that your baby has stopped moving or is moving much less than normal.    Watch closely for changes in your health, and be sure to contact your doctor if you have any problems. Where can you learn more? Go to http://sathya-bianka.info/. Enter [id number] in the search box to learn more about \"Pregnancy Precautions: Care Instructions. \"  Current as of: November 21, 2017  Content Version: 11.8  © 1423-7094 [employer]. Care instructions adapted under license by [employer] (which disclaims liability or warranty for this information). If you have questions about a medical condition or this instruction, always ask your healthcare professional. [place] [address] any warranty or liability for your use of this information. Counting Your Baby's Kicks: Care Instructions  Your Care Instructions    Counting your baby's kicks is one way your doctor can tell that your baby is healthy. Most women--especially in a first pregnancy--feel their baby move for the first time between 16 and 22 weeks. The movement may feel like flutters rather than kicks. Your baby may move more at certain times of the day. When you are active, you may notice less kicking than when you are resting. At your prenatal visits, your doctor will ask whether the baby is active. In your last trimester, your doctor may ask you to count the number of times you feel your baby move. Follow-up care is a key part of your treatment and safety.  Be sure to make and go to all appointments, and call your doctor if you are having problems. It's also a good idea to know your test results and keep a list of the medicines you take. How do you count fetal kicks? · A common method of checking your baby's movement is to count the number of kicks or moves you feel in 1 hour. Ten movements (such as kicks, flutters, or rolls) in 1 hour are normal. Some doctors suggest that you count in the morning until you get to 10 movements. Then you can quit for that day and start again the next day. · Pick your baby's most active time of day to count. This may be any time from morning to evening. · If you do not feel 10 movements in an hour, your baby may be sleeping. Wait for the next hour and count again. When should you call for help? Call your doctor now or seek immediate medical care if:    · You noticed that your baby has stopped moving or is moving much less than normal.    Watch closely for changes in your health, and be sure to contact your doctor if you have any problems. Where can you learn more? Go to http://sathya-bianka.info/. Enter I071 in the search box to learn more about \"Counting Your Baby's Kicks: Care Instructions. \"  Current as of: November 21, 2017  Content Version: 11.8  © 1164-9842 [employer]. Care instructions adapted under license by [employer] (which disclaims liability or warranty for this information). If you have questions about a medical condition or this instruction, always ask your healthcare professional. [place] [address] any warranty or liability for your use of this information.

## 2018-12-27 NOTE — H&P
History & Physical    Name: Manav Burris MRN: [id number]  SSN: [id number]    YOB: 1995  Age: 21 y.o. Sex: female        Subjective:     Estimated Date of Delivery: 19  OB History      Para Term  AB Living    1              SAB TAB Ectopic Molar Multiple Live Births                       Obstetric Comments    Menarche:  8. LMP: current. # of Children:  0. Age at Delivery of First Child:  na.   Hysterectomy/oophorectomy:  NO/NO. Breast Bx:  no.  Hx of Breast Feeding:  na. BCP:  yes. Hormone therapy:  no. Ms. Oscar Alford presents with pregnancy at 37w4d for evaluation for BPP . She was at [place] [address] office earlier today and was sent down from perinatology for non stress test monitoring. At MFM, possible decelerations heard. She denies any contractions, vb, lof and reports +FM. Prenatal course was complicated by obesity. Please see prenatal records for details.     Past Medical History:   Diagnosis Date    Dysmenorrhea     Major depressive disorder, single episode, unspecified     Morbid obesity ([facility] [address]     Pap smear for cervical cancer screening 17 neg    Pituitary abnormality (HCC)     Seasonal allergies     Unspecified symptom associated with female genital organs      Past Surgical History:   Procedure Laterality Date    HX WISDOM TEETH EXTRACTION       Social History     Occupational History    Not on file   Tobacco Use    Smoking status: Current Every Day Smoker     Packs/day: 0.50     Years: 1.00     Pack years: 0.50     Types: Cigarettes    Smokeless tobacco: Never Used   Substance and Sexual Activity    Alcohol use: No     Alcohol/week: 0.0 oz     Comment: former user just sips now and then    Drug use: No    Sexual activity: Yes     Partners: Male     Birth control/protection: Pill     Family History   Problem Relation Age of Onset    Alcohol abuse Father     Cancer Paternal Grandmother     Other Mother         endometriosis    Migraines Mother         tumer in brain       Allergies   Allergen Reactions    Latex Swelling     Skin peels    Aspirin Nausea and Vomiting    Betadine [Povidone-Iodine] Other (comments)     Skin peels    Cat Dander Sneezing     Prior to Admission medications    Medication Sig Start Date End Date Taking? Authorizing Provider   [id number] marcelo-folic-dha (PRENATAL DHA+COMPLETE PRENATAL) [id number] mg-mcg-mg cmpk Take 1 Tab by mouth daily. Yes Provider, Historical   omeprazole (PRILOSEC) 10 mg capsule TAKE ONE CAPSULE BY MOUTH ONCE A DAY 1/1/15  Yes Татьяна Perez MD   guaiFENesin-dextromethorphan SR [facility] DM) 600-30 mg per tablet Take 1 Tab by mouth every twelve (12) hours as needed for Cough. Provider, Historical   acetaminophen (TYLENOL) 325 mg tablet Take  by mouth every four (4) hours as needed for Pain. Provider, Historical   buPROPion XL (WELLBUTRIN XL) 150 mg tablet Take 1 Tab by mouth every morning. 2/5/18   Vinh Serrano., NP   clonazePAM (KLONOPIN) 1 mg tablet Take 1 Tab by mouth daily as needed. Must last 30 days. 10/30/17   Gabriel Goldberg MD   drospirenone-ethinyl estradiol (JEANETTE) 3-0.03 mg tab Take 1 Tab by mouth daily. 9/6/17   Vazquez Warner MD   albuterol (PROVENTIL VENTOLIN) 2.5 mg /3 mL (0.083 %) nebulizer solution 3 mL by Nebulization route every four (4) hours as needed for Wheezing. 6/29/17   Vinh Lamar, SABINE   MELATONIN PO Take  by mouth as needed. Provider, Historical   diphenhydrAMINE (BENADRYL) 50 mg tablet Take 1 tablet 1 hr prior to MRI 10/18/16   Shaye Pham MD   aspirin-acetaminophen-caffeine (EXCEDRIN ES) 040-370-59 mg per tablet Take 1 Tab by mouth. Provider, Historical   loratadine 10 mg cap Take 10 mg by mouth daily. Provider, Historical        A comprehensive review of systems was negative except for that written in the HPI.     Objective:     Vitals:  Vitals:    12/27/18 1658 12/27/18 1703 12/27/18 1708 12/27/18 1734   BP:    133/64 Pulse:    78   Resp:       Temp:       SpO2: 100% 100% 100%         Physical Exam  Gen: NAD  Pulm: CTA b/L  Cv: S1S2 RRR  Abd: Gravid, soft, NT  Pelvic: no VB  Cervical Exam: deferred  Uterine Activity: none  Membranes: Intact  Fetal Heart Rate: Reactive, Category 1 fetal tracing 120-130 bpm; +accels, -decels. Prenatal Labs:   Lab Results   Component Value Date/Time    Rubella, External Immune 05/24/2018    HBsAg, External Negative 05/24/2018    HIV, External Negative 05/24/2018    Gonorrhea, External Negative 05/24/2018    Chlamydia, External Negative 05/24/2018        Impression/Plan: 37.4weeks gestation, Non-reassuring BPP     Plan: Observation. IV hydration. D5- 500 ml bolus, followed by  ml/hr. Non stress test reactive,  patient is medically stable to be discharged home. Patient to maintain scheduled MFM/OB appointments.      Signed By:  Mandi Grigsby MD     December 27, 2018

## 2018-12-27 NOTE — PROGRESS NOTES
1623: Patient arrived to unit from Perinatology. Patient had 3min deceleration in office and BPP of 6/8.  1835: Patient given discharge instructions. Opportunity for questions and concerns provided. No questions at this time. 1843: Patient discharged home with family.

## 2018-12-27 NOTE — PROGRESS NOTES
12/27/2018      RE: Lubna Mejia      To Whom it May Concern:      Nasima Faye was treated at [place] [address] on 12/27/18. Please feel free to contact my office at [phone] if you have any questions or concerns. Thank you for your assistance in this matter.     Sincerely,      Dr Lopez Sessions

## 2018-12-29 ENCOUNTER — HOSPITAL ENCOUNTER (EMERGENCY)
Age: 23
Discharge: HOME | End: 2018-12-29
Attending: OBSTETRICS & GYNECOLOGY | Admitting: OBSTETRICS & GYNECOLOGY
Payer: MEDICAID

## 2018-12-29 ENCOUNTER — TELEPHONE (OUTPATIENT)
Dept: OBGYN CLINIC | Age: 23
End: 2018-12-29

## 2018-12-29 ENCOUNTER — ANESTHESIA (OUTPATIENT)
Dept: LABOR AND DELIVERY | Age: 23
DRG: 560 | End: 2018-12-29
Payer: MEDICAID

## 2018-12-29 ENCOUNTER — ANESTHESIA EVENT (OUTPATIENT)
Dept: LABOR AND DELIVERY | Age: 23
DRG: 560 | End: 2018-12-29
Payer: MEDICAID

## 2018-12-29 ENCOUNTER — HOSPITAL ENCOUNTER (INPATIENT)
Age: 23
LOS: 1 days | Discharge: HOME | DRG: 560 | End: 2018-12-31
Attending: OBSTETRICS & GYNECOLOGY | Admitting: OBSTETRICS & GYNECOLOGY
Payer: MEDICAID

## 2018-12-29 VITALS
SYSTOLIC BLOOD PRESSURE: 148 MMHG | HEART RATE: 104 BPM | BODY MASS INDEX: 47.09 KG/M2 | RESPIRATION RATE: 18 BRPM | TEMPERATURE: 97.8 F | WEIGHT: 293 LBS | DIASTOLIC BLOOD PRESSURE: 76 MMHG | HEIGHT: 66 IN

## 2018-12-29 DIAGNOSIS — F32.A DEPRESSION, UNSPECIFIED DEPRESSION TYPE: ICD-10-CM

## 2018-12-29 LAB
ERYTHROCYTE [DISTWIDTH] IN BLOOD BY AUTOMATED COUNT: 14 % (ref 11.5–14.5)
HCT VFR BLD AUTO: 37 % (ref 35–47)
HGB BLD-MCNC: 12.1 G/DL (ref 11.5–16)
MCH RBC QN AUTO: 28.8 PG (ref 26–34)
MCHC RBC AUTO-ENTMCNC: 32.7 G/DL (ref 30–36.5)
MCV RBC AUTO: 88.1 FL (ref 80–99)
NRBC # BLD: 0 K/UL (ref 0–0.01)
NRBC BLD-RTO: 0 PER 100 WBC
PLATELET # BLD AUTO: 248 K/UL (ref 150–400)
PMV BLD AUTO: 9.1 FL (ref 8.9–12.9)
RBC # BLD AUTO: 4.2 M/UL (ref 3.8–5.2)
WBC # BLD AUTO: 15.9 K/UL (ref 3.6–11)

## 2018-12-29 PROCEDURE — 74011250637 HC RX REV CODE- 250/637: Performed by: OBSTETRICS & GYNECOLOGY

## 2018-12-29 PROCEDURE — 75410000003 HC RECOV DEL/VAG/CSECN EA 0.5 HR: Performed by: OBSTETRICS & GYNECOLOGY

## 2018-12-29 PROCEDURE — 77010026065 HC OXYGEN MINIMUM MEDICAL AIR: Performed by: OBSTETRICS & GYNECOLOGY

## 2018-12-29 PROCEDURE — 36415 COLL VENOUS BLD VENIPUNCTURE: CPT

## 2018-12-29 PROCEDURE — 75410000000 HC DELIVERY VAGINAL/SINGLE: Performed by: OBSTETRICS & GYNECOLOGY

## 2018-12-29 PROCEDURE — 74011250636 HC RX REV CODE- 250/636: Performed by: ANESTHESIOLOGY

## 2018-12-29 PROCEDURE — 77030011943

## 2018-12-29 PROCEDURE — 85027 COMPLETE CBC AUTOMATED: CPT

## 2018-12-29 PROCEDURE — 77030014125 HC TY EPDRL BBMI -B: Performed by: ANESTHESIOLOGY

## 2018-12-29 PROCEDURE — 74011250636 HC RX REV CODE- 250/636

## 2018-12-29 PROCEDURE — 77030034850

## 2018-12-29 PROCEDURE — 76060000078 HC EPIDURAL ANESTHESIA: Performed by: OBSTETRICS & GYNECOLOGY

## 2018-12-29 PROCEDURE — 51703 INSERT BLADDER CATH COMPLEX: CPT

## 2018-12-29 PROCEDURE — 74011250636 HC RX REV CODE- 250/636: Performed by: OBSTETRICS & GYNECOLOGY

## 2018-12-29 PROCEDURE — 74011000250 HC RX REV CODE- 250

## 2018-12-29 PROCEDURE — 10907ZC DRAINAGE OF AMNIOTIC FLUID, THERAPEUTIC FROM PRODUCTS OF CONCEPTION, VIA NATURAL OR ARTIFICIAL OPENING: ICD-10-PCS | Performed by: OBSTETRICS & GYNECOLOGY

## 2018-12-29 PROCEDURE — 00HU33Z INSERTION OF INFUSION DEVICE INTO SPINAL CANAL, PERCUTANEOUS APPROACH: ICD-10-PCS | Performed by: ANESTHESIOLOGY

## 2018-12-29 PROCEDURE — 75410000002 HC LABOR FEE PER 1 HR: Performed by: OBSTETRICS & GYNECOLOGY

## 2018-12-29 PROCEDURE — 59025 FETAL NON-STRESS TEST: CPT

## 2018-12-29 PROCEDURE — 3E0R3BZ INTRODUCTION OF ANESTHETIC AGENT INTO SPINAL CANAL, PERCUTANEOUS APPROACH: ICD-10-PCS | Performed by: ANESTHESIOLOGY

## 2018-12-29 PROCEDURE — 99283 EMERGENCY DEPT VISIT LOW MDM: CPT

## 2018-12-29 RX ORDER — SODIUM CHLORIDE, SODIUM LACTATE, POTASSIUM CHLORIDE, CALCIUM CHLORIDE 600; 310; 30; 20 MG/100ML; MG/100ML; MG/100ML; MG/100ML
125 INJECTION, SOLUTION INTRAVENOUS CONTINUOUS
Status: DISCONTINUED | OUTPATIENT
Start: 2018-12-29 | End: 2018-12-29

## 2018-12-29 RX ORDER — BUPIVACAINE HYDROCHLORIDE 2.5 MG/ML
INJECTION, SOLUTION EPIDURAL; INFILTRATION; INTRACAUDAL AS NEEDED
Status: DISCONTINUED | OUTPATIENT
Start: 2018-12-29 | End: 2018-12-29 | Stop reason: HOSPADM

## 2018-12-29 RX ORDER — OXYTOCIN/RINGER'S LACTATE 20/1000 ML
999 PLASTIC BAG, INJECTION (ML) INTRAVENOUS CONTINUOUS
Status: DISCONTINUED | OUTPATIENT
Start: 2018-12-30 | End: 2018-12-31 | Stop reason: HOSPADM

## 2018-12-29 RX ORDER — SODIUM CHLORIDE 0.9 % (FLUSH) 0.9 %
5-10 SYRINGE (ML) INJECTION AS NEEDED
Status: DISCONTINUED | OUTPATIENT
Start: 2018-12-29 | End: 2018-12-29

## 2018-12-29 RX ORDER — IBUPROFEN 800 MG/1
800 TABLET ORAL EVERY 8 HOURS
Status: DISCONTINUED | OUTPATIENT
Start: 2018-12-30 | End: 2018-12-31 | Stop reason: HOSPADM

## 2018-12-29 RX ORDER — HYDROCORTISONE ACETATE PRAMOXINE HCL 2.5; 1 G/100G; G/100G
CREAM TOPICAL AS NEEDED
Status: DISCONTINUED | OUTPATIENT
Start: 2018-12-29 | End: 2018-12-31 | Stop reason: HOSPADM

## 2018-12-29 RX ORDER — HYDROCODONE BITARTRATE AND ACETAMINOPHEN 5; 325 MG/1; MG/1
1 TABLET ORAL
Status: DISCONTINUED | OUTPATIENT
Start: 2018-12-29 | End: 2018-12-31 | Stop reason: HOSPADM

## 2018-12-29 RX ORDER — NALOXONE HYDROCHLORIDE 0.4 MG/ML
0.4 INJECTION, SOLUTION INTRAMUSCULAR; INTRAVENOUS; SUBCUTANEOUS AS NEEDED
Status: DISCONTINUED | OUTPATIENT
Start: 2018-12-29 | End: 2018-12-29

## 2018-12-29 RX ORDER — OXYTOCIN/0.9 % SODIUM CHLORIDE 30/500 ML
PLASTIC BAG, INJECTION (ML) INTRAVENOUS
Status: COMPLETED
Start: 2018-12-29 | End: 2018-12-29

## 2018-12-29 RX ORDER — ONDANSETRON 2 MG/ML
4 INJECTION INTRAMUSCULAR; INTRAVENOUS
Status: DISCONTINUED | OUTPATIENT
Start: 2018-12-29 | End: 2018-12-29

## 2018-12-29 RX ORDER — EPHEDRINE SULFATE 50 MG/ML
10 INJECTION, SOLUTION INTRAVENOUS
Status: DISCONTINUED | OUTPATIENT
Start: 2018-12-29 | End: 2018-12-29

## 2018-12-29 RX ORDER — SODIUM CHLORIDE 0.9 % (FLUSH) 0.9 %
5-10 SYRINGE (ML) INJECTION EVERY 8 HOURS
Status: DISCONTINUED | OUTPATIENT
Start: 2018-12-29 | End: 2018-12-29

## 2018-12-29 RX ORDER — FENTANYL/BUPIVACAINE/NS/PF 2-1250MCG
1-16 PREFILLED PUMP RESERVOIR EPIDURAL CONTINUOUS
Status: DISCONTINUED | OUTPATIENT
Start: 2018-12-29 | End: 2018-12-29

## 2018-12-29 RX ORDER — OXYTOCIN/0.9 % SODIUM CHLORIDE 30/500 ML
500 PLASTIC BAG, INJECTION (ML) INTRAVENOUS ONCE
Status: COMPLETED | OUTPATIENT
Start: 2018-12-29 | End: 2018-12-29

## 2018-12-29 RX ORDER — NALOXONE HYDROCHLORIDE 0.4 MG/ML
0.4 INJECTION, SOLUTION INTRAMUSCULAR; INTRAVENOUS; SUBCUTANEOUS AS NEEDED
Status: DISCONTINUED | OUTPATIENT
Start: 2018-12-29 | End: 2018-12-31 | Stop reason: HOSPADM

## 2018-12-29 RX ADMIN — IBUPROFEN 800 MG: 800 TABLET ORAL at 23:55

## 2018-12-29 RX ADMIN — SODIUM CHLORIDE, SODIUM LACTATE, POTASSIUM CHLORIDE, AND CALCIUM CHLORIDE 1000 ML: 600; 310; 30; 20 INJECTION, SOLUTION INTRAVENOUS at 18:40

## 2018-12-29 RX ADMIN — Medication 10 ML/HR: at 19:53

## 2018-12-29 RX ADMIN — SODIUM CHLORIDE, SODIUM LACTATE, POTASSIUM CHLORIDE, AND CALCIUM CHLORIDE 999 ML/HR: 600; 310; 30; 20 INJECTION, SOLUTION INTRAVENOUS at 19:21

## 2018-12-29 RX ADMIN — BUPIVACAINE HYDROCHLORIDE 10 ML: 2.5 INJECTION, SOLUTION EPIDURAL; INFILTRATION; INTRACAUDAL at 19:37

## 2018-12-29 RX ADMIN — OXYTOCIN-SODIUM CHLORIDE 0.9% IV SOLN 30 UNIT/500ML 59940 MILLI-UNITS/HR: 30-0.9/5 SOLUTION at 21:30

## 2018-12-29 RX ADMIN — OXYTOCIN-SODIUM CHLORIDE 0.9% IV SOLN 30 UNIT/500ML 15000 MILLI-UNITS/HR: 30-0.9/5 SOLUTION at 21:45

## 2018-12-29 RX ADMIN — OXYTOCIN-SODIUM CHLORIDE 0.9% IV SOLN 30 UNIT/500ML 7500 MILLI-UNITS/HR: 30-0.9/5 SOLUTION at 22:04

## 2018-12-29 RX ADMIN — OXYTOCIN-SODIUM CHLORIDE 0.9% IV SOLN 30 UNIT/500ML 30000 MILLI-UNITS/HR: 30-0.9/5 SOLUTION at 21:33

## 2018-12-29 NOTE — PROGRESS NOTES
1815 
 edc2019 ADMITTED IN ACTIVE LABOR. sve 5100/-1 WITH BULGING BAG.  pT STATES SHE WANTS AN EPIDURAL iv FLUIDS STARTED.   pT AWARE OF PLAN OF CARE

## 2018-12-29 NOTE — PROGRESS NOTES
0700 
 [facility] 19 admitted under triage to r/o labor. Pt has been being followed at [facility] for obesity, polyhydramnios, smoker. Pt has hx of elevated BP and physiatric depression. Pt presently not taking any medications. Pt came in d/t lower abdominal discomfort and lower back pain. SVE 1.5/90/-2 pt having mucous discharge without odor. 7:55 AM Dr Anne Contreras reviewed FM strip and updated on pt   Aware baby is hard to trace d/t morbid obesity. Able to hear audible movement on the FM at the bedside. Baby very active. 2582 Dr Laurita Hickman at the bedside to discharge pt and home go over labor signs and symptoms. Pt verbalized understanding. Dr Laurita Hickman reviewed FM strip 8:17 AM Discharge teaching given to the pt.

## 2018-12-29 NOTE — TELEPHONE ENCOUNTER
Patient with contractions this morning. Started bleeding heavily with passage of clots. Is not feeling the baby moving as much. Patient advised to go to L&D right away.

## 2018-12-29 NOTE — H&P
History & Physical 
 
Name: Drake Baltazar MRN: [id number]  SSN: [id number] YOB: 1995  Age: 21 y.o. Sex: female Subjective:  
 
Estimated Date of Delivery: 19 OB History  Para Term  AB Living 1 SAB TAB Ectopic Molar Multiple Live Births Obstetric Comments Menarche:  10. LMP: current. # of Children:  0. Age at Delivery of First Child:  na.   Hysterectomy/oophorectomy:  NO/NO. Breast Bx:  no.  Hx of Breast Feeding:  na. BCP:  yes. Hormone therapy:  no. Ms. Ellie Brush presents with pregnancy at 37w6d for evaluation for uterine contractions. She reports foir 1hr, ctxs were every 3-4mins. She reports no vaginal bleeding, leakage of fluid and reports +FM. Prenatal course was complicated by Obesity, Polyhrdramnios, tobacco use. Eyal Mancuso Please see prenatal records for details. Past Medical History:  
Diagnosis Date  Asthma  Dysmenorrhea  Essential hypertension  Major depressive disorder, single episode, unspecified  Morbid obesity ([address]  Pap smear for cervical cancer screening 17 neg  Pituitary abnormality ([facility] [address]  Seasonal allergies  Unspecified symptom associated with female genital organs Past Surgical History:  
Procedure Laterality Date  HX WISDOM TEETH EXTRACTION Social History Occupational History  Not on file Tobacco Use  Smoking status: Current Every Day Smoker Packs/day: 0.50 Years: 1.00 Pack years: 0.50 Types: Cigarettes  Smokeless tobacco: Never Used Substance and Sexual Activity  Alcohol use: No  
  Alcohol/week: 0.0 oz  
  Comment: former user just sips now and then  Drug use: No  
 Sexual activity: Yes  
  Partners: Male Birth control/protection: Pill Family History Problem Relation Age of Onset  Alcohol abuse Father  Cancer Paternal Grandmother  Other Mother   
     endometriosis  Migraines Mother   
     tumer in brain Allergies Allergen Reactions  Latex Swelling Skin peels  Aspirin Nausea and Vomiting  Betadine [Povidone-Iodine] Other (comments) Skin peels  Cat Dander Sneezing Prior to Admission medications Medication Sig Start Date End Date Taking? Authorizing Provider  
[id number] marcelo-folic-dha (PRENATAL DHA+COMPLETE PRENATAL) [id number] mg-mcg-mg cmpk Take 1 Tab by mouth daily. Yes Provider, Historical  
omeprazole (PRILOSEC) 10 mg capsule TAKE ONE CAPSULE BY MOUTH ONCE A DAY 1/1/15  Yes Qamar Aguilar MD  
guaiFENesin-dextromethorphan SR [facility] DM) 600-30 mg per tablet Take 1 Tab by mouth every twelve (12) hours as needed for Cough. Provider, Historical  
acetaminophen (TYLENOL) 325 mg tablet Take  by mouth every four (4) hours as needed for Pain. Provider, Historical  
buPROPion XL (WELLBUTRIN XL) 150 mg tablet Take 1 Tab by mouth every morning. 2/5/18   Manasa Estrada, SABINE  
clonazePAM (KLONOPIN) 1 mg tablet Take 1 Tab by mouth daily as needed. Must last 30 days. 10/30/17   Vicente Goldberg MD  
drospirenone-ethinyl estradiol (JEANETTE) 3-0.03 mg tab Take 1 Tab by mouth daily. 9/6/17   Annetta Cantu MD  
albuterol (PROVENTIL VENTOLIN) 2.5 mg /3 mL (0.083 %) nebulizer solution 3 mL by Nebulization route every four (4) hours as needed for Wheezing. 6/29/17   Manasa Estrada, SABINE  
MELATONIN PO Take  by mouth as needed. Provider, Historical  
diphenhydrAMINE (BENADRYL) 50 mg tablet Take 1 tablet 1 hr prior to MRI 10/18/16   Neal Rdz MD  
aspirin-acetaminophen-caffeine (EXCEDRIN ES) 155-895-10 mg per tablet Take 1 Tab by mouth. Provider, Historical  
loratadine 10 mg cap Take 10 mg by mouth daily. Provider, Historical  
  
 
A comprehensive review of systems was negative except for that written in the HPI. Objective:  
 
Vitals: 
Vitals:  
 12/29/18 0715 12/29/18 0719 12/29/18 2813 BP: 152/71  148/76 Pulse: (!) 104 Resp: 18 Temp: 97.8 °F (36.6 °C) Weight:  156 kg (344 lb) Height:  5' 6\" (1.676 m) Physical Exam  
Gen: NAD Pulm: CTA B/L 
CV: S1S2 RRR Abd: GRavid, soft, NT 
Pelvic: No VB Cervical Exam: 1-2 cm dilated; 90%effaced, -2 fetal station Uterine Activity: irregular Membranes: Intact Fetal Heart Rate: Reactive Prenatal Labs:  
Lab Results Component Value Date/Time  
 Rubella, External Immune 05/24/2018 GrBStrep, External Negative 12/17/2018 HBsAg, External Negative 05/24/2018 HIV, External Negative 05/24/2018 Gonorrhea, External Negative 05/24/2018 Chlamydia, External Negative 05/24/2018 Impression/Plan: 37.6weeks gestation, False labor Plan: PO hydration. Labor precautions given. Return to hospital once in active labor. Patient advised to keep scheduled OB appointment for 12/31/18. Medically stable for discharge. Signed By:  Michelle Shook MD   
 December 29, 2018

## 2018-12-29 NOTE — DISCHARGE INSTRUCTIONS
Learning About Pregnancy  Your Care Instructions    Your health in the early weeks of your pregnancy is particularly important for your baby's health. Take good care of yourself. Anything you do that harms your body can also harm your baby. Make sure to go to all of your doctor appointments. Regular checkups will help keep you and your baby healthy. How can you care for yourself at home? Diet    · Eat a balanced diet. Make sure your diet includes plenty of beans, peas, and leafy green vegetables.     · Do not skip meals or go for many hours without eating. If you are nauseated, try to eat a small, healthy snack every 2 to 3 hours.     · Do not eat fish that has a high level of mercury, such as shark, swordfish, or mackerel. Do not eat more than one can of tuna each week.     · Drink plenty of fluids, enough so that your urine is light yellow or clear like water. If you have kidney, heart, or liver disease and have to limit fluids, talk with your doctor before you increase the amount of fluids you drink.     · Cut down on caffeine, such as coffee, tea, and cola.     · Do not drink alcohol, such as beer, wine, or hard liquor.     · Take a multivitamin that contains at least 400 micrograms (mcg) of folic acid to help prevent birth defects. Fortified cereal and whole wheat bread are good additional sources of folic acid.     · Increase the calcium in your diet. Try to drink a quart of skim milk each day. You may also take calcium supplements and choose foods such as cheese and yogurt.    Lifestyle    · Make sure you go to your follow-up appointments.     · Get plenty of rest. You may be unusually tired while you are pregnant.     · Get at least 30 minutes of exercise on most days of the week. Walking is a good choice. If you have not exercised in the past, start out slowly. Take several short walks each day.     · Do not smoke. If you need help quitting, talk to your doctor about stop-smoking programs.  These can increase your chances of quitting for good.     · Do not touch cat feces or litter boxes. Also, wash your hands after you handle raw meat, and fully cook all meat before you eat it. Wear gloves when you work in the yard or garden, and wash your hands well when you are done. Cat feces, raw or undercooked meat, and contaminated dirt can cause an infection that may harm your baby or lead to a miscarriage.     · Do not use saunas or hot tubs. Raising your body temperature may harm your baby.     · Avoid chemical fumes, paint fumes, or poisons.     · Do not use illegal drugs or alcohol. Medicines    · Review all of your medicines with your doctor. Some of your routine medicines may need to be changed to protect your baby.     · Use acetaminophen (Tylenol) to relieve minor problems, such as a mild headache or backache or a mild fever with cold symptoms. Do not use nonsteroidal anti-inflammatory drugs (NSAIDs), such as ibuprofen (Advil, Motrin) or naproxen (Aleve), unless your doctor says it is okay.     · Do not take two or more pain medicines at the same time unless the doctor told you to. Many pain medicines have acetaminophen, which is Tylenol. Too much acetaminophen (Tylenol) can be harmful.     · Take your medicines exactly as prescribed. Call your doctor if you think you are having a problem with your medicine.    To manage morning sickness    · If you feel sick when you first wake up, try eating a small snack (such as crackers) before you get out of bed. Allow some time to digest the snack, and then get out of bed slowly.     · Do not skip meals or go for long periods without eating. An empty stomach can make nausea worse.     · Eat small, frequent meals instead of three large meals each day.     · Drink plenty of fluids.  Sports drinks, such as Gatorade or Powerade, are good choices.     · Eat foods that are high in protein but low in fat.     · If you are taking iron supplements, ask your doctor if they are necessary. Iron can make nausea worse.     · Avoid any smells, such as coffee, that make you feel sick.     · Get lots of rest. Morning sickness may be worse when you are tired. Follow-up care is a key part of your treatment and safety. Be sure to make and go to all appointments, and call your doctor if you are having problems. It's also a good idea to know your test results and keep a list of the medicines you take. Where can you learn more? Go to http://sathya-bianka.info/. Enter O001 in the search box to learn more about \"Learning About Pregnancy. \"  Current as of: November 21, 2017  Content Version: 11.8  © 2753-1708 [employer]. Care instructions adapted under license by [employer] (which disclaims liability or warranty for this information). If you have questions about a medical condition or this instruction, always ask your healthcare professional. [place] [address] any warranty or liability for your use of this information. Counting Your Baby's Kicks: Care Instructions  Your Care Instructions    Counting your baby's kicks is one way your doctor can tell that your baby is healthy. Most women--especially in a first pregnancy--feel their baby move for the first time between 16 and 22 weeks. The movement may feel like flutters rather than kicks. Your baby may move more at certain times of the day. When you are active, you may notice less kicking than when you are resting. At your prenatal visits, your doctor will ask whether the baby is active. In your last trimester, your doctor may ask you to count the number of times you feel your baby move. Follow-up care is a key part of your treatment and safety. Be sure to make and go to all appointments, and call your doctor if you are having problems. It's also a good idea to know your test results and keep a list of the medicines you take. How do you count fetal kicks?   · A common method of checking your baby's movement is to count the number of kicks or moves you feel in 1 hour. Ten movements (such as kicks, flutters, or rolls) in 1 hour are normal. Some doctors suggest that you count in the morning until you get to 10 movements. Then you can quit for that day and start again the next day. · Pick your baby's most active time of day to count. This may be any time from morning to evening. · If you do not feel 10 movements in an hour, your baby may be sleeping. Wait for the next hour and count again. When should you call for help? Call your doctor now or seek immediate medical care if:    · You noticed that your baby has stopped moving or is moving much less than normal.    Watch closely for changes in your health, and be sure to contact your doctor if you have any problems. Where can you learn more? Go to http://sathya-bianka.info/. Enter F660 in the search box to learn more about \"Counting Your Baby's Kicks: Care Instructions. \"  Current as of: November 21, 2017  Content Version: 11.8  © 7758-0601 [employer]. Care instructions adapted under license by [employer] (which disclaims liability or warranty for this information). If you have questions about a medical condition or this instruction, always ask your healthcare professional. [address] any warranty or liability for your use of this information. [place] Contractions: Care Instructions  Your Care Instructions    [place] contractions prepare your uterus for labor. Think of them as a \"warm-up\" exercise that your body does. You may begin to feel them between the 28th and 30th weeks of your pregnancy. But they start as early as the 20th week. Paden Robison contractions usually occur more often during the ninth month.  They may go away when you are active and return when you rest. These contractions are like mild contractions of true labor, but they occur less often. (You feel fewer than 8 in an hour.) They don't cause your cervix to open. It may be hard for you to tell the difference between [facility] contractions and true labor, especially in your first pregnancy. Follow-up care is a key part of your treatment and safety. Be sure to make and go to all appointments, and call your doctor if you are having problems. It's also a good idea to know your test results and keep a list of the medicines you take. How can you care for yourself at home? · Try a warm bath to help relieve muscle tension and reduce pain. · Change positions every 30 minutes. Take breaks if you must sit for a long time. Get up and walk around. · Drink plenty of water, enough so that your urine is light yellow or clear like water. · Taking short walks may help you feel better. Your doctor needs to check any contractions that are getting stronger or closer together. Where can you learn more? Go to http://sathya-bianka.info/. Enter 210 [id number] [id number] in the search box to learn more about \"[place] Contractions: Care Instructions. \"  Current as of: November 21, 2017  Content Version: 11.8  © 2309-4065 [employer], [employer]. Care instructions adapted under license by [employer] (which disclaims liability or warranty for this information). If you have questions about a medical condition or this instruction, always ask your healthcare professional. [place] [address] any warranty or liability for your use of this information.

## 2018-12-30 VITALS
RESPIRATION RATE: 16 BRPM | DIASTOLIC BLOOD PRESSURE: 66 MMHG | SYSTOLIC BLOOD PRESSURE: 131 MMHG | HEART RATE: 80 BPM | WEIGHT: 293 LBS | OXYGEN SATURATION: 100 % | TEMPERATURE: 97.5 F | BODY MASS INDEX: 47.09 KG/M2 | HEIGHT: 66 IN

## 2018-12-30 PROBLEM — Z34.00 SUPERVISION OF NORMAL FIRST PREGNANCY: Status: RESOLVED | Noted: 2018-05-24 | Resolved: 2018-12-30

## 2018-12-30 PROCEDURE — 77030021125

## 2018-12-30 PROCEDURE — 77030018846 HC SOL IRR STRL H20 ICUM -A

## 2018-12-30 PROCEDURE — 74011250637 HC RX REV CODE- 250/637: Performed by: OBSTETRICS & GYNECOLOGY

## 2018-12-30 PROCEDURE — 65270000029 HC RM PRIVATE

## 2018-12-30 RX ORDER — CALCIUM CARBONATE 200(500)MG
200 TABLET,CHEWABLE ORAL
Status: DISCONTINUED | OUTPATIENT
Start: 2018-12-30 | End: 2018-12-31 | Stop reason: HOSPADM

## 2018-12-30 RX ADMIN — IBUPROFEN 800 MG: 800 TABLET ORAL at 15:49

## 2018-12-30 RX ADMIN — ANTACID TABLETS 200 MG: 500 TABLET, CHEWABLE ORAL at 08:10

## 2018-12-30 RX ADMIN — IBUPROFEN 800 MG: 800 TABLET ORAL at 08:10

## 2018-12-30 NOTE — PROGRESS NOTES
Labor Progress Note Patient seen, fetal heart rate and contraction pattern evaluated, patient examined. Physical Exam: 
Cervical Exam: 8 cm dilated  , 100% effaced Membranes:  AROM Uterine Activity: Frequency: IUPC placed, every 2-4mins Fetal Heart Rate: Category 2 tracing Station 0 Assessment/Plan: 
Reassuring fetal status, FSE placed,  continue monitoring, normal labor progress. Anticipate . Discussed with pt and family, agree

## 2018-12-30 NOTE — PROGRESS NOTES
CNMPostpartumNoteDay1 S: Patient ambulating and voiding without difficulty. Patient happy with birth experience. Breastfeeding well. Complains of hemorrhoid and tail bone pain. Pain managed with medication. Bleeding is decreasing. O: Vital signs stable, Heart RRR without murmur, Lungs CTA bilaterally, FF below umbilicus, lochia rubra light or moderate, breasts soft, nipples intact, no edema, negative Beto's sign A: Postpartum Day 1 Breastfeeding Blood type O positive/Rubella immune/GBS negative Tail bone pain Hemorrhoid P: Continue current postpartum orders Lactation consult Anticipate discharge tomorrow Offer Tdap if has not had this pregnancy Encouraged lying on side for tailbone pain Anusol cream 
Discussed family planning

## 2018-12-30 NOTE — L&D DELIVERY NOTE
Delivery Summary    Patient: Issa Angela MRN: [id number]  SSN: [id number]    YOB: 1995  Age: 21 y.o. Sex: female       Information for the patient's :  Chinmay Sanders [[id number]]       Labor Events:    Labor: No   Rupture Date: 2018   Rupture Time: 9:15 PM   Rupture Type     Amniotic Fluid Volume: Moderate    Amniotic Fluid Description:       Induction: None       Augmentation: None   Labor Events:       Cervical Ripening:     None     Delivery Events:  Episiotomy: None   Laceration(s): None     Repaired: None    Number of Repair Packets:     Suture Type and Size: None     Estimated Blood Loss (ml): 250ml       Delivery Date: 2018    Delivery Time: 9:27 PM  Delivery Type: Vaginal, Spontaneous  Sex:  Male     Gestational Age: 41w10d   Delivery Clinician:  Angélica Maxwell  Living Status: Living   Delivery Location: L&D            APGARS  One minute Five minutes Ten minutes   Skin color: 1   1        Heart rate: 2   2        Grimace: 2   2        Muscle tone: 2   2        Breathin   2        Totals: 9   9            Presentation: Vertex    Position:        Resuscitation Method:  Suctioning-bulb; Tactile Stimulation     Meconium Stained: None      Cord Information:    Complications: Nuchal Cord With Compressions  Cord around: trunk  Delayed cord clamping? Yes  Cord clamped date/time:   Disposition of Cord Blood: Lab    Blood Gases Sent?: No    Placenta:  Date/Time: 2018  9:30 PM  Removal: Spontaneous      Appearance: Normal     Wheelwright Measurements:  Birth Weight:        Birth Length:        Head Circumference:        Chest Circumference:       Abdominal Girth: Other Providers:   ANGÉLICA MAXWELL;OLVIN MOODY;BAYRON VILLAGOMEZ;SANDEEP GARZA;CAMERON JENKINS;;;;;, Obstetrician;Primary Nurse;Primary Wheelwright Nurse;Charge Nurse;Tech; Anesthesiologist;Crna;Nurse Practitioner;Midwife;Nursery Nurse           Group B Strep:   Lab Results Component Value Date/Time    GrBStrep, External Negative 2018     Information for the patient's :  Chi Like, Male Uriel Garciaom [[id number]]   No results found for: ABORH, PCTABR, PCTDIG, BILI, ABORHEXT, ABORH    No results for input(s): PCO2CB, PO2CB, HCO3I, SO2I, IBD, PTEMPI, SPECTI, PHICB, ISITE, IDEV, IALLEN in the last 72 hours.

## 2018-12-30 NOTE — ANESTHESIA PROCEDURE NOTES
Epidural Block Start time: 12/29/2018 7:15 PM 
End time: 12/29/2018 7:41 PM 
Performed by: Hong Long MD 
Authorized by: Hong Long MD  
 
Pre-Procedure Indication: labor epidural   
Preanesthetic Checklist: patient identified, risks and benefits discussed, anesthesia consent, timeout performed and anesthesia consent Timeout Time: 19:15 Epidural:  
Patient position:  Seated Prep region:  Lumbar Prep: Chlorhexidine Location:  L3-4 Needle and Epidural Catheter:  
Needle Type:  Tuohy Needle Gauge:  17 G Injection Technique:  Loss of resistance using air and loss of resistance using saline Attempts:  3 Catheter Size:  18 G Events: no paresthesia and negative aspiration test   
Test Dose:  Lidocaine 1.5% w/ epi and negative Assessment:  
Catheter Secured:  Tegaderm and tape Insertion:  Uncomplicated Patient tolerance:  Patient tolerated the procedure well with no immediate complications Difficult placement due to patient's size. First epidural needle not long enough, needed to switch to 5 inch tuohy

## 2018-12-30 NOTE — ANESTHESIA PREPROCEDURE EVALUATION
Anesthetic History No history of anesthetic complications Review of Systems / Medical History Patient summary reviewed and pertinent labs reviewed Pulmonary Asthma Neuro/Psych Psychiatric history Cardiovascular Hypertension GI/Hepatic/Renal 
Within defined limits Endo/Other Morbid obesity Other Findings Comments: Super obese BMI over 50 Physical Exam 
 
Airway Mallampati: II 
TM Distance: 4 - 6 cm Neck ROM: normal range of motion Mouth opening: Normal 
 
 Cardiovascular Regular rate and rhythm,  S1 and S2 normal,  no murmur, click, rub, or gallop Rhythm: regular Rate: normal 
 
 
 
 Dental 
No notable dental hx Pulmonary Breath sounds clear to auscultation Abdominal 
GI exam deferred Other Findings Anesthetic Plan ASA: 3 Anesthesia type: epidural 
 
 
 
 
 
Anesthetic plan and risks discussed with: Patient Charting completed after epidural placement.

## 2018-12-30 NOTE — PROGRESS NOTES
Loree Place MD at bedside 1: TO 
 
: Call placed to MD regarding BP reading. No orders received at this time. : Pt continues to feel \"sharp pain\" with ctx. Call placed to ANE to have to pt redosed. Loree Place MD at bedside to redose pt.  
 
0: Pt stands at the side of the bed with the assistance of the RN. Pt right leg still weak at this time. : Variable decel noted. This RN, GODFREY FERGUSON, ROC Ulloa RN at bedside. Pt turned left and right lateral, oxygen, FHT difficult to trace, OB requested at bedside. FHT recover, MD places FSE and IUPC after AROM, clear, moderate. : Variable decel noted for approx 6 minutes: pt turned lateral right, left, all fours, oxygen administration, IV bolus, SVE. FHT recovers. 3522-3252: This RN and Eben Coffman RN at bedside d/t variable decel noted, pt turned left and right later, oxygen applied and IV fluid bolus continues. SVE performed. Pt complete and +2 station. Request for OB and nursery. Pt placed in [facility]. : Pt begins pushing adequately with coaching from RN and OB.  
 
: Delivery of viable  male placed skin to skin with mother. Cord loosely wrapped around body. Delayed cord clamping performed. EBL 250mL. 2250: Pt straight cathed as legs too numb to ambulate a this time. Pt states she would rather be straight cathed. 0100: With the assistance of the RN, the pt stands at the side of the bed. Pt unable to hold weight on the right leg at this time. With the assistance of the RN and OBT, pt placed in wheelchair to go to the BR. Pt successfully voids 250ml of clear yellow urine. Loly care performed. Pt then ambulates back to bed with the assistance of the RN and OBT. Pt advised by RN not to get up out of bed without the assistance of the RN. Pt verbalizes understanding and agrees.   
 
0245: Pt ambulates successfully to the bathroom without the assitance of the RN. Loly care performed by patient with the guidance of the RN.   
 
 
0710: TRANSFER - OUT REPORT: 
 
Verbal report given to FRANCISCO Talavera RN(name) on [facility]  being transferred to MIU(unit) for routine progression of care Report consisted of patients Situation, Background, Assessment and  
Recommendations(SBAR). Information from the following report(s) SBAR, Kardex, Intake/Output, MAR, Accordion and Recent Results was reviewed with the receiving nurse. Lines:  
Peripheral IV 12/29/18 Left Hand (Active) Site Assessment Clean, dry, & intact 12/29/2018  7:00 PM  
Phlebitis Assessment 0 12/29/2018  7:00 PM  
Infiltration Assessment 0 12/29/2018  7:00 PM  
Dressing Status Clean, dry, & intact 12/29/2018  7:00 PM  
Dressing Type Transparent;Tape 12/29/2018  7:00 PM  
Hub Color/Line Status Pink; Infusing;Patent 12/29/2018  7:00 PM  
Action Taken Open ports on tubing capped 12/29/2018  7:00 PM  
Alcohol Cap Used Yes 12/29/2018  7:00 PM  
  
 
Opportunity for questions and clarification was provided. Patient transported with: 
 Registered Nurse

## 2018-12-30 NOTE — H&P
History & Physical 
 
Name: Joe Orosco MRN: [id number]  SSN: [id number] YOB: 1995  Age: 21 y.o. Sex: female Subjective:  
 
Estimated Date of Delivery: 19 OB History  Para Term  AB Living 1 SAB TAB Ectopic Molar Multiple Live Births Obstetric Comments Menarche:  10. LMP: current. # of Children:  0. Age at Delivery of First Child:  na.   Hysterectomy/oophorectomy:  NO/NO. Breast Bx:  no.  Hx of Breast Feeding:  na. BCP:  yes. Hormone therapy:  no. Ms. Alphonse Isidro is admitted with pregnancy at 37w6d for active labor. Prenatal course was complicated by polyhydramnios. Please see prenatal records for details. Past Medical History:  
Diagnosis Date  Asthma  Dysmenorrhea  Essential hypertension  Major depressive disorder, single episode, unspecified  Morbid obesity ([facility] [address]  Pap smear for cervical cancer screening 17 neg  Pituitary abnormality ([facility] [address]  Seasonal allergies  Unspecified symptom associated with female genital organs Past Surgical History:  
Procedure Laterality Date  HX WISDOM TEETH EXTRACTION Social History Occupational History  Not on file Tobacco Use  Smoking status: Current Every Day Smoker Packs/day: 0.50 Years: 1.00 Pack years: 0.50 Types: Cigarettes  Smokeless tobacco: Never Used Substance and Sexual Activity  Alcohol use: No  
  Alcohol/week: 0.0 oz  
  Comment: former user just sips now and then  Drug use: No  
 Sexual activity: Yes  
  Partners: Male Birth control/protection: Pill Family History Problem Relation Age of Onset  Alcohol abuse Father  Cancer Paternal Grandmother  Other Mother   
     endometriosis  Migraines Mother   
     tumer in brain Allergies Allergen Reactions  Latex Swelling Skin peels  Aspirin Nausea and Vomiting  Betadine [Povidone-Iodine] Other (comments) Skin peels  Cat Dander Sneezing Prior to Admission medications Medication Sig Start Date End Date Taking? Authorizing Provider  
guaiFENesin-dextromethorphan SR [facility] DM) 600-30 mg per tablet Take 1 Tab by mouth every twelve (12) hours as needed for Cough. Provider, Historical  
acetaminophen (TYLENOL) 325 mg tablet Take  by mouth every four (4) hours as needed for Pain. Provider, Historical  
[id number] marcelo-folic-dha (PRENATAL DHA+COMPLETE PRENATAL) [id number] mg-mcg-mg cmpk Take 1 Tab by mouth daily. Provider, Historical  
buPROPion XL (WELLBUTRIN XL) 150 mg tablet Take 1 Tab by mouth every morning. 2/5/18   Ceci Olivares NP  
clonazePAM (KLONOPIN) 1 mg tablet Take 1 Tab by mouth daily as needed. Must last 30 days. 10/30/17   Dianelys Goldberg MD  
drospirenone-ethinyl estradiol (JEANETTE) 3-0.03 mg tab Take 1 Tab by mouth daily. 9/6/17   Fernando Alvarez MD  
albuterol (PROVENTIL VENTOLIN) 2.5 mg /3 mL (0.083 %) nebulizer solution 3 mL by Nebulization route every four (4) hours as needed for Wheezing. 6/29/17   Ceci Olivares NP  
MELATONIN PO Take  by mouth as needed. Provider, Historical  
diphenhydrAMINE (BENADRYL) 50 mg tablet Take 1 tablet 1 hr prior to MRI 10/18/16   Victoriano Schmidt MD  
aspirin-acetaminophen-caffeine (EXCEDRIN ES) 752-520-25 mg per tablet Take 1 Tab by mouth. Provider, Historical  
loratadine 10 mg cap Take 10 mg by mouth daily. Provider, Historical  
omeprazole (PRILOSEC) 10 mg capsule TAKE ONE CAPSULE BY MOUTH ONCE A DAY 1/1/15   Yonas Zambrano MD  
  
 
A comprehensive review of systems was negative except for that written in the HPI. Objective:  
 
Vitals: 
Vitals:  
 12/29/18 2024 12/29/18 2029 12/29/18 2034 12/29/18 2040 BP:    137/69 Pulse:    98 Resp:      
Temp:      
SpO2: 100% 100% 100% 100% Weight:      
Height:      
  
 
Physical Exam 
Gen: Uncomfortable Pulm: CTA b/L 
CV: S1S2 RRR Abd: Gravid, soft,and rigid Pelvic: no vb 
Cervical Exam: 5 cm dilated, 100% effaced, -1 fetal station Uterine Activity: difficult to palpate due to large abdominal girth Membranes: Intact Fetal Heart Rate: Reactive Prenatal Labs:  
Lab Results Component Value Date/Time  
 Rubella, External Immune 05/24/2018 GrBStrep, External Negative 12/17/2018 HBsAg, External Negative 05/24/2018 HIV, External Negative 05/24/2018 Gonorrhea, External Negative 05/24/2018 Chlamydia, External Negative 05/24/2018 Impression/Plan: 37.6weeks gestation, active labor Plan: Admit for labor. Group B Strep was negative. IV hydration, cbc, consult anesthesia. Signed By:  Karen Pritchard MD   
 December 29, 2018

## 2018-12-31 PROCEDURE — 74011250637 HC RX REV CODE- 250/637: Performed by: OBSTETRICS & GYNECOLOGY

## 2018-12-31 RX ORDER — BUPROPION HYDROCHLORIDE 150 MG/1
150 TABLET ORAL
Qty: 180 TAB | Refills: 4 | Status: SHIPPED | OUTPATIENT
Start: 2018-12-31 | End: 2020-02-26

## 2018-12-31 RX ORDER — IBUPROFEN 800 MG/1
800 TABLET ORAL EVERY 8 HOURS
Qty: 60 TAB | Refills: 0 | Status: SHIPPED | OUTPATIENT
Start: 2018-12-31 | End: 2020-02-26

## 2018-12-31 RX ORDER — DOCUSATE SODIUM 100 MG/1
100 CAPSULE, LIQUID FILLED ORAL DAILY
Status: DISCONTINUED | OUTPATIENT
Start: 2018-12-31 | End: 2018-12-31 | Stop reason: HOSPADM

## 2018-12-31 RX ADMIN — DOCUSATE SODIUM 100 MG: 100 CAPSULE, LIQUID FILLED ORAL at 12:00

## 2018-12-31 RX ADMIN — IBUPROFEN 800 MG: 800 TABLET ORAL at 01:59

## 2018-12-31 RX ADMIN — IBUPROFEN 800 MG: 800 TABLET ORAL at 10:26

## 2018-12-31 NOTE — PROGRESS NOTES
Post-Partum Day Number 2 Progress Note Alisha [place] Information for the patient's :  Morgan Butt, Male Shayy Cartagena [[id number]] Vaginal, Spontaneous Patient doing well without significant complaint. Voiding without difficulty, normal lochia. Vitals: 
Visit Vitals /66 (BP 1 Location: Right arm, BP Patient Position: Sitting) Pulse 80 Temp 97.5 °F (36.4 °C) Resp 16 Ht 5' 6\" (1.676 m) Wt 344 lb (156 kg) LMP 2018 SpO2 100% Breastfeeding? Unknown BMI 55.52 kg/m² Temp (24hrs), Av.9 °F (36.6 °C), Min:97.5 °F (36.4 °C), Max:98.3 °F (36.8 °C) Exam:    
    Patient without distress. Abdomen soft, fundus firm, nontender 
               ower extremities are negative for swelling, cords or tenderness. Labs:  
 
Lab Results Component Value Date/Time  WBC 15.9 (H) 2018 06:28 PM  
 WBC 8.4 10/19/2018 10:20 AM  
 WBC 8.8 2018 02:41 PM  
 WBC 7.0 2018 03:42 PM  
 WBC 8.1 02/10/2014 03:14 PM  
 WBC 6.7 2013 02:15 PM  
 WBC 7.2 2013 02:55 PM  
 WBC 7.3 2013 11:35 AM  
 WBC 8.7 2011 10:23 AM  
 WBC 6.4 10/14/2010 09:37 AM  
 HGB 12.1 2018 06:28 PM  
 HGB 11.5 10/19/2018 10:20 AM  
 HGB 12.0 2018 02:41 PM  
 HGB 13.3 2018 03:42 PM  
 HGB 13.4 02/10/2014 03:14 PM  
 HGB 13.7 2013 02:15 PM  
 HGB 13.6 2013 02:55 PM  
 HGB 14.3 2013 11:35 AM  
 HGB 13.3 2011 10:23 AM  
 HGB 12.8 10/14/2010 09:37 AM  
 HCT 37.0 2018 06:28 PM  
 HCT 34.4 10/19/2018 10:20 AM  
 HCT 37.7 2018 02:41 PM  
 HCT 40.1 2018 03:42 PM  
 HCT 40.5 02/10/2014 03:14 PM  
 HCT 41.5 2013 02:15 PM  
 HCT 41.2 2013 02:55 PM  
 HCT 43.5 2013 11:35 AM  
 HCT 41.9 2011 10:23 AM  
 HCT 39.7 10/14/2010 09:37 AM  
 PLATELET 686 [id number] 06:28 PM  
 PLATELET 378  10:20 AM  
 PLATELET 903  02:41 PM  
 PLATELET 229  03:42 PM  
 PLATELET 287 71/12/5173 03:14 PM  
 PLATELET 067 99/50/2113 02:15 PM  
 PLATELET 882 56/86/1704 02:55 PM  
 PLATELET 432 20/70/9190 11:35 AM  
 PLATELET 465 (H) 02/98/5022 10:23 AM  
 PLATELET 611 (H) 92/11/0194 09:37 AM  
 Hgb, External 13.3 05/24/2018 Hct, External 40.7 05/24/2018 Platelet cnt., External 288 05/24/2018 No results found for this or any previous visit (from the past 24 hour(s)). Assessment: Doing well, post partum day 2 Plan: 1. Discharge home today 2. Follow up in office in 6 weeks with Luis Mcmahan MD 
3. Post partum activity advised, diet as tolerated 4. Discharge Medications: ibuprofen, percocet and medications prior to admission

## 2018-12-31 NOTE — PROGRESS NOTES
12/31/18 1:51 PM 
Technicians here for delivery of bili blanket. Nurse aware and will discharge after education on blanket complete. 12/31/18 11:30 AM 
CM spoke again with Lior Cortes. CM re-faxed order as Lior Cortes stated that she still has not received order and has been unable to locate. CM also provided referral and order information over the phone. Lior Cortes working to process order now and will make contact with parents. CM spoke with supervisor, Jaylin Schneider, regarding concerns in delay. 12/31/18 10:48 AM 
CM contacted [facility] to inquire about receipt of the order and ETA for delivery. CM spoke with Lior Cortes,  from [facility], rep stated that they have not received the order. CM confirmed fax number and that CM received fax confirmation. Rep stated that she will step away and check fax and call this CM back. CM explained that delivery is holding patient and baby's discharge. 12/31/18 9:00 AM 
KEN met with NICOLÁS Medeiros ([phone]) to complete initial assessment and to begin discharge planning. Demographics were reviewed and confirmed; NICOLÁS and her mother, Srinivasa Hudson ([phone]) live in [place]. NCIOLÁS's boyfriend/FOB is Willy Niece and he lives close and plans to be involved in the daily care of the baby; this is the first baby for the couple. Both MOB and FOB work and will have adequate time away from work. Supports include family on both MOB and FOB's sides and a large group of friends. Pediatrician will be Ritesh Myrick at [facility]; an appointment is scheduled for Weds 1/2/19 at 9:45AM.  Patient has car seat, crib, clothing, and other necessary supplies. MOB noted that she is mostly bottlefeeding formula; she has [facility] services and will call today to schedule an enrollment appointment for the baby. MOB has Medicaid and CM advised MOB to call to add the baby to her coverage.   NICOLÁS's mother to provide transportation home at discharge today. Order for biliblanket noted. After getting choice from MOB, order and referral sent to [facility] and delivery requested to the hospital today. CM will continue to follow for ETA of delivery. Care Management Interventions PCP Verified by CM: Yes([facility]) Mode of Transport at Discharge: Self Transition of Care Consult (CM Consult): Discharge Planning Current Support Network: Family Lives Nearby, Relative's Home(live with mother) Confirm Follow Up Transport: Family Plan discussed with Pt/Family/Caregiver: Yes Discharge Location Discharge Placement: Home with family assistance CRISTEL Wall

## 2018-12-31 NOTE — DISCHARGE SUMMARY
Obstetrical Discharge Summary     Name: Saritha Rogers MRN: [id number]  SSN: [id number]    YOB: 1995  Age: 21 y.o. Sex: female      Admit Date: 2018    Discharge Date: 2018     Admitting Physician: Aleta Lindsey MD     Attending Physician:  Mallika Benton MD     * Admission Diagnoses: pregnancy;Vaginal delivery    * Discharge Diagnoses:   Information for the patient's :  Melodie Brady, Male Joselito Bates [[id number]]   Delivery of a 6 lb 3.8 oz (2.83 kg) male infant via Vaginal, Spontaneous on 2018 at 9:27 PM  by . Apgars were 9 and 9. Additional Diagnoses:   Hospital Problems as of 2018 Date Reviewed: 2018          Codes Class Noted - Resolved POA    Vaginal delivery ICD-10-CM: O80  ICD-9-CM: 495  2018 - Present Unknown             Lab Results   Component Value Date/Time    Rubella, External Immune 2018    GrBStrep, External Negative 2018    ABO,Rh O Positive 2018      Immunization History   Administered Date(s) Administered    Tdap 10/19/2018       * Procedures:      * Discharge Condition: stable    * Hospital Course: Normal hospital course following the delivery. * Disposition: home with office follow-up    Discharge Medications:   Current Discharge Medication List      START taking these medications    Details   ibuprofen (MOTRIN) 800 mg tablet Take 1 Tab by mouth every eight (8) hours. Qty: 60 Tab, Refills: 0             * Follow-up Care/Patient Instructions:   Activity: activity as tolerated  Diet: general  Wound Care: as directed    Follow-up Information     Follow up With Specialties Details Why Contact Info    [facility]  [address] [place] [address]  [address]   [phone]      Mallika Benton MD Obstetrics & Gynecology, Gynecology, Obstetrics In 6 weeks  [address] [facility] [address]  [phone]             Signed By:  Aleta Lindsey MD December 31, 2018

## 2018-12-31 NOTE — ROUTINE PROCESS
Bedside and Verbal shift change report given to Luiz Morales RN (oncoming nurse) by Alka Cuevas RN (offgoing nurse). Report included the following information SBAR, Kardex, Intake/Output and MAR.

## 2018-12-31 NOTE — PROGRESS NOTES
Bedside and Verbal shift change report given to Bhavesh Leblanc (oncoming nurse) by Lazaro Pritchett RN (offgoing nurse). Report included the following information SBAR, Intake/Output, MAR and Recent Results.

## 2018-12-31 NOTE — DISCHARGE INSTRUCTIONS
POST DELIVERY DISCHARGE INSTRUCTIONS    Name: Harmeet Mejia  YOB: 1995  Primary Diagnosis: Active Problems:    Vaginal delivery (2018)        General:     Diet/Diet Restrictions:  Eight 8-ounce glasses of fluid daily (water, juices); avoid excessive caffeine intake. Meals/snacks as desired which are high in fiber and carbohydrates and low in fat and cholesterol. Medications:   {Medication reconciliation information is now added to the patient's AVS automatically when it is printed. There is no need to use this SmartLink in discharge instructions. Highlight this text and delete it to clear this message}      Physical Activity / Restrictions / Safety:     Avoid heavy lifting, no more that 8 lbs. For 2-3 weeks; No driving while taking narcotic pain medication. Post  patients should not drive until pain free. No intercourse 4-6 weeks, no douching or tampon use. May resume exercise in 6 weeks. Discharge Instructions/Special Treatment/Home Care Needs:     Continue prenatal vitamins. Continue to use squirt bottle with warm water on your episiotomy after each bathroom use until bleeding stops. If steri-strips applied to your incision, remove in 7 days. Take stool softeners daily. Call your doctor for the following:     Fever over 101 degrees by mouth. Vaginal bleeding heavier than a normal menstrual period or lost larger than a golf ball. Red streaks or increased swelling of legs, painful red streaks on your breast.  Painful urination, or increased pain, redness or discharge with your incision. Pain Management:     Pain Management:   Take Acetaminophen (Tylenol) or Ibuprofen (Advil, Motrin), as directed for pain. Use a warm Sitz bath 3 times daily to relieve episiotomy or hemorrhoidal discomfort. Heating pad to  incision as needed. For hemorrhoidal discomfort, use Tucks and Anusol cream as needed and directed.     Follow-Up Care:     Appointment with MD: Follow-up Appointments   Procedures    FOLLOW UP VISIT Appointment in: 6 Weeks     Standing Status:   Standing     Number of Occurrences:   1     Order Specific Question:   Appointment in     Answer:   6 Weeks     Telephone number: [phone]    Signed By: Steffen Galloway MD                                                                                                   Date: 12/31/2018 Time: 9:33 AM

## 2018-12-31 NOTE — ROUTINE PROCESS
Discharge instructions given to patient including but not limited to signs and symptoms and who to call; restrictions and limitations; postpartum depression. All questions answered. Discharge paperwork signed in computer. No prescriptions given.

## 2018-12-31 NOTE — ADT AUTH CERT NOTES
Patient Demographics Patient Name Majo Brizuela 
[id number] Sex Female  
1995 Address [address] Phone [phone] (Home) [phone] (Work) [phone] (Mobile) *Preferred*  
CSN:  
[id number] Admit Date: Admit Time Room Bed Dec 29, 2018  6:16  [[id number]] 01 [[id number]] Attending Providers Provider Pager From To Bg Dickey MD  18 Male Madelyn Holland is a male infant born on 2018 at 9:27 PM. He weighed 2.83 kg and measured 21 in length. His head circumference was 34.5 cm at birth. Apgars were 9 and 9. He has been doing well. 
  
Maternal Data:  
  
Delivery Type: Vaginal, Spontaneous Delivery Resuscitation:  
Number of Vessels:   
Cord Events:  
Meconium Stained:   
  
Information for the patient's mother:  Love Stroud [[id number]] Gestational Age: 41w10d

## 2018-12-31 NOTE — LACTATION NOTE
Oj Calabrese Lactation Consultant Progress Notes Signed Date of Service:  12/31/18 7894 Problem: Lactation Care Plan Goal: *Infant latching appropriately Outcome: Resolved/Met Date Met: 12/31/18 Mother decided to pump and formula feed baby - baby has elevated bilirubin and is under phototherapy Goal: *Weight loss less than 10% of birth weight Outcome: Resolved/Met Date Met: 12/31/18 Infant weight loss is -3.5% Mother is pumping and formula feeding. 
  
Problem: Patient Education: Go to Patient Education Activity Goal: Patient/Family Education Outcome: Resolved/Met Date Met: 12/31/18 Discussed with mother her plan for feeding. Reviewed the benefits of exclusive breast milk feeding during the hospital stay. Informed her of the risks of using formula to supplement in the first few days of life as well as the benefits of successful breast milk feeding; referred her to the Breastfeeding booklet about this information. She acknowledges understanding of information reviewed and states that it is her plan to pump and give formula due to elevated bilirubin her infant. Will support her choice and offer additional information as needed.  
  
Pumping:  Guidelines for pumping, milk collection and storage, proper cleaning of pump parts all reviewed. How to establish and maintain breast milk supply through pumping reviewed. Differences between hospital grade rental pumps vs store bought double electric/hand pumps discussed. Set up pumping with double electric set up. Assisted with pump session. Mother got a few drops and finger fed them to baby. List of area pump rental locations and lactation support services provided. Mother to call insurance company to get a pump. Instructed mother to pump Q 2-3 hr for 20 minutes.  
  
Engorgement Care Guidelines:  Reviewed how milk is made and normal phases of milk production.   Taught care of engorged breasts - frequent breastfeeding encouraged, cool packs and motrin as tolerated. Anticipatory guidance shared. 
  
  
  
  
  
Comments: Pt will successfully establish breastfeeding by feeding in response to early feeding cues  
or wake every 3h, will obtain deep latch, and will keep log of feedings/output. Taught to BF at hunger cues and or q 2-3 hrs and to offer 10-20 drops of hand expressed colostrum at any non-feeds.   
  
Breast Assessment Left Breast: Small , Medium Left Nipple: Everted, Intact, Tender, Short(Mother states left nipple tender from prior feedings) Right Breast: Small , Medium Right Nipple: Everted, Intact, Short Breast- Feeding Assessment Attends Breast-Feeding Classes: No 
Breast-Feeding Experience: No 
Breast Trauma/Surgery: No 
Type/Quality: Fair(Mother states baby  well after deliverry and once last night. He has been sleepy. Baby has elevated bilirubin and is under phototherapy. Mother has been formula feeding. ) Lactation Consultant Visits Breast-Feedings: Not breast-feeding(Mother states breastfeeding hurt too much-she decided to formula feed. Discussed the benefits of breast milk. Mother decided she would like to pump and formula feed. [employer] pump set up-instructions given.)

## 2018-12-31 NOTE — PROGRESS NOTES
Bedside and Verbal shift change report given to Maggie Olguin RN (oncoming nurse) by Emile Gotti RN (offgoing nurse). Report included the following information SBAR, Intake/Output, MAR and Recent Results.

## 2019-01-04 ENCOUNTER — TELEPHONE (OUTPATIENT)
Dept: OBGYN CLINIC | Age: 24
End: 2019-01-04

## 2019-01-04 NOTE — TELEPHONE ENCOUNTER
Patient has been advised and will go to   ER for possible doppler if needed. She said that her left foot is considerably larger than the right. She asked if a urgent care center would be okay, I advised ER would be better to make sure they had a doppler to check her.

## 2019-01-04 NOTE — TELEPHONE ENCOUNTER
If her swelling is unilateral, then she will need to be seen to r/o DVT -- can go to ER or [place]. If swelling is bilateral, can come in for BP check or can check at local pharmacy. Looks like her BPs were only minimally elevated PP.

## 2019-01-04 NOTE — TELEPHONE ENCOUNTER
Patient of FW, she just gave birth last 18 vaginal delivery. Patient is saying that since she got home from the hospital where she had elevated blood pressures during delivery she has not had her blood pressure checked. She has got severe swelling in her left foot and has a \"cankle\". She said that she has had a headache off and on. No dizziness. No ringing in ears, feels fine otherwrise. She has urinated well and had a good bowel movement. She is trying to elevate her feet as much as possible but she said that it is hard with the . Do we need to bring her in today for blood pressure check?

## 2019-01-21 ENCOUNTER — TELEPHONE (OUTPATIENT)
Dept: OBGYN CLINIC | Age: 24
End: 2019-01-21

## 2019-01-21 RX ORDER — MICONAZOLE NITRATE 2 %
1 CREAM WITH APPLICATOR VAGINAL
Qty: 45 G | Refills: 0 | Status: SHIPPED | OUTPATIENT
Start: 2019-01-21 | End: 2019-01-28

## 2019-01-21 NOTE — TELEPHONE ENCOUNTER
Pt called stating she had been on macrobid for a UTI and now she has a yeast infection. She is wondering if we can send a prescription for monistat since it is cheaper for her that way than buying it over the counter.   [facility] would [facility] to the [facility]

## 2019-02-06 ENCOUNTER — OFFICE VISIT (OUTPATIENT)
Dept: OBGYN CLINIC | Age: 24
End: 2019-02-06

## 2019-02-06 VITALS
WEIGHT: 293 LBS | DIASTOLIC BLOOD PRESSURE: 72 MMHG | SYSTOLIC BLOOD PRESSURE: 118 MMHG | HEIGHT: 66 IN | BODY MASS INDEX: 47.09 KG/M2

## 2019-02-06 DIAGNOSIS — N89.8 VAGINAL ITCHING: ICD-10-CM

## 2019-02-06 RX ORDER — DROSPIRENONE AND ETHINYL ESTRADIOL 0.03MG-3MG
1 KIT ORAL DAILY
Qty: 3 PACKAGE | Refills: 4 | Status: SHIPPED | OUTPATIENT
Start: 2019-02-06 | End: 2019-10-03 | Stop reason: SINTOL

## 2019-02-06 RX ORDER — FLUCONAZOLE 150 MG/1
150 TABLET ORAL DAILY
Qty: 3 TAB | Refills: 0 | Status: SHIPPED | OUTPATIENT
Start: 2019-02-06 | End: 2019-02-09

## 2019-02-06 NOTE — LETTER
2/6/2019 2:10 PM 
 
Ms. Ramirez Oven C/Mallory Lyles [address] [facility] [address] Ms. Ceci Lyn may return to work on 2/9/19 with no restrictions. Sincerely, Everardo Castaneda MD

## 2019-02-06 NOTE — PROGRESS NOTES
Postpartum evaluation    Idris Pat is a 21 y.o. female who presents for a postpartum exam.     She is now six weeks post normal spontaneous vaginal delivery. Her baby is doing well. She has had menses since delivery. She has had the following significant problems since her delivery: vaginal itching    The patient is bottle feeding without difficulty. She is currently taking: no medications. She is due for her next AE in 12 months. Visit Vitals  /72   Ht 5' 6\" (1.676 m)   Wt 316 lb (143.3 kg)   LMP 01/31/2019 (Exact Date)   Breastfeeding?  No   BMI 51.00 kg/m²       PHYSICAL EXAMINATION    Constitutional  · Appearance: well-nourished, well developed, alert, in no acute distress    HENT  · Head and Face: appears normal    Neck  · Inspection/Palpation: normal appearance, no masses or tenderness  · Lymph Nodes: no lymphadenopathy present  · Thyroid: gland size normal, nontender, no nodules or masses present on palpation    Gastrointestinal  · Abdominal Examination: abdomen non-tender to palpation, normal bowel sounds, no masses present  · Liver and spleen: no hepatomegaly present, spleen not palpable  · Hernias: no hernias identified    Genitourinary  · External Genitalia: normal appearance for age, + discharge present, no tenderness present, no inflammatory lesions present, no masses present, no atrophy present  · Vagina: normal vaginal vault without central or paravaginal defects, + discharge present, no inflammatory lesions present, no masses present  · Bladder: non-tender to palpation  · Urethra: appears normal  · Cervix: normal   · Uterus: normal size, shape and consistency  · Adnexa: no adnexal tenderness present, no adnexal masses present  · Perineum: perineum within normal limits, no evidence of trauma, no rashes or skin lesions present  · Anus: anus within normal limits, no hemorrhoids present  · Inguinal Lymph Nodes: no lymphadenopathy present    Skin  · General Inspection: no rash, no lesions identified    Neurologic/Psychiatric  · Mental Status:  · Orientation: grossly oriented to person, place and time  · Mood and Affect: mood normal, affect appropriate    Assessment:  Normal postpartum check  Vaginal irritation- will f/u with nuswab    Plan:  RTO for AE.

## 2019-02-11 RX ORDER — METRONIDAZOLE 500 MG/1
500 TABLET ORAL 2 TIMES DAILY
Qty: 14 TAB | Refills: 0 | Status: SHIPPED | OUTPATIENT
Start: 2019-02-11 | End: 2019-02-18

## 2019-03-04 ENCOUNTER — DOCUMENTATION ONLY (OUTPATIENT)
Dept: OBGYN CLINIC | Age: 24
End: 2019-03-04

## 2019-03-26 ENCOUNTER — OFFICE VISIT (OUTPATIENT)
Dept: OBGYN CLINIC | Age: 24
End: 2019-03-26

## 2019-03-26 DIAGNOSIS — N89.8 VAGINAL DISCHARGE: ICD-10-CM

## 2019-03-26 DIAGNOSIS — R30.0 DYSURIA: Primary | ICD-10-CM

## 2019-03-26 LAB
BILIRUB UR QL STRIP: NEGATIVE
GLUCOSE UR-MCNC: NEGATIVE MG/DL
KETONES P FAST UR STRIP-MCNC: NEGATIVE MG/DL
PH UR STRIP: 4.6 [PH] (ref 4.6–8)
PROT UR QL STRIP: NEGATIVE
SP GR UR STRIP: 1 (ref 1–1.03)
UA UROBILINOGEN AMB POC: NORMAL (ref 0.2–1)
URINALYSIS CLARITY POC: NORMAL
URINALYSIS COLOR POC: NORMAL
URINE BLOOD POC: NEGATIVE
URINE LEUKOCYTES POC: NEGATIVE
URINE NITRITES POC: NEGATIVE

## 2019-03-26 NOTE — PROGRESS NOTES
Chief Complaint   Vaginitis and Urinary Pain      HPI  21 y.o. female complains of white and yellow vaginal discharge for almost 3 weeks off and on. No LMP recorded. She admits to additional symptoms at this time. Itching and dysuria  The patient  denies aggravating factors  She denies exposure to new chemicals ot hygenic agents  Previous treatment included:  None  She wants std testing today    Past Medical History:   Diagnosis Date    Asthma     Dysmenorrhea     Essential hypertension     Major depressive disorder, single episode, unspecified     Morbid obesity ([address]     Pap smear for cervical cancer screening 2/23/17 neg    Pituitary abnormality ([address]     Seasonal allergies     Unspecified symptom associated with female genital organs      Past Surgical History:   Procedure Laterality Date    HX WISDOM TEETH EXTRACTION       Social History     Occupational History    Not on file   Tobacco Use    Smoking status: Current Every Day Smoker     Packs/day: 0.50     Years: 1.00     Pack years: 0.50     Types: Cigarettes    Smokeless tobacco: Never Used   Substance and Sexual Activity    Alcohol use: No     Alcohol/week: 0.0 oz     Comment: former user just sips now and then   [facility] Drug use: No    Sexual activity: Yes     Partners: Male     Birth control/protection: Pill     Family History   Problem Relation Age of Onset    Alcohol abuse Father     Cancer Paternal Grandmother     Other Mother         endometriosis   [facility] Migraines Mother         tumer in brain        Allergies   Allergen Reactions    Latex Swelling     Skin peels    Aspirin Nausea and Vomiting    Betadine [Povidone-Iodine] Other (comments)     Skin peels    Cat Dander Sneezing     Prior to Admission medications    Medication Sig Start Date End Date Taking? Authorizing Provider   drospirenone-ethinyl estradiol (JEANETTE) 3-0.03 mg tab Take 1 Tab by mouth daily.  2/6/19  Yes Vazquez Curran MD   buPROPion XL (WELLBUTRIN XL) 150 mg tablet Take 1 Tab by mouth every morning. 12/31/18  Yes Ian Johnson MD   [id number] marcelo-folic-dha (PRENATAL DHA+COMPLETE PRENATAL) -838 mg-mcg-mg cmpk Take 1 Tab by mouth daily. Yes Provider, Historical   omeprazole (PRILOSEC) 10 mg capsule TAKE ONE CAPSULE BY MOUTH ONCE A DAY 1/1/15  Yes Abad Salgado MD   ibuprofen (MOTRIN) 800 mg tablet Take 1 Tab by mouth every eight (8) hours. 12/31/18   Ian Johnson MD   guaiFENesin-dextromethorphan SR [facility] DM) 600-30 mg per tablet Take 1 Tab by mouth every twelve (12) hours as needed for Cough. Provider, Historical   acetaminophen (TYLENOL) 325 mg tablet Take  by mouth every four (4) hours as needed for Pain. Provider, Historical   clonazePAM (KLONOPIN) 1 mg tablet Take 1 Tab by mouth daily as needed. Must last 30 days. 10/30/17   Ervin Goldberg MD   albuterol (PROVENTIL VENTOLIN) 2.5 mg /3 mL (0.083 %) nebulizer solution 3 mL by Nebulization route every four (4) hours as needed for Wheezing. 6/29/17   Ernesto Youssef NP   MELATONIN PO Take  by mouth as needed. Provider, Historical   diphenhydrAMINE (BENADRYL) 50 mg tablet Take 1 tablet 1 hr prior to MRI 10/18/16   Delores Fothergill, MD   aspirin-acetaminophen-caffeine (EXCEDRIN ES) 871-508-55 mg per tablet Take 1 Tab by mouth. Provider, Historical   loratadine 10 mg cap Take 10 mg by mouth daily.     Provider, Historical                      Review of Systems - History obtained from the patient  Constitutional: negative for weight loss, fever, night sweats  Breast: negative for breast lumps, nipple discharge, galactorrhea  GI: negative for change in bowel habits, abdominal pain, black or bloody stools  : negative for frequency, dysuria, hematuria  MSK: negative for back pain, joint pain, muscle pain  Skin: negative for itching, rash, hives  Neuro: negative for dizziness, headache, confusion, weakness  Psych: negative for anxiety, depression, change in mood  Heme/lymph: negative for bleeding, bruising, pallor       Objective: There were no vitals taken for this visit. Physical Exam:   PHYSICAL EXAMINATION    Constitutional  · Appearance: well-nourished, well developed, alert, in no acute distress    HENT  · Head and Face: appears normal    Genitourinary  · External Genitalia: normal appearance for age, + discharge present, no tenderness present, no inflammatory lesions present, no masses present, no atrophy present  · Vagina:  + discharge present, otherwise normal vaginal vault without central or paravaginal defects, no inflammatory lesions present, no masses present  · Bladder: non-tender to palpation  · Urethra: appears normal  · Cervix: normal   · Uterus: normal size, shape and consistency  · Adnexa: no adnexal tenderness present, no adnexal masses present  · Perineum: perineum within normal limits, no evidence of trauma, no rashes or skin lesions present  · Anus: anus within normal limits, no hemorrhoids present  · Inguinal Lymph Nodes: no lymphadenopathy present    Skin  · General Inspection: no rash, no lesions identified    Neurologic/Psychiatric  · Mental Status:  · Orientation: grossly oriented to person, place and time  · Mood and Affect: mood normal, affect appropriate    Assessment:   Vaginal irritation and urinary pain- will f/u with nuswab and urine culture. Plan:   ROV prn if symptoms persist or worsen.

## 2019-03-28 LAB — BACTERIA UR CULT: NORMAL

## 2019-04-04 ENCOUNTER — OFFICE VISIT (OUTPATIENT)
Dept: FAMILY MEDICINE CLINIC | Age: 24
End: 2019-04-04

## 2019-04-04 VITALS
SYSTOLIC BLOOD PRESSURE: 111 MMHG | RESPIRATION RATE: 19 BRPM | DIASTOLIC BLOOD PRESSURE: 71 MMHG | HEART RATE: 97 BPM | TEMPERATURE: 97.6 F | OXYGEN SATURATION: 98 % | HEIGHT: 66 IN | BODY MASS INDEX: 47.09 KG/M2 | WEIGHT: 293 LBS

## 2019-04-04 DIAGNOSIS — J40 BRONCHITIS: Primary | ICD-10-CM

## 2019-04-04 RX ORDER — FLUCONAZOLE 150 MG/1
150 TABLET ORAL DAILY
Qty: 2 TAB | Refills: 0 | Status: SHIPPED | OUTPATIENT
Start: 2019-04-04 | End: 2019-04-05

## 2019-04-04 RX ORDER — AZITHROMYCIN 250 MG/1
TABLET, FILM COATED ORAL
Qty: 6 TAB | Refills: 0 | Status: SHIPPED | OUTPATIENT
Start: 2019-04-04 | End: 2019-05-10 | Stop reason: ALTCHOICE

## 2019-04-04 NOTE — PROGRESS NOTES
Chief Complaint   Patient presents with    Sore Throat     x 1 week    Cough     Visit Vitals  /71 (BP 1 Location: Left arm, BP Patient Position: Sitting)   Pulse 97   Temp 97.6 °F (36.4 °C) (Oral)   Resp 19   Ht 5' 6\" (1.676 m)   Wt 323 lb 8 oz (146.7 kg)   LMP 03/04/2019 (Approximate)   SpO2 98%   BMI 52.21 kg/m²     1. Have you been to the ER, urgent care clinic since your last visit? Hospitalized since your last visit? No    2. Have you seen or consulted any other health care providers outside of the [facility] since your last visit? Include any pap smears or colon screening.  No

## 2019-04-04 NOTE — LETTER
4/4/2019 9:43 AM 
 
Ms. Prashant Angela C/Mallory Lyles [id number] [facility] [id number] Please excuse Ms. Mejia from work today and possibly tomorrow due to illness. Sincerely, Lawanda Mejía NP

## 2019-04-04 NOTE — PROGRESS NOTES
Chief Complaint   Patient presents with    Sore Throat     x 1 week    Cough     she is a 21y.o. year old female who presents for evalution. Pt has been sick for past week - sore throat, congestion. Fevers at first but have now resolved. Having bad headaches. Course is worsening. Body aches, fatigue. Reviewed PmHx, RxHx, FmHx, SocHx, AllgHx and updated and dated in the chart. Review of Systems - negative except as listed above in the HPI    Objective:     Vitals:    04/04/19 0917   BP: 111/71   Pulse: 97   Resp: 19   Temp: 97.6 °F (36.4 °C)   TempSrc: Oral   SpO2: 98%   Weight: 323 lb 8 oz (146.7 kg)   Height: 5' 6\" (1.676 m)     Physical Examination: General appearance - alert, well appearing, and in no distress  Ears - bilateral TM's and external ear canals normal  Nose - normal nontender sinuses, mucosal congestion and mucosal erythema  Mouth - mucous membranes moist, pharynx normal without lesions and erythematous  Neck - supple, no significant adenopathy  Chest - clear to auscultation, no wheezes, rales or rhonchi, symmetric air entry  Heart - normal rate, regular rhythm, normal S1, S2, no murmurs, rubs, clicks or gallops    Assessment/ Plan:   Diagnoses and all orders for this visit:    1. Bronchitis  -     azithromycin (ZITHROMAX) 250 mg tablet; Take 2 tabs po today then 1 tab po x 4 days  -     fluconazole (DIFLUCAN) 150 mg tablet; Take 1 Tab by mouth daily for 1 day. May repeat in 3 days if needed  Likely viral but will cover with antibiotic. Discussed and encouraged rest and clear fluids, if congestion is thick should avoid dairy. Recommended hot showers with steam and elevating head of bed. May use Vitamin C or Echinacea in addition to current therapy. Pt voiced understanding regarding plan of care. Follow-up and Dispositions    · Return if symptoms worsen or fail to improve.        I have discussed the diagnosis with the patient and the intended plan as seen in the above orders. The patient has received an after-visit summary and questions were answered concerning future plans.      Medication Side Effects and Warnings were discussed with patient    Chaparro Haines NP

## 2019-04-04 NOTE — PATIENT INSTRUCTIONS
Bronchitis: Care Instructions  Your Care Instructions    Bronchitis is inflammation of the bronchial tubes, which carry air to the lungs. The tubes swell and produce mucus, or phlegm. The mucus and inflamed bronchial tubes make you cough. You may have trouble breathing. Most cases of bronchitis are caused by viruses like those that cause colds. Antibiotics usually do not help and they may be harmful. Bronchitis usually develops rapidly and lasts about 2 to 3 weeks in otherwise healthy people. Follow-up care is a key part of your treatment and safety. Be sure to make and go to all appointments, and call your doctor if you are having problems. It's also a good idea to know your test results and keep a list of the medicines you take. How can you care for yourself at home? · Take all medicines exactly as prescribed. Call your doctor if you think you are having a problem with your medicine. · Get some extra rest.  · Take an over-the-counter pain medicine, such as acetaminophen (Tylenol), ibuprofen (Advil, Motrin), or naproxen (Aleve) to reduce fever and relieve body aches. Read and follow all instructions on the label. · Do not take two or more pain medicines at the same time unless the doctor told you to. Many pain medicines have acetaminophen, which is Tylenol. Too much acetaminophen (Tylenol) can be harmful. · Take an over-the-counter cough medicine that contains dextromethorphan to help quiet a dry, hacking cough so that you can sleep. Avoid cough medicines that have more than one active ingredient. Read and follow all instructions on the label. · Breathe moist air from a humidifier, hot shower, or sink filled with hot water. The heat and moisture will thin mucus so you can cough it out. · Do not smoke. Smoking can make bronchitis worse. If you need help quitting, talk to your doctor about stop-smoking programs and medicines. These can increase your chances of quitting for good.   When should you call for help? Call 911 anytime you think you may need emergency care. For example, call if:    · You have severe trouble breathing.    Call your doctor now or seek immediate medical care if:    · You have new or worse trouble breathing.     · You cough up dark brown or bloody mucus (sputum).     · You have a new or higher fever.     · You have a new rash.    Watch closely for changes in your health, and be sure to contact your doctor if:    · You cough more deeply or more often, especially if you notice more mucus or a change in the color of your mucus.     · You are not getting better as expected. Where can you learn more? Go to http://sathya-bianka.info/. Enter H333 in the search box to learn more about \"Bronchitis: Care Instructions. \"  Current as of: September 5, 2018  Content Version: 11.9  © 9568-2422 [employer]. Care instructions adapted under license by [employer] (which disclaims liability or warranty for this information). If you have questions about a medical condition or this instruction, always ask your healthcare professional. [address] any warranty or liability for your use of this information.

## 2019-04-17 ENCOUNTER — PATIENT MESSAGE (OUTPATIENT)
Dept: OBGYN CLINIC | Age: 24
End: 2019-04-17

## 2019-04-22 RX ORDER — LEVONORGESTREL AND ETHINYL ESTRADIOL 0.15-0.03
1 KIT ORAL DAILY
Qty: 1 PACKAGE | Refills: 4 | Status: SHIPPED | OUTPATIENT
Start: 2019-04-22 | End: 2019-10-03 | Stop reason: SINTOL

## 2019-05-10 ENCOUNTER — OFFICE VISIT (OUTPATIENT)
Dept: ENDOCRINOLOGY | Age: 24
End: 2019-05-10

## 2019-05-10 VITALS
OXYGEN SATURATION: 98 % | DIASTOLIC BLOOD PRESSURE: 58 MMHG | HEIGHT: 66 IN | SYSTOLIC BLOOD PRESSURE: 131 MMHG | HEART RATE: 82 BPM | WEIGHT: 293 LBS | BODY MASS INDEX: 47.09 KG/M2 | RESPIRATION RATE: 14 BRPM | TEMPERATURE: 97.8 F

## 2019-05-10 DIAGNOSIS — E22.1 HYPERPROLACTINEMIA (HCC): Primary | ICD-10-CM

## 2019-05-10 DIAGNOSIS — D35.2 PITUITARY ADENOMA (HCC): ICD-10-CM

## 2019-05-10 NOTE — PROGRESS NOTES
[facility] DIABETES AND ENDOCRINOLOGY Vishal Farrell MD 
 
    [phone] Fax [phone] Patient Information Date:5/10/2019 Name : Gunnar Mejia 21 y.o.    
YOB: 1995 Referred by: Pernell Mcmullen NP Chief Complaint Patient presents with  Pituitary Problem  Hyperprolactinemia History of Present Illness: Marva Coronel is a 21 y.o. female here for fu She was referred by Pernell Mcmullen NP for evaluation and management of galactorrhea. She was on oral contraceptives in July 2016 for dysmenorrhea. She was switched to Medroxyprogesterone injection. MRI showed small adenoma 7 mm She delivered a healthy baby in December 2018 Complains of galactorrhea, did not breast-feed the baby Thinks she may be pregnant requesting pregnancy test 
Reports breast enlargement and tenderness Family history of diabetes Wt Readings from Last 3 Encounters:  
05/10/19 321 lb (145.6 kg) 04/04/19 323 lb 8 oz (146.7 kg) 02/06/19 316 lb (143.3 kg) BP Readings from Last 3 Encounters:  
05/10/19 131/58  
04/04/19 111/71  
02/06/19 118/72 Past Medical History:  
Diagnosis Date  Asthma  Dysmenorrhea  Essential hypertension  Major depressive disorder, single episode, unspecified  Morbid obesity ([address]  Pap smear for cervical cancer screening 2/23/17 neg  Pituitary abnormality [address]  Seasonal allergies  Unspecified symptom associated with female genital organs Current Outpatient Medications Medication Sig  levonorgestrel-ethinyl estradiol (SEASONALE) 0.15 mg-30 mcg (91) 3MPk Take 1 Tab by mouth daily.  acetaminophen (TYLENOL) 325 mg tablet Take  by mouth every four (4) hours as needed for Pain.   
 omeprazole (PRILOSEC) 10 mg capsule TAKE ONE CAPSULE BY MOUTH ONCE A DAY (Patient taking differently: TAKE ONE CAPSULE BY MOUTH ONCE A DAY PRN)  drospirenone-ethinyl estradiol (JEANETTE) 3-0.03 mg tab Take 1 Tab by mouth daily.  ibuprofen (MOTRIN) 800 mg tablet Take 1 Tab by mouth every eight (8) hours.  buPROPion XL (WELLBUTRIN XL) 150 mg tablet Take 1 Tab by mouth every morning.  guaiFENesin-dextromethorphan SR (MUCINEX DM) 600-30 mg per tablet Take 1 Tab by mouth every twelve (12) hours as needed for Cough.  [id number]-CCIC marcelo-folic-dha (PRENATAL DHA+COMPLETE PRENATAL) -300 mg-mcg-mg cmpk Take 1 Tab by mouth daily.  clonazePAM (KLONOPIN) 1 mg tablet Take 1 Tab by mouth daily as needed. Must last 30 days.  albuterol (PROVENTIL VENTOLIN) 2.5 mg /3 mL (0.083 %) nebulizer solution 3 mL by Nebulization route every four (4) hours as needed for Wheezing.  MELATONIN PO Take  by mouth as needed.  diphenhydrAMINE (BENADRYL) 50 mg tablet Take 1 tablet 1 hr prior to MRI  aspirin-acetaminophen-caffeine (EXCEDRIN ES) 250-250-65 mg per tablet Take 1 Tab by mouth.  loratadine 10 mg cap Take 10 mg by mouth daily. No current facility-administered medications for this visit. Allergies Allergen Reactions  Latex Swelling Skin peels  Lactose Other (comments)  Aspirin Nausea and Vomiting  Betadine [Povidone-Iodine] Other (comments) Skin peels  Cat Dander Sneezing Review of Systems: 
- Constitutional Symptoms: no fevers, no chills,  
- Eyes: no blurry vision no double vision - Cardiovascular: no chest pain ,no palpitations - Neurological: no numbness, tingling, no  headaches - Psychiatric: no depression no  anxiety - Endocrine: no heat or cold intolerance Physical Examination: 
 Blood pressure 131/58, pulse 82, temperature 97.8 °F (36.6 °C), temperature source Oral, resp. rate 14, height 5' 6\" (1.676 m), weight 321 lb (145.6 kg), SpO2 98 %, not currently breastfeeding.  Estimated body mass index is 51.81 kg/m² as calculated from the following: 
  Height as of this encounter: 5' 6\" (1.676 m). -   Weight as of this encounter: 321 lb (145.6 kg). - General: pleasant, no distress, good eye contact 
- HEENT: no pallor, no periorbital edema, EOMI 
- Neck: supple, 
- Cardiovascular: regular, normal rate, normal S1 and S2,  
- Respiratory: clear to auscultation bilaterally - Integumentary: ,no edema, 
- Neurological: ,alert and oriented - Psychiatric: normal mood and affect 
- Skin: color, texture, turgor normal.  
 
 
Data Reviewed:  
 
[] Glucose records reviewed. [] See glucose records for details (to be scanned). [] A1C [] Reviewed labs Assessment/Plan: 1. Hyperprolactinemia ([address] 2. Pituitary adenoma ([address] 7 mm Pituitary microadenoma - could be incidental finding, prolactin is very minimally elevated Screening for pituitary hormones - neg Galactorrhea: Recheck prolactin, Pregnancy test per patient's request 
If it is really bothering her discussed about cabergoline, she is on birth control pills which she has been taking it consistently the last 1 week. Obesity Body mass index is 51.81 kg/m². Weight has been stable, low-carb diet Thank you for allowing me to participate in the care of this patient. Symone Junior MD 
 
 
Patient verbalized understanding

## 2019-05-10 NOTE — PROGRESS NOTES
Les Calderon is a 21 y.o. female here for Chief Complaint Patient presents with  Pituitary Problem  Hyperprolactinemia Having persistent headaches x 1 week 1. Have you been to the ER, urgent care clinic since your last visit? Hospitalized since your last visit? -[facility] 12/29/18 for delivery 2. Have you seen or consulted any other health care providers outside of the [address] [place] [address] since your last visit?   Include any pap smears or colon screening.-no

## 2019-05-10 NOTE — LETTER
5/11/19 Patient: Danielle Neri YOB: 1995 Date of Visit: 5/10/2019 Zuri Goldberg MD 
[address] [address] VIA In Basket Dear Gurwinder Moreno MD, Thank you for referring Ms. Georgette Sidhu to [address] [address] for evaluation. My notes for this consultation are attached. If you have questions, please do not hesitate to call me. I look forward to following your patient along with you. Sincerely, Jessica Puentes MD

## 2019-05-11 LAB
B-HCG SERPL QL: NEGATIVE MIU/ML
PROLACTIN SERPL-MCNC: 28.3 NG/ML (ref 4.8–23.3)
T4 FREE SERPL-MCNC: 1.43 NG/DL (ref 0.82–1.77)
TSH SERPL DL<=0.005 MIU/L-ACNC: 1.49 UIU/ML (ref 0.45–4.5)

## 2019-05-11 NOTE — PROGRESS NOTES
Pregnancy test is negative, and prolactin is mildly elevated not very severe. If galactorrhea is bothering her we can start cabergoline 0.25 mg at night once a week to help with the galactorrhea.

## 2019-05-13 ENCOUNTER — PATIENT MESSAGE (OUTPATIENT)
Dept: ENDOCRINOLOGY | Age: 24
End: 2019-05-13

## 2019-05-13 ENCOUNTER — TELEPHONE (OUTPATIENT)
Dept: ENDOCRINOLOGY | Age: 24
End: 2019-05-13

## 2019-05-13 DIAGNOSIS — E22.1 HYPERPROLACTINEMIA (HCC): Primary | ICD-10-CM

## 2019-05-13 DIAGNOSIS — D35.2 PITUITARY ADENOMA (HCC): Primary | ICD-10-CM

## 2019-05-13 RX ORDER — CABERGOLINE 0.5 MG/1
0.25 TABLET ORAL
Qty: 2 TAB | Refills: 5 | Status: SHIPPED | OUTPATIENT
Start: 2019-05-13 | End: 2019-09-27 | Stop reason: SDUPTHER

## 2019-05-13 NOTE — TELEPHONE ENCOUNTER
----- Message from Rasta Howell MD sent at 5/11/2019  5:15 PM EDT -----  Pregnancy test is negative, and prolactin is mildly elevated not very severe. If galactorrhea is bothering her we can start cabergoline 0.25 mg at night once a week to help with the galactorrhea.

## 2019-05-13 NOTE — TELEPHONE ENCOUNTER
Pt sent [employer] msg. Sent results via [address] [place] [address] [phone].

## 2019-06-02 ENCOUNTER — HOSPITAL ENCOUNTER (OUTPATIENT)
Dept: MRI IMAGING | Age: 24
Discharge: HOME | End: 2019-06-02
Attending: INTERNAL MEDICINE
Payer: MEDICAID

## 2019-06-02 VITALS — BODY MASS INDEX: 51.81 KG/M2 | WEIGHT: 293 LBS

## 2019-06-02 DIAGNOSIS — D35.2 PITUITARY ADENOMA (HCC): ICD-10-CM

## 2019-06-02 PROCEDURE — 70553 MRI BRAIN STEM W/O & W/DYE: CPT

## 2019-06-02 PROCEDURE — A9575 INJ GADOTERATE MEGLUMI 0.1ML: HCPCS | Performed by: RADIOLOGY

## 2019-06-02 PROCEDURE — 74011250636 HC RX REV CODE- 250/636: Performed by: RADIOLOGY

## 2019-06-02 PROCEDURE — 77030021566

## 2019-06-02 RX ORDER — GADOTERATE MEGLUMINE 376.9 MG/ML
20 INJECTION INTRAVENOUS
Status: COMPLETED | OUTPATIENT
Start: 2019-06-02 | End: 2019-06-02

## 2019-06-02 RX ADMIN — GADOTERATE MEGLUMINE 20 ML: 376.9 INJECTION INTRAVENOUS at 18:11

## 2019-06-13 ENCOUNTER — TELEPHONE (OUTPATIENT)
Dept: OBGYN CLINIC | Age: 24
End: 2019-06-13

## 2019-06-13 NOTE — TELEPHONE ENCOUNTER
Patient is asking to speak with Kurtis Machado. Esha told her to call her directly. Please call her before 2 pm or tomorrow morning.

## 2019-06-26 ENCOUNTER — OFFICE VISIT (OUTPATIENT)
Dept: OBGYN CLINIC | Age: 24
End: 2019-06-26

## 2019-06-26 DIAGNOSIS — Z11.3 SCREENING EXAMINATION FOR SEXUALLY TRANSMITTED DISEASE: ICD-10-CM

## 2019-06-26 DIAGNOSIS — N89.8 VAGINAL ITCHING: ICD-10-CM

## 2019-06-26 DIAGNOSIS — M54.50 LOW BACK PAIN WITHOUT SCIATICA, UNSPECIFIED BACK PAIN LATERALITY, UNSPECIFIED CHRONICITY: Primary | ICD-10-CM

## 2019-06-26 RX ORDER — FLUCONAZOLE 150 MG/1
150 TABLET ORAL DAILY
Qty: 3 TAB | Refills: 0 | Status: SHIPPED | OUTPATIENT
Start: 2019-06-26 | End: 2019-06-29

## 2019-06-26 RX ORDER — NYSTATIN AND TRIAMCINOLONE ACETONIDE 100000; 1 [USP'U]/G; MG/G
OINTMENT TOPICAL 2 TIMES DAILY
Qty: 1 TUBE | Refills: 3 | Status: SHIPPED | OUTPATIENT
Start: 2019-06-26 | End: 2019-07-03

## 2019-06-26 NOTE — PROGRESS NOTES
Vaginitis evaluation    Chief Complaint   Vaginitis      HPI  21 y.o. female complains of scant vaginal discharge. No LMP recorded. She admits additional symptoms at this time- itching, odor  The patient admits aggravating factors- recent antibiotic use  She is concerned about possible STI exposure at this time- she wants testing  She admits exposure to new chemicals ot hygenic agents- antibiotics  Previous treatment included: none  She wants std testing today.   Pt also wants urine dipped as she has been having lower back pain- urine dipstick in office negative today    Past Medical History:   Diagnosis Date    Asthma     Dysmenorrhea     Essential hypertension     Major depressive disorder, single episode, unspecified     Morbid obesity ([address]     Pap smear for cervical cancer screening 2/23/17 neg    Pituitary abnormality (HCC)     Seasonal allergies     Unspecified symptom associated with female genital organs      Past Surgical History:   Procedure Laterality Date    HX WISDOM TEETH EXTRACTION       Social History     Occupational History    Not on file   Tobacco Use    Smoking status: Current Every Day Smoker     Packs/day: 0.50     Years: 1.00     Pack years: 0.50     Types: Cigarettes    Smokeless tobacco: Never Used   Substance and Sexual Activity    Alcohol use: No     Alcohol/week: 0.0 oz     Comment: former user just sips now and then   Gonzalez Drug use: No    Sexual activity: Yes     Partners: Male     Birth control/protection: Pill     Family History   Problem Relation Age of Onset    Alcohol abuse Father     Cancer Paternal Grandmother     Other Mother         endometriosis   Gonzalez Migraines Mother         tumer in brain        Allergies   Allergen Reactions    Latex Swelling     Skin peels    Lactose Other (comments)    Aspirin Nausea and Vomiting    Betadine [Povidone-Iodine] Other (comments)     Skin peels    Cat Dander Sneezing     Prior to Admission medications    Medication Sig Start Date End Date Taking? Authorizing Provider   cabergoline (DOSTINEX) 0.5 mg tablet Take 0.5 Tabs by mouth every seven (7) days. At night 5/13/19   Kate Lynch MD   levonorgestrel-ethinyl estradiol (SEASONALE) 0.15 mg-30 mcg (91) 3MPk Take 1 Tab by mouth daily. 4/22/19   Natasha Zafar MD   drospirenone-ethinyl estradiol (JEANETTE) 3-0.03 mg tab Take 1 Tab by mouth daily. 2/6/19   Natasha Zafar MD   ibuprofen (MOTRIN) 800 mg tablet Take 1 Tab by mouth every eight (8) hours. 12/31/18   Natasha Zafar MD   buPROPion XL (WELLBUTRIN XL) 150 mg tablet Take 1 Tab by mouth every morning. 12/31/18   Natasha Zafar MD   guaiFENesin-dextromethorphan SR [facility] DM) 600-30 mg per tablet Take 1 Tab by mouth every twelve (12) hours as needed for Cough. Provider, Historical   acetaminophen (TYLENOL) 325 mg tablet Take  by mouth every four (4) hours as needed for Pain. Provider, Historical   [id number] marcelo-folic-dha (PRENATAL DHA+COMPLETE PRENATAL) [id number] mg-mcg-mg cmpk Take 1 Tab by mouth daily. Provider, Historical   clonazePAM (KLONOPIN) 1 mg tablet Take 1 Tab by mouth daily as needed. Must last 30 days. 10/30/17   Anita Goldberg MD   albuterol (PROVENTIL VENTOLIN) 2.5 mg /3 mL (0.083 %) nebulizer solution 3 mL by Nebulization route every four (4) hours as needed for Wheezing. 6/29/17   Rebecca Ireland NP   MELATONIN PO Take  by mouth as needed. Provider, Historical   diphenhydrAMINE (BENADRYL) 50 mg tablet Take 1 tablet 1 hr prior to MRI 10/18/16   Rhodia Gaucher, MD   aspirin-acetaminophen-caffeine (EXCEDRIN ES) 416-721-06 mg per tablet Take 1 Tab by mouth. Provider, Historical   loratadine 10 mg cap Take 10 mg by mouth daily.     Provider, Historical   omeprazole (PRILOSEC) 10 mg capsule TAKE ONE CAPSULE BY MOUTH ONCE A DAY  Patient taking differently: TAKE ONE CAPSULE BY MOUTH ONCE A DAY PRN 1/1/15   Dunia Brush MD                      Review of Systems - History obtained from the patient  Constitutional: negative for weight loss, fever, night sweats  Breast: negative for breast lumps, nipple discharge, galactorrhea  GI: negative for change in bowel habits, abdominal pain, black or bloody stools  : negative for frequency, dysuria, hematuria  MSK: negative for back pain, joint pain, muscle pain  Skin: negative for itching, rash, hives  Neuro: negative for dizziness, headache, confusion, weakness  Psych: negative for anxiety, depression, change in mood  Heme/lymph: negative for bleeding, bruising, pallor       Objective:    Visit Vitals  Breastfeeding? No       Physical Exam:   PHYSICAL EXAMINATION    Constitutional  · Appearance: well-nourished, well developed, alert, in no acute distress    HENT  · Head and Face: appears normal    Genitourinary  · External Genitalia: normal appearance for age, + discharge present, no tenderness present, no inflammatory lesions present, no masses present, no atrophy present  · Vagina:  + discharge present, otherwise normal vaginal vault without central or paravaginal defects, no inflammatory lesions present, no masses present  · Bladder: non-tender to palpation  · Urethra: appears normal  · Cervix: normal   · Uterus: normal size, shape and consistency  · Adnexa: no adnexal tenderness present, no adnexal masses present  · Perineum: perineum within normal limits, no evidence of trauma, no rashes or skin lesions present  · Anus: anus within normal limits, no hemorrhoids present  · Inguinal Lymph Nodes: no lymphadenopathy present    Skin  · General Inspection: no rash, no lesions identified    Neurologic/Psychiatric  · Mental Status:  · Orientation: grossly oriented to person, place and time  · Mood and Affect: mood normal, affect appropriate      Assessment:   Vaginitis- likely yeast, will treat with diflucan and f/u with nuswab  STD testing    Plan:   ROV prn if symptoms persist or worsen.

## 2019-06-28 ENCOUNTER — ED HISTORICAL/CONVERTED ENCOUNTER (OUTPATIENT)
Dept: OTHER | Age: 24
End: 2019-06-28

## 2019-07-03 ENCOUNTER — PATIENT MESSAGE (OUTPATIENT)
Dept: OBGYN CLINIC | Age: 24
End: 2019-07-03

## 2019-07-22 ENCOUNTER — TELEPHONE (OUTPATIENT)
Dept: OBGYN CLINIC | Age: 24
End: 2019-07-22

## 2019-07-22 NOTE — TELEPHONE ENCOUNTER
Patient of FW. Calling to say that she has concerns of strong odor smell, yellow thick discharge, and possible exposure to STD's. She said that all of this started after unprotected intercourse with her boyfriend and she has been suspicious of fidelity. She would like to cancel her cervical biopsy scheduled for tomorrrow, she can not make appt due to work schedule. She said that she does not want her appt rescheduled as she said it is improved. She wants to make urine/STD check for Friday.

## 2019-07-26 ENCOUNTER — OFFICE VISIT (OUTPATIENT)
Dept: OBGYN CLINIC | Age: 24
End: 2019-07-26

## 2019-07-26 DIAGNOSIS — Z72.51 UNPROTECTED SEXUAL INTERCOURSE: ICD-10-CM

## 2019-07-26 DIAGNOSIS — N89.8 VAGINAL DISCHARGE: Primary | ICD-10-CM

## 2019-07-26 DIAGNOSIS — Z11.3 SCREENING EXAMINATION FOR SEXUALLY TRANSMITTED DISEASE: ICD-10-CM

## 2019-07-26 LAB
HCG URINE, QL. (POC): NEGATIVE
VALID INTERNAL CONTROL?: YES

## 2019-07-26 NOTE — PROGRESS NOTES
Chief Complaint   Vaginitis      HPI  21 y.o. female complains of increased vaginal discharge. No LMP recorded. She admits to additional symptoms at this time. Irritation and itching  The patient  admits to aggravating factors  She admits to exposure to new chemicals ot hygenic agents  Previous treatment included:  None  She wants std testing and a pregnancy test as she has missed a few of her OCP's. Past Medical History:   Diagnosis Date    Asthma     Dysmenorrhea     Essential hypertension     Major depressive disorder, single episode, unspecified     Morbid obesity ([facility] [address]     Pap smear for cervical cancer screening 2/23/17 neg    Pituitary abnormality (HCC)     Seasonal allergies     Unspecified symptom associated with female genital organs      Past Surgical History:   Procedure Laterality Date    HX WISDOM TEETH EXTRACTION       Social History     Occupational History    Not on file   Tobacco Use    Smoking status: Current Every Day Smoker     Packs/day: 0.50     Years: 1.00     Pack years: 0.50     Types: Cigarettes    Smokeless tobacco: Never Used   Substance and Sexual Activity    Alcohol use: No     Alcohol/week: 0.0 standard drinks     Comment: former user just sips now and then    Drug use: No    Sexual activity: Yes     Partners: Male     Birth control/protection: Pill     Family History   Problem Relation Age of Onset    Alcohol abuse Father     Cancer Paternal Grandmother     Other Mother         endometriosis    Migraines Mother         tumer in brain        Allergies   Allergen Reactions    Latex Swelling     Skin peels    Lactose Other (comments)    Aspirin Nausea and Vomiting    Betadine [Povidone-Iodine] Other (comments)     Skin peels    Cat Dander Sneezing     Prior to Admission medications    Medication Sig Start Date End Date Taking? Authorizing Provider   cabergoline (DOSTINEX) 0.5 mg tablet Take 0.5 Tabs by mouth every seven (7) days.  At night 5/13/19   Cris David Foley MD   levonorgestrel-ethinyl estradiol (SEASONALE) 0.15 mg-30 mcg (91) 3MPk Take 1 Tab by mouth daily. 4/22/19   Magdalena Leon MD   drospirenone-ethinyl estradiol (JEANETTE) 3-0.03 mg tab Take 1 Tab by mouth daily. 2/6/19   Magdalena Leon MD   ibuprofen (MOTRIN) 800 mg tablet Take 1 Tab by mouth every eight (8) hours. 12/31/18   Magdalena Leon MD   buPROPion XL (WELLBUTRIN XL) 150 mg tablet Take 1 Tab by mouth every morning. 12/31/18   Magdalena Leon MD   guaiFENesin-dextromethorphan SR [facility] [place] DM) 600-30 mg per tablet Take 1 Tab by mouth every twelve (12) hours as needed for Cough. Provider, Historical   acetaminophen (TYLENOL) 325 mg tablet Take  by mouth every four (4) hours as needed for Pain. Provider, Historical   [id number] marcelo-folic-dha (PRENATAL DHA+COMPLETE PRENATAL) [id number] mg-mcg-mg cmpk Take 1 Tab by mouth daily. Provider, Historical   clonazePAM (KLONOPIN) 1 mg tablet Take 1 Tab by mouth daily as needed. Must last 30 days. 10/30/17   Najma Goldberg MD   albuterol (PROVENTIL VENTOLIN) 2.5 mg /3 mL (0.083 %) nebulizer solution 3 mL by Nebulization route every four (4) hours as needed for Wheezing. 6/29/17   Dameon Orr NP   MELATONIN PO Take  by mouth as needed. Provider, Historical   diphenhydrAMINE (BENADRYL) 50 mg tablet Take 1 tablet 1 hr prior to MRI 10/18/16   Lawyer Nena MD   aspirin-acetaminophen-caffeine (EXCEDRIN ES) [phone] mg per tablet Take 1 Tab by mouth. Provider, Historical   loratadine 10 mg cap Take 10 mg by mouth daily.     Provider, Historical   omeprazole (PRILOSEC) 10 mg capsule TAKE ONE CAPSULE BY MOUTH ONCE A DAY  Patient taking differently: TAKE ONE CAPSULE BY MOUTH ONCE A DAY PRN 1/1/15   Stefani Brown MD                      Review of Systems - History obtained from the patient  Constitutional: negative for weight loss, fever, night sweats  Breast: negative for breast lumps, nipple discharge, galactorrhea  GI: negative for change in bowel habits, abdominal pain, black or bloody stools  : negative for frequency, dysuria, hematuria  MSK: negative for back pain, joint pain, muscle pain  Skin: negative for itching, rash, hives  Neuro: negative for dizziness, headache, confusion, weakness  Psych: negative for anxiety, depression, change in mood  Heme/lymph: negative for bleeding, bruising, pallor       Objective: There were no vitals taken for this visit. Physical Exam:   PHYSICAL EXAMINATION    Constitutional  · Appearance: well-nourished, well developed, alert, in no acute distress    HENT  · Head and Face: appears normal    Genitourinary  · External Genitalia: normal appearance for age, + discharge present, no tenderness present, no inflammatory lesions present, no masses present, no atrophy present  · Vagina:  + discharge present, otherwise normal vaginal vault without central or paravaginal defects, no inflammatory lesions present, no masses present  · Bladder: non-tender to palpation  · Urethra: appears normal  · Cervix: normal   · Uterus: normal size, shape and consistency  · Adnexa: no adnexal tenderness present, no adnexal masses present  · Perineum: perineum within normal limits, no evidence of trauma, no rashes or skin lesions present  · Anus: anus within normal limits, no hemorrhoids present  · Inguinal Lymph Nodes: no lymphadenopathy present    Skin  · General Inspection: no rash, no lesions identified    Neurologic/Psychiatric  · Mental Status:  · Orientation: grossly oriented to person, place and time  · Mood and Affect: mood normal, affect appropriate    Assessment:   Vaginitis- will treat with terazol while awaiting nuswab    Plan:   ROV prn if symptoms persist or worsen.

## 2019-08-01 RX ORDER — TERCONAZOLE 4 MG/G
1 CREAM VAGINAL
Qty: 1 TUBE | Refills: 0 | Status: SHIPPED | OUTPATIENT
Start: 2019-08-01 | End: 2019-08-08

## 2019-08-01 RX ORDER — METRONIDAZOLE 500 MG/1
500 TABLET ORAL 2 TIMES DAILY
Qty: 14 TAB | Refills: 0 | Status: SHIPPED | OUTPATIENT
Start: 2019-08-01 | End: 2019-08-08

## 2019-08-01 NOTE — TELEPHONE ENCOUNTER
From: Aleksander Mejia  To: Tamara Shirley MD  Sent: 7/3/2019 6:18 AM EDT  Subject: Non-Urgent Medical Question    [id number] [address] hurtado honestly extremely confused. With the swab saying no infection where is the discharge and inflammation, redness and irritation coming from? Because you told me my cervix was pretty red and inflammed. Im just not understanding why.

## 2019-08-01 NOTE — TELEPHONE ENCOUNTER
----- Message -----     From: Elyssa Frye     Sent: 8/1/2019 11:19 AM EDT       To: Esa Marshall MD  Subject: RE: Non-Urgent Medical Question    Could dr Weeks Fill call in the 7 day thing for a yeast now since i always get it from flagyl      rx pended for signature and pt notified

## 2019-10-03 ENCOUNTER — OFFICE VISIT (OUTPATIENT)
Dept: OBGYN CLINIC | Age: 24
End: 2019-10-03

## 2019-10-03 DIAGNOSIS — Z01.419 ENCOUNTER FOR GYNECOLOGICAL EXAMINATION WITHOUT ABNORMAL FINDING: ICD-10-CM

## 2019-10-03 DIAGNOSIS — N93.9 ABNORMAL UTERINE BLEEDING (AUB): Primary | ICD-10-CM

## 2019-10-03 RX ORDER — TERCONAZOLE 4 MG/G
1 CREAM VAGINAL
Qty: 45 G | Refills: 0 | Status: SHIPPED | OUTPATIENT
Start: 2019-10-03 | End: 2020-02-26

## 2019-10-03 RX ORDER — NORGESTIMATE AND ETHINYL ESTRADIOL 0.25-0.035
1 KIT ORAL DAILY
Qty: 3 PACKAGE | Refills: 4 | Status: SHIPPED | OUTPATIENT
Start: 2019-10-03 | End: 2020-01-27 | Stop reason: SDUPTHER

## 2019-10-03 NOTE — PROGRESS NOTES
Abnormal bleeding note      Dianna Senior is a 21 y.o. female who complains of vaginal bleeding problems. Her current method of family planning is OCP (estrogen/progesterone). She developed this problem approximately a few months ago. She has had vaginal bleeding which she describes as light, moderate lasting up to several weeks. Associated symptoms include discharge. Alleviating factors: none    Aggravating factors: none      The patient is sexually active. Her relevant past medical history:   Past Medical History:   Diagnosis Date    Asthma     Dysmenorrhea     Essential hypertension     Major depressive disorder, single episode, unspecified     Morbid obesity ([facility] [address]     Pap smear for cervical cancer screening 2/23/17 neg    Pituitary abnormality (HCC)     Seasonal allergies     Unspecified symptom associated with female genital organs         Past Surgical History:   Procedure Laterality Date    HX WISDOM TEETH EXTRACTION       Social History     Occupational History    Not on file   Tobacco Use    Smoking status: Current Every Day Smoker     Packs/day: 0.50     Years: 1.00     Pack years: 0.50     Types: Cigarettes    Smokeless tobacco: Never Used   Substance and Sexual Activity    Alcohol use: No     Alcohol/week: 0.0 standard drinks     Comment: former user just sips now and then    Drug use: No    Sexual activity: Yes     Partners: Male     Birth control/protection: Pill     Family History   Problem Relation Age of Onset    Alcohol abuse Father     Cancer Paternal Grandmother     Other Mother         endometriosis    Migraines Mother         tumer in brain       Allergies   Allergen Reactions    Latex Swelling     Skin peels    Lactose Other (comments)    Aspirin Nausea and Vomiting    Betadine [Povidone-Iodine] Other (comments)     Skin peels    Cat Dander Sneezing     Prior to Admission medications    Medication Sig Start Date End Date Taking?  Authorizing Provider   cabergoline (DOSTINEX) 0.5 mg tablet TAKE 1/2 TABLET BY MOUTH AT NIGHT ONCE WEEKLY 9/29/19   Thuy Bergman MD   levonorgestrel-ethinyl estradiol (SEASONALE) 0.15 mg-30 mcg (91) 3MPk Take 1 Tab by mouth daily. 4/22/19   Bg Castle MD   drospirenone-ethinyl estradiol (JEANETTE) 3-0.03 mg tab Take 1 Tab by mouth daily. 2/6/19   Bg Castle MD   ibuprofen (MOTRIN) 800 mg tablet Take 1 Tab by mouth every eight (8) hours. 12/31/18   Bg Castle MD   buPROPion XL (WELLBUTRIN XL) 150 mg tablet Take 1 Tab by mouth every morning. 12/31/18   Bg Castle MD   guaiFENesin-dextromethorphan SR [facility] DM) 600-30 mg per tablet Take 1 Tab by mouth every twelve (12) hours as needed for Cough. Provider, Historical   acetaminophen (TYLENOL) 325 mg tablet Take  by mouth every four (4) hours as needed for Pain. Provider, Historical   [id number] marcelo-folic-dha (PRENATAL DHA+COMPLETE PRENATAL) [id number] mg-mcg-mg cmpk Take 1 Tab by mouth daily. Provider, Historical   clonazePAM (KLONOPIN) 1 mg tablet Take 1 Tab by mouth daily as needed. Must last 30 days. 10/30/17   Zully Goldberg MD   albuterol (PROVENTIL VENTOLIN) 2.5 mg /3 mL (0.083 %) nebulizer solution 3 mL by Nebulization route every four (4) hours as needed for Wheezing. 6/29/17   Monica Esposito NP   MELATONIN PO Take  by mouth as needed. Provider, Historical   diphenhydrAMINE (BENADRYL) 50 mg tablet Take 1 tablet 1 hr prior to MRI 10/18/16   Thuy Bergman MD   aspirin-acetaminophen-caffeine (EXCEDRIN ES) 357-661-22 mg per tablet Take 1 Tab by mouth. Provider, Historical   loratadine 10 mg cap Take 10 mg by mouth daily.     Provider, Historical   omeprazole (PRILOSEC) 10 mg capsule TAKE ONE CAPSULE BY MOUTH ONCE A DAY  Patient taking differently: TAKE ONE CAPSULE BY MOUTH ONCE A DAY PRN 1/1/15   Angle Murphy MD        Review of Systems - History obtained from the patient  Constitutional: negative for weight loss, fever, night sweats  HEENT: negative for hearing loss, earache, congestion, snoring, sorethroat  CV: negative for chest pain, palpitations, edema  Resp: negative for cough, shortness of breath, wheezing  Breast: negative for breast lumps, nipple discharge, galactorrhea  GI: negative for change in bowel habits, abdominal pain, black or bloody stools  : negative for frequency, dysuria, hematuria  MSK: negative for back pain, joint pain, muscle pain  Skin: negative for itching, rash, hives  Neuro: negative for dizziness, headache, confusion, weakness  Psych: negative for anxiety, depression, change in mood  Heme/lymph: negative for bleeding, bruising, pallor      Objective: There were no vitals taken for this visit.        PHYSICAL EXAMINATION    Constitutional  · Appearance: well-nourished, well developed, alert, in no acute distress    HENT  · Head and Face: appears normal    Neck  · Inspection/Palpation: normal appearance, no masses or tenderness  · Lymph Nodes: no lymphadenopathy present  · Thyroid: gland size normal, nontender, no nodules or masses present on palpation  ·   Breasts  · Inspection of Breasts: breasts symmetrical, no skin changes, no discharge present, nipple appearance normal, no skin retraction present  · Palpation of Breasts and Axillae: no masses present on palpation, no breast tenderness  · Axillary Lymph Nodes: no lymphadenopathy present    Gastrointestinal  · Abdominal Examination: abdomen non-tender to palpation, normal bowel sounds, no masses present  · Liver and spleen: no hepatomegaly present, spleen not palpable  · Hernias: no hernias identified    Genitourinary  · External Genitalia: normal appearance for age, no discharge present, no tenderness present, no inflammatory lesions present, no masses present, no atrophy present  · Vagina: normal vaginal vault without central or paravaginal defects, no discharge present, no inflammatory lesions present, no masses present  · Bladder: non-tender to palpation  · Urethra: appears normal  · Cervix: normal   · Uterus: normal size, shape and consistency  · Adnexa: no adnexal tenderness present, no adnexal masses present  · Perineum: perineum within normal limits, no evidence of trauma, no rashes or skin lesions present  · Anus: anus within normal limits, no hemorrhoids present  · Inguinal Lymph Nodes: no lymphadenopathy present    Skin  · General Inspection: no rash, no lesions identified    Neurologic/Psychiatric  · Mental Status:  · Orientation: grossly oriented to person, place and time  · Mood and Affect: mood normal, affect appropriate    Assessment:   AUB with normal ultrasound, will switch to different pill, encouraged weight loss  Requested terazol as she is currently on antibiotics    Plan:   Instructions given to pt. Handouts given to pt. Ultrasound followup    Rony Almaguer is a 21 y.o. female is here today to review the results of her ultrasound evaluation. Her U/S evaluation is performed because of a previous encounter revealing aub which was identified a few months ago. She is here for an initial ultrasound study. The sonogram: within normal limits. See detailed report for more information.

## 2019-10-30 NOTE — PROGRESS NOTES
Chief Complaint   No chief complaint on file. HPI  21 y.o. female complains of scant vaginal discharge. No LMP recorded. She admits to additional symptoms at this time. Itching, irritation, odor, and STD concerns  The patient  denies aggravating factors  She denies exposure to new chemicals ot hygenic agents  She also reports she noticed an area of abrasion recently      Past Medical History:   Diagnosis Date    Asthma     Dysmenorrhea     Essential hypertension     Major depressive disorder, single episode, unspecified     Morbid obesity ([facility] [address]     Pap smear for cervical cancer screening 2/23/17 neg    Pituitary abnormality ([facility] [address]     Seasonal allergies     Unspecified symptom associated with female genital organs      Past Surgical History:   Procedure Laterality Date    HX WISDOM TEETH EXTRACTION       Social History     Occupational History    Not on file   Tobacco Use    Smoking status: Current Every Day Smoker     Packs/day: 0.50     Years: 1.00     Pack years: 0.50     Types: Cigarettes    Smokeless tobacco: Never Used   Substance and Sexual Activity    Alcohol use: No     Alcohol/week: 0.0 standard drinks     Comment: former user just sips now and then    Drug use: No    Sexual activity: Yes     Partners: Male     Birth control/protection: Pill     Family History   Problem Relation Age of Onset    Alcohol abuse Father     Cancer Paternal Grandmother     Other Mother         endometriosis   [facility] Migraines Mother         tumer in brain        Allergies   Allergen Reactions    Latex Swelling     Skin peels    Lactose Other (comments)    Aspirin Nausea and Vomiting    Betadine [Povidone-Iodine] Other (comments)     Skin peels    Cat Dander Sneezing     Prior to Admission medications    Medication Sig Start Date End Date Taking? Authorizing Provider   terconazole (TERAZOL 7) 0.4 % vaginal cream Insert 1 Applicator into vagina nightly.  10/3/19   Princess Lambert MD   norgestimate-ethinyl estradiol (ORTHO-CYCLEN, SPRINTEC) 0.25-35 mg-mcg tab Take 1 Tab by mouth daily. 10/3/19   Jose Bright MD   cabergoline (DOSTINEX) 0.5 mg tablet TAKE 1/2 TABLET BY MOUTH AT NIGHT ONCE WEEKLY 9/29/19   Laury Mclean MD   ibuprofen (MOTRIN) 800 mg tablet Take 1 Tab by mouth every eight (8) hours. 12/31/18   Jose Bright MD   buPROPion XL (WELLBUTRIN XL) 150 mg tablet Take 1 Tab by mouth every morning. 12/31/18   Jose Bright MD   guaiFENesin-dextromethorphan SR [facility] DM) 600-30 mg per tablet Take 1 Tab by mouth every twelve (12) hours as needed for Cough. Provider, Historical   acetaminophen (TYLENOL) 325 mg tablet Take  by mouth every four (4) hours as needed for Pain. Provider, Historical   [id number] marcelo-folic-dha (PRENATAL DHA+COMPLETE PRENATAL) [id number] mg-mcg-mg cmpk Take 1 Tab by mouth daily. Provider, Historical   clonazePAM (KLONOPIN) 1 mg tablet Take 1 Tab by mouth daily as needed. Must last 30 days. 10/30/17   Javy Goldberg MD   albuterol (PROVENTIL VENTOLIN) 2.5 mg /3 mL (0.083 %) nebulizer solution 3 mL by Nebulization route every four (4) hours as needed for Wheezing. 6/29/17   Darrius Cardenas NP   MELATONIN PO Take  by mouth as needed. Provider, Historical   diphenhydrAMINE (BENADRYL) 50 mg tablet Take 1 tablet 1 hr prior to MRI 10/18/16   Laury Mclean MD   aspirin-acetaminophen-caffeine (EXCEDRIN ES) 075-216-54 mg per tablet Take 1 Tab by mouth. Provider, Historical   loratadine 10 mg cap Take 10 mg by mouth daily.     Provider, Historical   omeprazole (PRILOSEC) 10 mg capsule TAKE ONE CAPSULE BY MOUTH ONCE A DAY  Patient taking differently: TAKE ONE CAPSULE BY MOUTH ONCE A DAY PRN 1/1/15   Hiram Cordova MD                      Review of Systems - History obtained from the patient  Constitutional: negative for weight loss, fever, night sweats  Breast: negative for breast lumps, nipple discharge, galactorrhea  GI: negative for change in bowel habits, abdominal pain, black or bloody stools  : negative for frequency, dysuria, hematuria  MSK: negative for back pain, joint pain, muscle pain  Skin: negative for itching, rash, hives  Neuro: negative for dizziness, headache, confusion, weakness  Psych: negative for anxiety, depression, change in mood  Heme/lymph: negative for bleeding, bruising, pallor       Objective: There were no vitals taken for this visit. Physical Exam:   PHYSICAL EXAMINATION    Constitutional  · Appearance: well-nourished, well developed, alert, in no acute distress    HENT  · Head and Face: appears normal    Genitourinary  · External Genitalia: normal appearance for age, + discharge present, are of increased erthema and tenderness at the introitus- hsv culture obtained. · Vagina:  + discharge present, otherwise normal vaginal vault without central or paravaginal defects, no inflammatory lesions present, no masses present  · Bladder: non-tender to palpation  · Urethra: appears normal  · Cervix: normal   · Uterus: normal size, shape and consistency  · Adnexa: no adnexal tenderness present, no adnexal masses present  · Perineum: perineum within normal limits, no evidence of trauma, no rashes or skin lesions present  · Anus: anus within normal limits, no hemorrhoids present  · Inguinal Lymph Nodes: no lymphadenopathy present    Skin  · General Inspection: no rash, no lesions identified    Neurologic/Psychiatric  · Mental Status:  · Orientation: grossly oriented to person, place and time  · Mood and Affect: mood normal, affect appropriate    Assessment:   Vaginitis and vulvar irritation- will f/u with nuswab and hsv culture. Plan:   ROV prn if symptoms persist or worsen.

## 2019-10-31 ENCOUNTER — OFFICE VISIT (OUTPATIENT)
Dept: OBGYN CLINIC | Age: 24
End: 2019-10-31

## 2019-10-31 DIAGNOSIS — Z11.3 SCREENING EXAMINATION FOR SEXUALLY TRANSMITTED DISEASE: ICD-10-CM

## 2019-10-31 DIAGNOSIS — N89.8 VAGINAL ODOR: Primary | ICD-10-CM

## 2019-10-31 DIAGNOSIS — N89.8 VAGINAL LESION: ICD-10-CM

## 2019-11-02 LAB
HSV1 DNA SPEC QL NAA+PROBE: NEGATIVE
HSV2 DNA SPEC QL NAA+PROBE: POSITIVE

## 2019-11-05 ENCOUNTER — PATIENT MESSAGE (OUTPATIENT)
Dept: OBGYN CLINIC | Age: 24
End: 2019-11-05

## 2019-11-06 RX ORDER — VALACYCLOVIR HYDROCHLORIDE 1 G/1
1000 TABLET, FILM COATED ORAL 2 TIMES DAILY
Qty: 30 TAB | Refills: 11 | Status: SHIPPED | OUTPATIENT
Start: 2019-11-06 | End: 2019-11-13

## 2019-11-06 NOTE — TELEPHONE ENCOUNTER
This nurse called to make sure patient did not have any further questions regarding her results. Patient verbalized understanding.

## 2019-11-06 NOTE — TELEPHONE ENCOUNTER
Call received at 850am    21year old patient last seen in the office on 10/31/19. Patient calling to that she can see her results and would like to know how to proceed. Patient advised of need for MD to review and advise. Pharmacy confirmed.       Please advise

## 2019-11-08 ENCOUNTER — OFFICE VISIT (OUTPATIENT)
Dept: ENDOCRINOLOGY | Age: 24
End: 2019-11-08

## 2019-11-08 VITALS
WEIGHT: 293 LBS | OXYGEN SATURATION: 99 % | TEMPERATURE: 97.4 F | BODY MASS INDEX: 47.09 KG/M2 | SYSTOLIC BLOOD PRESSURE: 136 MMHG | RESPIRATION RATE: 14 BRPM | HEIGHT: 66 IN | HEART RATE: 80 BPM | DIASTOLIC BLOOD PRESSURE: 68 MMHG

## 2019-11-08 DIAGNOSIS — D35.2 PITUITARY ADENOMA (HCC): ICD-10-CM

## 2019-11-08 DIAGNOSIS — E66.01 MORBID OBESITY (HCC): ICD-10-CM

## 2019-11-08 DIAGNOSIS — E22.1 HYPERPROLACTINEMIA (HCC): Primary | ICD-10-CM

## 2019-11-08 DIAGNOSIS — E66.01 OBESITY, MORBID (HCC): Primary | ICD-10-CM

## 2019-11-08 RX ORDER — PHENTERMINE HYDROCHLORIDE 37.5 MG/1
37.5 TABLET ORAL
Qty: 30 TAB | Refills: 2 | Status: SHIPPED | OUTPATIENT
Start: 2019-11-08 | End: 2020-02-26

## 2019-11-08 NOTE — PROGRESS NOTES
Samantha Miller MD        Patient Information  Date:11/8/2019  Name : Rico Mejia 21 y.o.     YOB: 1995         Referred by: Eugene Javier NP         Chief Complaint   Patient presents with    Pituitary Problem       History of Present Illness: Jazmín Richards is a 21 y.o. female here for fu    She was referred by Eugene Jaiver NP for evaluation and management of galactorrhea. She was on oral contraceptives in July 2016 for dysmenorrhea. She was switched to Medroxyprogesterone injection. MRI showed small adenoma 7 mm   She delivered a healthy baby in December 2018  Bothersome galactorrhea and hence was on cabergoline    Inability to lose weight, reportedly eating low calorie diet, walking          Wt Readings from Last 3 Encounters:   11/08/19 322 lb 6.4 oz (146.2 kg)   06/02/19 321 lb (145.6 kg)   05/10/19 321 lb (145.6 kg)       BP Readings from Last 3 Encounters:   11/08/19 136/68   05/10/19 131/58   04/04/19 111/71           Past Medical History:   Diagnosis Date    Asthma     Dysmenorrhea     Essential hypertension     Major depressive disorder, single episode, unspecified     Morbid obesity ([facility]     Pap smear for cervical cancer screening 2/23/17 neg    Pituitary abnormality (HCC)     Seasonal allergies     Unspecified symptom associated with female genital organs      Current Outpatient Medications   Medication Sig    valACYclovir (VALTREX) 1 gram tablet Take 1 Tab by mouth two (2) times a day for 7 days. Then may take 1 tab by mouth daily.  norgestimate-ethinyl estradiol (ORTHO-CYCLEN, SPRINTEC) 0.25-35 mg-mcg tab Take 1 Tab by mouth daily.  cabergoline (DOSTINEX) 0.5 mg tablet TAKE 1/2 TABLET BY MOUTH AT NIGHT ONCE WEEKLY    acetaminophen (TYLENOL) 325 mg tablet Take  by mouth every four (4) hours as needed for Pain.     omeprazole (PRILOSEC) 10 mg capsule TAKE ONE CAPSULE BY MOUTH ONCE A DAY (Patient taking differently: TAKE ONE CAPSULE BY MOUTH ONCE A DAY PRN)    terconazole (TERAZOL 7) 0.4 % vaginal cream Insert 1 Applicator into vagina nightly.  ibuprofen (MOTRIN) 800 mg tablet Take 1 Tab by mouth every eight (8) hours.  buPROPion XL (WELLBUTRIN XL) 150 mg tablet Take 1 Tab by mouth every morning.  guaiFENesin-dextromethorphan SR (MUCINEX DM) 600-30 mg per tablet Take 1 Tab by mouth every twelve (12) hours as needed for Cough.  [id number] marcelo-folic-dha (PRENATAL DHA+COMPLETE PRENATAL) -300 mg-mcg-mg cmpk Take 1 Tab by mouth daily.  clonazePAM (KLONOPIN) 1 mg tablet Take 1 Tab by mouth daily as needed. Must last 30 days.  albuterol (PROVENTIL VENTOLIN) 2.5 mg /3 mL (0.083 %) nebulizer solution 3 mL by Nebulization route every four (4) hours as needed for Wheezing.  MELATONIN PO Take  by mouth as needed.  diphenhydrAMINE (BENADRYL) 50 mg tablet Take 1 tablet 1 hr prior to MRI    aspirin-acetaminophen-caffeine (EXCEDRIN ES) 250-250-65 mg per tablet Take 1 Tab by mouth.  loratadine 10 mg cap Take 10 mg by mouth daily. No current facility-administered medications for this visit. Allergies   Allergen Reactions    Latex Swelling     Skin peels    Lactose Other (comments)    Aspirin Nausea and Vomiting    Betadine [Povidone-Iodine] Other (comments)     Skin peels    Cat Dander Sneezing         Review of Systems:  - Constitutional Symptoms: no fevers, no chills,   - Eyes: no blurry vision no double vision  - Cardiovascular: no chest pain ,no palpitations  - Neurological: no numbness, tingling, no  headaches  - Psychiatric: no depression no  anxiety  - Endocrine: no heat or cold intolerance    Physical Examination:   Blood pressure 136/68, pulse 80, temperature 97.4 °F (36.3 °C), temperature source Oral, resp. rate 14, height 5' 6\" (1.676 m), weight 322 lb 6.4 oz (146.2 kg), SpO2 99 %, not currently breastfeeding.  Estimated body mass index is 52.04 kg/m² as calculated from the following:    Height as of this encounter: 5' 6\" (1.676 m). -   Weight as of this encounter: 322 lb 6.4 oz (146.2 kg). - General: pleasant, no distress, good eye contact  - HEENT: no pallor, no periorbital edema, EOMI  - Neck: supple,  - Cardiovascular: regular, normal rate, normal S1 and S2,   - Respiratory: clear to auscultation bilaterally  - Integumentary: ,no edema,  - Neurological: ,alert and oriented  - Psychiatric: normal mood and affect  - Skin: color, texture, turgor normal.         Assessment/Plan:     1. Hyperprolactinemia ([address]    2. Pituitary adenoma ([address]          7 mm Pituitary microadenoma - could be incidental finding, prolactin is very minimally elevated   Screening for pituitary hormones - neg  Galactorrhea: Bothersome according to the patient, she felt better with cabergoline and wanted to continue        Obesity Body mass index is 52.04 kg/m². Weight has been stable, low-carb diet  Increase activity  Requesting phentermine as her friend is on it. Discussed the side effects, avoid pregnancy              Thank you for allowing me to participate in the care of this patient.     Mat Wilkes MD      Patient verbalized understanding

## 2019-11-08 NOTE — LETTER
11/9/19 Patient: Andrea Abrams YOB: 1995 Date of Visit: 11/8/2019 Alma Ying NP 
[address] [place] [address] [place] [address] VIA Facsimile: [phone] Dear Alma Ying NP, Thank you for referring Ms. Domenica Bhatti to [id number] [place] [address] for evaluation. My notes for this consultation are attached. If you have questions, please do not hesitate to call me. I look forward to following your patient along with you. Sincerely, Roxy Silverio MD

## 2019-11-08 NOTE — PATIENT INSTRUCTIONS
We will begin weight loss medication to help with metabolism. Start half a tablet in AM for a week and then 2 nd week 1 tablet in AM . Please watch out for chest pain, heart racing, dizziness, or anxiety and let me know if you develop any of these symptoms. Please monitor your blood pressure 1-2 times a week while on this and let me know if you are having any readings over 140/90. You can not be pregnant or get pregnant while you take the medication. If there is any possibility of pregnancy, a pregnancy test should be done to rule out before starting medication.

## 2019-12-18 ENCOUNTER — OFFICE VISIT (OUTPATIENT)
Dept: FAMILY MEDICINE CLINIC | Age: 24
End: 2019-12-18

## 2019-12-18 VITALS
HEIGHT: 66 IN | BODY MASS INDEX: 47.09 KG/M2 | SYSTOLIC BLOOD PRESSURE: 133 MMHG | TEMPERATURE: 98.7 F | OXYGEN SATURATION: 99 % | DIASTOLIC BLOOD PRESSURE: 82 MMHG | WEIGHT: 293 LBS | HEART RATE: 90 BPM | RESPIRATION RATE: 20 BRPM

## 2019-12-18 DIAGNOSIS — J06.9 UPPER RESPIRATORY TRACT INFECTION, UNSPECIFIED TYPE: Primary | ICD-10-CM

## 2019-12-18 RX ORDER — CETIRIZINE HCL 10 MG
10 TABLET ORAL DAILY
Qty: 30 TAB | Refills: 5 | Status: SHIPPED | OUTPATIENT
Start: 2019-12-18 | End: 2020-04-01

## 2019-12-18 RX ORDER — AZITHROMYCIN 250 MG/1
TABLET, FILM COATED ORAL
Qty: 6 TAB | Refills: 0 | Status: SHIPPED | OUTPATIENT
Start: 2019-12-18 | End: 2020-02-26 | Stop reason: ALTCHOICE

## 2019-12-18 NOTE — PROGRESS NOTES
Patient here for congestion, sore throat and runny nose. Chest congestion worse. Denies fever. Flu and strep negative in ED , positive for pharyngitis. Viral.     1. Have you been to the ER, urgent care clinic since your last visit? Hospitalized since your last visit? No    2. Have you seen or consulted any other health care providers outside of the [address] [place] [address] since your last visit? Include any pap smears or colon screening. No       Chief Complaint   Patient presents with    Sore Throat     ED 4 days ago, virus. Chest congestion, runny nose, fatigue     She is a 25 y.o. female who presents for evalution. Reviewed PmHx, RxHx, FmHx, SocHx, AllgHx and updated and dated in the chart. Patient Active Problem List    Diagnosis    Vaginal delivery    Obesity, morbid ([facility]    Pituitary adenoma ([facility]    Galactorrhea    Hyperprolactinemia ([facility] [facility]    Panic attacks    Chronic pelvic pain in female    Constipation    Obesity    Dysmenorrhea       Review of Systems - negative except as listed above in the HPI    Objective:     Vitals:    12/18/19 1349   BP: 133/82   Pulse: 90   Resp: 20   Temp: 98.7 °F (37.1 °C)   SpO2: 99%   Weight: 315 lb (142.9 kg)   Height: 5' 6\" (1.676 m)     Physical Examination: General appearance - alert, well appearing, and in no distress  Eyes - pupils equal and reactive, extraocular eye movements intact  Ears - bilateral TM's and external ear canals normal  Nose - normal and patent, no erythema, discharge or polyps  Mouth - mucous membranes moist, pharynx normal without lesions  Chest - clear to auscultation, no wheezes, rales or rhonchi, symmetric air entry  Heart - normal rate, regular rhythm, normal S1, S2, no murmurs, rubs, clicks or gallops      Assessment/ Plan:   Diagnoses and all orders for this visit:    1. Upper respiratory tract infection, unspecified type  -     cetirizine (ZYRTEC) 10 mg tablet; Take 1 Tab by mouth daily.   -     azithromycin (ZITHROMAX) 250 mg tablet; Take two tablets today then one tablet daily           I have discussed the diagnosis with the patient and the intended plan as seen in the above orders. The patient understands and agrees with the plan. The patient has received an after-visit summary and questions were answered concerning future plans. Medication Side Effects and Warnings were discussed with patient  Patient Labs were reviewed and or requested:  Patient Past Records were reviewed and or requested    Aviva Phan M.D. There are no Patient Instructions on file for this visit.

## 2020-01-27 ENCOUNTER — OFFICE VISIT (OUTPATIENT)
Dept: OBGYN CLINIC | Age: 25
End: 2020-01-27

## 2020-01-27 ENCOUNTER — HOSPITAL ENCOUNTER (OUTPATIENT)
Dept: LAB | Age: 25
Discharge: HOME | End: 2020-01-27

## 2020-01-27 VITALS
SYSTOLIC BLOOD PRESSURE: 128 MMHG | HEIGHT: 66 IN | BODY MASS INDEX: 47.09 KG/M2 | DIASTOLIC BLOOD PRESSURE: 84 MMHG | WEIGHT: 293 LBS

## 2020-01-27 DIAGNOSIS — R30.0 DYSURIA: ICD-10-CM

## 2020-01-27 DIAGNOSIS — Z72.51 UNPROTECTED SEXUAL INTERCOURSE: ICD-10-CM

## 2020-01-27 DIAGNOSIS — N89.8 VAGINAL DISCHARGE: ICD-10-CM

## 2020-01-27 DIAGNOSIS — Z01.419 ENCOUNTER FOR GYNECOLOGICAL EXAMINATION WITHOUT ABNORMAL FINDING: Primary | ICD-10-CM

## 2020-01-27 DIAGNOSIS — Z11.3 SCREENING EXAMINATION FOR SEXUALLY TRANSMITTED DISEASE: ICD-10-CM

## 2020-01-27 LAB
HBV SURFACE AG SER QL: <0.1 INDEX
HBV SURFACE AG SER QL: NEGATIVE
HCG URINE, QL. (POC): NEGATIVE
HCV AB SERPL QL IA: NONREACTIVE
HCV COMMENT,HCGAC: NORMAL
HIV 1+2 AB+HIV1 P24 AG SERPL QL IA: NONREACTIVE
HIV12 RESULT COMMENT, HHIVC: NORMAL
VALID INTERNAL CONTROL?: YES

## 2020-01-27 RX ORDER — NORGESTIMATE AND ETHINYL ESTRADIOL 0.25-0.035
1 KIT ORAL DAILY
Qty: 3 PACKAGE | Refills: 4 | Status: SHIPPED | OUTPATIENT
Start: 2020-01-27 | End: 2021-08-12

## 2020-01-27 NOTE — PROGRESS NOTES
Annual exam ages 21-44    Jerry Kearns is a ,  25 y.o. female   No LMP recorded. She presents for her annual checkup. She is having vaginal discharge and burning with urination      Menstrual status:    Her periods are normal in flow. She is using three to ten pads or tampons per day, usually regular with a 26-32 day interval with 3-7 day duration. She does not have dysmenorrhea. She reports no premenstrual symptoms. Contraception:    The current method of family planning is OCP. Sexual history:    She  reports being sexually active and has had partner(s) who are Male. She reports using the following method of birth control/protection: Pill. Medical conditions:    Since her last annual GYN exam about one year ago, she has not the following changes in her health history: none. Surgical history confirmed with patient. has a past surgical history that includes hx wisdom teeth extraction. Pap and Mammogram History:    Her most recent Pap smear was normal, obtained 3 year(s) ago. The patient has never had a mammogram.    Breast Cancer History/Substance Abuse: negative    Past Medical History:   Diagnosis Date    Asthma     Dysmenorrhea     Essential hypertension     Major depressive disorder, single episode, unspecified     Morbid obesity ([facility] [address]     Pap smear for cervical cancer screening 17 neg    Pituitary abnormality (HCC)     Seasonal allergies     Unspecified symptom associated with female genital organs      Past Surgical History:   Procedure Laterality Date    HX WISDOM TEETH EXTRACTION         Current Outpatient Medications   Medication Sig Dispense Refill    cetirizine (ZYRTEC) 10 mg tablet Take 1 Tab by mouth daily. 30 Tab 5    phentermine (ADIPEX-P) 37.5 mg tablet Take 1 Tab by mouth every morning. Max Daily Amount: 37.5 mg. 30 Tab 2    norgestimate-ethinyl estradiol (ORTHO-CYCLEN, SPRINTEC) 0.25-35 mg-mcg tab Take 1 Tab by mouth daily.  3 Package 4  azithromycin (ZITHROMAX) 250 mg tablet Take two tablets today then one tablet daily 6 Tab 0    terconazole (TERAZOL 7) 0.4 % vaginal cream Insert 1 Applicator into vagina nightly. 45 g 0    cabergoline (DOSTINEX) 0.5 mg tablet TAKE 1/2 TABLET BY MOUTH AT NIGHT ONCE WEEKLY 6 Tab 0    ibuprofen (MOTRIN) 800 mg tablet Take 1 Tab by mouth every eight (8) hours. 60 Tab 0    buPROPion XL (WELLBUTRIN XL) 150 mg tablet Take 1 Tab by mouth every morning. 180 Tab 4    acetaminophen (TYLENOL) 325 mg tablet Take  by mouth every four (4) hours as needed for Pain. Allergies: Latex; Lactose; Aspirin; Betadine [povidone-iodine]; and Cat dander     Tobacco History:  reports that she has been smoking cigarettes. She has a 0.50 pack-year smoking history. She has never used smokeless tobacco.  Alcohol Abuse:  reports no history of alcohol use. Drug Abuse:  reports no history of drug use.     Family Medical/Cancer History:   Family History   Problem Relation Age of Onset    Alcohol abuse Father     Cancer Paternal Grandmother     Other Mother         endometriosis   [facility] Mother         tumer in brain        Review of Systems - History obtained from the patient  Constitutional: negative for weight loss, fever, night sweats  HEENT: negative for hearing loss, earache, congestion, snoring, sorethroat  CV: negative for chest pain, palpitations, edema  Resp: negative for cough, shortness of breath, wheezing  GI: negative for change in bowel habits, abdominal pain, black or bloody stools  : negative for frequency, hematuria, +vaginal discharge, + dysuria  MSK: negative for back pain, joint pain, muscle pain  Breast: negative for breast lumps, nipple discharge, galactorrhea  Skin :negative for itching, rash, hives  Neuro: negative for dizziness, headache, confusion, weakness  Psych: negative for anxiety, depression, change in mood  Heme/lymph: negative for bleeding, bruising, pallor    Physical Exam    Visit Vitals  /84   Ht 5' 6\" (1.676 m)   Wt 328 lb (148.8 kg)   Breastfeeding No   BMI 52.94 kg/m²       Constitutional  · Appearance: well-nourished, well developed, alert, in no acute distress    HENT  · Head and Face: appears normal    Neck  · Inspection/Palpation: normal appearance, no masses or tenderness  · Lymph Nodes: no lymphadenopathy present  · Thyroid: gland size normal, nontender, no nodules or masses present on palpation    Chest  · Respiratory Effort: breathing unlabored    Breasts  · Inspection of Breasts: breasts symmetrical, no skin changes, no discharge present, nipple appearance normal, no skin retraction present  · Palpation of Breasts and Axillae: no masses present on palpation, no breast tenderness  · Axillary Lymph Nodes: no lymphadenopathy present    Gastrointestinal  · Abdominal Examination: abdomen non-tender to palpation, normal bowel sounds, no masses present  · Liver and spleen: no hepatomegaly present, spleen not palpable  · Hernias: no hernias identified    Genitourinary  · External Genitalia: normal appearance for age, no discharge present, no tenderness present, no inflammatory lesions present, no masses present, no atrophy present  · Vagina: normal vaginal vault without central or paravaginal defects, no discharge present, no inflammatory lesions present, no masses present  · Bladder: non-tender to palpation  · Urethra: appears normal  · Cervix: normal   · Uterus: normal size, shape and consistency  · Adnexa: no adnexal tenderness present, no adnexal masses present  · Perineum: perineum within normal limits, no evidence of trauma, no rashes or skin lesions present  · Anus: anus within normal limits, no hemorrhoids present  · Inguinal Lymph Nodes: no lymphadenopathy present    Skin  · General Inspection: no rash, no lesions identified    Neurologic/Psychiatric  · Mental Status:  · Orientation: grossly oriented to person, place and time  · Mood and Affect: mood normal, affect appropriate      Assessment:  Routine gynecologic examination  Her current medical status is satisfactory with no evidence of significant gynecologic issues. Dysuria- will f/u with urine culture  Vaginal discharge- will f/u with nuswab    Plan:  Counseled re: diet, exercise, healthy lifestyle  Return for yearly wellness visits  Pt counseled regarding co-testing for high risk HPV with pap  Rec screening mammo at either 35 or 40  Gardasil counseling, she will contact pediatrician to see if she has received all 3 injections.

## 2020-01-28 LAB
BACTERIA SPEC CULT: NORMAL
CC UR VC: NORMAL
RPR SER QL: NONREACTIVE
SERVICE CMNT-IMP: NORMAL

## 2020-02-01 LAB
C TRACH RRNA CVX QL NAA+PROBE: NEGATIVE
CYTOLOGIST CVX/VAG CYTO: NORMAL
CYTOLOGY CVX/VAG DOC CYTO: NORMAL
CYTOLOGY CVX/VAG DOC THIN PREP: NORMAL
CYTOLOGY HISTORY:: NORMAL
DX ICD CODE: NORMAL
LABCORP, 190119: NORMAL
Lab: NORMAL
N GONORRHOEA RRNA CVX QL NAA+PROBE: NEGATIVE
OTHER STN SPEC: NORMAL
STAT OF ADQ CVX/VAG CYTO-IMP: NORMAL
T VAGINALIS RRNA SPEC QL NAA+PROBE: NEGATIVE

## 2020-02-19 PROBLEM — G58.9 NERVE COMPRESSION SYNDROME: Status: ACTIVE | Noted: 2020-02-19

## 2020-02-19 PROBLEM — R60.9 PERIPHERAL EDEMA: Status: ACTIVE | Noted: 2020-02-19

## 2020-02-19 PROBLEM — R07.81 PLEURODYNIA: Status: ACTIVE | Noted: 2020-02-19

## 2020-02-26 ENCOUNTER — HOSPITAL ENCOUNTER (OUTPATIENT)
Dept: LAB | Age: 25
Discharge: HOME | End: 2020-02-26

## 2020-02-26 ENCOUNTER — OFFICE VISIT (OUTPATIENT)
Dept: ENDOCRINOLOGY | Age: 25
End: 2020-02-26

## 2020-02-26 VITALS
WEIGHT: 293 LBS | RESPIRATION RATE: 16 BRPM | OXYGEN SATURATION: 99 % | HEIGHT: 66 IN | SYSTOLIC BLOOD PRESSURE: 131 MMHG | DIASTOLIC BLOOD PRESSURE: 64 MMHG | BODY MASS INDEX: 47.09 KG/M2 | TEMPERATURE: 97 F | HEART RATE: 87 BPM

## 2020-02-26 DIAGNOSIS — D35.2 PITUITARY ADENOMA (HCC): Primary | ICD-10-CM

## 2020-02-26 DIAGNOSIS — E22.1 HYPERPROLACTINEMIA (HCC): ICD-10-CM

## 2020-02-26 DIAGNOSIS — R53.83 FATIGUE, UNSPECIFIED TYPE: ICD-10-CM

## 2020-02-26 DIAGNOSIS — D35.2 PITUITARY ADENOMA (HCC): ICD-10-CM

## 2020-02-26 DIAGNOSIS — R63.5 WEIGHT GAIN: ICD-10-CM

## 2020-02-26 LAB
ANION GAP SERPL CALC-SCNC: 5 MMOL/L (ref 5–15)
BUN SERPL-MCNC: 12 MG/DL (ref 6–20)
BUN/CREAT SERPL: 18 (ref 12–20)
CALCIUM SERPL-MCNC: 8.8 MG/DL (ref 8.5–10.1)
CHLORIDE SERPL-SCNC: 105 MMOL/L (ref 97–108)
CO2 SERPL-SCNC: 27 MMOL/L (ref 21–32)
CREAT SERPL-MCNC: 0.66 MG/DL (ref 0.55–1.02)
FERRITIN SERPL-MCNC: 17 NG/ML (ref 8–252)
GLUCOSE SERPL-MCNC: 86 MG/DL (ref 65–100)
POTASSIUM SERPL-SCNC: 4.4 MMOL/L (ref 3.5–5.1)
SODIUM SERPL-SCNC: 137 MMOL/L (ref 136–145)
T4 FREE SERPL-MCNC: 1 NG/DL (ref 0.8–1.5)
TSH SERPL DL<=0.05 MIU/L-ACNC: 2.86 UIU/ML (ref 0.36–3.74)

## 2020-02-26 RX ORDER — VALACYCLOVIR HYDROCHLORIDE 1 G/1
TABLET, FILM COATED ORAL
COMMUNITY
Start: 2020-01-27 | End: 2020-10-21

## 2020-02-26 NOTE — PROGRESS NOTES
Flavio Khna MD        Patient Information  Date:2/26/2020  Name : Mai Slaughter 25 y. o.     YOB: 1995         Referred by: Enrico Perea MD         Chief Complaint   Patient presents with    Pituitary Problem    Hyperprolactinemia       History of Present Illness: Mai Slaughter is a 25 y.o. female here for fu    She was referred by Enrico Perea MD for evaluation and management of galactorrhea. She was on oral contraceptives in July 2016 for dysmenorrhea. She was switched to Medroxyprogesterone injection. MRI showed small adenoma 7 mm   She delivered a healthy baby in December 2018  Bothersome galactorrhea and hence was on cabergoline, she is off cabergoline      Inability to lose weight, reportedly eating low calorie diet, walking, could not tolerate phentermine            Wt Readings from Last 3 Encounters:   02/26/20 330 lb (149.7 kg)   01/27/20 328 lb (148.8 kg)   12/18/19 315 lb (142.9 kg)       BP Readings from Last 3 Encounters:   02/26/20 131/64   01/27/20 128/84   12/18/19 133/82           Past Medical History:   Diagnosis Date    Asthma     Dysmenorrhea     Essential hypertension     Major depressive disorder, single episode, unspecified     Morbid obesity ([facility]     Pap smear for cervical cancer screening 2/23/17 neg    Pituitary abnormality (HCC)     Seasonal allergies     Unspecified symptom associated with female genital organs      Current Outpatient Medications   Medication Sig    valACYclovir (VALTREX) 1 gram tablet TK 1 T PO BID FOR 7 DAYS. THEN MAY TK 1 T PO D    norgestimate-ethinyl estradioL (ORTHO-CYCLEN, SPRINTEC) 0.25-35 mg-mcg tab Take 1 Tab by mouth daily.  cetirizine (ZYRTEC) 10 mg tablet Take 1 Tab by mouth daily.  acetaminophen (TYLENOL) 325 mg tablet Take  by mouth every four (4) hours as needed for Pain.  phentermine (ADIPEX-P) 37.5 mg tablet Take 1 Tab by mouth every morning.  Max Daily Amount: 37.5 mg.    terconazole (TERAZOL 7) 0.4 % vaginal cream Insert 1 Applicator into vagina nightly.  cabergoline (DOSTINEX) 0.5 mg tablet TAKE 1/2 TABLET BY MOUTH AT NIGHT ONCE WEEKLY    ibuprofen (MOTRIN) 800 mg tablet Take 1 Tab by mouth every eight (8) hours.  buPROPion XL (WELLBUTRIN XL) 150 mg tablet Take 1 Tab by mouth every morning. No current facility-administered medications for this visit. Allergies   Allergen Reactions    Latex Swelling     Skin peels    Lactose Other (comments)    Aspirin Nausea and Vomiting    Betadine [Povidone-Iodine] Other (comments)     Skin peels    Cat Dander Sneezing         Review of Systems:  - Constitutional Symptoms: no fevers, no chills,   - Eyes: no blurry vision no double vision  - Cardiovascular: no chest pain ,no palpitations  - Neurological: no numbness, tingling, no  headaches  - Psychiatric: no depression no  anxiety  - Endocrine: no heat or cold intolerance    Physical Examination:   Blood pressure 131/64, pulse 87, temperature 97 °F (36.1 °C), temperature source Oral, resp. rate 16, height 5' 6\" (1.676 m), weight 330 lb (149.7 kg), SpO2 99 %, not currently breastfeeding. Estimated body mass index is 53.26 kg/m² as calculated from the following:    Height as of this encounter: 5' 6\" (1.676 m). -   Weight as of this encounter: 330 lb (149.7 kg). - General: pleasant, no distress, good eye contact  - HEENT: no pallor, no periorbital edema, EOMI  - Neck: supple,  - Cardiovascular: regular, normal rate, normal S1 and S2,   - Respiratory: clear to auscultation bilaterally  - Integumentary: ,no edema,  - Neurological: ,alert and oriented  - Psychiatric: normal mood and affect  - Skin: color, texture, turgor normal.         Assessment/Plan:     1. Pituitary adenoma ([address]    2.  Hyperprolactinemia (HCC)          7 mm Pituitary microadenoma - could be incidental finding, prolactin is very minimally elevated   Screening for pituitary hormones - neg  Galactorrhea: Bothersome according to the patient, she felt better with cabergoline. Now she is off cabergoline        Obesity Body mass index is 53.26 kg/m². Low-carb diet  Increase activity  Could not tolerate phentermine  She has tried different diets in the past with little success  Medical weight loss program discussed      Chronic fatigue: Sleep hygiene, exercise, reduce carbs  Euthyroid      Thank you for allowing me to participate in the care of this patient.     Anahy Squires MD      Patient verbalized understanding

## 2020-02-26 NOTE — PROGRESS NOTES
Trae Melgar is a 25 y.o. female here for   Chief Complaint   Patient presents with    Pituitary Problem    Hyperprolactinemia       1. Have you been to the ER, urgent care clinic since your last visit? Hospitalized since your last visit? -no    2. Have you seen or consulted any other health care providers outside of the [address] [place] [address] since your last visit?   Include any pap smears or colon screening.-[facility]

## 2020-02-26 NOTE — LETTER
2/27/20 Patient: Ramos Leon YOB: 1995 Date of Visit: 2/26/2020 Staci Mack MD 
[address] [address] VIA In Basket Dear Staci Mack MD, Thank you for referring Ms. Junior Hightower to [address] [address] for evaluation. My notes for this consultation are attached. If you have questions, please do not hesitate to call me. I look forward to following your patient along with you. Sincerely, Tati Sy MD

## 2020-02-29 ENCOUNTER — APPOINTMENT (OUTPATIENT)
Dept: ULTRASOUND IMAGING | Age: 25
End: 2020-02-29
Attending: EMERGENCY MEDICINE
Payer: MEDICAID

## 2020-02-29 ENCOUNTER — APPOINTMENT (OUTPATIENT)
Dept: ULTRASOUND IMAGING | Age: 25
End: 2020-02-29
Payer: MEDICAID

## 2020-02-29 ENCOUNTER — HOSPITAL ENCOUNTER (EMERGENCY)
Age: 25
Discharge: HOME | End: 2020-02-29
Attending: EMERGENCY MEDICINE | Admitting: EMERGENCY MEDICINE
Payer: MEDICAID

## 2020-02-29 VITALS
WEIGHT: 293 LBS | HEART RATE: 83 BPM | SYSTOLIC BLOOD PRESSURE: 152 MMHG | DIASTOLIC BLOOD PRESSURE: 81 MMHG | OXYGEN SATURATION: 99 % | BODY MASS INDEX: 47.09 KG/M2 | TEMPERATURE: 97.8 F | HEIGHT: 66 IN | RESPIRATION RATE: 18 BRPM

## 2020-02-29 DIAGNOSIS — N30.00 ACUTE CYSTITIS WITHOUT HEMATURIA: Primary | ICD-10-CM

## 2020-02-29 LAB
ALBUMIN SERPL-MCNC: 3.4 G/DL (ref 3.5–5)
ALBUMIN/GLOB SERPL: 0.8 {RATIO} (ref 1.1–2.2)
ALP SERPL-CCNC: 82 U/L (ref 45–117)
ALT SERPL-CCNC: 21 U/L (ref 12–78)
ANION GAP SERPL CALC-SCNC: 7 MMOL/L (ref 5–15)
APPEARANCE UR: CLEAR
AST SERPL-CCNC: 13 U/L (ref 15–37)
BACTERIA URNS QL MICRO: ABNORMAL /HPF
BASOPHILS # BLD: 0 K/UL (ref 0–0.1)
BASOPHILS NFR BLD: 0 % (ref 0–1)
BILIRUB SERPL-MCNC: 0.3 MG/DL (ref 0.2–1)
BILIRUB UR QL: NEGATIVE
BUN SERPL-MCNC: 11 MG/DL (ref 6–20)
BUN/CREAT SERPL: 17 (ref 12–20)
CALCIUM SERPL-MCNC: 8.9 MG/DL (ref 8.5–10.1)
CHLORIDE SERPL-SCNC: 103 MMOL/L (ref 97–108)
CO2 SERPL-SCNC: 26 MMOL/L (ref 21–32)
COLOR UR: ABNORMAL
COMMENT, HOLDF: NORMAL
CREAT SERPL-MCNC: 0.64 MG/DL (ref 0.55–1.02)
DIFFERENTIAL METHOD BLD: ABNORMAL
EOSINOPHIL # BLD: 0.1 K/UL (ref 0–0.4)
EOSINOPHIL NFR BLD: 1 % (ref 0–7)
EPITH CASTS URNS QL MICRO: ABNORMAL /LPF
ERYTHROCYTE [DISTWIDTH] IN BLOOD BY AUTOMATED COUNT: 13.2 % (ref 11.5–14.5)
GLOBULIN SER CALC-MCNC: 4.1 G/DL (ref 2–4)
GLUCOSE SERPL-MCNC: 87 MG/DL (ref 65–100)
GLUCOSE UR STRIP.AUTO-MCNC: NEGATIVE MG/DL
HCG UR QL: NEGATIVE
HCT VFR BLD AUTO: 42.3 % (ref 35–47)
HGB BLD-MCNC: 13.8 G/DL (ref 11.5–16)
HGB UR QL STRIP: NEGATIVE
HYALINE CASTS URNS QL MICRO: ABNORMAL /LPF (ref 0–5)
IMM GRANULOCYTES # BLD AUTO: 0 K/UL (ref 0–0.04)
IMM GRANULOCYTES NFR BLD AUTO: 0 % (ref 0–0.5)
KETONES UR QL STRIP.AUTO: NEGATIVE MG/DL
LEUKOCYTE ESTERASE UR QL STRIP.AUTO: ABNORMAL
LIPASE SERPL-CCNC: 101 U/L (ref 73–393)
LYMPHOCYTES # BLD: 1.5 K/UL (ref 0.8–3.5)
LYMPHOCYTES NFR BLD: 17 % (ref 12–49)
MCH RBC QN AUTO: 30.4 PG (ref 26–34)
MCHC RBC AUTO-ENTMCNC: 32.6 G/DL (ref 30–36.5)
MCV RBC AUTO: 93.2 FL (ref 80–99)
MONOCYTES # BLD: 0.7 K/UL (ref 0–1)
MONOCYTES NFR BLD: 8 % (ref 5–13)
NEUTS SEG # BLD: 6.8 K/UL (ref 1.8–8)
NEUTS SEG NFR BLD: 74 % (ref 32–75)
NITRITE UR QL STRIP.AUTO: NEGATIVE
NRBC # BLD: 0 K/UL (ref 0–0.01)
NRBC BLD-RTO: 0 PER 100 WBC
PH UR STRIP: 6.5 [PH] (ref 5–8)
PLATELET # BLD AUTO: 321 K/UL (ref 150–400)
PMV BLD AUTO: 8.6 FL (ref 8.9–12.9)
POTASSIUM SERPL-SCNC: 4.2 MMOL/L (ref 3.5–5.1)
PROT SERPL-MCNC: 7.5 G/DL (ref 6.4–8.2)
PROT UR STRIP-MCNC: NEGATIVE MG/DL
RBC # BLD AUTO: 4.54 M/UL (ref 3.8–5.2)
RBC #/AREA URNS HPF: ABNORMAL /HPF (ref 0–5)
SAMPLES BEING HELD,HOLD: NORMAL
SODIUM SERPL-SCNC: 136 MMOL/L (ref 136–145)
SP GR UR REFRACTOMETRY: 1 (ref 1–1.03)
UR CULT HOLD, URHOLD: NORMAL
UROBILINOGEN UR QL STRIP.AUTO: 0.2 EU/DL (ref 0.2–1)
WBC # BLD AUTO: 9.1 K/UL (ref 3.6–11)
WBC URNS QL MICRO: ABNORMAL /HPF (ref 0–4)

## 2020-02-29 PROCEDURE — 80053 COMPREHEN METABOLIC PANEL: CPT

## 2020-02-29 PROCEDURE — 76830 TRANSVAGINAL US NON-OB: CPT

## 2020-02-29 PROCEDURE — 81025 URINE PREGNANCY TEST: CPT

## 2020-02-29 PROCEDURE — 81001 URINALYSIS AUTO W/SCOPE: CPT

## 2020-02-29 PROCEDURE — 85025 COMPLETE CBC W/AUTO DIFF WBC: CPT

## 2020-02-29 PROCEDURE — 87086 URINE CULTURE/COLONY COUNT: CPT

## 2020-02-29 PROCEDURE — 74011250637 HC RX REV CODE- 250/637: Performed by: EMERGENCY MEDICINE

## 2020-02-29 PROCEDURE — 96374 THER/PROPH/DIAG INJ IV PUSH: CPT

## 2020-02-29 PROCEDURE — 83690 ASSAY OF LIPASE: CPT

## 2020-02-29 PROCEDURE — 99284 EMERGENCY DEPT VISIT MOD MDM: CPT

## 2020-02-29 PROCEDURE — 36415 COLL VENOUS BLD VENIPUNCTURE: CPT

## 2020-02-29 PROCEDURE — 76856 US EXAM PELVIC COMPLETE: CPT

## 2020-02-29 RX ORDER — CEPHALEXIN 500 MG/1
500 CAPSULE ORAL 2 TIMES DAILY
Qty: 8 CAP | Refills: 0 | Status: SHIPPED | OUTPATIENT
Start: 2020-02-29 | End: 2020-03-04

## 2020-02-29 RX ORDER — CEPHALEXIN 250 MG/1
500 CAPSULE ORAL
Status: COMPLETED | OUTPATIENT
Start: 2020-02-29 | End: 2020-02-29

## 2020-02-29 RX ORDER — FLUCONAZOLE 150 MG/1
150 TABLET ORAL
Qty: 1 TAB | Refills: 1 | Status: SHIPPED | OUTPATIENT
Start: 2020-02-29 | End: 2020-02-29

## 2020-02-29 RX ORDER — IBUPROFEN 800 MG/1
800 TABLET ORAL
COMMUNITY
End: 2021-08-12

## 2020-02-29 RX ORDER — IBUPROFEN 800 MG/1
800 TABLET ORAL
Status: COMPLETED | OUTPATIENT
Start: 2020-02-29 | End: 2020-02-29

## 2020-02-29 RX ORDER — KETOROLAC TROMETHAMINE 30 MG/ML
30 INJECTION, SOLUTION INTRAMUSCULAR; INTRAVENOUS ONCE
Status: DISCONTINUED | OUTPATIENT
Start: 2020-02-29 | End: 2020-02-29 | Stop reason: HOSPADM

## 2020-02-29 RX ADMIN — CEPHALEXIN 500 MG: 250 CAPSULE ORAL at 18:31

## 2020-02-29 RX ADMIN — IBUPROFEN 800 MG: 800 TABLET ORAL at 19:57

## 2020-02-29 NOTE — ED PROVIDER NOTES
The history is provided by the patient. Abdominal Pain    This is a new problem. The current episode started 1 to 2 hours ago. The problem occurs constantly. The problem has been gradually improving. The pain is associated with an unknown factor. The pain is located in the suprapubic region. The quality of the pain is cramping and tearing. The pain is severe. Associated symptoms include nausea and dysuria. Pertinent negatives include no fever, no diarrhea, no vomiting, no constipation, no frequency and no chest pain. The pain is worsened by certain positions. The pain is relieved by nothing. Her past medical history does not include ectopic pregnancy or ovarian cysts. The patient's surgical history non-contributory.        Past Medical History:   Diagnosis Date    Asthma     Dysmenorrhea     Essential hypertension     Major depressive disorder, single episode, unspecified     Morbid obesity ([facility] [address]     Pap smear for cervical cancer screening 2/23/17 neg    Pituitary abnormality (HCC)     Seasonal allergies     Unspecified symptom associated with female genital organs        Past Surgical History:   Procedure Laterality Date    HX WISDOM TEETH EXTRACTION           Family History:   Problem Relation Age of Onset    Alcohol abuse Father     Cancer Paternal Grandmother     Other Mother         endometriosis   [facility] Mother         tumer in brain       Social History     Socioeconomic History    Marital status: SINGLE     Spouse name: Not on file    Number of children: Not on file    Years of education: Not on file    Highest education level: Not on file   Occupational History    Not on file   Social Needs    Financial resource strain: Not on file    Food insecurity:     Worry: Not on file     Inability: Not on file    Transportation needs:     Medical: Not on file     Non-medical: Not on file   Tobacco Use    Smoking status: Current Every Day Smoker     Packs/day: 0.50     Years: 1.00     Pack years: 0.50     Types: Cigarettes    Smokeless tobacco: Never Used   Substance and Sexual Activity    Alcohol use: No     Alcohol/week: 0.0 standard drinks     Comment: former user just sips now and then    Drug use: No    Sexual activity: Yes     Partners: Male     Birth control/protection: Pill   Lifestyle    Physical activity:     Days per week: Not on file     Minutes per session: Not on file    Stress: Not on file   Relationships    Social connections:     Talks on phone: Not on file     Gets together: Not on file     Attends Mu-ism service: Not on file     Active member of club or organization: Not on file     Attends meetings of clubs or organizations: Not on file     Relationship status: Not on file    Intimate partner violence:     Fear of current or ex partner: Not on file     Emotionally abused: Not on file     Physically abused: Not on file     Forced sexual activity: Not on file   Other Topics Concern     Service Not Asked    Blood Transfusions Not Asked    Caffeine Concern Not Asked    Occupational Exposure Not Asked   Meredith Plate Hazards Not Asked    Sleep Concern Not Asked    Stress Concern Not Asked    Weight Concern Not Asked    Special Diet Not Asked    Back Care Not Asked    Exercise Not Asked    Bike Helmet Not Asked   [address] Not Asked    Self-Exams Not Asked   Social History Narrative    Not on file         ALLERGIES: Latex; Lactose; Aspirin; Betadine [povidone-iodine]; and Cat dander    Review of Systems   Constitutional: Negative for chills and fever. Respiratory: Negative for shortness of breath. Cardiovascular: Negative for chest pain. Gastrointestinal: Positive for abdominal pain and nausea. Negative for constipation, diarrhea and vomiting. Genitourinary: Positive for dysuria and pelvic pain. Negative for frequency and vaginal bleeding. Neurological: Negative for dizziness and light-headedness.    All other systems reviewed and are negative. Vitals:    02/29/20 1458   BP: 132/82   Pulse: 96   Resp: 18   Temp: 97.8 °F (36.6 °C)   SpO2: 100%   Weight: 147.4 kg (325 lb)   Height: 5' 6\" (1.676 m)            Physical Exam  Vitals signs and nursing note reviewed. Constitutional:       Appearance: She is well-developed. HENT:      Head: Normocephalic and atraumatic. Eyes:      Pupils: Pupils are equal, round, and reactive to light. Neck:      Musculoskeletal: Normal range of motion and neck supple. Cardiovascular:      Rate and Rhythm: Normal rate and regular rhythm. Pulmonary:      Effort: Pulmonary effort is normal.      Breath sounds: Normal breath sounds. Abdominal:      General: There is no distension. Palpations: Abdomen is soft. Tenderness: There is no abdominal tenderness. Skin:     General: Skin is warm and dry. Capillary Refill: Capillary refill takes less than 2 seconds. Neurological:      Mental Status: She is alert and oriented to person, place, and time. Psychiatric:         Behavior: Behavior normal.          MDM  Number of Diagnoses or Management Options  Acute cystitis without hematuria:   Diagnosis management comments: The patient is resting comfortably and feels better, is alert and in no distress. The repeat examination is unremarkable and benign; in particular, there is no discomfort at McBurney's point. The history, exam, diagnostic testing, and current condition do not suggest acute appendicitis, ovarian torsion, ruptured ovarian cyst, ectopic pregnancy, bowel obstruction, incarcerated hernia, acute cholecystitis, bowel perforation, major gastrointestinal bleeding, severe diverticulitis, sepsis, or other significant pathology to warrant further testing, continued ED treatment, admission, or surgical evaluation at this point. The vital signs have been stable and are within normal limits at this time.  The patient does not have uncontrollable pain, intractable vomiting, or other significant symptoms. The patient's condition is stable and appropriate for discharge. The patient will pursue further outpatient evaluation with the primary care physician or other designated or consulting physician as indicated in the discharge instructions. Procedures      The patient's results have been reviewed with them and/or available family. Patient and/or family verbally conveyed their understanding and agreement of the patient's signs, symptoms, diagnosis, treatment and prognosis and additionally agree to follow up as recommended in the discharge instructions or to return to the Emergency Room should their condition change prior to their follow-up appointment. The patient/family verbally agrees with the care-plan and verbally conveys that all of their questions have been answered. The discharge instructions have also been provided to the patient and/or family with some educational information regarding the patient's diagnosis as well a list of reasons why the patient would want to return to the ER prior to their follow-up appointment, should their condition change.

## 2020-03-01 NOTE — ED TRIAGE NOTES
Pt arrives with c/o of abdominal pain x 1 day. Pt reports pain during deep penetration x 2 weeks. Pt reports taking ibuprofen at home. Pt denies pain or burning when urinating.

## 2020-03-02 ENCOUNTER — OP HISTORICAL/CONVERTED ENCOUNTER (OUTPATIENT)
Dept: OTHER | Age: 25
End: 2020-03-02

## 2020-03-02 LAB
BACTERIA SPEC CULT: NORMAL
CC UR VC: NORMAL
SERVICE CMNT-IMP: NORMAL

## 2020-03-26 ENCOUNTER — OFFICE VISIT (OUTPATIENT)
Dept: FAMILY MEDICINE CLINIC | Age: 25
End: 2020-03-26

## 2020-03-26 DIAGNOSIS — J18.9 COMMUNITY ACQUIRED PNEUMONIA, UNSPECIFIED LATERALITY: Primary | ICD-10-CM

## 2020-03-26 DIAGNOSIS — J45.30 MILD PERSISTENT ASTHMA WITHOUT COMPLICATION: ICD-10-CM

## 2020-03-26 DIAGNOSIS — R68.89 FLU-LIKE SYMPTOMS: ICD-10-CM

## 2020-03-26 LAB
FLUAV+FLUBV AG NOSE QL IA.RAPID: NEGATIVE POS/NEG
FLUAV+FLUBV AG NOSE QL IA.RAPID: NEGATIVE POS/NEG
S PYO AG THROAT QL: NEGATIVE
VALID INTERNAL CONTROL?: YES
VALID INTERNAL CONTROL?: YES

## 2020-03-26 RX ORDER — ALBUTEROL SULFATE 90 UG/1
1 AEROSOL, METERED RESPIRATORY (INHALATION)
Qty: 1 INHALER | Refills: 0 | Status: SHIPPED | OUTPATIENT
Start: 2020-03-26 | End: 2022-05-02

## 2020-03-26 RX ORDER — PREDNISONE 10 MG/1
TABLET ORAL
Qty: 21 TAB | Refills: 0 | Status: SHIPPED | OUTPATIENT
Start: 2020-03-26 | End: 2021-08-12

## 2020-03-31 DIAGNOSIS — J06.9 UPPER RESPIRATORY TRACT INFECTION, UNSPECIFIED TYPE: ICD-10-CM

## 2020-04-01 ENCOUNTER — OP HISTORICAL/CONVERTED ENCOUNTER (OUTPATIENT)
Dept: OTHER | Age: 25
End: 2020-04-01

## 2020-04-01 RX ORDER — CETIRIZINE HCL 10 MG
TABLET ORAL
Qty: 30 TAB | Refills: 5 | Status: SHIPPED | OUTPATIENT
Start: 2020-04-01 | End: 2020-06-26 | Stop reason: SDUPTHER

## 2020-04-06 NOTE — PROGRESS NOTES
After obtaining consent, and per orders of Dr. Anshu Mullen, injection of TDAP given in right deltoid by Veronique Garber LPN. Patient instructed to remain in clinic for 20 minutes afterwards, and to report any adverse reaction to me immediately. · Secondary to COVID-19  · Plan as above  · Currently patient is breathing comfortably in room air

## 2020-04-10 ENCOUNTER — TELEPHONE (OUTPATIENT)
Dept: FAMILY MEDICINE CLINIC | Age: 25
End: 2020-04-10

## 2020-04-10 NOTE — TELEPHONE ENCOUNTER
[facility] nurse called re  patient calling because she went to [facility] ER and they said she has Covid. ( They didn't test her.) Gave her tussilon pearls. She is worried because her cough is worse and she also has asthma.  Please call    [phone] patient

## 2020-04-10 NOTE — TELEPHONE ENCOUNTER
Attempted to reach pt to inform message from provider. Patient x2 id verified. Notified message to pt, verbalized understanding.

## 2020-04-10 NOTE — TELEPHONE ENCOUNTER
I recommend pt follow up with ER since we did not see her for this and are not currently seeing patient in the office.

## 2020-04-15 ENCOUNTER — NURSE TRIAGE (OUTPATIENT)
Dept: OTHER | Facility: CLINIC | Age: 25
End: 2020-04-15

## 2020-04-15 NOTE — TELEPHONE ENCOUNTER
Pt reports about 2 weeks ago she became ill and was sick for about 24 hours. She got better, except that she had persistent chest and nasal congestion. She has had a persistent cough with phlegm since that time. She has not had a fever since then but it was 102 during that initial illness. She went to [facility] ER/ 1 week ago, they gave her a presumptive dx of CV19 and did not test her; she was told she had bronchitis and sinusitis. She is taking mucinex but stopped a few days ago because it was upsetting her stomach. She has an inhaler and underlying asthma and has used her inhaler and felt better right away. Unfortunately, she has really been sick persistently for the past 2 months. She has recently found out she has black mold in her home which is going to be taken care of this week, but it has made her quite unwell. She is super frustrated because she can't get through to her PCP to get his help with her cough. She does smoke, we discussed smoking cessation as well. Reason for Disposition   Patient wants to be seen    Answer Assessment - Initial Assessment Questions  1. ONSET: \"When did the cough begin? \"       It has been ongoing fors everal months, worse the past few weeks  2. SEVERITY: \"How bad is the cough today? \"       Moderate but she can function  3. RESPIRATORY DISTRESS: \"Describe your breathing. \"       No resp distress at rest  4. FEVER: \"Do you have a fever? \" If so, ask: \"What is your temperature, how was it measured, and when did it start? \"      no  5. HEMOPTYSIS: \"Are you coughing up any blood? \" If so ask: \"How much? \" (flecks, streaks, tablespoons, etc.)      no  6. TREATMENT: \"What have you done so far to treat the cough? \" (e.g., meds, fluids, humidifier)      albuterol  7. CARDIAC HISTORY: \"Do you have any history of heart disease? \" (e.g., heart attack, congestive heart failure)       no  8. LUNG HISTORY: \"Do you have any history of lung disease? \"  (e.g., pulmonary embolus, asthma, emphysema)      Asthma, smoker  9. PE RISK FACTORS: \"Do you have a history of blood clots? \" (or: recent major surgery, recent prolonged travel, bedridden)      no  10. OTHER SYMPTOMS: \"Do you have any other symptoms? (e.g., runny nose, wheezing, chest pain)        wheezing  11. PREGNANCY: \"Is there any chance you are pregnant? \" \"When was your last menstrual period? \"        no  12. TRAVEL: \"Have you traveled out of the country in the last month? \" (e.g., travel history, exposures)        no    Protocols used: COUGH-ADULT-[place]

## 2020-04-16 ENCOUNTER — TELEPHONE (OUTPATIENT)
Dept: FAMILY MEDICINE CLINIC | Age: 25
End: 2020-04-16

## 2020-04-16 NOTE — TELEPHONE ENCOUNTER
Spoke with pt she stated that she wants to wait to set up a vv with this office as she saw a dr. Felix Ryder vv already and she was given an antibiotic and prednisone. Encouraged pt to give us a call if once the meds are done she is not any better.  Pt verbalized understanding

## 2020-05-01 ENCOUNTER — OP HISTORICAL/CONVERTED ENCOUNTER (OUTPATIENT)
Dept: OTHER | Age: 25
End: 2020-05-01

## 2020-06-04 ENCOUNTER — TELEPHONE (OUTPATIENT)
Dept: OBGYN CLINIC | Age: 25
End: 2020-06-04

## 2020-06-04 NOTE — TELEPHONE ENCOUNTER
Please call patient tomorrow morning. She missed your call but is good with you returning call tomorrow. No details.

## 2020-06-05 NOTE — TELEPHONE ENCOUNTER
Called pt again and had to leave VM. If she calls back and I'm seeing pt's encourage her to use mychart.

## 2020-06-10 ENCOUNTER — OFFICE VISIT (OUTPATIENT)
Dept: PRIMARY CARE CLINIC | Age: 25
End: 2020-06-10

## 2020-06-10 DIAGNOSIS — R68.89 FLU-LIKE SYMPTOMS: ICD-10-CM

## 2020-06-10 DIAGNOSIS — J02.9 SORE THROAT: Primary | ICD-10-CM

## 2020-06-10 LAB
S PYO AG THROAT QL: NEGATIVE
VALID INTERNAL CONTROL?: YES

## 2020-06-10 NOTE — PROGRESS NOTES
Patient seen at [address] [place] [address] drive through flu clinic. Please see scanned form for visit documentation. Strep negative  Covid pending    Verbal consent obtained to treat and bill for services.

## 2020-06-13 LAB
SARS-COV-2, NAA: NOT DETECTED
SPECIMEN STATUS REPORT, ROLRST: NORMAL

## 2020-06-15 NOTE — PROGRESS NOTES
Please call to inform that COVID test was negative. Results were also released via [employer] as follows:    [place] [id number] test was negative!     Eleni Franco NP

## 2020-06-19 ENCOUNTER — TELEPHONE (OUTPATIENT)
Dept: OBGYN CLINIC | Age: 25
End: 2020-06-19

## 2020-06-19 NOTE — TELEPHONE ENCOUNTER
Patient has Ob appt Monday. Discussed that intercourse (she denies in over 1 week) and bowel movements can cause the cervix to bleed slightly. Rest when she can,   Pelvic rest hydrate, and monitor. Call if bleeding intensifies to speak with someone throughout the weekend or ER if necessary. Call prn.

## 2020-06-19 NOTE — TELEPHONE ENCOUNTER
Patient noticed after having constipation last night she started with loose stools that turned to diarrhea. She had light brown spotting vaginally after a bowel movement. It gradually turned more red to pinkish and now today it is brown again. No cramping, no intercourse, sometimes feels a little pelvic pressure.

## 2020-06-20 ENCOUNTER — TELEPHONE (OUTPATIENT)
Dept: OBGYN CLINIC | Age: 25
End: 2020-06-20

## 2020-06-21 NOTE — TELEPHONE ENCOUNTER
Returned call from answering service. Spoke with patient earlier this afternoon, 6/20/20 ~ 1610. Pt of Dr. Dennis Collier. Approx 4wks pregnant. Has confirmation appt on Monday. Vaginal spotting over last couple of days. Was brown, now bright red. Less that a period. No pain. States blood type is O pos. SAB precautions reviewed. Keep appt Mon as scheduled.

## 2020-06-22 ENCOUNTER — HOSPITAL ENCOUNTER (OUTPATIENT)
Dept: LAB | Age: 25
Discharge: HOME | End: 2020-06-22

## 2020-06-22 ENCOUNTER — OFFICE VISIT (OUTPATIENT)
Dept: OBGYN CLINIC | Age: 25
End: 2020-06-22

## 2020-06-22 DIAGNOSIS — O20.0 THREATENED ABORTION: ICD-10-CM

## 2020-06-22 DIAGNOSIS — O20.0 THREATENED ABORTION: Primary | ICD-10-CM

## 2020-06-22 LAB
HCG SERPL-ACNC: 47 MIU/ML (ref 0–6)
HCG URINE, QL. (POC): POSITIVE
VALID INTERNAL CONTROL?: YES

## 2020-06-22 NOTE — PROGRESS NOTES
Threatened AB note    Lucretia Núñez is a ,  25 y.o. female [facility] whose Patient's last menstrual period was 2020. was on 2020. making her 5 weeks pregnant , who presents with spotting and cramping that started 3 days ago . The amount of bleeding is described as moderate . The cramps are described as minimal.                          Recent Tests:  She had a positive No components found for: SPEP, UPEP  pregnancy test last week . She has not had a recent pelvic ultrasound. She has not had prenatal care. She does not have a history of a spontaneous . She has not had a recent injury or trauma.   Additional complaints: UPT very faint positive line in office today    Past Medical History:   Diagnosis Date    Asthma     Dysmenorrhea     Essential hypertension     Major depressive disorder, single episode, unspecified     Morbid obesity ([facility] [address]     Pap smear for cervical cancer screening 17 neg    Pituitary abnormality (HCC)     Seasonal allergies     Unspecified symptom associated with female genital organs      Past Surgical History:   Procedure Laterality Date    HX WISDOM TEETH EXTRACTION       Social History     Occupational History    Not on file   Tobacco Use    Smoking status: Current Every Day Smoker     Packs/day: 0.50     Years: 1.00     Pack years: 0.50     Types: Cigarettes    Smokeless tobacco: Never Used   Substance and Sexual Activity    Alcohol use: No     Alcohol/week: 0.0 standard drinks     Comment: former user just sips now and then    Drug use: No    Sexual activity: Yes     Partners: Male     Birth control/protection: Pill     Family History   Problem Relation Age of Onset    Alcohol abuse Father     Cancer Paternal Grandmother     Other Mother         endometriosis   [facility] Mother         tumer in brain       Allergies   Allergen Reactions    Latex Swelling     Skin peels    Lactose Other (comments)    Aspirin Nausea and Vomiting    Betadine [Povidone-Iodine] Other (comments)     Skin peels    Cat Dander Sneezing    Penicillins Other (comments)     Yeast infections     Prior to Admission medications    Medication Sig Start Date End Date Taking? Authorizing Provider   valACYclovir (VALTREX) 1 gram tablet TK 1 T PO BID FOR 7 DAYS. THEN MAY TK 1 T PO D 1/27/20  Yes Provider, Historical   cetirizine (ZYRTEC) 10 mg tablet TAKE 1 TABLET BY MOUTH DAILY 4/1/20   Pro Coy NP   predniSONE (STERAPRED DS) 10 mg dose pack See administration instruction per 10mg dose pack 3/26/20   Ophelia Chavez NP   albuterol (PROVENTIL HFA, VENTOLIN HFA, PROAIR HFA) 90 mcg/actuation inhaler Take 1 Puff by inhalation every four (4) hours as needed for Wheezing. 3/26/20   Ophelia Chavez NP   ibuprofen (MOTRIN) 800 mg tablet Take 800 mg by mouth every eight (8) hours as needed for Pain. Other, MD Jermain   norgestimate-ethinyl estradioL (ORTHO-CYCLEN, SPRINTEC) 0.25-35 mg-mcg tab Take 1 Tab by mouth daily. 1/27/20   Augustin Santamaria MD   acetaminophen (TYLENOL) 325 mg tablet Take  by mouth every four (4) hours as needed for Pain.     Provider, Historical        Review of Systems: History obtained from the patient  Constitutional: negative for weight loss, fever, night sweats  Breast: negative for breast lumps, nipple discharge, galactorrhea  GI: negative for change in bowel habits, abdominal pain, black or bloody stools  : negative for frequency, dysuria, hematuria, vaginal discharge  MSK: negative for back pain, joint pain, muscle pain  Skin: negative for itching, rash, hives  Psych: negative for anxiety, depression, change in mood      Objective:  Visit Vitals  LMP 05/17/2020       Physical Exam:   PHYSICAL EXAMINATION    Constitutional  · Appearance: well-nourished, well developed, alert, in no acute distress    Gastrointestinal  · Abdominal Examination: abdomen non-tender to palpation, normal bowel sounds, no masses present  · Liver and spleen: no hepatomegaly present, spleen not palpable  · Hernias: no hernias identified    Skin  · General Inspection: no rash, no lesions identified    Neurologic/Psychiatric  · Mental Status:  · Orientation: grossly oriented to person, place and time  · Mood and Affect: mood normal, affect appropriate    Assessment/Plan:   Threatened AB- will check hcg today, if + will repeat upt in 2 weeks, if still + thenw ill rto for repeat hcg.

## 2020-06-26 DIAGNOSIS — J06.9 UPPER RESPIRATORY TRACT INFECTION, UNSPECIFIED TYPE: ICD-10-CM

## 2020-06-26 RX ORDER — CETIRIZINE HCL 10 MG
TABLET ORAL
Qty: 30 TAB | Refills: 5 | Status: SHIPPED | OUTPATIENT
Start: 2020-06-26 | End: 2021-03-18 | Stop reason: SDUPTHER

## 2020-07-14 ENCOUNTER — TELEPHONE (OUTPATIENT)
Dept: ENDOCRINOLOGY | Age: 25
End: 2020-07-14

## 2020-07-14 DIAGNOSIS — E22.1 HYPERPROLACTINEMIA (HCC): ICD-10-CM

## 2020-07-14 DIAGNOSIS — D35.2 PITUITARY ADENOMA (HCC): Primary | ICD-10-CM

## 2020-07-31 DIAGNOSIS — E22.1 HYPERPROLACTINEMIA (HCC): ICD-10-CM

## 2020-07-31 DIAGNOSIS — D35.2 PITUITARY ADENOMA (HCC): ICD-10-CM

## 2020-08-09 ENCOUNTER — ED HISTORICAL/CONVERTED ENCOUNTER (OUTPATIENT)
Dept: OTHER | Age: 25
End: 2020-08-09

## 2020-08-18 LAB
BUN SERPL-MCNC: 10 MG/DL (ref 6–20)
BUN/CREAT SERPL: 14 (ref 9–23)
CALCIUM SERPL-MCNC: 9 MG/DL (ref 8.7–10.2)
CHLORIDE SERPL-SCNC: 106 MMOL/L (ref 96–106)
CO2 SERPL-SCNC: 22 MMOL/L (ref 20–29)
CREAT SERPL-MCNC: 0.73 MG/DL (ref 0.57–1)
GLUCOSE SERPL-MCNC: 91 MG/DL (ref 65–99)
POTASSIUM SERPL-SCNC: 4.5 MMOL/L (ref 3.5–5.2)
PROLACTIN SERPL-MCNC: 20.3 NG/ML (ref 4.8–23.3)
SODIUM SERPL-SCNC: 140 MMOL/L (ref 134–144)

## 2020-08-26 ENCOUNTER — OFFICE VISIT (OUTPATIENT)
Dept: ENDOCRINOLOGY | Age: 25
End: 2020-08-26
Payer: MEDICAID

## 2020-08-26 VITALS
RESPIRATION RATE: 16 BRPM | BODY MASS INDEX: 47.09 KG/M2 | TEMPERATURE: 97.3 F | WEIGHT: 293 LBS | DIASTOLIC BLOOD PRESSURE: 76 MMHG | OXYGEN SATURATION: 99 % | HEART RATE: 82 BPM | SYSTOLIC BLOOD PRESSURE: 135 MMHG | HEIGHT: 66 IN

## 2020-08-26 DIAGNOSIS — E22.1 HYPERPROLACTINEMIA (HCC): Primary | ICD-10-CM

## 2020-08-26 DIAGNOSIS — E66.01 MORBID OBESITY (HCC): ICD-10-CM

## 2020-08-26 DIAGNOSIS — R53.82 CHRONIC FATIGUE: ICD-10-CM

## 2020-08-26 DIAGNOSIS — D35.2 PITUITARY ADENOMA (HCC): ICD-10-CM

## 2020-08-26 PROCEDURE — 99214 OFFICE O/P EST MOD 30 MIN: CPT | Performed by: INTERNAL MEDICINE

## 2020-08-26 NOTE — PROGRESS NOTES
Jailene Merchant MD        Patient Information  Date:8/26/2020  Name : Davin Solomon 25 y. o.     YOB: 1995         Referred by: Juana Mckeon MD         Chief Complaint   Patient presents with    Pituitary Problem    Hyperprolactinemia       History of Present Illness: Davin Solomon is a 25 y.o. female here for fu    She was referred by Juana Mckeon MD for evaluation and management of galactorrhea. She was on oral contraceptives in July 2016 for dysmenorrhea. She was switched to Medroxyprogesterone injection. MRI showed small adenoma 7 mm   She delivered a healthy baby in December 2018  Bothersome galactorrhea and hence was on cabergoline, she is off cabergoline  Had a miscarriage  Reports a lot of stress  She has been gaining weight, limited activity  She has started working  Complains of swelling, generalized, had recent UTI    Headache      Wt Readings from Last 3 Encounters:   08/26/20 330 lb (149.7 kg)   02/29/20 325 lb (147.4 kg)   02/26/20 330 lb (149.7 kg)       BP Readings from Last 3 Encounters:   08/26/20 135/76   02/29/20 152/81   02/26/20 131/64           Past Medical History:   Diagnosis Date    Asthma     Dysmenorrhea     Essential hypertension     Major depressive disorder, single episode, unspecified     Morbid obesity ([address]     Pap smear for cervical cancer screening 2/23/17 neg    Pituitary abnormality (HCC)     Seasonal allergies     Unspecified symptom associated with female genital organs      Current Outpatient Medications   Medication Sig    cetirizine (ZYRTEC) 10 mg tablet TAKE 1 TABLET BY MOUTH DAILY    albuterol (PROVENTIL HFA, VENTOLIN HFA, PROAIR HFA) 90 mcg/actuation inhaler Take 1 Puff by inhalation every four (4) hours as needed for Wheezing.  ibuprofen (MOTRIN) 800 mg tablet Take 800 mg by mouth every eight (8) hours as needed for Pain.     valACYclovir (VALTREX) 1 gram tablet TK 1 T PO BID FOR 7 DAYS. THEN MAY TK 1 T PO D    norgestimate-ethinyl estradioL (ORTHO-CYCLEN, SPRINTEC) 0.25-35 mg-mcg tab Take 1 Tab by mouth daily.  acetaminophen (TYLENOL) 325 mg tablet Take  by mouth every four (4) hours as needed for Pain.  predniSONE (STERAPRED DS) 10 mg dose pack See administration instruction per 10mg dose pack     No current facility-administered medications for this visit. Allergies   Allergen Reactions    Latex Swelling     Skin peels    Lactose Other (comments)    Aspirin Nausea and Vomiting    Betadine [Povidone-Iodine] Other (comments)     Skin peels    Cat Dander Sneezing    Penicillins Other (comments)     Yeast infections         Review of Systems:  - Constitutional Symptoms: no fevers, no chills,   - Eyes: no blurry vision no double vision  - Cardiovascular: no chest pain ,no palpitations  - Neurological: no numbness, tingling, no  headaches  - Psychiatric: no depression no  anxiety  - Endocrine: no heat or cold intolerance    Physical Examination:   Blood pressure 135/76, pulse 82, temperature 97.3 °F (36.3 °C), temperature source Oral, resp. rate 16, height 5' 6\" (1.676 m), weight 330 lb (149.7 kg), SpO2 99 %, not currently breastfeeding. Estimated body mass index is 53.26 kg/m² as calculated from the following:    Height as of this encounter: 5' 6\" (1.676 m). -   Weight as of this encounter: 330 lb (149.7 kg). - General: pleasant, no distress, good eye contact  - HEENT: no pallor, no periorbital edema, EOMI  - Neck: supple,  - Cardiovascular: regular, normal rate, normal S1 and S2,   - Respiratory: clear to auscultation bilaterally  - Integumentary: ,no edema,  - Neurological: ,alert and oriented  - Psychiatric: normal mood and affect  - Skin: color, texture, turgor normal.         Assessment/Plan:     1. Hyperprolactinemia ([address]    2.  Pituitary adenoma ([address]          7 mm Pituitary microadenoma - could be incidental finding, prolactin is very minimally elevated Screening for pituitary hormones - neg  Galactorrhea: Bothersome according to the patient, she felt better with cabergoline. Now she is off cabergoline  Prolactin normal  Reassured patient that headache is not due to pituitary microadenoma  Sleep hygiene, reduction of the stress, may have sleep apnea, weight loss is the key which was stressed      Obesity Body mass index is 53.26 kg/m². Low-carb diet  Increase activity  Could not tolerate phentermine  She has tried different diets in the past with little success  Medical weight loss program discussed      Chronic fatigue: Sleep hygiene, exercise, reduce carbs  Euthyroid  She has more psychosomatic symptoms      Thank you for allowing me to participate in the care of this patient.     Dewayne Contreras MD      Patient verbalized understanding

## 2020-08-26 NOTE — LETTER
8/29/20 Patient: Sandra Eduardo YOB: 1995 Date of Visit: 8/26/2020 Red Wynne MD 
[address] [address] VIA In Basket Dear Red Wynne MD, Thank you for referring Ms. Davis Aguillon to [id number] [place] [address] for evaluation. My notes for this consultation are attached. If you have questions, please do not hesitate to call me. I look forward to following your patient along with you. Sincerely, Nancy Lawler MD

## 2020-08-26 NOTE — PROGRESS NOTES
Ericka Moreno is a 25 y.o. female here for   Chief Complaint   Patient presents with    Pituitary Problem    Hyperprolactinemia       1. Have you been to the ER, urgent care clinic since your last visit? Hospitalized since your last visit? -[facility] 1 month ago for the swelling, dx with UTI    2. Have you seen or consulted any other health care providers outside of the [address] [place] [address] since your last visit?   Include any pap smears or colon screening.-no    C/o of fatigue, headaches, loss of appetite and having issues with swelling x1 month and getting UTI's frequently and elevated chloride  *Took diclofenac for 4 days before swelling started

## 2020-08-31 ENCOUNTER — VIRTUAL VISIT (OUTPATIENT)
Dept: FAMILY MEDICINE CLINIC | Age: 25
End: 2020-08-31
Payer: MEDICAID

## 2020-08-31 DIAGNOSIS — J06.9 UPPER RESPIRATORY TRACT INFECTION, UNSPECIFIED TYPE: Primary | ICD-10-CM

## 2020-08-31 PROCEDURE — 99213 OFFICE O/P EST LOW 20 MIN: CPT | Performed by: FAMILY MEDICINE

## 2020-08-31 RX ORDER — BENZONATATE 100 MG/1
100 CAPSULE ORAL
Qty: 60 CAP | Refills: 1 | Status: SHIPPED | OUTPATIENT
Start: 2020-08-31 | End: 2020-09-07

## 2020-08-31 RX ORDER — AZITHROMYCIN 250 MG/1
TABLET, FILM COATED ORAL
Qty: 6 TAB | Refills: 0 | Status: SHIPPED | OUTPATIENT
Start: 2020-08-31 | End: 2021-08-12

## 2020-08-31 RX ORDER — PREDNISONE 10 MG/1
TABLET ORAL
Qty: 1 PACKAGE | Refills: 0 | Status: SHIPPED | OUTPATIENT
Start: 2020-08-31 | End: 2021-08-12

## 2020-08-31 NOTE — PROGRESS NOTES
Patient here today with complaints of cough for the last week, chest congestion, mild wheezing, no fever chills or myalgias and no COVID-19 contacts. Consent: Roger Walker, who was seen by synchronous (real-time) audio-video technology, and/or her healthcare decision maker, is aware that this patient-initiated, Telehealth encounter on 8/31/2020 is a billable service, with coverage as determined by her insurance carrier. She is aware that she may receive a bill and has provided verbal consent to proceed: YES-Consent obtained within past 12 months        712  Subjective:   Roger Walker is a 25 y.o. female who was seen for No chief complaint on file. Prior to Admission medications    Medication Sig Start Date End Date Taking? Authorizing Provider   azithromycin (ZITHROMAX) 250 mg tablet Take two tablets today then one tablet daily 8/31/20  Yes Ermias Head MD   benzonatate (Tessalon Perles) 100 mg capsule Take 1 Cap by mouth three (3) times daily as needed for Cough for up to 7 days. 8/31/20 9/7/20 Yes Ermias Head MD   predniSONE [facility]) 10 mg dose pack 6 Day DS taper pack as directed 8/31/20  Yes Ermias Head MD   cetirizine (ZYRTEC) 10 mg tablet TAKE 1 TABLET BY MOUTH DAILY 6/26/20   Ermias Head MD   predniSONE [facility]) 10 mg dose pack See administration instruction per 10mg dose pack 3/26/20   Levi Sharma NP   albuterol (PROVENTIL HFA, VENTOLIN HFA, PROAIR HFA) 90 mcg/actuation inhaler Take 1 Puff by inhalation every four (4) hours as needed for Wheezing. 3/26/20   Levi Sharma NP   ibuprofen (MOTRIN) 800 mg tablet Take 800 mg by mouth every eight (8) hours as needed for Pain. Other, MD Jermain   valACYclovir (VALTREX) 1 gram tablet TK 1 T PO BID FOR 7 DAYS. THEN MAY TK 1 T PO D 1/27/20   Provider, Historical   norgestimate-ethinyl estradioL (ORTHO-CYCLEN, SPRINTEC) 0.25-35 mg-mcg tab Take 1 Tab by mouth daily.  1/27/20   Brit Williamson MD   acetaminophen (TYLENOL) 325 mg tablet Take  by mouth every four (4) hours as needed for Pain. Provider, Historical     Allergies   Allergen Reactions    Latex Swelling     Skin peels    Lactose Other (comments)    Aspirin Nausea and Vomiting    Betadine [Povidone-Iodine] Other (comments)     Skin peels    Cat Dander Sneezing    Penicillins Other (comments)     Yeast infections       ROS    Objective:   Vital Signs: (As obtained by patient/caregiver at home)  There were no vitals taken for this visit. [INSTRUCTIONS:  \"[x]\" Indicates a positive item  \"[]\" Indicates a negative item  -- DELETE ALL ITEMS NOT EXAMINED]    Constitutional: [x] Appears well-developed and well-nourished [x] No apparent distress      [] Abnormal -     Mental status: [x] Alert and awake  [x] Oriented to person/place/time [x] Able to follow commands    [] Abnormal -     Eyes:   EOM    [x]  Normal    [] Abnormal -   Sclera  [x]  Normal    [] Abnormal -          Discharge [x]  None visible   [] Abnormal -     HENT: [x] Normocephalic, atraumatic  [] Abnormal -   [x] Mouth/Throat: Mucous membranes are moist    External Ears [x] Normal  [] Abnormal -    Neck: [x] No visualized mass [] Abnormal -     Pulmonary/Chest: [x] Respiratory effort normal   [x] No visualized signs of difficulty breathing or respiratory distress        [] Abnormal -        Neurological:        [x] No Facial Asymmetry (Cranial nerve 7 motor function) (limited exam due to video visit)          [x] No gaze palsy        [] Abnormal -          Skin:        [x] No significant exanthematous lesions or discoloration noted on facial skin         [] Abnormal -            Psychiatric:       [x] Normal Affect [] Abnormal -        [x] No Hallucinations    Other pertinent observable physical exam findings:-              Assessment & Plan:   Diagnoses and all orders for this visit:    1. Upper respiratory tract infection, unspecified type  -     azithromycin (ZITHROMAX) 250 mg tablet;  Take two tablets today then one tablet daily  -     benzonatate (Tessalon Perles) 100 mg capsule; Take 1 Cap by mouth three (3) times daily as needed for Cough for up to 7 days. Other orders  -     predniSONE (STERAPRED DS) 10 mg dose pack; 6 Day DS taper pack as directed                    We discussed the expected course, resolution and complications of the diagnosis(es) in detail. Medication risks, benefits, costs, interactions, and alternatives were discussed as indicated. I advised her to contact the office if her condition worsens, changes or fails to improve as anticipated. She expressed understanding with the diagnosis(es) and plan. Marlin Wheatley is a 25 y.o. female being evaluated by a video visit encounter for concerns as above. A caregiver was present when appropriate. Due to this being a TeleHealth encounter (During OVRBS-14 public health emergency), evaluation of the following organ systems was limited: Vitals/Constitutional/EENT/Resp/CV/GI//MS/Neuro/Skin/Heme-Lymph-Imm. Pursuant to the emergency declaration under the [facility], 1135 waiver authority and the [employer] Act, this Virtual  Visit was conducted, with patient's (and/or legal guardian's) consent, to reduce the patient's risk of exposure to COVID-19 and provide necessary medical care. Services were provided through a video synchronous discussion virtually to substitute for in-person clinic visit. Patient and provider were located at their individual homes.         Arsh Tate MD

## 2020-10-05 LAB — SARS-COV-2, NAA: NEGATIVE

## 2020-10-25 NOTE — PROGRESS NOTES
Chief Complaint   Vulvar irritation    HPI  25 y.o. female complains of vaginal burning. No LMP recorded. She denies additional symptoms at this time. The patient  denies aggravating factors  She denies exposure to new chemicals ot hygenic agents  Previous treatment included:  OTC yeast cream such as Monistat or Gyne-Lotrimin    Past Medical History:   Diagnosis Date    Asthma     Dysmenorrhea     Essential hypertension     Major depressive disorder, single episode, unspecified     Morbid obesity ([facility] [address]     Pap smear for cervical cancer screening 2/23/17 neg    Pituitary abnormality ([facility] [address]     Seasonal allergies     Unspecified symptom associated with female genital organs      Past Surgical History:   Procedure Laterality Date    HX WISDOM TEETH EXTRACTION       Social History     Occupational History    Not on file   Tobacco Use    Smoking status: Current Every Day Smoker     Packs/day: 0.50     Years: 1.00     Pack years: 0.50     Types: Cigarettes    Smokeless tobacco: Never Used   Substance and Sexual Activity    Alcohol use: No     Alcohol/week: 0.0 standard drinks     Comment: former user just sips now and then    Drug use: No    Sexual activity: Yes     Partners: Male     Birth control/protection: Pill     Family History   Problem Relation Age of Onset    Alcohol abuse Father     Cancer Paternal Grandmother     Other Mother         endometriosis    Migraines Mother         tumer in brain        Allergies   Allergen Reactions    Latex Swelling     Skin peels    Lactose Other (comments)    Aspirin Nausea and Vomiting    Betadine [Povidone-Iodine] Other (comments)     Skin peels    Cat Dander Sneezing    Penicillins Other (comments)     Yeast infections     Prior to Admission medications    Medication Sig Start Date End Date Taking? Authorizing Provider   valACYclovir (VALTREX) 1 gram tablet TAKE 1 TABLET BY MOUTH TWICE DAILY FOR 7 DAYS.  THEN MAY TAKE 1 TABLET BY MOUTH DAILY 10/21/20 Gene Mcclelland MD   azithromycin [facility]) 250 mg tablet Take two tablets today then one tablet daily 8/31/20   Hank Martinez MD   predniSONE [facility] DS) 10 mg dose pack 6 Day DS taper pack as directed 8/31/20   Hank Martinez MD   cetirizine (ZYRTEC) 10 mg tablet TAKE 1 TABLET BY MOUTH DAILY 6/26/20   Hank Martinez MD   predniSONE [facility] DS) 10 mg dose pack See administration instruction per 10mg dose pack 3/26/20   Blank Brewer NP   albuterol (PROVENTIL HFA, VENTOLIN HFA, PROAIR HFA) 90 mcg/actuation inhaler Take 1 Puff by inhalation every four (4) hours as needed for Wheezing. 3/26/20   Blank Brewer NP   ibuprofen (MOTRIN) 800 mg tablet Take 800 mg by mouth every eight (8) hours as needed for Pain. Other, MD Jermain   norgestimate-ethinyl estradioL (ORTHO-CYCLEN, SPRINTEC) 0.25-35 mg-mcg tab Take 1 Tab by mouth daily. 1/27/20   Gene Mcclelland MD   acetaminophen (TYLENOL) 325 mg tablet Take  by mouth every four (4) hours as needed for Pain. Provider, Historical                      Review of Systems - History obtained from the patient  Constitutional: negative for weight loss, fever, night sweats  Breast: negative for breast lumps, nipple discharge, galactorrhea  GI: negative for change in bowel habits, abdominal pain, black or bloody stools  : negative for frequency, dysuria, hematuria  MSK: negative for back pain, joint pain, muscle pain  Skin: negative for itching, rash, hives  Neuro: negative for dizziness, headache, confusion, weakness  Psych: negative for anxiety, depression, change in mood  Heme/lymph: negative for bleeding, bruising, pallor       Objective: There were no vitals taken for this visit.     Physical Exam:   PHYSICAL EXAMINATION    Constitutional  · Appearance: well-nourished, well developed, alert, in no acute distress    HENT  · Head and Face: appears normal    Genitourinary  · External Genitalia: normal appearance for age, + discharge present, no tenderness present, no inflammatory lesions present, no masses present, no atrophy present  · Vagina:  + discharge present, otherwise normal vaginal vault without central or paravaginal defects, no inflammatory lesions present, no masses present  · Bladder: non-tender to palpation  · Urethra: appears normal  · Cervix: normal   · Uterus: normal size, shape and consistency  · Adnexa: no adnexal tenderness present, no adnexal masses present  · Perineum: perineum within normal limits, no evidence of trauma, no rashes or skin lesions present  · Anus: anus within normal limits, no hemorrhoids present  · Inguinal Lymph Nodes: no lymphadenopathy present    Skin  · General Inspection: no rash, no lesions identified    Neurologic/Psychiatric  · Mental Status:  · Orientation: grossly oriented to person, place and time  · Mood and Affect: mood normal, affect appropriate    Assessment:   vuvlar irriation- will treat with diflucan and mycolog and f/u with nuswab    Plan:   ROV prn if symptoms persist or worsen.

## 2020-10-26 ENCOUNTER — OFFICE VISIT (OUTPATIENT)
Dept: OBGYN CLINIC | Age: 25
End: 2020-10-26
Payer: MEDICAID

## 2020-10-26 DIAGNOSIS — N89.8 VAGINAL DISCHARGE: Primary | ICD-10-CM

## 2020-10-26 DIAGNOSIS — N90.89 VULVAR IRRITATION: ICD-10-CM

## 2020-10-26 PROCEDURE — 99213 OFFICE O/P EST LOW 20 MIN: CPT | Performed by: OBSTETRICS & GYNECOLOGY

## 2020-10-26 RX ORDER — FLUCONAZOLE 150 MG/1
150 TABLET ORAL DAILY
Qty: 3 TAB | Refills: 0 | Status: SHIPPED | OUTPATIENT
Start: 2020-10-26 | End: 2020-10-29

## 2020-10-26 RX ORDER — NYSTATIN AND TRIAMCINOLONE ACETONIDE 100000; 1 [USP'U]/G; MG/G
OINTMENT TOPICAL 2 TIMES DAILY
Qty: 1 TUBE | Refills: 3 | Status: SHIPPED | OUTPATIENT
Start: 2020-10-26 | End: 2020-11-02

## 2020-10-27 ENCOUNTER — TELEPHONE (OUTPATIENT)
Dept: OBGYN CLINIC | Age: 25
End: 2020-10-27

## 2020-10-27 RX ORDER — TRIAMCINOLONE ACETONIDE 1 MG/G
OINTMENT TOPICAL 2 TIMES DAILY
Qty: 1 TUBE | Refills: 3 | Status: SHIPPED | OUTPATIENT
Start: 2020-10-27 | End: 2021-08-12

## 2020-10-27 RX ORDER — NYSTATIN 100000 U/G
CREAM TOPICAL 2 TIMES DAILY
Qty: 1 TUBE | Refills: 3 | Status: SHIPPED | OUTPATIENT
Start: 2020-10-27 | End: 2021-08-12

## 2020-10-27 NOTE — TELEPHONE ENCOUNTER
----- Message from Feng Crawford sent at 10/27/2020 11:54 AM EDT -----  Regarding: script  Can you separate please Nystatin-Triamcinolone

## 2020-10-30 LAB
A VAGINAE DNA VAG QL NAA+PROBE: NORMAL SCORE
BVAB2 DNA VAG QL NAA+PROBE: NORMAL SCORE
C ALBICANS DNA VAG QL NAA+PROBE: NEGATIVE
C GLABRATA DNA VAG QL NAA+PROBE: NEGATIVE
C TRACH DNA VAG QL NAA+PROBE: NEGATIVE
MEGA1 DNA VAG QL NAA+PROBE: NORMAL SCORE
N GONORRHOEA DNA VAG QL NAA+PROBE: NEGATIVE
T VAGINALIS DNA VAG QL NAA+PROBE: NEGATIVE

## 2020-12-16 LAB — SARS-COV-2, NAA: NEGATIVE

## 2021-01-15 ENCOUNTER — TELEPHONE (OUTPATIENT)
Dept: ENDOCRINOLOGY | Age: 26
End: 2021-01-15

## 2021-01-15 NOTE — TELEPHONE ENCOUNTER
----- Message from Hector Stone sent at 1/13/2021 12:21 PM EST -----  Regarding: Dr. Lynch/Telephone  Pt request for a call back form the practice to discuss if the doctor is requesting for her get full labs prior to her appt on 02/24. Best contact number is [phone].

## 2021-01-31 ENCOUNTER — PATIENT MESSAGE (OUTPATIENT)
Dept: OBGYN CLINIC | Age: 26
End: 2021-01-31

## 2021-02-08 ENCOUNTER — OFFICE VISIT (OUTPATIENT)
Dept: OBGYN CLINIC | Age: 26
End: 2021-02-08
Payer: MEDICAID

## 2021-02-08 VITALS — BODY MASS INDEX: 54.01 KG/M2 | WEIGHT: 293 LBS

## 2021-02-08 DIAGNOSIS — N92.6 MISSED MENSES: ICD-10-CM

## 2021-02-08 DIAGNOSIS — Z01.419 ENCOUNTER FOR GYNECOLOGICAL EXAMINATION WITHOUT ABNORMAL FINDING: Primary | ICD-10-CM

## 2021-02-08 LAB
HCG URINE, QL. (POC): POSITIVE
VALID INTERNAL CONTROL?: YES

## 2021-02-08 PROCEDURE — 99395 PREV VISIT EST AGE 18-39: CPT | Performed by: OBSTETRICS & GYNECOLOGY

## 2021-02-08 PROCEDURE — 81025 URINE PREGNANCY TEST: CPT | Performed by: OBSTETRICS & GYNECOLOGY

## 2021-02-08 RX ORDER — VALACYCLOVIR HYDROCHLORIDE 1 G/1
1000 TABLET, FILM COATED ORAL DAILY
Qty: 90 TAB | Refills: 4 | Status: SHIPPED | OUTPATIENT
Start: 2021-02-08 | End: 2022-02-21

## 2021-02-08 RX ORDER — BISMUTH SUBSALICYLATE 262 MG
1 TABLET,CHEWABLE ORAL DAILY
COMMUNITY
End: 2021-08-12

## 2021-02-08 NOTE — PROGRESS NOTES
Annual exam ages 21-44    Baldev Sorensen is a ,  22 y.o. female   Patient's last menstrual period was 01/10/2021. She presents for her annual checkup. She is having no significant problems. Patient complains of not having her cycle yet, and being 1 week late. We performed a urinary pregnancy test      Menstrual status:    Her periods are normal in flow. She is using three to ten pads or tampons per day, often irregular with no apparent pattern. She does not have dysmenorrhea. She reports no premenstrual symptoms. Contraception:    The current method of family planning is condoms. Sexual history:    She  reports being sexually active and has had partner(s) who are Male. She reports using the following method of birth control/protection: Pill. Medical conditions:    Since her last annual GYN exam about one year ago, she has not the following changes in her health history: none. Surgical history confirmed with patient. has a past surgical history that includes hx wisdom teeth extraction. Pap and Mammogram History:    Her most recent Pap smear was normal, obtained 1 year(s) ago. The patient has never had a mammogram.    Breast Cancer History/Substance Abuse: negative    Past Medical History:   Diagnosis Date    Asthma     Dysmenorrhea     Essential hypertension     Major depressive disorder, single episode, unspecified     Morbid obesity ([facility] [address]     Pap smear for cervical cancer screening 17 neg    Pituitary abnormality ([facility])     Seasonal allergies     Unspecified symptom associated with female genital organs      Past Surgical History:   Procedure Laterality Date    HX WISDOM TEETH EXTRACTION         Current Outpatient Medications   Medication Sig Dispense Refill    multivitamin (ONE A DAY) tablet Take 1 Tab by mouth daily.  valACYclovir (VALTREX) 1 gram tablet TAKE 1 TABLET BY MOUTH TWICE DAILY FOR 7 DAYS.  THEN MAY TAKE 1 TABLET BY MOUTH DAILY 90 Tab 4  triamcinolone acetonide (KENALOG) 0.1 % ointment Apply  to affected area two (2) times a day. use thin layer for 7 days 1 Tube 3    nystatin (MYCOSTATIN) topical cream Apply  to affected area two (2) times a day. For 7 days 1 Tube 3    azithromycin (ZITHROMAX) 250 mg tablet Take two tablets today then one tablet daily 6 Tab 0    predniSONE (STERAPRED DS) 10 mg dose pack 6 Day DS taper pack as directed 1 Package 0    cetirizine (ZYRTEC) 10 mg tablet TAKE 1 TABLET BY MOUTH DAILY 30 Tab 5    predniSONE (STERAPRED DS) 10 mg dose pack See administration instruction per 10mg dose pack 21 Tab 0    albuterol (PROVENTIL HFA, VENTOLIN HFA, PROAIR HFA) 90 mcg/actuation inhaler Take 1 Puff by inhalation every four (4) hours as needed for Wheezing. 1 Inhaler 0    ibuprofen (MOTRIN) 800 mg tablet Take 800 mg by mouth every eight (8) hours as needed for Pain.  norgestimate-ethinyl estradioL (ORTHO-CYCLEN, SPRINTEC) 0.25-35 mg-mcg tab Take 1 Tab by mouth daily. 3 Package 4    acetaminophen (TYLENOL) 325 mg tablet Take  by mouth every four (4) hours as needed for Pain. Allergies: Latex, Lactose, Aspirin, Betadine [povidone-iodine], Cat dander, and Penicillins     Tobacco History:  reports that she has been smoking cigarettes. She has a 0.50 pack-year smoking history. She has never used smokeless tobacco.  Alcohol Abuse:  reports no history of alcohol use. Drug Abuse:  reports no history of drug use.     Family Medical/Cancer History:   Family History   Problem Relation Age of Onset    Alcohol abuse Father     Cancer Paternal Grandmother     Other Mother         endometriosis   [de-identified] Migraines Mother         tumer in brain        Review of Systems - History obtained from the patient  Constitutional: negative for weight loss, fever, night sweats  HEENT: negative for hearing loss, earache, congestion, snoring, sorethroat  CV: negative for chest pain, palpitations, edema  Resp: negative for cough, shortness of breath, wheezing  GI: negative for change in bowel habits, abdominal pain, black or bloody stools  : negative for frequency, dysuria, hematuria, vaginal discharge  MSK: negative for back pain, joint pain, muscle pain  Breast: negative for breast lumps, nipple discharge, galactorrhea  Skin :negative for itching, rash, hives  Neuro: negative for dizziness, headache, confusion, weakness  Psych: negative for anxiety, depression, change in mood  Heme/lymph: negative for bleeding, bruising, pallor    Physical Exam    Visit Vitals  Wt 334 lb 9.6 oz (151.8 kg)   LMP 01/10/2021   BMI 54.01 kg/m²       Constitutional  · Appearance: well-nourished, well developed, alert, in no acute distress    HENT  · Head and Face: appears normal    Neck  · Inspection/Palpation: normal appearance, no masses or tenderness  · Lymph Nodes: no lymphadenopathy present  · Thyroid: gland size normal, nontender, no nodules or masses present on palpation    Chest  · Respiratory Effort: breathing unlabored    Breasts  · Inspection of Breasts: breasts symmetrical, no skin changes, no discharge present, nipple appearance normal, no skin retraction present  · Palpation of Breasts and Axillae: no masses present on palpation, no breast tenderness  · Axillary Lymph Nodes: no lymphadenopathy present    Gastrointestinal  · Abdominal Examination: abdomen non-tender to palpation, normal bowel sounds, no masses present  · Liver and spleen: no hepatomegaly present, spleen not palpable  · Hernias: no hernias identified    Genitourinary  · External Genitalia: normal appearance for age, no discharge present, no tenderness present, no inflammatory lesions present, no masses present, no atrophy present  · Vagina: normal vaginal vault without central or paravaginal defects, no discharge present, no inflammatory lesions present, no masses present  · Bladder: non-tender to palpation  · Urethra: appears normal  · Cervix: normal   · Uterus: normal size, shape and consistency  · Adnexa: no adnexal tenderness present, no adnexal masses present  · Perineum: perineum within normal limits, no evidence of trauma, no rashes or skin lesions present  · Anus: anus within normal limits, no hemorrhoids present  · Inguinal Lymph Nodes: no lymphadenopathy present    Skin  · General Inspection: no rash, no lesions identified    Neurologic/Psychiatric  · Mental Status:  · Orientation: grossly oriented to person, place and time  · Mood and Affect: mood normal, affect appropriate    . Assessment:  Routine gynecologic examination  Her current medical status is satisfactory with no evidence of significant gynecologic issues.   + new pregnancy- rto for EOB    Plan:  Counseled re: diet, exercise, healthy lifestyle  Return for yearly wellness visits  [facility] counseling provided

## 2021-02-25 ENCOUNTER — DOCUMENTATION ONLY (OUTPATIENT)
Dept: FAMILY MEDICINE CLINIC | Age: 26
End: 2021-02-25

## 2021-03-01 ENCOUNTER — ROUTINE PRENATAL (OUTPATIENT)
Dept: OBGYN CLINIC | Age: 26
End: 2021-03-01

## 2021-03-01 VITALS — BODY MASS INDEX: 53.59 KG/M2 | WEIGHT: 293 LBS | DIASTOLIC BLOOD PRESSURE: 61 MMHG | SYSTOLIC BLOOD PRESSURE: 124 MMHG

## 2021-03-01 DIAGNOSIS — Z32.01 PREGNANCY EXAMINATION OR TEST, POSITIVE RESULT: Primary | ICD-10-CM

## 2021-03-01 PROBLEM — Z34.80 SUPERVISION OF OTHER NORMAL PREGNANCY: Status: ACTIVE | Noted: 2021-03-01

## 2021-03-01 LAB
HBSAG, EXTERNAL: NEGATIVE
HIV, EXTERNAL: NON REACTIVE
RPR, EXTERNAL: NON REACTIVE
RUBELLA, EXTERNAL: NORMAL

## 2021-03-01 PROCEDURE — 0502F SUBSEQUENT PRENATAL CARE: CPT | Performed by: OBSTETRICS & GYNECOLOGY

## 2021-03-01 PROCEDURE — 76817 TRANSVAGINAL US OBSTETRIC: CPT | Performed by: OBSTETRICS & GYNECOLOGY

## 2021-03-01 NOTE — PATIENT INSTRUCTIONS
Learning About Pregnancy Your Care Instructions Your health in the early weeks of your pregnancy is particularly important for your baby's health. Take good care of yourself. Anything you do that harms your body can also harm your baby. Make sure to go to all of your doctor appointments. Regular checkups will help keep you and your baby healthy. How can you care for yourself at home? Diet 
  · Eat a balanced diet. Make sure your diet includes plenty of beans, peas, and leafy green vegetables.  
  · Do not skip meals or go for many hours without eating. If you are nauseated, try to eat a small, healthy snack every 2 to 3 hours.  
  · Do not eat fish that has a high level of mercury, such as shark, swordfish, or mackerel. Do not eat more than one can of tuna each week.  
  · Drink plenty of fluids, enough so that your urine is light yellow or clear like water. If you have kidney, heart, or liver disease and have to limit fluids, talk with your doctor before you increase the amount of fluids you drink.  
  · Cut down on caffeine, such as coffee, tea, and cola.  
  · Do not drink alcohol, such as beer, wine, or hard liquor.  
  · Take a multivitamin that contains at least 400 micrograms (mcg) of folic acid to help prevent birth defects. Fortified cereal and whole wheat bread are good additional sources of folic acid.  
  · Increase the calcium in your diet. Try to drink a quart of skim milk each day. You may also take calcium supplements and choose foods such as cheese and yogurt. Lifestyle 
  · Make sure you go to your follow-up appointments.  
  · Get plenty of rest. You may be unusually tired while you are pregnant.  
  · Get at least 30 minutes of exercise on most days of the week. Walking is a good choice. If you have not exercised in the past, start out slowly. Take several short walks each day.  
  · Do not smoke. If you need help quitting, talk to your doctor about stop-smoking programs.  These can increase your chances of quitting for good.  
  · Do not touch cat feces or litter boxes. Also, wash your hands after you handle raw meat, and fully cook all meat before you eat it. Wear gloves when you work in the yard or garden, and wash your hands well when you are done. Cat feces, raw or undercooked meat, and contaminated dirt can cause an infection that may harm your baby or lead to a miscarriage.  
  · Do not use saunas or hot tubs. Raising your body temperature may harm your baby.  
  · Avoid chemical fumes, paint fumes, or poisons.  
  · Do not use illegal drugs or alcohol. Medicines 
  · Review all of your medicines with your doctor. Some of your routine medicines may need to be changed to protect your baby.  
  · Use acetaminophen (Tylenol) to relieve minor problems, such as a mild headache or backache or a mild fever with cold symptoms. Do not use nonsteroidal anti-inflammatory drugs (NSAIDs), such as ibuprofen (Advil, Motrin) or naproxen (Aleve), unless your doctor says it is okay.  
  · Do not take two or more pain medicines at the same time unless the doctor told you to. Many pain medicines have acetaminophen, which is Tylenol. Too much acetaminophen (Tylenol) can be harmful.  
  · Take your medicines exactly as prescribed. Call your doctor if you think you are having a problem with your medicine. To manage morning sickness 
  · If you feel sick when you first wake up, try eating a small snack (such as crackers) before you get out of bed. Allow some time to digest the snack, and then get out of bed slowly.  
  · Do not skip meals or go for long periods without eating. An empty stomach can make nausea worse.  
  · Eat small, frequent meals instead of three large meals each day.  
  · Drink plenty of fluids.  Sports drinks, such as Gatorade or Powerade, are good choices.  
  · Eat foods that are high in protein but low in fat.  
  · If you are taking iron supplements, ask your doctor if they are necessary. Iron can make nausea worse.  
  · Avoid any smells, such as coffee, that make you feel sick.  
  · Get lots of rest. Morning sickness may be worse when you are tired. Follow-up care is a key part of your treatment and safety. Be sure to make and go to all appointments, and call your doctor if you are having problems. It's also a good idea to know your test results and keep a list of the medicines you take. Where can you learn more? Go to http://www.gray.com/ Enter E424 in the search box to learn more about \"Learning About Pregnancy. \" Current as of: February 11, 2020               Content Version: 12.6 © 7339-5370 [employer]. Care instructions adapted under license by [employer] (which disclaims liability or warranty for this information). If you have questions about a medical condition or this instruction, always ask your healthcare professional. [address] any warranty or liability for your use of this information.

## 2021-03-01 NOTE — PROGRESS NOTES
Current pregnancy history:    Jazmyn Funes is a ,  22 y.o. female WHITE Patient's last menstrual period was 01/10/2021 (exact date). .  She presents for the evaluation of amenorrhea and a positive pregnancy test.    LMP history:  The date of her LMP is certain. Her last menstrual period was normal and lasted for 4 to 5 days. A urine pregnancy test was positive a few weeks ago. She was not on the pill at conception. Based on her LMP, her EDC is 10/15/21 and her EGA is 7 weeks,3 days. Her menstrual cycles are regular and occur approximately every 28 days  and range from 3 to 5 days. The last menses lasted the usual number of days. Ultrasound data:  She had an  ultrasound done by the ultrasound tech today which revealed a viable sam pregnancy with a gestational age of 9 weeks and 1 days giving an [facility] of 10/17/21. Pregnancy symptoms:    Since her LMP she has experienced  urinary frequency, breast tenderness, and nausea. She has been vomiting over the last few weeks. Associated signs and symptoms which she denies: dysuria, discharge, vaginal bleeding. Relevant past pregnancy history:   She has the following pregnancy history: Her last pregnancy was uncomplicated. She has no history of  delivery. Relevant past medical history:(relevant to this pregnancy):  H/o pituitary microadenoma- followed by endocrine  Morbid obesity- early glucola at next visit  H/o depression    Substance history: negative for alcohol, tobacco and street drugs. Positive for nothing. Exposure history: There is/are no indoor cat/s in the home. The patient was instructed to not change the cat litter. She admits close contact with children on a regular basis. She has had chicken pox or the vaccine in the past.   Patient denies issues with domestic violence.      Genetic Screening/Teratology Counseling: (Includes patient, baby's father, or anyone in either family with:)  3.  Patient's age >/= 35 at [facility]?-- no  .   2. Thalassemia ([facility], [place], [address], or  background): MCV<80?--no.     3.  Neural tube defect (meningomyelocele, spina bifida, anencephaly)?--no.   4.  Congenital heart defect?--yes, uncle born with TOF- with  demise  11. Down syndrome?--no.   6.  Laz-Sachs ([employer], [place])?--no.   7.  Canavan's Disease?--no.   8.  Familial Dysautonomia?--no.   9.  Sickle cell disease or trait ()? --no   The patient has not been tested for sickle trait  10. Hemophilia or other blood disorders?--no. 11.  Muscular dystrophy?--no. 12.  Cystic fibrosis?--no. 13.  Tonya's Chorea?--no. 14.  Mental retardation/autism (if yes was person tested for Fragile X)?--no. 15.  Other inherited genetic or chromosomal disorder?--no. 12.  Maternal metabolic disorder (DM, PKU, etc)?--no. 17.  Patient or FOB with a child with a birth defect not listed above?--no.  17a. Patient or FOB with a birth defect themselves?--no. 18.  Recurrent pregnancy loss, or stillbirth?--no. 19.  Any medications since LMP other than prenatal vitamins (include vitamins, supplements, OTC meds, drugs, alcohol)?--no. 20.  Any other genetic/environmental exposure to discuss?--no. Infection History:  1. Lives with someone with TB or TB exposed?--no.   2.  Patient or partner has history of genital herpes?--no.  3.  Rash or viral illness since LMP?--no.    4.  History of STD (GC, CT, HPV, syphilis, HIV)? --no   5. Other: OTHER?       Past Medical History:   Diagnosis Date    Asthma     Dysmenorrhea     Essential hypertension     Major depressive disorder, single episode, unspecified     Morbid obesity ([facility] [address]     Pap smear for cervical cancer screening     17 neg 21 neg    Pituitary abnormality (HCC)     Seasonal allergies     Unspecified symptom associated with female genital organs      Past Surgical History:   Procedure Laterality Date    HX WISDOM TEETH EXTRACTION       Social History     Occupational History    Not on file   Tobacco Use    Smoking status: Current Every Day Smoker     Packs/day: 0.50     Years: 1.00     Pack years: 0.50     Types: Cigarettes    Smokeless tobacco: Never Used   Substance and Sexual Activity    Alcohol use: No     Alcohol/week: 0.0 standard drinks     Comment: former user just sips now and then    Drug use: No    Sexual activity: Yes     Partners: Male     Birth control/protection: Pill     Family History   Problem Relation Age of Onset    Alcohol abuse Father     Cancer Paternal Grandmother     Other Mother         endometriosis    Migraines Mother         tumer in brain     OB History    Para Term  AB Living   2 1 1     1   SAB TAB Ectopic Molar Multiple Live Births           0 1      # Outcome Date GA Lbr Gil/2nd Weight Sex Delivery Anes PTL Lv   2 Current            1 Term 18 37w6d / 00:12 6 lb 3.8 oz (2.83 kg) M Vag-Spont EPIDURAL AN N PENNY      Obstetric Comments   Menarche:  10. LMP: current. # of Children:  0. Age at Delivery of First Child:  na.   Hysterectomy/oophorectomy:  NO/NO. Breast Bx:  no.  Hx of Breast Feeding:  na. BCP:  yes. Hormone therapy:  no.         Allergies   Allergen Reactions    Latex Swelling     Skin peels    Lactose Other (comments)    Aspirin Nausea and Vomiting    Betadine [Povidone-Iodine] Other (comments)     Skin peels    Cat Dander Sneezing    Penicillins Other (comments)     Yeast infections     Prior to Admission medications    Medication Sig Start Date End Date Taking? Authorizing Provider   ondansetron (Zofran ODT) 8 mg disintegrating tablet Take 1 Tab by mouth every eight (8) hours as needed for Nausea. 21  Yes Dennis Duong MD   valACYclovir (VALTREX) 1 gram tablet Take 1 Tab by mouth daily. 21  Yes Dennis Duong MD   doxylamine-pyridoxine, vit B6, (Diclegis) 10-10 mg TbEC DR tablet Take 2 tablets by mouth nightly.   May add 1 tab in the morning and 1 tab in the afternoon. 4 tabs max daily. #120 tabs for 30 days 2/17/21   Cathy Dominguez MD   multivitamin (ONE A DAY) tablet Take 1 Tab by mouth daily. Provider, Historical   triamcinolone acetonide (KENALOG) 0.1 % ointment Apply  to affected area two (2) times a day. use thin layer for 7 days 10/27/20   Cathy Dominguez MD   nystatin (MYCOSTATIN) topical cream Apply  to affected area two (2) times a day. For 7 days 10/27/20   Cathy Dominguez MD   [facility]) 250 mg tablet Take two tablets today then one tablet daily 8/31/20   Lindsay Jaime MD   predniSONE Located within [facility]) 10 mg dose pack 6 Day DS taper pack as directed 8/31/20   Lindsay Jaime MD   cetirizine (ZYRTEC) 10 mg tablet TAKE 1 TABLET BY MOUTH DAILY 6/26/20   Lindsay Jaime MD   predniSONE Located within [facility]) 10 mg dose pack See administration instruction per 10mg dose pack 3/26/20   Unique Emery NP   albuterol (PROVENTIL HFA, VENTOLIN HFA, PROAIR HFA) 90 mcg/actuation inhaler Take 1 Puff by inhalation every four (4) hours as needed for Wheezing. 3/26/20   Unique Emery NP   ibuprofen (MOTRIN) 800 mg tablet Take 800 mg by mouth every eight (8) hours as needed for Pain. Other, MD Jermain   norgestimate-ethinyl estradioL (ORTHO-CYCLEN, SPRINTEC) 0.25-35 mg-mcg tab Take 1 Tab by mouth daily. 1/27/20   Cathy Dominguez MD   acetaminophen (TYLENOL) 325 mg tablet Take  by mouth every four (4) hours as needed for Pain.     Provider, Historical        Review of Systems: History obtained from the patient  Constitutional: negative for weight loss, fever, night sweats  HEENT: negative for hearing loss, earache, congestion, snoring, sore throat  CV: negative for chest pain, palpitations, edema  Resp: negative for cough, shortness of breath, wheezing  Breast: negative for breast lumps, nipple discharge, galactorrhea  GI: negative for change in bowel habits, abdominal pain, black or bloody stools  : negative for frequency, dysuria, hematuria, vaginal discharge  MSK: negative for back pain, joint pain, muscle pain  Skin: negative for itching, rash, hives  Neuro: negative for dizziness, headache, confusion, weakness  Psych: negative for anxiety, depression, change in mood  Heme/lymph: negative for bleeding, bruising, pallor    Objective:  Visit Vitals  /61   Wt 332 lb (150.6 kg)   LMP 01/10/2021 (Exact Date)   Breastfeeding No   BMI 53.59 kg/m²       Physical Exam:     Constitutional  · Appearance: well-nourished, well developed, alert, in no acute distress    HENT  · Head  · Face: appears normal  · Eyes: appear normal  · Ears: normal  · Mouth: normal  · Lips: no lesions    Neck  · Inspection/Palpation: normal appearance, no masses or tenderness  · Lymph Nodes: no lymphadenopathy present  · Thyroid: gland size normal, nontender, no nodules or masses present on palpation    Chest  · Respiratory Effort: breathing unlabored    Gastrointestinal  · Abdominal Examination: abdomen non-tender to palpation, normal bowel sounds, no masses present  · Liver and spleen: no hepatomegaly present, spleen not palpable  · Hernias: no hernias identified    Skin  · General Inspection: no rash, no lesions identified    Neurologic/Psychiatric  · Mental Status:  · Orientation: grossly oriented to person, place and time  · Mood and Affect: mood normal, affect appropriate    Assessment:   Intrauterine pregnancy with the following problems identified:   H/o pituitary microadenoma- followed by endocrine  Morbid obesity- early glucola at next visit  H/o depression  Uncle born with TOF,  demise      Plan:     Offered CF testing, CVS, Nuchal Translucency, MSAFP, amnio, and discussed NIPT  Course of pregnancy discussed including visit schedule, routine U/S, glucola testing, etc.  Avoid alcoholic beverages and illicit/recreational drugs use  Take prenatal vitamins or folic acid daily. Hospital and practice style discussed with coverage system.   Discussed nutrition, toxoplasmosis precautions, sexual activity, exercise, need for influenza vaccine, environmental and work hazards, travel advice, screen for domestic violence, need for seat belts. Discussed seafood, unpasteurized dairy products, deli meat, artificial sweeteners, and caffeine. Information on prenatal classes/breastfeeding given. Information on circumcision given  Patient encouraged not to smoke. Discussed current prescription drug use. Given medication list.  Discussed the use of over the counter medications and chemicals. Route of delivery discussed, including risks, benefits, and alternatives of  versus repeat LTCS. Pt understands risk of hemorrhage during pregnancy and post delivery and would accept blood products if necessary in life-threatening emergencies    Handouts given to pt.

## 2021-03-02 ENCOUNTER — TELEPHONE (OUTPATIENT)
Dept: FAMILY MEDICINE CLINIC | Age: 26
End: 2021-03-02

## 2021-03-02 LAB
ABO + RH BLD: NORMAL
BLOOD BANK CMNT PATIENT-IMP: NORMAL
BLOOD GROUP ANTIBODIES SERPL: NORMAL
COMMENT, HOLDF: NORMAL
SAMPLES BEING HELD,HOLD: NORMAL
SPECIMEN EXP DATE BLD: NORMAL

## 2021-03-02 NOTE — TELEPHONE ENCOUNTER
Received [employer] paperwork- spoke with pt states was to go to [facility] doctor, advised would place papers in shred folder- voiced ok do to.

## 2021-03-03 LAB
B19V IGG SER IA-ACNC: 7.3 INDEX (ref 0–0.8)
B19V IGM SER IA-ACNC: 0.3 INDEX (ref 0–0.8)
C TRACH RRNA SPEC QL NAA+PROBE: NEGATIVE
ERYTHROCYTE [DISTWIDTH] IN BLOOD BY AUTOMATED COUNT: 14.8 % (ref 11.5–14.5)
HBV SURFACE AG SER QL: 0.14 INDEX
HBV SURFACE AG SER QL: NEGATIVE
HCT VFR BLD AUTO: 41.3 % (ref 35–47)
HGB BLD-MCNC: 12.7 G/DL (ref 11.5–16)
HIV 1+2 AB+HIV1 P24 AG SERPL QL IA: NONREACTIVE
HIV12 RESULT COMMENT, HHIVC: NORMAL
MCH RBC QN AUTO: 29.4 PG (ref 26–34)
MCHC RBC AUTO-ENTMCNC: 30.8 G/DL (ref 30–36.5)
MCV RBC AUTO: 95.6 FL (ref 80–99)
N GONORRHOEA RRNA SPEC QL NAA+PROBE: NEGATIVE
NRBC # BLD: 0 K/UL (ref 0–0.01)
NRBC BLD-RTO: 0 PER 100 WBC
PLATELET # BLD AUTO: 261 K/UL (ref 150–400)
PMV BLD AUTO: 9.5 FL (ref 8.9–12.9)
RBC # BLD AUTO: 4.32 M/UL (ref 3.8–5.2)
RPR SER QL: NONREACTIVE
RUBV IGG SER-IMP: REACTIVE
RUBV IGG SERPL IA-ACNC: 270.8 IU/ML
T VAGINALIS DNA SPEC QL NAA+PROBE: NEGATIVE
WBC # BLD AUTO: 7.2 K/UL (ref 3.6–11)

## 2021-03-04 LAB
ANTIBODY SCREEN, EXTERNAL: NEGATIVE
BACTERIA UR CULT: NORMAL
TYPE, ABO & RH, EXTERNAL: NORMAL

## 2021-03-18 DIAGNOSIS — J06.9 UPPER RESPIRATORY TRACT INFECTION, UNSPECIFIED TYPE: ICD-10-CM

## 2021-03-18 RX ORDER — CETIRIZINE HCL 10 MG
TABLET ORAL
Qty: 30 TAB | Refills: 5 | Status: SHIPPED | OUTPATIENT
Start: 2021-03-18 | End: 2021-08-12

## 2021-03-29 ENCOUNTER — ROUTINE PRENATAL (OUTPATIENT)
Dept: OBGYN CLINIC | Age: 26
End: 2021-03-29
Payer: MEDICAID

## 2021-03-29 VITALS — BODY MASS INDEX: 54.23 KG/M2 | DIASTOLIC BLOOD PRESSURE: 78 MMHG | WEIGHT: 293 LBS | SYSTOLIC BLOOD PRESSURE: 122 MMHG

## 2021-03-29 DIAGNOSIS — Z34.80 SUPERVISION OF OTHER NORMAL PREGNANCY: ICD-10-CM

## 2021-03-29 DIAGNOSIS — Z34.80 SUPERVISION OF OTHER NORMAL PREGNANCY, ANTEPARTUM: Primary | ICD-10-CM

## 2021-03-29 DIAGNOSIS — O99.211 OBESITY AFFECTING PREGNANCY IN FIRST TRIMESTER: ICD-10-CM

## 2021-03-29 LAB — GTT, 1 HR, GLUCOLA, EXTERNAL: 73

## 2021-03-29 PROCEDURE — 0502F SUBSEQUENT PRENATAL CARE: CPT | Performed by: OBSTETRICS & GYNECOLOGY

## 2021-03-30 LAB — GLUCOSE 1H P 100 G GLC PO SERPL-MCNC: 73 MG/DL (ref 65–140)

## 2021-04-07 ENCOUNTER — HOSPITAL ENCOUNTER (OUTPATIENT)
Dept: PERINATAL CARE | Age: 26
Discharge: HOME | End: 2021-04-07
Attending: OBSTETRICS & GYNECOLOGY
Payer: MEDICAID

## 2021-04-07 PROCEDURE — 99204 OFFICE O/P NEW MOD 45 MIN: CPT | Performed by: OBSTETRICS & GYNECOLOGY

## 2021-04-07 PROCEDURE — 76813 OB US NUCHAL MEAS 1 GEST: CPT | Performed by: OBSTETRICS & GYNECOLOGY

## 2021-04-09 DIAGNOSIS — Z34.80 SUPERVISION OF OTHER NORMAL PREGNANCY, ANTEPARTUM: Primary | ICD-10-CM

## 2021-04-19 RX ORDER — FLUTICASONE PROPIONATE 50 MCG
2 SPRAY, SUSPENSION (ML) NASAL DAILY
Qty: 1 BOTTLE | Refills: 5 | Status: SHIPPED | OUTPATIENT
Start: 2021-04-19 | End: 2021-08-12

## 2021-04-19 RX ORDER — AZITHROMYCIN 250 MG/1
TABLET, FILM COATED ORAL
Qty: 6 TAB | Refills: 0 | Status: SHIPPED | OUTPATIENT
Start: 2021-04-19 | End: 2021-08-12

## 2021-04-26 ENCOUNTER — ROUTINE PRENATAL (OUTPATIENT)
Dept: OBGYN CLINIC | Age: 26
End: 2021-04-26
Payer: MEDICAID

## 2021-04-26 VITALS — WEIGHT: 293 LBS | DIASTOLIC BLOOD PRESSURE: 74 MMHG | SYSTOLIC BLOOD PRESSURE: 137 MMHG | BODY MASS INDEX: 54.55 KG/M2

## 2021-04-26 DIAGNOSIS — Z34.80 SUPERVISION OF OTHER NORMAL PREGNANCY: Primary | ICD-10-CM

## 2021-04-26 PROCEDURE — 0502F SUBSEQUENT PRENATAL CARE: CPT | Performed by: OBSTETRICS & GYNECOLOGY

## 2021-04-28 LAB
AFP ADJ MOM SERPL: 0.84
AFP INTERP SERPL-IMP: NORMAL
AFP INTERP SERPL-IMP: NORMAL
AFP SERPL-MCNC: 16.2 NG/ML
AGE AT DELIVERY: 25.8 YR
COMMENT, 018013: NORMAL
GA METHOD: NORMAL
GA: 15 WEEKS
IDDM PATIENT QL: NO
MULTIPLE PREGNANCY: NO
NEURAL TUBE DEFECT RISK FETUS: NORMAL %
RESULTS, 017004: NORMAL

## 2021-05-24 ENCOUNTER — ROUTINE PRENATAL (OUTPATIENT)
Dept: OBGYN CLINIC | Age: 26
End: 2021-05-24
Payer: MEDICAID

## 2021-05-24 VITALS — BODY MASS INDEX: 55.36 KG/M2 | DIASTOLIC BLOOD PRESSURE: 65 MMHG | SYSTOLIC BLOOD PRESSURE: 129 MMHG | WEIGHT: 293 LBS

## 2021-05-24 DIAGNOSIS — Z34.80 SUPERVISION OF OTHER NORMAL PREGNANCY: Primary | ICD-10-CM

## 2021-05-24 PROCEDURE — 0502F SUBSEQUENT PRENATAL CARE: CPT | Performed by: OBSTETRICS & GYNECOLOGY

## 2021-05-24 RX ORDER — METRONIDAZOLE 500 MG/1
500 TABLET ORAL 2 TIMES DAILY
Qty: 14 TABLET | Refills: 0 | Status: SHIPPED | OUTPATIENT
Start: 2021-05-24 | End: 2021-05-31

## 2021-06-02 ENCOUNTER — HOSPITAL ENCOUNTER (OUTPATIENT)
Dept: PERINATAL CARE | Age: 26
Discharge: HOME | End: 2021-06-02
Attending: OBSTETRICS & GYNECOLOGY
Payer: MEDICAID

## 2021-06-02 PROCEDURE — 76811 OB US DETAILED SNGL FETUS: CPT | Performed by: OBSTETRICS & GYNECOLOGY

## 2021-06-21 ENCOUNTER — ROUTINE PRENATAL (OUTPATIENT)
Dept: OBGYN CLINIC | Age: 26
End: 2021-06-21
Payer: MEDICAID

## 2021-06-21 VITALS — WEIGHT: 293 LBS | BODY MASS INDEX: 56.36 KG/M2 | DIASTOLIC BLOOD PRESSURE: 84 MMHG | SYSTOLIC BLOOD PRESSURE: 130 MMHG

## 2021-06-21 DIAGNOSIS — R30.0 BURNING WITH URINATION: ICD-10-CM

## 2021-06-21 DIAGNOSIS — N89.8 VAGINAL DISCHARGE: Primary | ICD-10-CM

## 2021-06-21 DIAGNOSIS — Z34.80 SUPERVISION OF OTHER NORMAL PREGNANCY: ICD-10-CM

## 2021-06-21 PROCEDURE — 0502F SUBSEQUENT PRENATAL CARE: CPT | Performed by: OBSTETRICS & GYNECOLOGY

## 2021-06-22 LAB
BACTERIA SPEC CULT: NORMAL
SERVICE CMNT-IMP: NORMAL

## 2021-06-23 LAB
A VAGINAE DNA VAG QL NAA+PROBE: NORMAL SCORE
BVAB2 DNA VAG QL NAA+PROBE: NORMAL SCORE
C ALBICANS DNA VAG QL NAA+PROBE: NEGATIVE
C GLABRATA DNA VAG QL NAA+PROBE: NEGATIVE
MEGA1 DNA VAG QL NAA+PROBE: NORMAL SCORE
SPECIMEN STATUS REPORT, ROLRST: NORMAL

## 2021-07-01 ENCOUNTER — HOSPITAL ENCOUNTER (EMERGENCY)
Age: 26
Discharge: HOME | End: 2021-07-01
Attending: EMERGENCY MEDICINE
Payer: MEDICAID

## 2021-07-01 ENCOUNTER — TELEPHONE (OUTPATIENT)
Dept: OBGYN CLINIC | Age: 26
End: 2021-07-01

## 2021-07-01 ENCOUNTER — APPOINTMENT (OUTPATIENT)
Dept: VASCULAR SURGERY | Age: 26
End: 2021-07-01
Attending: EMERGENCY MEDICINE
Payer: MEDICAID

## 2021-07-01 VITALS
HEIGHT: 66 IN | SYSTOLIC BLOOD PRESSURE: 108 MMHG | RESPIRATION RATE: 16 BRPM | OXYGEN SATURATION: 93 % | TEMPERATURE: 97.5 F | BODY MASS INDEX: 47.09 KG/M2 | DIASTOLIC BLOOD PRESSURE: 68 MMHG | HEART RATE: 77 BPM | WEIGHT: 293 LBS

## 2021-07-01 DIAGNOSIS — M79.89 LEFT LEG SWELLING: Primary | ICD-10-CM

## 2021-07-01 LAB
APPEARANCE UR: CLEAR
BACTERIA URNS QL MICRO: ABNORMAL /HPF
BILIRUB UR QL: NEGATIVE
COLOR UR: ABNORMAL
EPITH CASTS URNS QL MICRO: ABNORMAL /LPF
GLUCOSE UR STRIP.AUTO-MCNC: NEGATIVE MG/DL
HGB UR QL STRIP: NEGATIVE
HYALINE CASTS URNS QL MICRO: ABNORMAL /LPF (ref 0–5)
KETONES UR QL STRIP.AUTO: ABNORMAL MG/DL
LEUKOCYTE ESTERASE UR QL STRIP.AUTO: ABNORMAL
NITRITE UR QL STRIP.AUTO: NEGATIVE
PH UR STRIP: 7 [PH] (ref 5–8)
PROT UR STRIP-MCNC: NEGATIVE MG/DL
RBC #/AREA URNS HPF: ABNORMAL /HPF (ref 0–5)
SP GR UR REFRACTOMETRY: 1.01 (ref 1–1.03)
UR CULT HOLD, URHOLD: NORMAL
UROBILINOGEN UR QL STRIP.AUTO: 0.2 EU/DL (ref 0.2–1)
WBC URNS QL MICRO: ABNORMAL /HPF (ref 0–4)

## 2021-07-01 PROCEDURE — 99283 EMERGENCY DEPT VISIT LOW MDM: CPT

## 2021-07-01 PROCEDURE — 81001 URINALYSIS AUTO W/SCOPE: CPT

## 2021-07-01 PROCEDURE — 87086 URINE CULTURE/COLONY COUNT: CPT

## 2021-07-01 PROCEDURE — 93971 EXTREMITY STUDY: CPT

## 2021-07-01 NOTE — TELEPHONE ENCOUNTER
To ER to rule out DVT per DM. Upper thigh starting to now hurt severely. Patient plans to go to [facility] ER in a few minutes.

## 2021-07-01 NOTE — ED PROVIDER NOTES
HPI patient is a 60-year-old pregnant female with past medical history significant for asthma, hypertension, depression, morbid obesity, pituitary abnormality and seasonal allergies who presents to the ED reporting left lower leg pain and swelling for several days. She states that she gets intermittent \"charley horses\" in her left calf at night. Her OB/GYN sent her to rule out DVT. A0. Denies fever, cold symptoms,headache,  neck pain, visual changes, focal weakness or rash. Denies any  difficulty breathing, difficulty swallowing, SOB or chest pain. Denies any nausea, vomiting or diarrhea.           Past Medical History:   Diagnosis Date    Asthma     Dysmenorrhea     Essential hypertension     Major depressive disorder, single episode, unspecified     Morbid obesity ([facility] [address]     Pap smear for cervical cancer screening     17 neg 21 neg    Pituitary abnormality (HCC)     Seasonal allergies     Unspecified symptom associated with female genital organs        Past Surgical History:   Procedure Laterality Date    HX WISDOM TEETH EXTRACTION           Family History:   Problem Relation Age of Onset    Alcohol abuse Father     Cancer Paternal Grandmother     Other Mother         endometriosis   Eleni Nakai Migraines Mother         tumer in brain       Social History     Socioeconomic History    Marital status: SINGLE     Spouse name: Not on file    Number of children: Not on file    Years of education: Not on file    Highest education level: Not on file   Occupational History    Not on file   Tobacco Use    Smoking status: Current Every Day Smoker     Packs/day: 0.50     Years: 1.00     Pack years: 0.50     Types: Cigarettes    Smokeless tobacco: Never Used   Substance and Sexual Activity    Alcohol use: No     Alcohol/week: 0.0 standard drinks     Comment: former user just sips now and then    Drug use: No    Sexual activity: Yes     Partners: Male     Birth control/protection: Pill   Other Topics Concern     Service Not Asked    Blood Transfusions Not Asked    Caffeine Concern Not Asked    Occupational Exposure Not Asked    Hobby Hazards Not Asked    Sleep Concern Not Asked    Stress Concern Not Asked    Weight Concern Not Asked    Special Diet Not Asked    Back Care Not Asked    Exercise Not Asked    Bike Helmet Not Asked   [address] Not Asked    Self-Exams Not Asked   Social History Narrative    Not on file     Social Determinants of Health     Financial Resource Strain:     Difficulty of Paying Living Expenses:    Food Insecurity:     Worried About Running Out of Food in the Last Year:     [address] in the Last Year:    Transportation Needs:     Lack of Transportation (Medical):  Lack of Transportation (Non-Medical):    Physical Activity:     Days of Exercise per Week:     Minutes of Exercise per Session:    Stress:     Feeling of Stress :    Social Connections:     Frequency of Communication with Friends and Family:     Frequency of Social Gatherings with Friends and Family:     Attends Protestant Services:     Active Member of Clubs or Organizations:     Attends Club or Organization Meetings:     Marital Status:    Intimate Partner Violence:     Fear of Current or Ex-Partner:     Emotionally Abused:     Physically Abused:     Sexually Abused: ALLERGIES: Latex, Lactose, Aspirin, Betadine [povidone-iodine], Cat dander, and Penicillins    Review of Systems   Constitutional: Negative for activity change, appetite change, fever and unexpected weight change. HENT: Negative for congestion, sore throat and trouble swallowing. Eyes: Negative for visual disturbance. Respiratory: Negative for cough and shortness of breath. Cardiovascular: Positive for leg swelling. Gastrointestinal: Negative for abdominal pain, diarrhea, nausea and vomiting.    Genitourinary: Negative for difficulty urinating, dysuria, flank pain, vaginal bleeding and vaginal discharge. Musculoskeletal: Negative for arthralgias, joint swelling and neck pain. Skin: Negative for rash. Neurological: Negative for dizziness and headaches. All other systems reviewed and are negative. Vitals:    21 1315   BP: 108/68   Pulse: 77   Resp: 16   Temp: 97.5 °F (36.4 °C)   SpO2: 93%   Weight: 158.8 kg (350 lb)   Height: 5' 6\" (1.676 m)            Physical Exam  Vitals and nursing note reviewed. Constitutional:       General: She is not in acute distress. Appearance: Normal appearance. She is normal weight. She is not ill-appearing, toxic-appearing or diaphoretic. Comments: Pregnant female, non-smoker, , A0   HENT:      Head: Normocephalic. Cardiovascular:      Rate and Rhythm: Normal rate and regular rhythm. Pulmonary:      Effort: Pulmonary effort is normal.      Breath sounds: Normal breath sounds. Abdominal:      Tenderness: There is no abdominal tenderness. Comments: Pregnant; non tender;    Musculoskeletal:         General: Swelling and tenderness present. No signs of injury. Cervical back: Normal range of motion and neck supple. Comments: Reports left posterior calf pain and swelling; both lower legs appear symmetrical; no obvious deformity, bruising or erythema; Skin integrity is intact. There is no obvious bony or soft tissue deformity. Good neurovascular sensation. No apparent tendon or nerve injury. Lymphadenopathy:      Cervical: No cervical adenopathy. Skin:     General: Skin is warm and dry. Findings: No rash. Neurological:      General: No focal deficit present. Mental Status: She is alert and oriented to person, place, and time. Psychiatric:         Mood and Affect: Mood normal.         Behavior: Behavior normal.          MDM       Procedures      Left lower leg venous duplex negative for DVT or thrombophlebitis. Right common femoral vein is thrombus free.      3:41 PM  Patient's results and plan of care have been reviewed with the pt and her . Patient and/or family have verbally conveyed their understanding and agreement of the patient's signs, symptoms, diagnosis, treatment and prognosis and additionally agree to follow up as recommended or return to the Emergency Room should her condition change prior to follow-up. Discharge instructions have also been provided to the patient with some educational information regarding her diagnosis as well a list of reasons why she would want to return to the ER prior to her follow-up appointment should her condition change. Flaca Macias NP

## 2021-07-01 NOTE — TELEPHONE ENCOUNTER
Patient of FW    Calling to say that she has significant swelling and numbness in her left foot. Severe pain and tenderness in left calf, feels like a zaire horse starting constantly. Right foot is swollen but not as large. She can get feet swelling to go down if she keeps legs elevated and does not move around at all. She has some warmth to the top of her left foot. Eating bananas.

## 2021-07-01 NOTE — ED TRIAGE NOTES
Pt sent by Dr Ingrid Leary for possible DVT to left leg. Per pt states she noticed left foot swelling on Monday and now pain is radiating up leg and feels that foot was discolored this a.m. pt is 24 weeks pregnant.

## 2021-07-02 LAB
BACTERIA SPEC CULT: NORMAL
SERVICE CMNT-IMP: NORMAL

## 2021-07-19 ENCOUNTER — ROUTINE PRENATAL (OUTPATIENT)
Dept: OBGYN CLINIC | Age: 26
End: 2021-07-19
Payer: MEDICAID

## 2021-07-19 VITALS — SYSTOLIC BLOOD PRESSURE: 125 MMHG | DIASTOLIC BLOOD PRESSURE: 59 MMHG | WEIGHT: 293 LBS | BODY MASS INDEX: 57.62 KG/M2

## 2021-07-19 DIAGNOSIS — N89.8 VAGINAL DISCHARGE: ICD-10-CM

## 2021-07-19 DIAGNOSIS — Z34.80 SUPERVISION OF OTHER NORMAL PREGNANCY: Primary | ICD-10-CM

## 2021-07-19 DIAGNOSIS — Z23 ENCOUNTER FOR IMMUNIZATION: ICD-10-CM

## 2021-07-19 LAB
ERYTHROCYTE [DISTWIDTH] IN BLOOD BY AUTOMATED COUNT: 13.6 % (ref 11.5–14.5)
GLUCOSE 1H P 100 G GLC PO SERPL-MCNC: 97 MG/DL (ref 65–140)
HCT VFR BLD AUTO: 38.3 % (ref 35–47)
HGB BLD-MCNC: 11.9 G/DL (ref 11.5–16)
MCH RBC QN AUTO: 30.4 PG (ref 26–34)
MCHC RBC AUTO-ENTMCNC: 31.1 G/DL (ref 30–36.5)
MCV RBC AUTO: 97.7 FL (ref 80–99)
NRBC # BLD: 0 K/UL (ref 0–0.01)
NRBC BLD-RTO: 0 PER 100 WBC
PLATELET # BLD AUTO: 243 K/UL (ref 150–400)
PMV BLD AUTO: 9.4 FL (ref 8.9–12.9)
RBC # BLD AUTO: 3.92 M/UL (ref 3.8–5.2)
WBC # BLD AUTO: 8 K/UL (ref 3.6–11)

## 2021-07-19 PROCEDURE — 90471 IMMUNIZATION ADMIN: CPT | Performed by: OBSTETRICS & GYNECOLOGY

## 2021-07-19 PROCEDURE — 90715 TDAP VACCINE 7 YRS/> IM: CPT | Performed by: OBSTETRICS & GYNECOLOGY

## 2021-07-19 PROCEDURE — 0502F SUBSEQUENT PRENATAL CARE: CPT | Performed by: OBSTETRICS & GYNECOLOGY

## 2021-07-19 NOTE — PROGRESS NOTES
Pt doing well, see prenatal flowsheet.   Glucola, tdap, consents reviewed today  Reports itching and discharge- will f/u with [facility]

## 2021-07-22 LAB
A VAGINAE DNA VAG QL NAA+PROBE: NORMAL SCORE
BVAB2 DNA VAG QL NAA+PROBE: NORMAL SCORE
C ALBICANS DNA VAG QL NAA+PROBE: NEGATIVE
C GLABRATA DNA VAG QL NAA+PROBE: NEGATIVE
C TRACH DNA VAG QL NAA+PROBE: NEGATIVE
MEGA1 DNA VAG QL NAA+PROBE: NORMAL SCORE
N GONORRHOEA DNA VAG QL NAA+PROBE: NEGATIVE
SPECIMEN STATUS REPORT, ROLRST: NORMAL
T VAGINALIS DNA VAG QL NAA+PROBE: NEGATIVE

## 2021-08-04 ENCOUNTER — TELEPHONE (OUTPATIENT)
Dept: OBGYN CLINIC | Age: 26
End: 2021-08-04

## 2021-08-04 NOTE — TELEPHONE ENCOUNTER
Call received @ [facility] pt   FW pt    Pt calling with c/o constant pelvic pain which she rates at a 4/10 when seated and a 6/10 when walking. Pt states she was in so much pain this am that she was in tears. Pt describes pain as \"pelvic bone is in half and rubbing together\". Pt denies any vaginal bleeding, ctx, and +FM. Pt did mention she has had a little fluid leaking that has a yellow tint to it. Pt states she will have to be walking all day for her job tomorrow 8/5 and is worried about the pain. This RN advised her to try tylenol and a heating pad, which pt said she has tried but has not helped. Please advise ? OV?

## 2021-08-05 ENCOUNTER — ROUTINE PRENATAL (OUTPATIENT)
Dept: OBGYN CLINIC | Age: 26
End: 2021-08-05
Payer: MEDICAID

## 2021-08-05 VITALS
BODY MASS INDEX: 57.46 KG/M2 | WEIGHT: 293 LBS | HEART RATE: 105 BPM | DIASTOLIC BLOOD PRESSURE: 79 MMHG | SYSTOLIC BLOOD PRESSURE: 120 MMHG

## 2021-08-05 DIAGNOSIS — R10.2 PELVIC PAIN DURING PREGNANCY IN THIRD TRIMESTER, ANTEPARTUM: Primary | ICD-10-CM

## 2021-08-05 DIAGNOSIS — O26.893 PELVIC PAIN DURING PREGNANCY IN THIRD TRIMESTER, ANTEPARTUM: Primary | ICD-10-CM

## 2021-08-05 DIAGNOSIS — N89.8 VAGINAL DISCHARGE DURING PREGNANCY, ANTEPARTUM: ICD-10-CM

## 2021-08-05 DIAGNOSIS — O26.899 VAGINAL DISCHARGE DURING PREGNANCY, ANTEPARTUM: ICD-10-CM

## 2021-08-05 PROCEDURE — 99212 OFFICE O/P EST SF 10 MIN: CPT | Performed by: OBSTETRICS & GYNECOLOGY

## 2021-08-05 NOTE — LETTER
8/5/2021 8:14 AM    Ms. Brian Tabor 78                To Whom It May Concern,     Yasmolly Otoolegregoria is currently under the care of [facility]. Due to complications of pregnancy, Johanna Stone should not exceed an eight (8) hour work day. It is also recommended she not lift anything greater than 20 lbs and be allowed to sit during her workday. She should be allowed to sit 30 minutes of every hour. She also is required to have access to water for hydration during her work day, and bathroom breaks as needed. If you have any other questions/ concerns please have the patient contact the office at [phone].           Sincerely,      Blake Glass MD

## 2021-08-05 NOTE — PROGRESS NOTES
[address] [facility]  [email or web address]/  [phone]    Armando Mendez MD, FACOG       OB/GYN: OB Problem visit    Chief Complaint:   Chief Complaint   Patient presents with    Pregnancy Problem       Patient Active Problem List    Diagnosis    Supervision of other normal pregnancy     H/o pituitary microadenoma- followed by endocrine  Morbid obesity- early glucola at next visit  H/o depression  Uncle born with TOF,  demise  H/o hsv- on valtrex      Nerve compression syndrome    Peripheral edema    Pleurodynia    Vaginal delivery    Obesity, morbid ([address]    Pituitary adenoma ([address]    Galactorrhea    Hyperprolactinemia ([address]    Panic attacks    Chronic pelvic pain in female    Constipation    Obesity    Dysmenorrhea       History of Present Illness: The patient is a 22 y.o.  female who reports having pelvic pain that is so painful it hurts to sit and walk. Pt. Reports she mentioned this to regular ob MD, but she said that it was normal. Pt. Reports that she feels like this is not normal and experiences lightening pain in her vagina. Pt. also reports mucous, sticky discharge yellow tint everyday  for 1 months. She also reports that a month ago she had intercourse and started to bleed that day, but none since. Tried support belt. This is a new problem. This is not a routinely scheduled OB appointment. She reports the symptoms are has worsened. Aggravating factors include none. Alleviating factors include none. She does not have other concerns.     PFSH:  Past Medical History:   Diagnosis Date    Asthma     Dysmenorrhea     Essential hypertension     Major depressive disorder, single episode, unspecified     Morbid obesity ([address]     Pap smear for cervical cancer screening     17 neg 21 neg    Pituitary abnormality (HCC)     Seasonal allergies     Unspecified symptom associated with female genital organs      Past Surgical History: Procedure Laterality Date    HX WISDOM TEETH EXTRACTION       Family History   Problem Relation Age of Onset    Alcohol abuse Father     Cancer Paternal [de-identified]     Other Mother         endometriosis   [facility] Migra[facility] Mother         tumer in brain     Social History     Tobacco Use    Smoking status: Current Every Day Smoker     Packs/day: 0.50     Years: 1.00     Pack years: 0.50     Types: Cigarettes    Smokeless tobacco: Never Used   Substance Use Topics    Alcohol use: No     Alcohol/week: 0.0 standard drinks     Comment: former user just sips now and then    Drug use: No     Allergies   Allergen Reactions    Latex Swelling     Skin peels    Lactose Other (comments)    Aspirin Nausea and Vomiting    Betadine [Povidone-Iodine] Other (comments)     Skin peels    Cat Dander Sneezing    Ketorolac Hives    Penicillins Other (comments)     Yeast infections    Ivp Dye [Iodinated Contrast Media] Other (comments)     Current Outpatient Medications   Medication Sig    azithromycin (ZITHROMAX) 250 mg tablet Take two tablets today then one tablet daily    fluticasone propionate (FLONASE) 50 mcg/actuation nasal spray 2 Sprays by Both Nostrils route daily.  cetirizine (ZYRTEC) 10 mg tablet TAKE 1 TABLET BY MOUTH DAILY    ondansetron (Zofran ODT) 8 mg disintegrating tablet Take 1 Tab by mouth every eight (8) hours as needed for Nausea.  doxylamine-pyridoxine, vit B6, (Diclegis) 10-10 mg TbEC DR tablet Take 2 tablets by mouth nightly. May add 1 tab in the morning and 1 tab in the afternoon. 4 tabs max daily. #120 tabs for 30 days    multivitamin (ONE A DAY) tablet Take 1 Tab by mouth daily.  valACYclovir (VALTREX) 1 gram tablet Take 1 Tab by mouth daily.  triamcinolone acetonide (KENALOG) 0.1 % ointment Apply  to affected area two (2) times a day. use thin layer for 7 days    nystatin (MYCOSTATIN) topical cream Apply  to affected area two (2) times a day.  For 7 days    azithromycin (ZITHROMAX) 250 mg tablet Take two tablets today then one tablet daily    predniSONE (STERAPRED DS) 10 mg dose pack 6 Day DS taper pack as directed    predniSONE (STERAPRED DS) 10 mg dose pack See administration instruction per 10mg dose pack    albuterol (PROVENTIL HFA, VENTOLIN HFA, PROAIR HFA) 90 mcg/actuation inhaler Take 1 Puff by inhalation every four (4) hours as needed for Wheezing.  ibuprofen (MOTRIN) 800 mg tablet Take 800 mg by mouth every eight (8) hours as needed for Pain.  norgestimate-ethinyl estradioL (ORTHO-CYCLEN, SPRINTEC) 0.25-35 mg-mcg tab Take 1 Tab by mouth daily.  acetaminophen (TYLENOL) 325 mg tablet Take  by mouth every four (4) hours as needed for Pain. No current facility-administered medications for this visit.        Review of Systems:  History obtained from the patient and written ROS questionnaire  Constitutional: negative for fevers, chills and weight loss  ENT ROS: negative for - hearing change, oral lesions or visual changes  Respiratory: negative for cough, wheezing or dyspnea on exertion  Cardiovascular: negative for chest pain, irregular heart beats, exertional chest pressure/discomfort  Gastrointestinal: negative for dysphagia, nausea and vomiting  Genito-Urinary ROS: , see HPI  Inteument/breast: negative for rash, breast lump and nipple discharge  Musculoskeletal see HPI  Endocrine ROS: negative for - breast changes, galactorrhea or temperature intolerance  Hematological and Lymphatic ROS: negative for - blood clots, bruising or swollen lymph nodes    Physical Exam:  Visit Vitals  /79   Pulse (!) 105   Wt (!) 356 lb (161.5 kg)   BMI 57.46 kg/m²       GENERAL: alert, well appearing, and in no distress  HEAD; normocephalic, atraumatic  ABDOMEN: soft, nontender, nondistended, no masses or organomegaly   BACK: normal range of motion, no tenderness, no CVAT   EGBUS: no lesions, no inflammation, no masses  VULVA: normal appearing vulva with no masses, tenderness or lesions  VAGINA: normal appearing vagina with normal color, no lesions, white discharge  NTZ neg  CERVIX: normal appearing cervix without discharge or lesions, non tender  UTERUS: uterus is enlarged in size, gravid appropriate for gestational age  ADNEXA: normal adnexa in size, nontender and no masses  NEURO: alert, oriented, normal speech    See PN flowsheet for additional notes and exam    Assessment:  22 y.o.  29w4d   Encounter Diagnoses   Name Primary?  Pelvic pain during pregnancy in third trimester, antepartum Yes    Vaginal discharge during pregnancy, antepartum        Plan:  An evaluation of this patient's concern is planned. The patient is advised that she should contact the office if she does not note improvement or if symptoms recur  She should contact our office with any questions or concerns  She could keep her routine OB appointment. We discussed potential causes of vaginal discharge/irritation. We discussed good vulvar hygiene. Recommended avoid vaginal irritants. Discussed use of mild soaps/detergents. Follow up if NI. Patient will be notified about swab results and prescription sent, if indicated. Rec Pelvic PT ([place])  Work note. rec support belt      On this date, 2021,  I have spent 15 minutes reviewing previous notes, test results and face to face with the patient discussing the diagnosis and importance of compliance with the treatment plan as well as documenting on the day of the visit. Orders Placed This Encounter    NUSWAB VAGINITIS PLUS ([facility])    REFERRAL TO PHYSICAL THERAPY       No results found for this visit on 21.     Janith Fabry, MD

## 2021-08-08 NOTE — PROGRESS NOTES
The results are normal, no clear evidence of vaginal infection  [facility] message sent if active. Recommend f/u if still having symptoms/problems or has additional concerns.

## 2021-08-10 ENCOUNTER — ROUTINE PRENATAL (OUTPATIENT)
Dept: OBGYN CLINIC | Age: 26
End: 2021-08-10
Payer: MEDICAID

## 2021-08-10 VITALS
WEIGHT: 293 LBS | DIASTOLIC BLOOD PRESSURE: 76 MMHG | HEIGHT: 66 IN | BODY MASS INDEX: 47.09 KG/M2 | HEART RATE: 87 BPM | SYSTOLIC BLOOD PRESSURE: 143 MMHG

## 2021-08-10 DIAGNOSIS — O26.899 PELVIC PAIN IN PREGNANCY: Primary | ICD-10-CM

## 2021-08-10 DIAGNOSIS — O16.3 HIGH BLOOD PRESSURE AFFECTING PREGNANCY IN THIRD TRIMESTER, ANTEPARTUM: ICD-10-CM

## 2021-08-10 DIAGNOSIS — R10.2 PELVIC PAIN IN PREGNANCY: Primary | ICD-10-CM

## 2021-08-10 LAB
CREAT UR-MCNC: 110 MG/DL
PROT UR-MCNC: 15 MG/DL (ref 0–11.9)
PROT/CREAT UR-RTO: 0.1

## 2021-08-10 PROCEDURE — 0502F SUBSEQUENT PRENATAL CARE: CPT | Performed by: OBSTETRICS & GYNECOLOGY

## 2021-08-10 RX ORDER — CHOLECALCIFEROL (VITAMIN D3) 50 MCG
CAPSULE ORAL
COMMUNITY
End: 2022-05-02

## 2021-08-10 RX ORDER — OMEPRAZOLE 20 MG/1
20 CAPSULE, DELAYED RELEASE ORAL
COMMUNITY
End: 2021-08-12

## 2021-08-10 NOTE — PROGRESS NOTES
_ [address] [facility]  [email or web address]  [phone]    Marysol Valderrama MD, [facility]     Follow-up OB visit    Chief Complaint   Patient presents with   [facility] Routine Prenatal Visit       Patient Active Problem List    Diagnosis Date Noted    Supervision of other normal pregnancy 2021    Nerve compression syndrome 2020    Peripheral edema 2020    Pleurodynia 2020    Vaginal delivery 2018    Obesity, morbid ([address] 2018    Pituitary adenoma ([address] 2016    Galactorrhea 10/15/2016    Hyperprolactinemia ([address] 10/15/2016    Panic attacks 10/22/2013    Chronic pelvic pain in female 2011    Constipation 10/10/2011    Obesity 2011    Dysmenorrhea 2011          The patient reports the following concerns: She is using her pregnancy belt which is helping with her pain. She is scheduled with first available PT at [place] on 21. Patient reports work put her on medical leave *their decision, because did not want to comply with pregnancy modifications. Vitals:    08/10/21 1135 08/10/21 1141 08/10/21 1211   BP: (!) 166/86 139/74 (!) 143/76   Pulse: 90 87    Weight: (!) 355 lb 12.8 oz (161.4 kg)     Height: 5' 6\" (1.676 m)       See PN flowsheet for exam    22 y.o.  30w2d   Encounter Diagnoses   Name Primary?     Pelvic pain in pregnancy Yes    High blood pressure affecting pregnancy in third trimester, antepartum      Disc concerns re HTN possible PIH/preeclampsia  rec to L D for labs NST, serial BP  Pt declines hospital evaluation  Labs,   Strict PIH precautions  BP check ~1 day  Ur pr cr ration   [] SAB/bleeding precautions reviewed   [] PTL/PPROM precautions reviewed   [] Labor precautions reviewed   [] Fetal kick counts discussed   [] Labs reviewed with patient   [] Dario Moy precautions reviewed   [] Consent reviewed   [] Handouts given to pt   [] Glucola handout    [] GBS/labor/Magic Hour handout   []    [] We reviewed CDC recommendations for Tdap for patient and close contacts and RBA of receiving in pregnancy, advised obtaining in third trimester   [] Reviewed healthy nutrition in pregnancy and good exercise practices   [] We disc safer medications in pregnancy and referred patient to [facility] resources   [] We reviewed CDC recommendations for flu vaccine and RBA of receiving in pregnancy   []    []    []       Follow-up and Dispositions    · Return in about 2 weeks (around 8/24/2021).          Orders Placed This Encounter    CBC W/O DIFF    METABOLIC PANEL, COMPREHENSIVE    PROTEIN/CREATININE RATIO, Lianna Méndez MD

## 2021-08-10 NOTE — PATIENT INSTRUCTIONS
Weeks 30 to 32 of Your Pregnancy: Care Instructions  Overview     You've made it to the final months of your pregnancy! By now your baby is really starting to look like a baby, with hair and plump skin. As you enter the final weeks of pregnancy, the reality of having a baby may start to set in. This is a good time to set up a safe nursery and find quality  if needed. Doing this stuff ahead of time will allow you to focus on caring for and enjoying your new baby. You may also want to take a tour of your hospital's labor and delivery unit. This will help you get a better idea of what to expect while you're in the hospital.  During these last months, be sure to take good care of yourself. Pay attention to what your body needs. If your doctor says it's okay for you to work, don't push yourself too hard. If you haven't already had the Tdap shot during this pregnancy, talk to your doctor about getting it. It will help protect your  against pertussis infection. Follow-up care is a key part of your treatment and safety. Be sure to make and go to all appointments, and call your doctor if you are having problems. It's also a good idea to know your test results and keep a list of the medicines you take. How can you care for yourself at home? Pay attention to your baby's movements  · You should feel your baby move several times every day. · Your baby now turns less, and kicks and jabs more. · Your baby sleeps 20 to 45 minutes at a time and is more active at certain times of day. · If your doctor wants you to count your baby's kicks:  ? Empty your bladder, and lie on your side or relax in a comfortable chair. ? Write down your start time. ? Pay attention only to your baby's movements. Count any movement except hiccups. ? After you have counted 10 movements, write down your stop time. ? Write down how many minutes it took for your baby to move 10 times.   ? If an hour goes by and you have not recorded 10 movements, have something to eat or drink and then count for another hour. If you do not record 10 movements in either hour, call your doctor. Ease heartburn  · Eat small, frequent meals. · Do not eat chocolate, peppermint, or very spicy foods. Avoid drinks with caffeine, such as coffee, tea, and sodas. · Avoid bending over or lying down after meals. · Talk a short walk after you eat. · If heartburn is a problem at night, do not eat for 2 hours before bedtime. · Take antacids like Mylanta, Maalox, Rolaids, or Tums. Do not take antacids that have sodium bicarbonate. Care for varicose veins  · Varicose veins are blood vessels that stretch out with the extra blood during pregnancy. Your legs may ache or throb. Most varicose veins will go away after the birth. · Avoid standing for long periods of time. Sit with your legs crossed at the ankles, not the knees. · Sit with your feet propped up. · Avoid tight clothing or stockings. Wear support hose. · Exercise regularly. Try walking for at least 30 minutes a day. Where can you learn more? Go to http://www.gray.com/  Enter X471 in the search box to learn more about \"Weeks 30 to 32 of Your Pregnancy: Care Instructions. \"  Current as of: October 8, 2020               Content Version: 12.8  © 3680-3846 Healthwise, Incorporated. Care instructions adapted under license by [employer] (which disclaims liability or warranty for this information). If you have questions about a medical condition or this instruction, always ask your healthcare professional. [place] [address] any warranty or liability for your use of this information.

## 2021-08-11 LAB
ALBUMIN SERPL-MCNC: 3 G/DL (ref 3.5–5)
ALBUMIN/GLOB SERPL: 0.9 {RATIO} (ref 1.1–2.2)
ALP SERPL-CCNC: 96 U/L (ref 45–117)
ALT SERPL-CCNC: 21 U/L (ref 12–78)
ANION GAP SERPL CALC-SCNC: 8 MMOL/L (ref 5–15)
AST SERPL-CCNC: 12 U/L (ref 15–37)
BILIRUB SERPL-MCNC: 0.3 MG/DL (ref 0.2–1)
BUN SERPL-MCNC: 5 MG/DL (ref 6–20)
BUN/CREAT SERPL: 13 (ref 12–20)
CALCIUM SERPL-MCNC: 8.9 MG/DL (ref 8.5–10.1)
CHLORIDE SERPL-SCNC: 105 MMOL/L (ref 97–108)
CO2 SERPL-SCNC: 25 MMOL/L (ref 21–32)
CREAT SERPL-MCNC: 0.4 MG/DL (ref 0.55–1.02)
ERYTHROCYTE [DISTWIDTH] IN BLOOD BY AUTOMATED COUNT: 13.9 % (ref 11.5–14.5)
GLOBULIN SER CALC-MCNC: 3.3 G/DL (ref 2–4)
GLUCOSE SERPL-MCNC: 66 MG/DL (ref 65–100)
HCT VFR BLD AUTO: 40.5 % (ref 35–47)
HGB BLD-MCNC: 12.5 G/DL (ref 11.5–16)
MCH RBC QN AUTO: 30.1 PG (ref 26–34)
MCHC RBC AUTO-ENTMCNC: 30.9 G/DL (ref 30–36.5)
MCV RBC AUTO: 97.6 FL (ref 80–99)
NRBC # BLD: 0 K/UL (ref 0–0.01)
NRBC BLD-RTO: 0 PER 100 WBC
PLATELET # BLD AUTO: 255 K/UL (ref 150–400)
PMV BLD AUTO: 9.4 FL (ref 8.9–12.9)
POTASSIUM SERPL-SCNC: 4.5 MMOL/L (ref 3.5–5.1)
PROT SERPL-MCNC: 6.3 G/DL (ref 6.4–8.2)
RBC # BLD AUTO: 4.15 M/UL (ref 3.8–5.2)
SODIUM SERPL-SCNC: 138 MMOL/L (ref 136–145)
WBC # BLD AUTO: 8.3 K/UL (ref 3.6–11)

## 2021-08-12 ENCOUNTER — ROUTINE PRENATAL (OUTPATIENT)
Dept: OBGYN CLINIC | Age: 26
End: 2021-08-12

## 2021-08-12 ENCOUNTER — CLINICAL SUPPORT (OUTPATIENT)
Dept: OBGYN CLINIC | Age: 26
End: 2021-08-12

## 2021-08-12 VITALS
DIASTOLIC BLOOD PRESSURE: 78 MMHG | SYSTOLIC BLOOD PRESSURE: 124 MMHG | HEART RATE: 93 BPM | WEIGHT: 293 LBS | BODY MASS INDEX: 57.3 KG/M2

## 2021-08-12 DIAGNOSIS — O16.3 ELEVATED BLOOD PRESSURE AFFECTING PREGNANCY IN THIRD TRIMESTER, ANTEPARTUM: Primary | ICD-10-CM

## 2021-08-12 DIAGNOSIS — O99.211 OBESITY AFFECTING PREGNANCY IN FIRST TRIMESTER: ICD-10-CM

## 2021-08-12 DIAGNOSIS — O99.210 OBESITY IN PREGNANCY: ICD-10-CM

## 2021-08-12 DIAGNOSIS — Z72.89 ENGAGES IN VAPING: ICD-10-CM

## 2021-08-12 PROCEDURE — 99212 OFFICE O/P EST SF 10 MIN: CPT | Performed by: OBSTETRICS & GYNECOLOGY

## 2021-08-12 NOTE — PROGRESS NOTES
[address] [facility]  [email or web address]  [phone]    Le Gonzalez MD, FACOG       OB/GYN: OB Problem visit    Chief Complaint:   Chief Complaint   Patient presents with    Blood Pressure Check       Patient Active Problem List    Diagnosis    Engages in [address] of other normal pregnancy     H/o pituitary microadenoma- followed by endocrine  Morbid obesity- early glucola at next visit  H/o depression  Uncle born with TOF,  demise  H/o hsv- on valtrex      Nerve compression syndrome    Peripheral edema    Pleurodynia    Vaginal delivery    Obesity, morbid ([address]    Pituitary adenoma ([address]    Galactorrhea    Hyperprolactinemia ([address]    Panic attacks    Chronic pelvic pain in female    Constipation    Obesity    Dysmenorrhea       History of Present Illness: The patient is a 22 y.o.  female who reports having a blood pressure check. This is a new problem. This is not a routinely scheduled OB appointment. She reports the symptoms are has improved. Aggravating factors include none. Alleviating factors include none. She does not have other concerns.     PFSH:  Past Medical History:   Diagnosis Date    Asthma     Dysmenorrhea     Essential hypertension     Major depressive disorder, single episode, unspecified     Morbid obesity ([address]     Pap smear for cervical cancer screening     17 neg 21 neg    Pituitary abnormality (HCC)     Seasonal allergies     Unspecified symptom associated with female genital organs      Past Surgical History:   Procedure Laterality Date    HX WISDOM TEETH EXTRACTION       Family History   Problem Relation Age of Onset    Alcohol abuse Father     Cancer Paternal Grandmother     Other Mother         endometriosis   [facility] Migraines Mother         tumer in brain     Social History     Tobacco Use    Smoking status: Current Every Day Smoker     Packs/day: 0.50     Years: 1.00     Pack years: 0.50 Types: Cigarettes    Smokeless tobacco: Never Used   Substance Use Topics    Alcohol use: No     Alcohol/week: 0.0 standard drinks     Comment: former user just sips now and then    Drug use: No     Allergies   Allergen Reactions    Latex Swelling     Skin peels    Lactose Other (comments)    Aspirin Nausea and Vomiting    Betadine [Povidone-Iodine] Other (comments)     Skin peels    Cat Dander Sneezing    Ketorolac Hives    Penicillins Other (comments)     Yeast infections    Ivp Dye [Iodinated Contrast Media] Other (comments)     Current Outpatient Medications   Medication Sig    PNV Comb #2-Iron-FA-Omega 3 29-1-400 mg cmpk Take  by mouth.  B.infantis-B.ani-B.long-B.bifi (Probiotic 4X) 10-15 mg TbEC Take  by mouth.  OTHER Prebiotic with cranberry    valACYclovir (VALTREX) 1 gram tablet Take 1 Tab by mouth daily.  predniSONE (STERAPRED DS) 10 mg dose pack See administration instruction per 10mg dose pack (Patient not taking: Reported on 8/10/2021)    albuterol (PROVENTIL HFA, VENTOLIN HFA, PROAIR HFA) 90 mcg/actuation inhaler Take 1 Puff by inhalation every four (4) hours as needed for Wheezing. (Patient not taking: Reported on 8/10/2021)    ibuprofen (MOTRIN) 800 mg tablet Take 800 mg by mouth every eight (8) hours as needed for Pain. (Patient not taking: Reported on 8/10/2021)    norgestimate-ethinyl estradioL (ORTHO-CYCLEN, SPRINTEC) 0.25-35 mg-mcg tab Take 1 Tab by mouth daily. (Patient not taking: Reported on 8/10/2021)    acetaminophen (TYLENOL) 325 mg tablet Take  by mouth every four (4) hours as needed for Pain. (Patient not taking: Reported on 8/10/2021)     No current facility-administered medications for this visit.        Review of Systems:  History obtained from the patient and written ROS questionnaire  Constitutional: negative for fevers, chills and weight loss  ENT ROS: negative for - hearing change, oral lesions or visual changes  Respiratory: negative for cough, wheezing or dyspnea on exertion  Cardiovascular: negative for chest pain, irregular heart beats, exertional chest pressure/discomfort  Gastrointestinal: negative for dysphagia, nausea and vomiting  Genito-Urinary ROS: no dysuria, trouble voiding, or hematuria, see HPI  Inteument/breast: negative for rash, breast lump and nipple discharge  Musculoskeletal:negative for stiff joints, neck pain and muscle weakness  Endocrine ROS: negative for - breast changes, galactorrhea or temperature intolerance  Hematological and Lymphatic ROS: negative for - blood clots, bruising or swollen lymph nodes    Physical Exam:  Visit Vitals  /78 (BP 1 Location: Right lower arm, BP Patient Position: Sitting, BP Cuff Size: Adult)   Pulse 93   Wt (!) 355 lb (161 kg)   BMI 57.30 kg/m²       GENERAL: alert, well appearing, and in no distress  HEAD; normocephalic, atraumatic  PULM: clear to auscultation, no wheezes, rales or rhonchi, symmetric air entry   COR: normal rate and regular rhythm, S1 and S2 normal   ABDOMEN: soft, nontender, nondistended, no masses or organomegaly   BACK: normal range of motion, no tenderness, no CVAT   NEURO: alert, oriented, normal speech    See PN flowsheet for additional notes and exam    Assessment:  22 y.o.  30w4d   Encounter Diagnoses   Name Primary?  Elevated blood pressure affecting pregnancy in third trimester, antepartum Yes    Comment: improved    Obesity affecting pregnancy in first trimester     Obesity in pregnancy     Engages in vaping        Plan:  An evaluation of this patient's concern is planned. The patient is advised that she should contact the office if she does not note improvement or if symptoms recur  She should contact our office with any questions or concerns  She could keep her routine OB appointment.    Reviewed PIH labs and PIH precautions  [facility] BID  FU 1-2 wks  BP log if able  rec wean vaping/nicotine products if able  Serial growth US with MFM: pt has appt    On this date, 8/12/2021,  I have spent 15 minutes reviewing previous notes, test results and face to face with the patient discussing the diagnosis and importance of compliance with the treatment plan as well as documenting on the day of the visit. No orders of the defined types were placed in this encounter. No results found for this visit on 08/12/21.     Josefina Rodriguez MD

## 2021-08-13 ENCOUNTER — TELEPHONE (OUTPATIENT)
Dept: OBGYN CLINIC | Age: 26
End: 2021-08-13

## 2021-08-13 NOTE — TELEPHONE ENCOUNTER
Call received @[phone]    Pt states she received a phone call from us this am, but no voicemail. This RN did not see any phone call documented, but noticed she had some new lab results. This RN advised pt she would call pt back after consulting with MD about labs and/or phone call.

## 2021-08-14 NOTE — PROGRESS NOTES
Normal results, add to prenatal records. We can review in detail with patient at next visit. [address] message sent if active.   Add PIH labs to PL (hgb,plat,cr, ast, alt, ur: prot 24 hr total or ratio) w date

## 2021-08-23 ENCOUNTER — ROUTINE PRENATAL (OUTPATIENT)
Dept: OBGYN CLINIC | Age: 26
End: 2021-08-23
Payer: MEDICAID

## 2021-08-23 VITALS
HEART RATE: 94 BPM | DIASTOLIC BLOOD PRESSURE: 81 MMHG | BODY MASS INDEX: 47.09 KG/M2 | HEIGHT: 66 IN | SYSTOLIC BLOOD PRESSURE: 133 MMHG | WEIGHT: 293 LBS

## 2021-08-23 DIAGNOSIS — R10.2 CHRONIC PELVIC PAIN IN FEMALE: Primary | ICD-10-CM

## 2021-08-23 DIAGNOSIS — Z34.80 SUPERVISION OF OTHER NORMAL PREGNANCY: ICD-10-CM

## 2021-08-23 DIAGNOSIS — G89.29 CHRONIC PELVIC PAIN IN FEMALE: Primary | ICD-10-CM

## 2021-08-23 PROCEDURE — 0502F SUBSEQUENT PRENATAL CARE: CPT | Performed by: OBSTETRICS & GYNECOLOGY

## 2021-08-23 RX ORDER — HYDROCORTISONE 25 MG/G
CREAM TOPICAL 4 TIMES DAILY
Qty: 28 G | Refills: 0 | Status: SHIPPED | OUTPATIENT
Start: 2021-08-23 | End: 2021-09-06

## 2021-08-23 NOTE — PROGRESS NOTES
_ 164 [facility]  [email or web address]  [phone]    Cristian Guy MD, [address] [facility]     Follow-up OB visit    Chief Complaint   Patient presents with   [facility] Routine Prenatal Visit       Patient Active Problem List    Diagnosis Date Noted    Engages in [address] 2021    Supervision of other normal pregnancy 2021    Nerve compression syndrome 2020    Peripheral edema 2020    Pleurodynia 2020    Vaginal delivery 2018    Obesity, morbid ([address] 2018    Pituitary adenoma ([address] 2016    Galactorrhea 10/15/2016    Hyperprolactinemia ([address] 10/15/2016    Panic attacks 10/22/2013    Chronic pelvic pain in female 2011    Constipation 10/10/2011    Obesity 2011    Dysmenorrhea 2011          The patient reports the following concerns: c/o pelvic pressure; she has pressure in her anus. Vitals:    21 1617   BP: 133/81   Pulse: 94   Weight: (!) 359 lb 12.8 oz (163.2 kg)   Height: 5' 6\" (1.676 m)     See PN flowsheet for exam    22 y.o.  32w1d   Encounter Diagnoses   Name Primary?     Chronic pelvic pain in female Yes    Supervision of other normal pregnancy      Disc options for hemorrhoids, rec bowel regimen: rx sent,  Notify md if NI after 14 days  Pelvic PT: awaiting appt  Keep MFM  Growth us     [] SAB/bleeding precautions reviewed    [x] PTL/PPROM precautions reviewed   [] Labor precautions reviewed   [] Fetal kick counts discussed   [] Labs reviewed with patient   [] R[facility] [facility]ven precautions reviewed   [] Consent reviewed   [] Handouts given to pt   [] Glucola handout    [] GBS/labor/Magic Hour handout   []    [] We reviewed CDC recommendations for Tdap for patient and close contacts and RBA of receiving in pregnancy, advised obtaining in third trimester   [] Reviewed healthy nutrition in pregnancy and good exercise practices   [] We disc safer medications in pregnancy and referred patient to [facility] resources   [] We reviewed CDC recommendations for flu vaccine and RBA of receiving in pregnancy   []    []    []       Follow-up and Dispositions    · Return in about 2 weeks (around 9/6/2021) for Follow up OB visit.          Orders Placed This Encounter    hydrocortisone (ANUSOL-HC) 2.5 % rectal cream       Crystal Thomas MD

## 2021-08-26 ENCOUNTER — HOSPITAL ENCOUNTER (OUTPATIENT)
Dept: PHYSICAL THERAPY | Age: 26
Discharge: HOME | End: 2021-08-26
Attending: OBSTETRICS & GYNECOLOGY
Payer: MEDICAID

## 2021-08-26 DIAGNOSIS — O26.899 PELVIC PAIN IN PREGNANCY: ICD-10-CM

## 2021-08-26 DIAGNOSIS — R10.2 PELVIC PAIN DURING PREGNANCY IN THIRD TRIMESTER, ANTEPARTUM: ICD-10-CM

## 2021-08-26 DIAGNOSIS — O26.893 PELVIC PAIN DURING PREGNANCY IN THIRD TRIMESTER, ANTEPARTUM: ICD-10-CM

## 2021-08-26 DIAGNOSIS — R10.2 PELVIC PAIN IN PREGNANCY: ICD-10-CM

## 2021-08-26 PROCEDURE — 97162 PT EVAL MOD COMPLEX 30 MIN: CPT | Performed by: PHYSICAL THERAPIST

## 2021-08-26 NOTE — PROGRESS NOTES
Physical Therapy at [facility],   a part of  [facility]har[facility]  [address] [facility], [facility] [address]  Phone: [phone]  Fax: [phone]    Plan of Care/ Statement of Necessity for Physical Therapy Services 2-15    Patient name: Mike Stacy  : 1995  Provider#: [id number]  Referral source: Shereen Wilder MD      Medical/Treatment Diagnosis: Pelvic pain during pregnancy in third trimester, antepartum [O26.893, R10.2]  Pelvic pain in pregnancy [O26.899, R10.2]     Prior Hospitalization: see medical history     Comorbidities: see eval  Prior Level of Function: see eval  Medications: Verified on Patient Summary List    Start of Care: 2021      Onset Date: 6 months       The Plan of Care and following information is based on the information from the initial evaluation. Assessment/ key information: Patient referred to pelvic PT for pelvic floor dysfunction and LBP. She presents with signs and symptoms associated with stress and urge incontinence. She presents with strength deficits, overactivity, poor coordination and awareness of the levator ani. She will benefit from skilled PT to address these problems so that she can perform all ADLs at [facility]. Evaluation Complexity History MEDIUM  Complexity : 1-2 comorbidities / personal factors will impact the outcome/ POC ; Examination MEDIUM Complexity : 3 Standardized tests and measures addressing body structure, function, activity limitation and / or participation in recreation  ;Presentation MEDIUM Complexity : Evolving with changing characteristics  ; Clinical Decision Making MEDIUM Complexity : FOTO score of 26-74  Overall Complexity Rating: MEDIUM    Problem List: pain affecting function, decrease ROM, decrease strength, impaired gait/ balance, decrease ADL/ functional abilitiies, decrease activity tolerance and decrease flexibility/ joint mobility   Treatment Plan may include any combination of the following: Therapeutic exercise, Therapeutic activities, Neuromuscular re-education, Physical agent/modality, Manual therapy, Aquatic therapy, Patient education, Self Care training and Functional mobility training  Patient / Family readiness to learn indicated by: asking questions, trying to perform skills and interest  Persons(s) to be included in education: patient (P)  Barriers to Learning/Limitations: None  Patient Goal (s): to reduce pain  Patient Self Reported Health Status: good  Rehabilitation Potential: good    Short Term Goals: To be accomplished in 4 weeks: To demonstrate independence with HEP  To demonstrate SIJ protection strategies  To demonstrate compliance with SIJ belt  To demonstrate competence with Sx management and pelvic realignment techniques  Long Term Goals: To be accomplished in 8 weeks: To increase lumbar and hip flexibility by 50% in order to bend and perform ADL's  To improve postural awareness and ergonomic desk set up to tolerated 8 hour work day  Increase LE strength by 1 ms grade so patient can stand/navigate stairs and walk without pain  Decrease overall pain by 50% so that patient can sleep and perform all ADL's    Frequency / Duration: Patient to be seen 1-2 times per week for 12 weeks. Patient/ Caregiver education and instruction: self care, activity modification and exercises    [x]  Plan of care has been reviewed with SHIMON Mahmood, PT 8/26/2021     ________________________________________________________________________    I certify that the above Therapy Services are being furnished while the patient is under my care. I agree with the treatment plan and certify that this therapy is necessary.     Physician's Signature:____________________  Date:____________Time: _________      Loly Aschoff, MD

## 2021-08-26 NOTE — PROGRESS NOTES
PT INITIAL EVALUATION NOTE 2-15    Patient Name: Donald Sheriff  Date:2021  : 1995  [x]  Patient  Verified  Payor: Arlin Hess / Plan: Giuliana Garcia / Product Type: Managed Care Medicaid /    In time:1015  Out time:1100  Total Treatment Time (min): 45  Visit #: 1     Treatment Area: Pelvic pain during pregnancy in third trimester, antepartum [O26.893, R10.2]  Pelvic pain in pregnancy [O26.899, R10.2]    SUBJECTIVE  Pain Level (0-10 scale): 8/10  Any medication changes, allergies to medications, adverse drug reactions, diagnosis change, or new procedure performed?: [] No    [x] Yes (see summary sheet for update)  Subjective:     Currently 32 weeks with supervision of normal pregnancy. Delivered 1st child  2018. Has been experiencing pubic pain with movement and activity (walking, rolling in bed). Experiencing a lot of rectal pressure especially when she has to have BM or void urine. Having soft stools 1-2x daily. Constantly feels like she has to urinate. Leaks often throughout the day with or without awareness. PLOF:  and house chores  Mechanism of Injury: pregnancy  Previous Treatment/Compliance: n/a  PMHx/Surgical Hx: n/a  Work Hx: on medical leave from [employer] Situation: lives with mother and child  Pt Goals: to reduce pain  Barriers: none  Motivation: yes  Substance use: yes   Cognition: A & O x 4        OBJECTIVE/EXAMINATION  Posture:  Significant lumbar lordosis, wide ADELIA and hip ER  Other Observations:   Morbid obesity with large abdominal pannus  Gait and Functional Mobility:  Poor trunk rotation and inc lateral sway  Palpation: TTP over SIJ and pubic symphysis        Lumbar AROM:  Cervical AROM:        R  L  R  L  Flexion    50        Extension   75        Side Bending   50  50      Rotation               LOWER QUARTER   MUSCLE STRENGTH  KEY       R  L  0 - No Contraction  L1, L2 Psoas  4  4  1 - Trace   L3 Quads  5  5  2 - Poor   L4 Tib Ant  5  5  3 - Fair    L5 EHL  5  5  4 - Good   S1 FHL  5  5  5 - Normal   S2 Hams  5  5              MMT: n/a  Neurological: Reflexes / Sensations: wnl  Special Tests:    Trendelenberg: +    Stork: +   Forward Bend: 12 inch from floor    Slump: -   Sebastien:      Helga:    BUSHRA SLR: -     Piriformis Ext: -   Long Sit:      Other:    Compression/Distraction: +            7 min Self Care/Home Management:  [x]  See flow sheet :   Rationale: increase ROM, increase strength and improve coordination  to improve the patients ability to perform ADLs      With   [] TE   [] TA   [] Neuro   [] SC   [] other: Patient Education: [x] Review HEP    [] Progressed/Changed HEP based on:   [] positioning   [] body mechanics   [] transfers   [] heat/ice application    [] other:        Other Objective/Functional Measures: [place] [id number]    Pain Level (0-10 scale) post treatment: 5      ASSESSMENT:      [x]  See Plan of [facility], PT 8/26/2021

## 2021-08-30 ENCOUNTER — HOSPITAL ENCOUNTER (OUTPATIENT)
Dept: PERINATAL CARE | Age: 26
Discharge: HOME | End: 2021-08-30
Attending: OBSTETRICS & GYNECOLOGY
Payer: MEDICAID

## 2021-08-30 PROCEDURE — 76816 OB US FOLLOW-UP PER FETUS: CPT | Performed by: OBSTETRICS & GYNECOLOGY

## 2021-09-01 ENCOUNTER — HOSPITAL ENCOUNTER (OUTPATIENT)
Dept: PHYSICAL THERAPY | Age: 26
Discharge: HOME | End: 2021-09-01
Attending: OBSTETRICS & GYNECOLOGY
Payer: MEDICAID

## 2021-09-01 ENCOUNTER — APPOINTMENT (OUTPATIENT)
Dept: PHYSICAL THERAPY | Age: 26
End: 2021-09-01
Attending: OBSTETRICS & GYNECOLOGY

## 2021-09-01 PROCEDURE — 97110 THERAPEUTIC EXERCISES: CPT

## 2021-09-01 NOTE — PROGRESS NOTES
PT DAILY TREATMENT NOTE 2-15    Patient Name: Romi Mendoza  Date:2021  : 1995  [x]  Patient  Verified  Payor: Geoff Stoll / Plan: Rigoberto Fang / Product Type: Managed Care Medicaid /    In time:3:30p  Out time:4:15p  Total Treatment Time (min): 45  Visit #:  2    Treatment Area: Pelvic floor dysfunction [M62.89]    SUBJECTIVE  Pain Level (0-10 scale): 6-7  Any medication changes, allergies to medications, adverse drug reactions, diagnosis change, or new procedure performed?: [x] No    [] Yes (see summary sheet for update)  Subjective functional status/changes:   [] No changes reported  Patient reports she got an [address], but is unsure how to use it. Patient states she continues have the about the same amount of pain. OBJECTIVE        45 min Therapeutic Exercise:  [x] See flow sheet : reviewed donning/doffing new SI belt, positioning and pillow use with sleeping, pelvic floor off-loading reviewed   Rationale: increase ROM and increase proprioception to improve the patients ability to sit, stand, lift, carry, reach, ambulate and complete ADL's      With   [] TE   [] TA   [] Neuro   [] SC   [] other: Patient Education: [x] Review HEP    [] Progressed/Changed HEP based on:   [] positioning   [] body mechanics   [] transfers   [] heat/ice application    [] other:      Other Objective/Functional Measures: decreased groin and hip pain after hip flexor stretch     Pain Level (0-10 scale) post treatment: 2 \"so much better\"    ASSESSMENT/Changes in Function:     Patient will continue to benefit from skilled PT services to modify and progress therapeutic interventions, address functional mobility deficits, address ROM deficits, address strength deficits, analyze and address soft tissue restrictions, analyze and cue movement patterns, analyze and modify body mechanics/ergonomics, assess and modify postural abnormalities and address imbalance/dizziness to attain remaining goals.      []  See Plan of Care  []  See progress note/recertification  []  See Discharge Summary         Progress towards goals / Updated goals:  Patient making good initial progress towards goals. Will do well with continued progression to decrease overall pain as tolerated.      PLAN  [x]  Upgrade activities as tolerated     [x]  Continue plan of care  [x]  Update interventions per flow sheet       []  Discharge due to:_  []  Other:_      Katty Ibarra, PTA 9/1/2021

## 2021-09-05 LAB — SARS-COV-2, NAA: NOT DETECTED

## 2021-09-07 ENCOUNTER — ROUTINE PRENATAL (OUTPATIENT)
Dept: OBGYN CLINIC | Age: 26
End: 2021-09-07
Payer: MEDICAID

## 2021-09-07 VITALS
WEIGHT: 293 LBS | HEART RATE: 96 BPM | SYSTOLIC BLOOD PRESSURE: 129 MMHG | BODY MASS INDEX: 58.91 KG/M2 | DIASTOLIC BLOOD PRESSURE: 80 MMHG

## 2021-09-07 DIAGNOSIS — Z34.80 SUPERVISION OF OTHER NORMAL PREGNANCY: Primary | ICD-10-CM

## 2021-09-07 PROCEDURE — 0502F SUBSEQUENT PRENATAL CARE: CPT | Performed by: OBSTETRICS & GYNECOLOGY

## 2021-09-07 NOTE — PATIENT INSTRUCTIONS
Weeks 34 to 36 of Your Pregnancy: Care Instructions  Overview     By now, your baby and your belly have grown quite large. It's almost time to give birth! Your baby's lungs are almost ready to breathe air. The skull bones are firm enough to protect your baby's head, but soft enough to move down through the birth canal.  You may be feeling excited and happy at times--but also anxious or scared. You might wonder how you'll know if you're in labor or what to expect during labor. Try to be open and flexible in your expectations of the birth. Because each birth is different, there's no way to know exactly what childbirth will be like for you. Talk to your doctor or midwife about any concerns you have. If you haven't already had the Tdap shot during this pregnancy, talk to your doctor about getting it. It will help protect your  against pertussis infection. In the 36th week, most women have a test for group B streptococcus (GBS). GBS is a common bacteria that can live in the vagina and rectum. It can make your baby sick after birth. If you test positive, you will get antibiotics during labor. The medicine will help keep your baby from getting the bacteria. Follow-up care is a key part of your treatment and safety. Be sure to make and go to all appointments, and call your doctor if you are having problems. It's also a good idea to know your test results and keep a list of the medicines you take. How can you care for yourself at home? Learn about pain relief choices  · Pain is different for every woman. Talk with your doctor about your feelings about pain. · You can choose from several types of pain relief. These include medicine or breathing techniques, as well as comfort measures. You can use more than one option. · If you choose to have pain medicine during labor, talk to your doctor about your options. Some medicines lower anxiety and help with some of the pain.  Others make your lower body numb so that you won't feel pain. · Be sure to tell your doctor about your pain medicine choice before you start labor or very early in your labor. You may be able to change your mind as labor progresses. · Rarely, a woman is put to sleep by medicine given through a mask or an IV. Labor and delivery  · The first stage of labor has three parts: early, active, and transition. ? Most women have early labor at home. You can stay busy or rest, eat light snacks, drink clear fluids, and start counting contractions. ? When talking during a contraction gets hard, you may be moving to active labor. During active labor, you should head for the hospital if you are not there already. ? You are in active labor when contractions come every 3 to 4 minutes and last about 60 seconds. Your cervix is opening more rapidly. ? If your water breaks, contractions will come faster and stronger. ? During transition, your cervix is stretching, and contractions are coming more rapidly. ? You may want to push, but your cervix might not be ready. Your doctor will tell you when to push. · The second stage starts when your cervix is completely opened and you are ready to push. ? Contractions are very strong to push the baby down the birth canal.  ? You will feel the urge to push. You may feel like you need to have a bowel movement. ? You may be coached to push with contractions. These contractions will be very strong, but you will not have them as often. You can get a little rest between contractions. ? You may be emotional and irritable. You may not be aware of what is going on around you.  ? One last push, and your baby is born. · The third stage is when a few more contractions push out the placenta. This may take 30 minutes or less. · The fourth stage is the welcome recovery. You may feel overwhelmed with emotions and exhausted but alert. This is a good time to start breastfeeding. Where can you learn more?   Go to http://www.gray.com/  Enter K421 in the search box to learn more about \"Weeks 34 to 36 of Your Pregnancy: Care Instructions. \"  Current as of: October 8, 2020               Content Version: 12.8  © 2006-2021 [employer]. Care instructions adapted under license by [employer] (which disclaims liability or warranty for this information). If you have questions about a medical condition or this instruction, always ask your healthcare professional. [address] any warranty or liability for your use of this information.

## 2021-09-07 NOTE — PROGRESS NOTES
[facility]  [email or web address]  [phone]    Jose Eduardo Ordoñez MD, FACOG     Follow-up OB visit    Chief Complaint   Patient presents with   Mario Yanes Routine Prenatal Visit       Patient Active Problem List    Diagnosis Date Noted    Engages in [address] [place] [address] 2021    Supervision of other normal pregnancy 2021    Nerve compression syndrome 2020    Peripheral edema 2020    Pleurodynia 2020    Vaginal delivery 2018    Obesity, morbid ([address] 2018    Pituitary adenoma ([address] 2016    Galactorrhea 10/15/2016    Hyperprolactinemia ([address] 10/15/2016    Panic attacks 10/22/2013    Chronic pelvic pain in female 2011    Constipation 10/10/2011    Obesity 2011    Dysmenorrhea 2011          The patient reports the following concerns: Pt reports that she feels like the baby moved head down, and her pelvic pain has decreased. She feels like baby is a lot more active since stopped smoking cigarettes. Vitals:    21 1018   BP: 129/80   Pulse: 96   Weight: (!) 365 lb (165.6 kg)     See PN flowsheet for exam    22 y.o.  34w2d   Encounter Diagnosis   Name Primary?  Supervision of other normal pregnancy Yes     Keep MFM fu  Disc risks of obesity in pregnancy and inc risk of complications    Because of obesity and pregnancy risks especially in BMI >30 kg/m2  We discussed obesity is associated with an increased risk factor for fetal structural anomalies and an increased risk for not identifying these anomalies.       Obesity can also increase complications of other co morbidities and increase the risks of OB complications including but not limited to miscarriage, GDM and type 2 diabetes, pre-term delivery, pregnancy induced hypertension including preeclampsia, induction, post-dates pregnancy,  section, dysfunctional labor, shoulder dystocia, macrosomia, postpartum hemorrhage, perineal trauma, infection, venous thromboembolism, and congenital anomalies. [] SAB/bleeding precautions reviewed   [x] PTL/PPROM precautions reviewed   [] Labor precautions reviewed   [] Fetal kick counts discussed   [] Labs reviewed with patient   [] [place] precautions reviewed   [] Consent reviewed   [] Handouts given to pt   [] Glucola handout    [] GBS/labor/Magic Hour handout   []    [] We reviewed CDC recommendations for Tdap for patient and close contacts and RBA of receiving in pregnancy, advised obtaining in third trimester   [] Reviewed healthy nutrition in pregnancy and good exercise practices   [] We disc safer medications in pregnancy and referred patient to [facility] resources   [] We reviewed CDC recommendations for flu vaccine and RBA of receiving in pregnancy   []    []    []       Follow-up and Dispositions    · Return in about 2 weeks (around 9/21/2021) for routine prenatal care. No orders of the defined types were placed in this encounter.       Puneet Hall MD

## 2021-09-10 ENCOUNTER — PATIENT MESSAGE (OUTPATIENT)
Dept: OBGYN CLINIC | Age: 26
End: 2021-09-10

## 2021-09-10 ENCOUNTER — APPOINTMENT (OUTPATIENT)
Dept: PHYSICAL THERAPY | Age: 26
End: 2021-09-10
Attending: OBSTETRICS & GYNECOLOGY
Payer: MEDICAID

## 2021-09-13 NOTE — TELEPHONE ENCOUNTER
----- Message from Jigar Smith MD sent at 9/10/2021  3:58 PM EDT -----  Regarding: FW: Non-Urgent Medical Question  Contact: [phone]  PO hydrate. PTL prec.      ----- Message -----  From: Ayesha Black LPN  Sent: 1/43/5313   2:24 PM EDT  To: Jigar Smith MD  Subject: FW: Non-Urgent Medical Question                  Please advise    Judith Trujillo LPN  ----- Message -----  From: Romi Mendoza  Sent: 9/10/2021   1:23 PM EDT  To: [facility]  Subject: Non-Urgent Elyssa Leon I wasn't sure if I should let you know but I went to [facility] to get a few things and the small amount of walking caused me to start cramping he's moving and everything but the cramping became a little persistent. I wanted to let you know since I'm not due to see you till the 23rd.

## 2021-09-13 NOTE — TELEPHONE ENCOUNTER
Follow up call to Ms. Jackie Galicia Pt verified self and birth date for privacy precautions. Patient was advised of MD recommendations and that at her next visit the MD will check her cervix at her scheduled 36 wk OB appt. Ms. Seth Felix acknowledged understanding and all questions were answered to patients satisfaction. No further questions or concerns at this time.

## 2021-09-17 ENCOUNTER — APPOINTMENT (OUTPATIENT)
Dept: PHYSICAL THERAPY | Age: 26
End: 2021-09-17
Attending: OBSTETRICS & GYNECOLOGY
Payer: MEDICAID

## 2021-09-20 ENCOUNTER — APPOINTMENT (OUTPATIENT)
Dept: PHYSICAL THERAPY | Age: 26
End: 2021-09-20
Attending: OBSTETRICS & GYNECOLOGY
Payer: MEDICAID

## 2021-09-23 ENCOUNTER — ROUTINE PRENATAL (OUTPATIENT)
Dept: OBGYN CLINIC | Age: 26
End: 2021-09-23
Payer: MEDICAID

## 2021-09-23 ENCOUNTER — HOSPITAL ENCOUNTER (OUTPATIENT)
Dept: PERINATAL CARE | Age: 26
Discharge: HOME | End: 2021-09-23
Attending: OBSTETRICS & GYNECOLOGY
Payer: MEDICAID

## 2021-09-23 VITALS
SYSTOLIC BLOOD PRESSURE: 100 MMHG | HEART RATE: 88 BPM | BODY MASS INDEX: 59.17 KG/M2 | WEIGHT: 293 LBS | DIASTOLIC BLOOD PRESSURE: 72 MMHG

## 2021-09-23 DIAGNOSIS — Z34.80 SUPERVISION OF OTHER NORMAL PREGNANCY: Primary | ICD-10-CM

## 2021-09-23 LAB — GRBS, EXTERNAL: POSITIVE

## 2021-09-23 PROCEDURE — 76819 FETAL BIOPHYS PROFIL W/O NST: CPT | Performed by: OBSTETRICS & GYNECOLOGY

## 2021-09-23 PROCEDURE — 76816 OB US FOLLOW-UP PER FETUS: CPT | Performed by: OBSTETRICS & GYNECOLOGY

## 2021-09-23 PROCEDURE — 0502F SUBSEQUENT PRENATAL CARE: CPT | Performed by: OBSTETRICS & GYNECOLOGY

## 2021-09-23 NOTE — PATIENT INSTRUCTIONS
Weeks 34 to 36 of Your Pregnancy: Care Instructions  Overview     By now, your baby and your belly have grown quite large. It's almost time to give birth! Your baby's lungs are almost ready to breathe air. The skull bones are firm enough to protect your baby's head, but soft enough to move down through the birth canal.  You may be feeling excited and happy at timesbut also anxious or scared. You might wonder how you'll know if you're in labor or what to expect during labor. Try to be open and flexible in your expectations of the birth. Because each birth is different, there's no way to know exactly what childbirth will be like for you. Talk to your doctor or midwife about any concerns you have. If you haven't already had the Tdap shot during this pregnancy, talk to your doctor about getting it. It will help protect your  against pertussis infection. In the 36th week, you'll probably have a test for group B streptococcus (GBS). GBS is a common type of bacteria that can live in the vagina and rectum. It can make your baby sick after birth. If you test positive, you will get antibiotics during labor. The medicine will help keep your baby from getting the bacteria. Follow-up care is a key part of your treatment and safety. Be sure to make and go to all appointments, and call your doctor if you are having problems. It's also a good idea to know your test results and keep a list of the medicines you take. How can you care for yourself at home? Learn about pain relief choices  · Pain is different for everyone. Talk with your doctor about your feelings about pain. · You can choose from several types of pain relief. These include medicine, breathing techniques, and comfort measures. You can use more than one option. · If you choose to have pain medicine during labor, talk to your doctor about your options. Some medicines lower anxiety and help with some of the pain.  Others make your lower body numb so that you won't feel pain. · Be sure to tell your doctor about your pain medicine choice before you start labor or very early in your labor. You may be able to change your mind as labor progresses. Labor and delivery  · The first stage of labor has three parts: early, active, and transition. ? It's common to have early labor at home. You can stay busy or rest, eat light snacks, drink clear fluids, and start counting contractions. ? When talking during a contraction gets hard, you may be moving to active labor. During active labor, you should head for the hospital if you aren't there already. ? You are in active labor when contractions come every 3 to 4 minutes and last about 60 seconds. Your cervix is opening more rapidly. ? If your water breaks, contractions will come faster and stronger. ? During transition, your cervix is stretching, and contractions are coming more rapidly. ? You may want to push, but your cervix might not be ready. Your doctor will tell you when to push. · The second stage starts when your cervix is completely opened and you are ready to push. ? Contractions are very strong to push the baby down the birth canal.  ? You will probably feel the urge to push. You may feel like you need to have a bowel movement. ? You may be coached to push with contractions. These contractions will be very strong, but you won't have them as often. You can get a little rest between contractions. ? One last push, and your baby is born. · The third stage is when a few more contractions push out the placenta. This may take 30 minutes or less. Where can you learn more? Go to http://www.gray.com/  Enter B912 in the search box to learn more about \"Weeks 34 to 36 of Your Pregnancy: Care Instructions. \"  Current as of: June 16, 2021               Content Version: 13.0  © 0626-6738 [employer].    Care instructions adapted under license by [employer] (which disclaims liability or warranty for this information). If you have questions about a medical condition or this instruction, always ask your healthcare professional. [address] any warranty or liability for your use of this information. Turning a Breech Baby: Care Instructions  Overview     At the end of most pregnancies, the baby's head is near the birth canal (vagina). But sometimes a baby's rear end or feet are near the birth canal. This position is called breech. If your baby stays in this breech position, you will probably need a  section (). Most breech babies are healthy and don't have problems after birth. Your doctor may try to turn your baby. To do this, the doctor presses on certain places on your belly. Sometimes this causes the baby to turn. The medical name for this process is external cephalic version. During the process of trying to turn your baby, your doctor will carefully watch your uterus. There's a chance that the pressure and movement might start contractions. There's also a chance that the umbilical cord will twist or get damaged. If your baby turns, your doctor may send you home. But the doctor will check you often until your labor starts. If your baby's head stays down, you may be able to have a vaginal delivery. But a small number of babies move back into a breech position. Follow-up care is a key part of your treatment and safety. Be sure to make and go to all appointments, and call your doctor if you are having problems. It's also a good idea to know your test results and keep a list of the medicines you take. How is the procedure done? · Your doctor will give you medicine to relax your uterus. Your doctor may give you medicine for pain and to help you relax. · Your doctor will put both hands on your belly. One hand will be near the baby's head. The other hand will be near the baby's rear end.  The doctor will push and roll the baby to try to get the head down. · You may feel some pain. The doctor will ask you how you are doing. · Your doctor will use a heart monitor to see how your baby is doing. · After the process, your doctor will give you instructions for your care. When should you call for help? Call 911 anytime you think you may need emergency care. For example, call if:    · You passed out (lost consciousness).     · You have severe vaginal bleeding. Call your doctor now or seek immediate medical care if:    · You have any vaginal bleeding.     · You have pain in your belly or pelvis.     · You think that you are in labor.     · You have a sudden release of fluid from your vagina.     · You notice that your baby has stopped moving or is moving much less than normal.   Watch closely for changes in your health, and be sure to contact your doctor if you have any questions or concerns. Where can you learn more? Go to http://www.gray.com/  Enter I027 in the search box to learn more about \"Turning a Breech Baby: Care Instructions. \"  Current as of: June 16, 2021               Content Version: 13.0  © 8267-4814 [employer]. Care instructions adapted under license by [employer] (which disclaims liability or warranty for this information). If you have questions about a medical condition or this instruction, always ask your healthcare professional. [address] any warranty or liability for your use of this information.

## 2021-09-23 NOTE — PROGRESS NOTES
_ [facility]  [email or web address]  [phone]    Katie Hardin MD, FACOG     Follow-up OB visit    Chief Complaint   Patient presents with   [facility] Routine Prenatal Visit       Patient Active Problem List    Diagnosis Date Noted    Engages in [facility] 2021    Supervision of other normal pregnancy 2021    Nerve compression syndrome 2020    Peripheral edema 2020    Pleurodynia 2020    Vaginal delivery 2018    Obesity, morbid ([address] 2018    Pituitary adenoma ([address] 2016    Galactorrhea 10/15/2016    Hyperprolactinemia ([address] 10/15/2016    Panic attacks 10/22/2013    Chronic pelvic pain in female 2011    Constipation 10/10/2011    Obesity 2011    Dysmenorrhea 2011          The patient reports the following concerns: Pt. Reports that she is having a lot of concerns about her pelvic pain and contractions since her baby is breech per MFM. Vitals:    21 1111   BP: 100/72   Pulse: 88   Weight: (!) 366 lb 9.6 oz (166.3 kg)     See PN flowsheet for exam    22 y.o.  36w4d   Encounter Diagnoses   Name Primary?  Supervision of other normal pregnancy Yes    Breech presentation, single or unspecified fetus        Disc options for breech  Observation/ecv/cs  Pt had similar pattern in prior pregnancy. req cs if breech and defers ecv for now  Disc rba of CS, pt feels like this is last pregnancy or maybe one more.   Keep MFM fu  Rom/labor prec   [] SAB/bleeding precautions reviewed   [] PTL/PPROM precautions reviewed   [] Labor precautions reviewed   [] Fetal kick counts discussed   [] Labs reviewed with patient   [] Sami Peto precautions reviewed   [] Consent reviewed   [] Handouts given to pt   [] Glucola handout    [] GBS/labor/Magic Hour handout   []    [] We reviewed CDC recommendations for Tdap for patient and close contacts and RBA of receiving in pregnancy, advised obtaining in third trimester   [] Reviewed healthy nutrition in pregnancy and good exercise practices   [] We disc safer medications in pregnancy and referred patient to [facility] resources   [] We reviewed CDC recommendations for flu vaccine and RBA of receiving in pregnancy   []    []    []       Follow-up and Dispositions    · Return in about 1 week (around 9/30/2021).          Orders Placed This Encounter   Raimundo Weiss MD

## 2021-09-29 ENCOUNTER — HOSPITAL ENCOUNTER (OUTPATIENT)
Dept: PERINATAL CARE | Age: 26
Discharge: HOME | End: 2021-09-29
Attending: OBSTETRICS & GYNECOLOGY
Payer: MEDICAID

## 2021-09-29 PROCEDURE — 76819 FETAL BIOPHYS PROFIL W/O NST: CPT | Performed by: OBSTETRICS & GYNECOLOGY

## 2021-09-30 ENCOUNTER — ROUTINE PRENATAL (OUTPATIENT)
Dept: OBGYN CLINIC | Age: 26
End: 2021-09-30
Payer: MEDICAID

## 2021-09-30 ENCOUNTER — APPOINTMENT (OUTPATIENT)
Dept: PHYSICAL THERAPY | Age: 26
End: 2021-09-30
Attending: OBSTETRICS & GYNECOLOGY
Payer: MEDICAID

## 2021-09-30 VITALS
HEIGHT: 66 IN | WEIGHT: 293 LBS | SYSTOLIC BLOOD PRESSURE: 134 MMHG | DIASTOLIC BLOOD PRESSURE: 93 MMHG | HEART RATE: 101 BPM | BODY MASS INDEX: 47.09 KG/M2

## 2021-09-30 DIAGNOSIS — Z34.80 SUPERVISION OF OTHER NORMAL PREGNANCY: Primary | ICD-10-CM

## 2021-09-30 PROCEDURE — 0502F SUBSEQUENT PRENATAL CARE: CPT | Performed by: OBSTETRICS & GYNECOLOGY

## 2021-09-30 NOTE — PROGRESS NOTES
_ [address] [facility]  [email or web address]  [phone]    Biju Adam MD, [facility]     Follow-up OB visit    Chief Complaint   Patient presents with   [place] Routine Prenatal Visit       Patient Active Problem List    Diagnosis Date Noted    Engages in [facility] 2021    Supervision of other normal pregnancy 2021    Nerve compression syndrome 2020    Peripheral edema 2020    Pleurodynia 2020    Vaginal delivery 2018    Obesity, morbid ([address] 2018    Pituitary adenoma ([address] 2016    Galactorrhea 10/15/2016    Hyperprolactinemia ([address] 10/15/2016    Panic attacks 10/22/2013    Chronic pelvic pain in female 2011    Constipation 10/10/2011    Obesity 2011    Dysmenorrhea 2011          The patient reports the following concerns: pt states she is in pain today, c/o [address] The baby is no longer breech. She is ready to have this baby boy! She is noticing more discharge than usual. She would like to discuss if she needs a  since the baby is no longer breech. Please advise when a covid test is needed if she is delivering earlier, otherwise, pt knows to get her test at 38wks. Vitals:    21 1118 21 1142 21 1153   BP: (!) 134/93 135/84 (!) 134/93   Pulse:  (!) 101    Weight: (!) 371 lb (168.3 kg)     Height: 5' 6\" (1.676 m)       See PN flowsheet for exam    22 y.o.  37w4d   Encounter Diagnosis   Name Primary?     Supervision of other normal pregnancy Yes     Keep MFM fu 1 wk  Labor/rom precautions  rec BP log  PIH precautions  Repeat BP wnl ( 2nd high entry was an error)  [facility] BID   [] SAB/bleeding precautions reviewed   [] PTL/PPROM precautions reviewed   [] Labor precautions reviewed   [] Fetal kick counts discussed   [] Labs reviewed with patient   [] Hassel Gilberto precautions reviewed   [] Consent reviewed   [] Handouts given to pt   [] Glucola handout    [] GBS/labor/Magic Hour handout   []    [] We reviewed CDC recommendations for Tdap for patient and close contacts and RBA of receiving in pregnancy, advised obtaining in third trimester   [] Reviewed healthy nutrition in pregnancy and good exercise practices   [] We disc safer medications in pregnancy and referred patient to [facility] resources   [] We reviewed CDC recommendations for flu vaccine and RBA of receiving in pregnancy   []    []    []       Follow-up and Dispositions    · Return in about 1 week (around 10/7/2021) for Follow up OB visit. No orders of the defined types were placed in this encounter.       Pablo Flower MD

## 2021-10-02 RX ORDER — PHENOL/SODIUM PHENOLATE
20 AEROSOL, SPRAY (ML) MUCOUS MEMBRANE 2 TIMES DAILY
Qty: 60 TABLET | Refills: 1 | Status: SHIPPED | OUTPATIENT
Start: 2021-10-02

## 2021-10-03 LAB
GP B STREP DNA SPEC QL NAA+PROBE: POSITIVE
SPECIMEN SOURCE: ABNORMAL

## 2021-10-04 NOTE — PROGRESS NOTES
GBS positive, add to prenatal labs and OB problem list.   Notify patient at next visit and give GBS handout.    Add to PL; gbs pos clinda sens

## 2021-10-05 ENCOUNTER — HOSPITAL ENCOUNTER (EMERGENCY)
Age: 26
Discharge: HOME | End: 2021-10-05
Attending: OBSTETRICS & GYNECOLOGY | Admitting: OBSTETRICS & GYNECOLOGY
Payer: MEDICAID

## 2021-10-05 ENCOUNTER — HOSPITAL ENCOUNTER (OUTPATIENT)
Dept: PERINATAL CARE | Age: 26
Discharge: HOME | End: 2021-10-05
Attending: OBSTETRICS & GYNECOLOGY
Payer: MEDICAID

## 2021-10-05 VITALS
HEIGHT: 66 IN | HEART RATE: 91 BPM | RESPIRATION RATE: 16 BRPM | DIASTOLIC BLOOD PRESSURE: 57 MMHG | BODY MASS INDEX: 47.09 KG/M2 | WEIGHT: 293 LBS | OXYGEN SATURATION: 100 % | TEMPERATURE: 98.3 F | SYSTOLIC BLOOD PRESSURE: 128 MMHG

## 2021-10-05 PROCEDURE — 76815 OB US LIMITED FETUS(S): CPT

## 2021-10-05 PROCEDURE — 99283 EMERGENCY DEPT VISIT LOW MDM: CPT

## 2021-10-05 PROCEDURE — 76819 FETAL BIOPHYS PROFIL W/O NST: CPT | Performed by: OBSTETRICS & GYNECOLOGY

## 2021-10-05 PROCEDURE — 59025 FETAL NON-STRESS TEST: CPT

## 2021-10-05 NOTE — H&P
PANCHO History & Physical    Subjective:     Lucretia Núñez is a 22 y.o. female [address] @ 38w2d by Estimated Date of Delivery: 10/17/21 who arrives to L&D with chief complaint of uterine contractions on and off since Friday but are rare in nature. Sh reports that she had a very strong one that made her cry a few hours ago. Since getting to the hospital she does not feel anything now and her pain is a 0/10. She endorses +FM, intact membranes; Pt denies vaginal bleeding, fever/chills, chest pain, SOB, HA, scotomata, sudden swelling, nor malaise. Her pregnancy is complicated by obesity, genital HSV, and breech presentation for which she is scheduled for a primary c/s. She believes the baby has turned and is scheduled to see [facility] for an ultrasound this am. She has no other complaints. Pregnancy problems include:   - Body mass index is 59.72 kg/m².    -   Patient Active Problem List   Diagnosis Code    Obesity E66.9    Dysmenorrhea N94.6    Constipation K59.00    Chronic pelvic pain in female R10.2, G89.29    Panic attacks F41.0    Galactorrhea N64.3    Hyperprolactinemia (HCC) E22.1    Pituitary adenoma (HCC) D35.2    Obesity, morbid (HCC) E66.01    Vaginal delivery O80    Nerve compression syndrome G58.9    Peripheral edema R60.9    Pleurodynia R07.81    Supervision of other normal pregnancy Z34.80    Engages in vaping Z72.89       Allergies  -   Allergies   Allergen Reactions    Latex Swelling     Skin peels    Lactose Other (comments)    Aspirin Nausea and Vomiting    Betadine [Povidone-Iodine] Other (comments)     Skin peels    Cat Dander Sneezing    Ketorolac Hives    Penicillins Other (comments)     Yeast infections    Ivp Dye [Iodinated Contrast Media] Other (comments)       Prenatal Labs  Lab Results   Component Value Date/Time    Rubella, External Immune 05/24/2018 12:00 AM    GrBStrep, External positive 09/23/2021 12:00 AM    HBsAg, External Negative 05/24/2018 12:00 AM    HIV, External Negative 2018 12:00 AM    T. Pallidum Antibody, External Negative 2018 12:00 AM    Gonorrhea, External Negative 2018 12:00 AM    Chlamydia, External Negative 2018 12:00 AM     O POSITIVE    OB History  OB History        2    Para   1    Term   1            AB        Living   1       SAB        TAB        Ectopic        Molar        Multiple   0    Live Births   1          Obstetric Comments   Menarche:  8. LMP: current. # of Children:  0. Age at Delivery of First Child:  na.   Hysterectomy/oophorectomy:  NO/NO. Breast Bx:  no.  Hx of Breast Feeding:  na. BCP:  yes.  Hormone therapy:  no.   P1 spontaneous labor, efw 9pounds                PMHx  Past Medical History:   Diagnosis Date    Asthma     Dysmenorrhea     Essential hypertension     Genital herpes     Valtrex     Major depressive disorder, single episode, unspecified     Morbid obesity ([facility]     Pap smear for cervical cancer screening     17 neg 21 neg    Pituitary abnormality (HCC)     Postpartum depression     Seasonal allergies     Unspecified symptom associated with female genital organs         PSHx  Past Surgical History:   Procedure Laterality Date    HX OTHER SURGICAL      HX WISDOM TEETH EXTRACTION      HX WRIST FRACTURE TX Right         F/SHx  Family History   Problem Relation Age of Onset    Alcohol abuse Father     Cancer Paternal Grandmother     Other Mother         endometriosis   Gonzalez Migraines Mother         tumer in brain      Social History     Socioeconomic History    Marital status: SINGLE     Spouse name: Not on file    Number of children: Not on file    Years of education: Not on file    Highest education level: Not on file   Occupational History    Not on file   Tobacco Use    Smoking status: Former Smoker     Packs/day: 0.50     Years: 1.00     Pack years: 0.50     Types: Cigarettes    Smokeless tobacco: Never Used   Vaping Use    Vaping Use: Never used Substance and Sexual Activity    Alcohol use: No     Alcohol/week: 0.0 standard drinks     Comment: former user just sips now and then    Drug use: No    Sexual activity: Yes     Partners: Male     Birth control/protection: Pill   Other Topics Concern     Service Not Asked    Blood Transfusions Not Asked    Caffeine Concern Not Asked    Occupational Exposure Not Asked    Hobby Hazards Not Asked    Sleep Concern Not Asked    Stress Concern Not Asked    Weight Concern Not Asked    Special Diet Not Asked    Back Care Not Asked    Exercise Not Asked    Bike Helmet Not Asked   [address] Not Asked    Self-Exams Not Asked   Social History Narrative    Not on file     Social Determinants of Health     Financial Resource Strain:     Difficulty of Paying Living Expenses:    Food Insecurity:     Worried About Running Out of Food in the Last Year:     [address] in the Last Year:    Transportation Needs:     Lack of Transportation (Medical):  Lack of Transportation (Non-Medical):    Physical Activity:     Days of Exercise per Week:     Minutes of Exercise per Session:    Stress:     Feeling of Stress :    Social Connections:     Frequency of Communication with Friends and Family:     Frequency of Social Gatherings with Friends and Family:     Attends Taoist Services:     Active Member of Clubs or Organizations:     Attends Club or Organization Meetings:     Marital Status:    Intimate Partner Violence:     Fear of Current or Ex-Partner:     Emotionally Abused:     Physically Abused:     Sexually Abused:        Home Medications:  Prior to Admission Medications   Prescriptions Last Dose Informant Patient Reported? Taking? B.infantis-B.ani-B.long-B.bifi (Probiotic 4X) 10-15 mg TbEC   Yes No   Sig: Take  by mouth.    OTHER   Yes No   Sig: Prebiotic with cranberry   Omeprazole delayed release (PRILOSEC D/R) 20 mg tablet   No No   Sig: Take 1 Tablet by mouth two (2) times a day.   PNV Comb #2-Iron-FA-Omega 3 29-1-400 mg cmpk   Yes No   Sig: Take  by mouth. albuterol (PROVENTIL HFA, VENTOLIN HFA, PROAIR HFA) 90 mcg/actuation inhaler   No No   Sig: Take 1 Puff by inhalation every four (4) hours as needed for Wheezing. Patient not taking: Reported on 8/10/2021   valACYclovir (VALTREX) 1 gram tablet   No No   Sig: Take 1 Tab by mouth daily. Facility-Administered Medications: None        Review of Systems:  A comprehensive review of systems was negative except for that written in the History of Present Illness. Objective:     Vitals:    10/05/21 0203 10/05/21 0215   BP:  (!) 122/56   Pulse:  90   Resp:  16   Temp:  98.6 °F (37 °C)   SpO2:  100%   Weight: (!) 167.8 kg (370 lb)    Height: 5' 6\" (1.676 m)       Body mass index is 59.72 kg/m². Physical Exam:  General:  Alert, cooperative, no distress, appears stated age. Lungs:   Clear to auscultation bilaterally. Symmetrical expansion, non-labored   Heart:  Regular rate and rhythm   GI/Abdomen:   Soft, non-tender. Gravid. Extremities: Extremities normal, atraumatic, no cyanosis or edema. Skin: Skin color, texture, turgor normal. No rashes or lesions   /Cervical Exam: no lesions or erythema  Cervix: Cervical Exam  Dilation (cm): FT-1  Eff: 0 %  Station: -3  Position: Posterior  Vaginal exam done by? : Terry FOX  Membrane Status: Intact  Fetal Presentation: Vertex by Silkeds- and confirmed with bedside US.    Pelvimetry: Adequate  Fetal Size: AGA      Electronic Fetal Monitoring    Fetal Heart Rate:    Baseline 120, moderate variablity, +accels, no decels, cat 1 FHT  Uterine Contractions: External, q3-7min, mild, relaxed to palpation, patient does not report feeling any     Assessment:   22 y.o. female [address] @  38w2 d by FELIX of Estimated Date of Delivery: 10/17/21  Diagnoses:   -false labor     Pregnancy problems include:   -   Patient Active Problem List   Diagnosis Code    Obesity E66.9    Dysmenorrhea N94.6  Constipation K59.00    Chronic pelvic pain in female R10.2, G89.29    Panic attacks F41.0    Galactorrhea N64.3    Hyperprolactinemia (HCC) E22.1    Pituitary adenoma (HCC) D35.2    Obesity, morbid (HCC) E66.01    Vaginal delivery O80    Nerve compression syndrome G58.9    Peripheral edema R60.9    Pleurodynia R07.81    Supervision of other normal pregnancy Z34.80    Engages in vaping Z72.89     - Body mass index is 59.72 kg/m². Pertinent Labs:  - O POSITIVE  -   Lab Results   Component Value Date/Time    Rubella, External Immune 05/24/2018 12:00 AM    GrBStrep, External positive 09/23/2021 12:00 AM    HBsAg, External Negative 05/24/2018 12:00 AM    HIV, External Negative 05/24/2018 12:00 AM    T. Pallidum Antibody, External Negative 05/24/2018 12:00 AM    Gonorrhea, External Negative 05/24/2018 12:00 AM    Chlamydia, External Negative 05/24/2018 12:00 AM       EFM:   - Category 1  - NST: Fetal NST Findings: reactive with indication of establishing a baseline fetal evaluation conducted over a minimum of 20 minutes    Plan:   Discharge home in stable condition     Tests/Labs/Monitoring ordered: SVE and NST, bedside US      Review of prenatal records to include labs/diagnostic testing, encounter notes  Review and sign consents   Patient Education:   -Vertex on [facility]- Discussed with patient that her cervix is FT-1 and very long. She is not feeling any contractions at this time- she is unlikely to be in labor. Patient given choice to ambulate versus be discharged home. Patient desires to be discharged home at this time  -Labor precautions discussed.   -Patient to follow-up at her 8 am US. Patient has been counseled regarding this plan of care and is in agreement; all questions answered.     Collaborative MD: Dr. Grupo Mendoza      Signed By:  Randy Sewell CNM      10/5/2021 2:52 AM

## 2021-10-05 NOTE — PROGRESS NOTES
0150: Pt arriving onto unit ambulatory with steady gait accompanied by FOB. Pt reports back pain and cramping since Friday 10/1. Pt reports cramping has lessened this evening. Pt denies LOF, or bleeding. Pt reports positive fetal movement. 7482: This RN calling Subudhi CNM to update on pt status. CNM verbalizing understanding and will round on pt shortly. [id number]: This RN and Subudhi CNM at pt bedside. CNM assessing pt and performing bedside US to confirm vertex fetal presentation. CNM performing SVE: 1/0/-3. Pt tolerated well. CNM educating pt on POC to d/c at this time. Pt and FOB verbalizing understanding and agreeing with POC.     [id number]: RN at pt bedside reassessing pt and educating pt on discharge instructions including fetal kick counts, abdominal pain in pregnancy and current week of pregnancy. VSS. Pt denies further questions. [id number]: Pt and FOB ambulatory off of unit at this time with steady gait.

## 2021-10-05 NOTE — DISCHARGE INSTRUCTIONS
Patient Education   Patient Education   Patient Education        Belly Pain in Pregnancy: Care Instructions  Overview     When you're pregnant, any belly pain can be a worry. You may not want to call your doctor or midwife about every pain you have. But you don't want to miss something that is dangerous for you or your baby. Even if it feels familiar, belly pain can mean something new when you're pregnant. It's important to know when to call your doctor or midwife. It will also help to know how to care for yourself at home when your pain is not caused by anything harmful. · When belly pain is more severe or constant, see a doctor or midwife right away. · If you're sure your belly pain is a sign of labor, call your doctor or midwife. · When belly pain is brief, it's usually a normal part of pregnancy. It might be related to changes in the growing uterus. Or it could be the stretching of ligaments called round ligaments. These ligaments help support the uterus. Round ligament pain can be on either side of your belly. It can also be felt in your hips or groin. Follow-up care is a key part of your treatment and safety. Be sure to make and go to all appointments, and call your doctor if you are having problems. It's also a good idea to know your test results and keep a list of the medicines you take. How can you tell if belly pain is a sign of labor? When belly pain is caused by labor, it can feel like mild or menstrual-like cramps in your lower belly. These cramps are probably contractions. They can happen in your second or third trimester. You may also have:  · A steady, dull ache in your lower back, pelvis, or thighs. · A feeling of pressure in your pelvis or lower belly. · Changes in your vaginal discharge or a sudden release of fluid from the vagina. If you think you are in labor, call your doctor. How can you care for yourself at home?   When belly pain is mild and is not a symptom of labor:  · Rest until you feel better. · Take a warm bath. · Think about what you drink and eat:  ? Drink plenty of fluids. Choose water and other clear liquids until you feel better. ? Try eating small, frequent meals. If your stomach is upset, try bland, low-fat foods like plain rice, broiled chicken, toast, and yogurt. · Think about how you move if you are having brief pains from stretching of the round ligaments. ? Try gentle stretching. ? Move a little more slowly when turning in bed or getting up from a chair, so those ligaments don't stretch quickly. ? Lean forward a bit if you think you are going to cough or sneeze. When should you call for help? Call 911  anytime you think you may need emergency care. For example, call if:    · You have sudden, severe pain in your belly.     · You have severe vaginal bleeding.     · You passed out (lost consciousness).     · You have a seizure. Call your doctor now or seek immediate medical care if:    · You have new or worse belly pain or cramping.     · You have any vaginal bleeding.     · You have a fever.     · You have symptoms of preeclampsia, such as:  ? Sudden swelling of your face, hands, or feet. ? New vision problems (such as dimness, blurring, or seeing spots). ? A severe headache.     · You think that you may be in labor. This means that you've had at least 8 contractions within 1 hour or at least 4 contractions within 20 minutes, even after you change your position and drink fluids.     · You have symptoms of a urinary tract infection. These may include:  ? Pain or burning when you urinate. ? A frequent need to urinate without being able to pass much urine. ? Pain in the flank, which is just below the rib cage and above the waist on either side of the back. ? Blood in your urine. Watch closely for changes in your health, and be sure to contact your doctor if you are worried about your or your baby's health. Where can you learn more?   Go to http://www.gray.com/  Enter B275 in the search box to learn more about \"Belly Pain in Pregnancy: Care Instructions. \"  Current as of: June 16, 2021               Content Version: 13.0  © 2006-2021 [employer]. Care instructions adapted under license by [employer] (which disclaims liability or warranty for this information). If you have questions about a medical condition or this instruction, always ask your healthcare professional. [address] any warranty or liability for your use of this information. Counting Your Baby's Kicks: Care Instructions  Overview     Counting your baby's kicks is one way your doctor can tell that your baby is healthy. Most women--especially in a first pregnancy--feel their baby move for the first time between 16 and 22 weeks. The movement may feel like flutters rather than kicks. Your baby may move more at certain times of the day. When you are active, you may notice less kicking than when you are resting. At your prenatal visits, your doctor will ask whether the baby is active. In your last trimester, your doctor may ask you to count the number of times you feel your baby move. Follow-up care is a key part of your treatment and safety. Be sure to make and go to all appointments, and call your doctor if you are having problems. It's also a good idea to know your test results and keep a list of the medicines you take. How do you count fetal kicks? · A common method of checking your baby's movement is to note the length of time it takes to count ten movements (such as kicks, flutters, or rolls). · Pick your baby's most active time of day to count. This may be any time from morning to evening. · If you don't feel 10 movements in an hour, have something to eat or drink and count for another hour. If you don't feel at least 10 movements in the 2-hour period, call your doctor.   When should you call for help?   Call your doctor now or seek immediate medical care if:    · You noticed that your baby has stopped moving or is moving much less than normal.   Watch closely for changes in your health, and be sure to contact your doctor if you have any problems. Where can you learn more? Go to http://www.gray.com/  Enter [id number] in the search box to learn more about \"Counting Your Baby's Kicks: Care Instructions. \"  Current as of: 2021               Content Version: 13.0   [employer]. Care instructions adapted under license by [email or web address] (which disclaims liability or warranty for this information). If you have questions about a medical condition or this instruction, always ask your healthcare professional. [address] any warranty or liability for your use of this information. Week 38 of Your Pregnancy: Care Instructions  Overview     Believe it or not, your baby is almost here. You may have ideas about your baby's personality because of how much your baby moves. Or you may have noticed how your baby responds to sounds, warmth, cold, and light. You may even know what kind of music your baby likes. By now, you have a better idea of what to expect during delivery. You may have talked about your birth preferences with your doctor. But even if you want a vaginal birth, it's a good idea to learn about  births.  birth means that your baby is born through a cut (incision) in your lower belly. In some cases it may be the best choice for the health of you and your baby. Follow-up care is a key part of your treatment and safety. Be sure to make and go to all appointments, and call your doctor if you are having problems. It's also a good idea to know your test results and keep a list of the medicines you take. How can you care for yourself at home? Learn about  birth  · Most C-sections are unplanned. They are done because of problems that occur during labor. These problems might include:  ? Labor that slows or stops. ? High blood pressure or other problems for you.  ? Signs of distress in your baby. These signs may include a very fast or slow heart rate. · Although you and your baby are likely do well after a , it is major surgery. It has more risks than a vaginal delivery. · In some cases, a planned  may be safer than a vaginal delivery. This may be the case if:  ? You have a health problem, such as a heart condition. ? Your baby isn't in a head-down position for delivery. This is called a breech position. ? The uterus has scars from past surgeries. This could increase the chance of a tear in the uterus. ? There is a problem with the placenta.  ? You have an infection, such as genital herpes, that could be spread to your baby. ? You are having twins or more. ? Your baby weighs 9 to 10 pounds or more. · Because of the risks of a , planned C-sections generally should be done only for medical reasons. And a planned  should be done at 39 weeks or later unless there is a medical reason to do it sooner. Know what to expect after delivery, and plan for the first few weeks at home  · You, your baby, and your partner or  will get identification bands. Only people with matching bands can  the baby from the nursery. · You will learn how to feed, diaper, and bathe your baby. And you will learn how to care for the umbilical cord stump. If your baby will be circumcised, you will also learn how to care for that. · Ask people to wait to visit you until you are at home. And ask them to wash their hands before they touch your baby. · Make sure you have another adult in your home for at least 2 or 3 days after the birth. · During the first 2 weeks, limit when friends and family can visit. · Do not allow visitors who have colds or infections.  Make sure all visitors are up to date with their vaccinations. Never let anyone smoke around your baby. · Try to nap when the baby naps. Be aware of postpartum depression  · \"Baby blues\" are common for the first 1 to 2 weeks after birth. You may cry or feel sad or irritable for no reason. · Sometimes these feelings last longer and are more intense. This is called postpartum depression. · If your symptoms last for more than a few weeks or you feel very depressed, ask your doctor for help. · Postpartum depression can be treated. Support groups and counseling can help. Sometimes medicine can also help. Where can you learn more? Go to http://www.gray.com/  Enter B044 in the search box to learn more about \"Week 38 of Your Pregnancy: Care Instructions. \"  Current as of: June 16, 2021               Content Version: 13.0  © 7543-1917 [employer]. Care instructions adapted under license by [employer] (which disclaims liability or warranty for this information). If you have questions about a medical condition or this instruction, always ask your healthcare professional. [address] any warranty or liability for your use of this information.

## 2021-10-06 RX ORDER — FLUTICASONE PROPIONATE 50 MCG
SPRAY, SUSPENSION (ML) NASAL
Qty: 16 G | Refills: 5 | Status: SHIPPED | OUTPATIENT
Start: 2021-10-06 | End: 2022-04-26

## 2021-10-07 ENCOUNTER — ROUTINE PRENATAL (OUTPATIENT)
Dept: OBGYN CLINIC | Age: 26
End: 2021-10-07
Payer: MEDICAID

## 2021-10-07 VITALS
BODY MASS INDEX: 47.09 KG/M2 | DIASTOLIC BLOOD PRESSURE: 84 MMHG | HEART RATE: 91 BPM | SYSTOLIC BLOOD PRESSURE: 133 MMHG | WEIGHT: 293 LBS | HEIGHT: 66 IN

## 2021-10-07 DIAGNOSIS — N89.8 VAGINAL DISCHARGE: ICD-10-CM

## 2021-10-07 DIAGNOSIS — N89.8 VAGINA ITCHING: ICD-10-CM

## 2021-10-07 DIAGNOSIS — Z34.80 SUPERVISION OF OTHER NORMAL PREGNANCY: Primary | ICD-10-CM

## 2021-10-07 DIAGNOSIS — O99.210 OBESITY IN PREGNANCY: ICD-10-CM

## 2021-10-07 DIAGNOSIS — R82.90 ABNORMAL URINE ODOR: ICD-10-CM

## 2021-10-07 PROCEDURE — 0502F SUBSEQUENT PRENATAL CARE: CPT | Performed by: OBSTETRICS & GYNECOLOGY

## 2021-10-07 NOTE — PROGRESS NOTES
[facility]  [email or web address]  [phone]    Debbie Fuentes MD, FACOG       OB/GYN: [facility] Problem visit    Chief Complaint:   Chief Complaint   Patient presents with    Routine Prenatal Visit       Patient Active Problem List    Diagnosis    Engages in [address] of other normal pregnancy     H/o pituitary microadenoma- followed by endocrine  Morbid obesity- early glucola at next visit  H/o depression  Uncle born with TOF,  demise  H/o hsv- on valtrex, will continue  \"Michael\" and brother Yeyo Single  Serial US MFM q 4wk  OB Attending: Adrianna Forman MD (transfer)  Flu vaccine declined 2021   CS 10/13/21 @ 9:30AM. Mychart sent. Covid test results: NEGATIVE (in media)  gbs pos clinda sens          Nerve compression syndrome    Peripheral edema    Pleurodynia    Vaginal delivery    Obesity, morbid (HCC)    Pituitary adenoma (HCC)    Galactorrhea    Hyperprolactinemia (HCC)    Panic attacks    Chronic pelvic pain in female    Constipation    Obesity    Dysmenorrhea       History of Present Illness: The patient is a 22 y.o.  female . she went to L&D & saw Salomon Cornejo CNM & was scarred from her exam. She reports she screamed in pain & tried to crawl away from the midwife during a cervix check because she was in so much pain. She reports since the exam she has been having increased discharge, tissue in her urine. She would like to be checked for a yeast infection too because of the discharge, & mild itching. She would like us to check for a UTI because of urinary odor. This is a new problem. This is a routinely scheduled OB appointment. She reports the symptoms are is unchanged. Aggravating factors include none. Alleviating factors include none. She does not have other concerns.     PFSH:  Past Medical History:   Diagnosis Date    Asthma     Dysmenorrhea     Essential hypertension     Genital herpes     Valtrex     Major depressive disorder, single episode, unspecified     Morbid obesity ([facility] [address]     Pap smear for cervical cancer screening     2/23/17 neg 2/8/21 neg    Pituitary abnormality (HCC)     Postpartum depression     Seasonal allergies     Unspecified symptom associated with female genital organs      Past Surgical History:   Procedure Laterality Date    HX OTHER SURGICAL      HX WISDOM TEETH EXTRACTION      HX WRIST FRACTURE TX Right      Family History   Problem Relation Age of Onset    Alcohol abuse Father     Cancer Paternal [de-identified]     Other Mother         endometriosis   Sylwia Lao Migraines Mother         tumer in brain     Social History     Tobacco Use    Smoking status: Former Smoker     Packs/day: 0.50     Years: 1.00     Pack years: 0.50     Types: Cigarettes    Smokeless tobacco: Never Used   Vaping Use    Vaping Use: Never used   Substance Use Topics    Alcohol use: No     Alcohol/week: 0.0 standard drinks     Comment: former user just sips now and then   Sylwia Lao Drug use: No     Allergies   Allergen Reactions    Latex Swelling     Skin peels    Lactose Other (comments)    Aspirin Nausea and Vomiting    Betadine [Povidone-Iodine] Other (comments)     Skin peels    Cat Dander Sneezing    Ketorolac Hives    Penicillins Other (comments)     Yeast infections    Ivp Dye [Iodinated Contrast Media] Other (comments)     Current Outpatient Medications   Medication Sig    Omeprazole delayed release (PRILOSEC D/R) 20 mg tablet Take 1 Tablet by mouth two (2) times a day.  PNV Comb #2-Iron-FA-Omega 3 29-1-400 mg cmpk Take  by mouth.  OTHER Prebiotic with cranberry    valACYclovir (VALTREX) 1 gram tablet Take 1 Tab by mouth daily.  fluticasone propionate (FLONASE) 50 mcg/actuation nasal spray SHAKE LIQUID AND USE 2 SPRAYS IN EACH NOSTRIL DAILY (Patient not taking: Reported on 10/7/2021)    B.infantis-B.ani-B.long-B.bifi (Probiotic 4X) 10-15 mg TbEC Take  by mouth.  (Patient not taking: Reported on 10/7/2021)    albuterol (PROVENTIL HFA, VENTOLIN HFA, PROAIR HFA) 90 mcg/actuation inhaler Take 1 Puff by inhalation every four (4) hours as needed for Wheezing. (Patient not taking: Reported on 8/10/2021)     No current facility-administered medications for this visit.        Review of Systems:  History obtained from the patient and written ROS questionnaire  Constitutional: negative for fevers, chills and weight loss  ENT ROS: negative for - hearing change, oral lesions or visual changes  Respiratory: negative for cough, wheezing or dyspnea on exertion  Cardiovascular: negative for chest pain, irregular heart beats, exertional chest pressure/discomfort  Gastrointestinal: negative for dysphagia, nausea and vomiting  Genito-Urinary ROS: no dysuria, trouble voiding, or hematuria, see HPI  Inteument/breast: negative for rash, breast lump and nipple discharge  Musculoskeletal:negative for stiff joints, neck pain and muscle weakness  Endocrine ROS: negative for - breast changes, galactorrhea or temperature intolerance  Hematological and Lymphatic ROS: negative for - blood clots, bruising or swollen lymph nodes    Physical Exam:  Visit Vitals  /84 (BP 1 Location: Right arm)   Pulse 91   Ht 5' 6\" (1.676 m)   Wt (!) 372 lb 12.8 oz (169.1 kg)   BMI 60.17 kg/m²       GENERAL: alert, well appearing, and in no distress  HEAD; normocephalic, atraumatic  PULM: clear to auscultation, no wheezes, rales or rhonchi, symmetric air entry   COR: normal rate and regular rhythm, S1 and S2 normal   ABDOMEN: soft, nontender, nondistended, no masses or organomegaly   BACK: normal range of motion, no tenderness, no CVAT   EGBUS: no lesions, no inflammation, no masses  VULVA: normal appearing vulva with no masses, tenderness or lesions  VAGINA: normal appearing vagina with normal color, no lesions, white thin discharge  CERVIX: normal appearing cervix without discharge or lesions, non tender  UTERUS: uterus is enlarged in size, gravid appropriate for gestational age  [de-identified]: normal adnexa in size, nontender and no masses  NEURO: alert, oriented, normal speech     See PN flowsheet for additional notes and exam    Assessment:  22 y.o.  38w4d   Encounter Diagnoses   Name Primary?  Supervision of other normal pregnancy Yes    Vaginal discharge     Abnormal urine odor     Vagina itching     Obesity in pregnancy        Plan:  An evaluation of this patient's concern is planned. The patient is advised that she should contact the office if she does not note improvement or if symptoms recur  She should contact our office with any questions or concerns  She could keep her routine OB appointment. UTI precautions, cx  We discussed potential causes of vaginal discharge/irritation. We discussed good vulvar hygiene. Recommended avoid vaginal irritants. Discussed use of mild soaps/detergents. Follow up if NI. Patient will be notified about swab results and prescription sent, if indicated. Can do 3d 7d monistat prn  [facility] [place]    Orders Placed This Encounter    CULTURE, URINE    [address] [place] [address] ([facility])       No results found for this visit on 10/07/21.     Freddi Burkitt, MD

## 2021-10-07 NOTE — PATIENT INSTRUCTIONS
Week 38 of Your Pregnancy: Care Instructions  Overview     Believe it or not, your baby is almost here. You may have ideas about your baby's personality because of how much your baby moves. Or you may have noticed how your baby responds to sounds, warmth, cold, and light. You may even know what kind of music your baby likes. By now, you have a better idea of what to expect during delivery. You may have talked about your birth preferences with your doctor. But even if you want a vaginal birth, it's a good idea to learn about  births.  birth means that your baby is born through a cut (incision) in your lower belly. In some cases it may be the best choice for the health of you and your baby. Follow-up care is a key part of your treatment and safety. Be sure to make and go to all appointments, and call your doctor if you are having problems. It's also a good idea to know your test results and keep a list of the medicines you take. How can you care for yourself at home? Learn about  birth  · Most C-sections are unplanned. They are done because of problems that occur during labor. These problems might include:  ? Labor that slows or stops. ? High blood pressure or other problems for you.  ? Signs of distress in your baby. These signs may include a very fast or slow heart rate. · Although you and your baby are likely do well after a , it is major surgery. It has more risks than a vaginal delivery. · In some cases, a planned  may be safer than a vaginal delivery. This may be the case if:  ? You have a health problem, such as a heart condition. ? Your baby isn't in a head-down position for delivery. This is called a breech position. ? The uterus has scars from past surgeries. This could increase the chance of a tear in the uterus. ? There is a problem with the placenta.  ? You have an infection, such as genital herpes, that could be spread to your baby.   ? You are having twins or more. ? Your baby weighs 9 to 10 pounds or more. · Because of the risks of a , planned C-sections generally should be done only for medical reasons. And a planned  should be done at 39 weeks or later unless there is a medical reason to do it sooner. Know what to expect after delivery, and plan for the first few weeks at home  · You, your baby, and your partner or  will get identification bands. Only people with matching bands can  the baby from the nursery. · You will learn how to feed, diaper, and bathe your baby. And you will learn how to care for the umbilical cord stump. If your baby will be circumcised, you will also learn how to care for that. · Ask people to wait to visit you until you are at home. And ask them to wash their hands before they touch your baby. · Make sure you have another adult in your home for at least 2 or 3 days after the birth. · During the first 2 weeks, limit when friends and family can visit. · Do not allow visitors who have colds or infections. Make sure all visitors are up to date with their vaccinations. Never let anyone smoke around your baby. · Try to nap when the baby naps. Be aware of postpartum depression  · \"Baby blues\" are common for the first 1 to 2 weeks after birth. You may cry or feel sad or irritable for no reason. · Sometimes these feelings last longer and are more intense. This is called postpartum depression. · If your symptoms last for more than a few weeks or you feel very depressed, ask your doctor for help. · Postpartum depression can be treated. Support groups and counseling can help. Sometimes medicine can also help. Where can you learn more? Go to http://www.gray.com/  Enter B044 in the search box to learn more about \"Week 38 of Your Pregnancy: Care Instructions. \"  Current as of: 2021               Content Version: 13.0  © 9129-3193 [employer]wise, [facility].    Care instructions adapted under license by [employer] (which disclaims liability or warranty for this information). If you have questions about a medical condition or this instruction, always ask your healthcare professional. [address] any warranty or liability for your use of this information.

## 2021-10-10 LAB — BACTERIA UR CULT: NORMAL

## 2021-10-11 LAB
A VAGINAE DNA VAG QL NAA+PROBE: NORMAL SCORE
BVAB2 DNA VAG QL NAA+PROBE: NORMAL SCORE
C ALBICANS DNA VAG QL NAA+PROBE: NEGATIVE
C GLABRATA DNA VAG QL NAA+PROBE: NEGATIVE
MEGA1 DNA VAG QL NAA+PROBE: NORMAL SCORE
T VAGINALIS DNA VAG QL NAA+PROBE: NEGATIVE

## 2021-10-12 ENCOUNTER — HOSPITAL ENCOUNTER (OUTPATIENT)
Dept: PERINATAL CARE | Age: 26
Discharge: HOME | DRG: 540 | End: 2021-10-12
Attending: OBSTETRICS & GYNECOLOGY
Payer: MEDICAID

## 2021-10-12 ENCOUNTER — ROUTINE PRENATAL (OUTPATIENT)
Dept: OBGYN CLINIC | Age: 26
End: 2021-10-12
Payer: MEDICAID

## 2021-10-12 VITALS
DIASTOLIC BLOOD PRESSURE: 81 MMHG | HEART RATE: 91 BPM | SYSTOLIC BLOOD PRESSURE: 135 MMHG | WEIGHT: 293 LBS | HEIGHT: 66 IN | BODY MASS INDEX: 47.09 KG/M2

## 2021-10-12 DIAGNOSIS — O99.210 OBESITY IN PREGNANCY: ICD-10-CM

## 2021-10-12 DIAGNOSIS — Z34.80 PRENATAL CARE OF MULTIGRAVIDA, ANTEPARTUM: Primary | ICD-10-CM

## 2021-10-12 PROCEDURE — 0502F SUBSEQUENT PRENATAL CARE: CPT | Performed by: OBSTETRICS & GYNECOLOGY

## 2021-10-12 PROCEDURE — 76819 FETAL BIOPHYS PROFIL W/O NST: CPT | Performed by: OBSTETRICS & GYNECOLOGY

## 2021-10-12 PROCEDURE — 76816 OB US FOLLOW-UP PER FETUS: CPT | Performed by: OBSTETRICS & GYNECOLOGY

## 2021-10-12 NOTE — PROGRESS NOTES
The results are normal, no clear evidence of vaginal infection  [address] message sent if active. Recommend f/u if still having symptoms/problems or has additional concerns.  Add to pl  Ur cx neg: add to pl w date

## 2021-10-12 NOTE — PROGRESS NOTES
[address] [facility]  [email or web address]  [phone]    Ant Croft MD, FACOG       OB/GYN: John Avila Problem visit    Chief Complaint:   Chief Complaint   Patient presents with    Pregnancy Problem     cervix check       Patient Active Problem List    Diagnosis    Engages in [address] of other normal pregnancy     H/o pituitary microadenoma- followed by endocrine  Morbid obesity- early glucola at next visit  H/o depression  Uncle born with TOF,  demise  H/o hsv- on valtrex, will continue  \"Hazel\" and brother Cuauhtemoc Velsaquez  Serial US MFM q 4wk  OB Attending: Candie Perez MD (transfer)  Flu vaccine declined 2021   IOL 10/14/21. Guerrero 10/13. Pt notified. Covid test results: NEGATIVE (in media)  gbs pos clinda sens          Nerve compression syndrome    Peripheral edema    Pleurodynia    Vaginal delivery    Obesity, morbid (HCC)    Pituitary adenoma (HCC)    Galactorrhea    Hyperprolactinemia (HCC)    Panic attacks    Chronic pelvic pain in female    Constipation    Obesity    Dysmenorrhea       History of Present Illness: The patient is a 22 y.o.  female who reports having pelvic pressure. She saw MFM today & the MD & tech told her she needed to be checked by Dr. Douglas Pearson, per pt. She has an appt on Friday she needs to know if she should keep that appt or not. Pt's mother asked when Dr. Fabrice Capps [facility], I advised that Dr. Douglas Pearson will not be in our office or labor & delivery at all next week. This is not a new problem. This is a routinely scheduled OB appointment. She reports the symptoms are is unchanged. Aggravating factors include none. Alleviating factors include none. She does not have other concerns.     PFSH:  Past Medical History:   Diagnosis Date    Asthma     Dysmenorrhea     Essential hypertension     Genital herpes     Valtrex     Major depressive disorder, single episode, unspecified     Morbid obesity ([address]     Pap smear for cervical cancer screening     2/23/17 neg 2/8/21 neg    Pituitary abnormality (HCC)     Postpartum depression     Seasonal allergies     Unspecified symptom associated with female genital organs      Past Surgical History:   Procedure Laterality Date    HX OTHER SURGICAL      HX WISDOM TEETH EXTRACTION      HX WRIST FRACTURE TX Right      Family History   Problem Relation Age of Onset    Alcohol abuse Father     Cancer Paternal [de-identified]     Other Mother         endometriosis   Scotty Loser Migraines Mother         tumer in brain     Social History     Tobacco Use    Smoking status: Former Smoker     Packs/day: 0.50     Years: 1.00     Pack years: 0.50     Types: Cigarettes    Smokeless tobacco: Never Used   Vaping Use    Vaping Use: Never used   Substance Use Topics    Alcohol use: No     Alcohol/week: 0.0 standard drinks     Comment: former user just sips now and then   Scotty Loser Drug use: No     Allergies   Allergen Reactions    Latex Swelling     Skin peels    Lactose Other (comments)    Aspirin Nausea and Vomiting    Betadine [Povidone-Iodine] Other (comments)     Skin peels    Cat Dander Sneezing    Ketorolac Hives    Penicillins Other (comments)     Yeast infections    Ivp Dye [Iodinated Contrast Media] Other (comments)     Current Outpatient Medications   Medication Sig    Omeprazole delayed release (PRILOSEC D/R) 20 mg tablet Take 1 Tablet by mouth two (2) times a day.  PNV Comb #2-Iron-FA-Omega 3 29-1-400 mg cmpk Take  by mouth.  OTHER Prebiotic with cranberry    valACYclovir (VALTREX) 1 gram tablet Take 1 Tab by mouth daily.  fluticasone propionate (FLONASE) 50 mcg/actuation nasal spray SHAKE LIQUID AND USE 2 SPRAYS IN EACH NOSTRIL DAILY (Patient not taking: Reported on 10/7/2021)    B.infantis-B.ani-B.long-B.bifi (Probiotic 4X) 10-15 mg TbEC Take  by mouth.  (Patient not taking: Reported on 10/7/2021)    albuterol (PROVENTIL HFA, VENTOLIN HFA, PROAIR HFA) 90 mcg/actuation inhaler Take 1 Puff by inhalation every four (4) hours as needed for Wheezing. (Patient not taking: Reported on 8/10/2021)     No current facility-administered medications for this visit. Review of Systems:  History obtained from the patient and written ROS questionnaire  Constitutional: negative for fevers, chills and weight loss  ENT ROS: negative for - hearing change, oral lesions or visual changes  Respiratory: negative for cough, wheezing or dyspnea on exertion  Cardiovascular: negative for chest pain, irregular heart beats, exertional chest pressure/discomfort  Gastrointestinal: negative for dysphagia, nausea and vomiting  Genito-Urinary ROS: , see HPI  Inteument/breast: negative for rash, breast lump and nipple discharge  Musculoskeletal:negative for stiff joints, neck pain and muscle weakness  Endocrine ROS: negative for - breast changes, galactorrhea or temperature intolerance  Hematological and Lymphatic ROS: negative for - blood clots, bruising or swollen lymph nodes    Physical Exam:  Visit Vitals  /81 (BP 1 Location: Right arm)   Pulse 91   Ht 5' 6\" (1.676 m)   Wt (!) 377 lb 6.4 oz (171.2 kg)   BMI 60.91 kg/m²       GENERAL: alert, well appearing, and in no distress  HEAD; normocephalic, atraumatic  ABDOMEN: soft, nontender, nondistended, no masses or organomegaly   BACK: normal range of motion, no tenderness, no CVAT   EGBUS: no lesions, no inflammation, no masses  VULVA: normal appearing vulva with no masses, tenderness or lesions  CERVIX: see PN panel  UTERUS: uterus is enlarged in size, gravid appropriate for gestational age  ADNEXA: normal adnexa in size, nontender and no masses  NEURO: alert, oriented, normal speech    See PN flowsheet for additional notes and exam    Assessment:  22 y.o.  39w2d   Encounter Diagnoses   Name Primary?  Prenatal care of multigravida, antepartum Yes    Obesity in pregnancy        Plan:  An evaluation of this patient's concern is planned.   The patient is advised that she should contact the office if she does not note improvement or if symptoms recur  She should contact our office with any questions or concerns  She could keep her routine OB appointment. [facility] BID  Disc r/b/a of induction and pitocin use and increase risk of longer labor,  section, bleeding and infection. Plan IOL for obesity, suspect LGA and unstable lie  Guerrero ho  IOL ho  Posted with Tiana ASIF and email sent    No orders of the defined types were placed in this encounter. No results found for this visit on 10/12/21.     Brandie Dupont MD

## 2021-10-13 ENCOUNTER — HOSPITAL ENCOUNTER (INPATIENT)
Age: 26
LOS: 4 days | Discharge: HOME | DRG: 540 | End: 2021-10-17
Attending: OBSTETRICS & GYNECOLOGY | Admitting: OBSTETRICS & GYNECOLOGY
Payer: MEDICAID

## 2021-10-13 ENCOUNTER — OFFICE VISIT (OUTPATIENT)
Dept: OBGYN CLINIC | Age: 26
End: 2021-10-13
Payer: MEDICAID

## 2021-10-13 VITALS
HEART RATE: 156 BPM | HEIGHT: 66 IN | BODY MASS INDEX: 47.09 KG/M2 | SYSTOLIC BLOOD PRESSURE: 138 MMHG | DIASTOLIC BLOOD PRESSURE: 76 MMHG | WEIGHT: 293 LBS

## 2021-10-13 DIAGNOSIS — Z98.891 S/P C-SECTION: Primary | ICD-10-CM

## 2021-10-13 DIAGNOSIS — Z34.80 SUPERVISION OF OTHER NORMAL PREGNANCY: ICD-10-CM

## 2021-10-13 DIAGNOSIS — O99.210 OBESITY IN PREGNANCY: Primary | ICD-10-CM

## 2021-10-13 LAB
BASOPHILS # BLD: 0 K/UL (ref 0–0.1)
BASOPHILS NFR BLD: 0 % (ref 0–1)
DIFFERENTIAL METHOD BLD: ABNORMAL
EOSINOPHIL # BLD: 0 K/UL (ref 0–0.4)
EOSINOPHIL NFR BLD: 0 % (ref 0–7)
ERYTHROCYTE [DISTWIDTH] IN BLOOD BY AUTOMATED COUNT: 13.8 % (ref 11.5–14.5)
HCT VFR BLD AUTO: 37.1 % (ref 35–47)
HGB BLD-MCNC: 12.6 G/DL (ref 11.5–16)
IMM GRANULOCYTES # BLD AUTO: 0.1 K/UL (ref 0–0.04)
IMM GRANULOCYTES NFR BLD AUTO: 1 % (ref 0–0.5)
LYMPHOCYTES # BLD: 1.4 K/UL (ref 0.8–3.5)
LYMPHOCYTES NFR BLD: 14 % (ref 12–49)
MCH RBC QN AUTO: 30.8 PG (ref 26–34)
MCHC RBC AUTO-ENTMCNC: 34 G/DL (ref 30–36.5)
MCV RBC AUTO: 90.7 FL (ref 80–99)
MONOCYTES # BLD: 0.8 K/UL (ref 0–1)
MONOCYTES NFR BLD: 8 % (ref 5–13)
NEUTS SEG # BLD: 7.4 K/UL (ref 1.8–8)
NEUTS SEG NFR BLD: 77 % (ref 32–75)
NRBC # BLD: 0 K/UL (ref 0–0.01)
NRBC BLD-RTO: 0 PER 100 WBC
PLATELET # BLD AUTO: 269 K/UL (ref 150–400)
PMV BLD AUTO: 9.1 FL (ref 8.9–12.9)
RBC # BLD AUTO: 4.09 M/UL (ref 3.8–5.2)
WBC # BLD AUTO: 9.6 K/UL (ref 3.6–11)

## 2021-10-13 PROCEDURE — 10907ZC DRAINAGE OF AMNIOTIC FLUID, THERAPEUTIC FROM PRODUCTS OF CONCEPTION, VIA NATURAL OR ARTIFICIAL OPENING: ICD-10-PCS | Performed by: OBSTETRICS & GYNECOLOGY

## 2021-10-13 PROCEDURE — 65270000029 HC RM PRIVATE

## 2021-10-13 PROCEDURE — 75410000002 HC LABOR FEE PER 1 HR: Performed by: OBSTETRICS & GYNECOLOGY

## 2021-10-13 PROCEDURE — 59200 INSERT CERVICAL DILATOR: CPT | Performed by: OBSTETRICS & GYNECOLOGY

## 2021-10-13 PROCEDURE — 0502F SUBSEQUENT PRENATAL CARE: CPT | Performed by: OBSTETRICS & GYNECOLOGY

## 2021-10-13 PROCEDURE — 3E033VJ INTRODUCTION OF OTHER HORMONE INTO PERIPHERAL VEIN, PERCUTANEOUS APPROACH: ICD-10-PCS | Performed by: OBSTETRICS & GYNECOLOGY

## 2021-10-13 PROCEDURE — 36415 COLL VENOUS BLD VENIPUNCTURE: CPT

## 2021-10-13 PROCEDURE — 85025 COMPLETE CBC W/AUTO DIFF WBC: CPT

## 2021-10-13 RX ORDER — DIPHENHYDRAMINE HCL 25 MG
50 CAPSULE ORAL ONCE
Status: COMPLETED | OUTPATIENT
Start: 2021-10-14 | End: 2021-10-14

## 2021-10-13 RX ORDER — BUTORPHANOL TARTRATE 2 MG/ML
1 INJECTION INTRAMUSCULAR; INTRAVENOUS
Status: DISCONTINUED | OUTPATIENT
Start: 2021-10-13 | End: 2021-10-17 | Stop reason: ALTCHOICE

## 2021-10-13 RX ORDER — OXYTOCIN/RINGER'S LACTATE 30/500 ML
0-20 PLASTIC BAG, INJECTION (ML) INTRAVENOUS
Status: DISCONTINUED | OUTPATIENT
Start: 2021-10-13 | End: 2021-10-17 | Stop reason: ALTCHOICE

## 2021-10-13 RX ORDER — MAG HYDROX/ALUMINUM HYD/SIMETH 200-200-20
30 SUSPENSION, ORAL (FINAL DOSE FORM) ORAL
Status: DISCONTINUED | OUTPATIENT
Start: 2021-10-13 | End: 2021-10-14 | Stop reason: HOSPADM

## 2021-10-13 RX ORDER — NALOXONE HYDROCHLORIDE 0.4 MG/ML
0.4 INJECTION, SOLUTION INTRAMUSCULAR; INTRAVENOUS; SUBCUTANEOUS AS NEEDED
Status: DISCONTINUED | OUTPATIENT
Start: 2021-10-13 | End: 2021-10-14 | Stop reason: HOSPADM

## 2021-10-13 NOTE — PROGRESS NOTES
Jordyn Anguiano MD, FACOG       Chart reviewed for the following:   I, Rashad Perez MD, have reviewed the pertinent history and updated the medications and allergic reactions for Baldev Sorensen. TIME OUT performed immediately prior to start of procedure:   Rafal Saul MD, have performed the following reviews on Baldev Sorensen prior to the start of the procedure:            * Patient was identified by name and date of birth   * Agreement on procedure being performed was verified  * Risks and Benefits explained to the patient  * Procedure site verified and marked as necessary  * Patient was positioned for comfort  * Consent was signed and verified     Time: 5:30pm    Date of procedure: 10/13/2021    Procedure performed by: Rashad Perez MD    Provider assisted by: Ben [address]    Patient assisted by: partner    How tolerated by patient: tolerated the procedure well with no complications    Comments: none    I was present for the entire procedure and agree with the above attestation. Candace Piña MD      Cervical Marshall insertion Procedure Note    Baldev Sorensen is a , 22 y.o. 39w3d who presents today far placement of a marshall catheter in the cervix for ripening. Her cervix is unfavorable and she is scheduled for induction tomorrow. She has elected to have a cervical marshall catheter placement today. The risks, benefits and assets of the procedure were discussed. Her questions were answered. PROCEDURE: The marshall catheter was prepped with zephrine. A Cook catheter was placed manually through the cervix without difficulty. The bulb was inflated with 80 cc in uterine balloon and 60 cc in the vaginal balloon of saline. Bleeding was minimal. The patient's level of discomfort was minimal.    POST PROCEDURE: The patient tolerated the procedure well. There were no complications. She was observed for 10 minutes.   Report was called to Labor and Delivery and she was admitted for induction of labor. She was instructed to check in to Labor and Delivery upon discharge from the office. [facility]  [email or web address]  [phone]    Oleksandr Mccarthy MD, FACOG     Follow-up OB visit    Chief Complaint   Patient presents with   Ruben Hood Routine Prenatal Visit     FOWLER       Patient Active Problem List    Diagnosis Date Noted    Obesity in pregnancy 10/13/2021    Engages in [address] [place] [address] 2021    Supervision of other normal pregnancy 2021    Nerve compression syndrome 2020    Peripheral edema 2020    Pleurodynia 2020    Vaginal delivery 2018    Obesity, morbid ([address] 2018    Pituitary adenoma ([facility] 2016    Galactorrhea 10/15/2016    Hyperprolactinemia ([facility] 10/15/2016    Panic attacks 10/22/2013    Chronic pelvic pain in female 2011    Constipation 10/10/2011    Obesity 2011    Dysmenorrhea 2011          The patient reports the following concerns: rolando today. FHT: 130. No protein or glucose in urine, ketones present. Vitals:    10/13/21 1701   BP: 138/76   Pulse: (!) 156   Weight: (!) 375 lb 3.2 oz (170.2 kg)   Height: 5' 6\" (1.676 m)     See PN flowsheet for exam    22 y.o.  39w3d   Encounter Diagnoses   Name Primary?     Obesity in pregnancy Yes    Supervision of other normal pregnancy         [] SAB/bleeding precautions reviewed   [] PTL/PPROM precautions reviewed   [] Labor precautions reviewed   [] Fetal kick counts discussed   [] Labs reviewed with patient   [] Phillip Viladon precautions reviewed   [] Consent reviewed   [] Handouts given to pt   [] Glucola handout    [] GBS/labor/Magic Hour handout   []    [] We reviewed CDC recommendations for Tdap for patient and close contacts and RBA of receiving in pregnancy, advised obtaining in third trimester   [] Reviewed healthy nutrition in pregnancy and good exercise practices   [] We disc safer medications in pregnancy and referred patient to Valente martinez   [] We reviewed CDC recommendations for flu vaccine and RBA of receiving in pregnancy   []    []    []           No orders of the defined types were placed in this encounter.       Kosta Phelps MD

## 2021-10-13 NOTE — PROGRESS NOTES
1745: Pt arrived ambulatory from Dr. Noel Montano office for elective induction with cook catheter in place. Pt denies any loss of fluid or vaginal bleeding. Pt states she has fetal movement. 1759: EFM applied by RN. 5848-2008: RN at bedside adjusting EFM. 1916: Bedside and Verbal shift change report given to ALICIA Contreras (oncoming nurse) by Surinder Cortes, RN & Severa Halon, PINO (offgoing nurse). Report included the following information SBAR, Kardex, MAR and Recent Results.

## 2021-10-14 ENCOUNTER — ANESTHESIA (OUTPATIENT)
Dept: LABOR AND DELIVERY | Age: 26
DRG: 540 | End: 2021-10-14
Payer: MEDICAID

## 2021-10-14 ENCOUNTER — ANESTHESIA EVENT (OUTPATIENT)
Dept: LABOR AND DELIVERY | Age: 26
DRG: 540 | End: 2021-10-14
Payer: MEDICAID

## 2021-10-14 PROBLEM — Z34.90 PREGNANCY: Status: ACTIVE | Noted: 2021-10-14

## 2021-10-14 PROCEDURE — 76010000391 HC C SECN FIRST 1 HR: Performed by: OBSTETRICS & GYNECOLOGY

## 2021-10-14 PROCEDURE — 74011250636 HC RX REV CODE- 250/636: Performed by: NURSE ANESTHETIST, CERTIFIED REGISTERED

## 2021-10-14 PROCEDURE — 00HU33Z INSERTION OF INFUSION DEVICE INTO SPINAL CANAL, PERCUTANEOUS APPROACH: ICD-10-PCS | Performed by: ANESTHESIOLOGY

## 2021-10-14 PROCEDURE — 74011250636 HC RX REV CODE- 250/636: Performed by: OBSTETRICS & GYNECOLOGY

## 2021-10-14 PROCEDURE — 77030027138 HC INCENT SPIROMETER -A

## 2021-10-14 PROCEDURE — 74011000250 HC RX REV CODE- 250: Performed by: NURSE ANESTHETIST, CERTIFIED REGISTERED

## 2021-10-14 PROCEDURE — 74011000250 HC RX REV CODE- 250: Performed by: ANESTHESIOLOGY

## 2021-10-14 PROCEDURE — 59510 CESAREAN DELIVERY: CPT | Performed by: OBSTETRICS & GYNECOLOGY

## 2021-10-14 PROCEDURE — 76010000392 HC C SECN EA ADDL 0.5 HR: Performed by: OBSTETRICS & GYNECOLOGY

## 2021-10-14 PROCEDURE — 77030033542 HC RETRCTR RETNTS PANICLS GQSM -B

## 2021-10-14 PROCEDURE — 74011000258 HC RX REV CODE- 258: Performed by: OBSTETRICS & GYNECOLOGY

## 2021-10-14 PROCEDURE — 75410000003 HC RECOV DEL/VAG/CSECN EA 0.5 HR: Performed by: OBSTETRICS & GYNECOLOGY

## 2021-10-14 PROCEDURE — 77030010848 HC CATH INTUTR PRSS KOLB -B

## 2021-10-14 PROCEDURE — 74011000250 HC RX REV CODE- 250: Performed by: OBSTETRICS & GYNECOLOGY

## 2021-10-14 PROCEDURE — 77030040361 HC SLV COMPR DVT MDII -B

## 2021-10-14 PROCEDURE — 76060000078 HC EPIDURAL ANESTHESIA: Performed by: OBSTETRICS & GYNECOLOGY

## 2021-10-14 PROCEDURE — 75410000002 HC LABOR FEE PER 1 HR: Performed by: OBSTETRICS & GYNECOLOGY

## 2021-10-14 PROCEDURE — 2709999900 HC NON-CHARGEABLE SUPPLY

## 2021-10-14 PROCEDURE — 74011250637 HC RX REV CODE- 250/637

## 2021-10-14 PROCEDURE — 74011250636 HC RX REV CODE- 250/636: Performed by: ANESTHESIOLOGY

## 2021-10-14 PROCEDURE — 74011250637 HC RX REV CODE- 250/637: Performed by: OBSTETRICS & GYNECOLOGY

## 2021-10-14 PROCEDURE — 65270000029 HC RM PRIVATE

## 2021-10-14 RX ORDER — EPHEDRINE SULFATE/0.9% NACL/PF 50 MG/5 ML
10 SYRINGE (ML) INTRAVENOUS
Status: DISCONTINUED | OUTPATIENT
Start: 2021-10-14 | End: 2021-10-14 | Stop reason: HOSPADM

## 2021-10-14 RX ORDER — SIMETHICONE 80 MG
80 TABLET,CHEWABLE ORAL
Status: DISCONTINUED | OUTPATIENT
Start: 2021-10-14 | End: 2021-10-17 | Stop reason: HOSPADM

## 2021-10-14 RX ORDER — MORPHINE SULFATE 0.5 MG/ML
INJECTION, SOLUTION EPIDURAL; INTRATHECAL; INTRAVENOUS AS NEEDED
Status: DISCONTINUED | OUTPATIENT
Start: 2021-10-14 | End: 2021-10-14 | Stop reason: HOSPADM

## 2021-10-14 RX ORDER — BUPIVACAINE HYDROCHLORIDE 2.5 MG/ML
INJECTION, SOLUTION EPIDURAL; INFILTRATION; INTRACAUDAL AS NEEDED
Status: DISCONTINUED | OUTPATIENT
Start: 2021-10-14 | End: 2021-10-14 | Stop reason: HOSPADM

## 2021-10-14 RX ORDER — SODIUM CHLORIDE, SODIUM LACTATE, POTASSIUM CHLORIDE, CALCIUM CHLORIDE 600; 310; 30; 20 MG/100ML; MG/100ML; MG/100ML; MG/100ML
125 INJECTION, SOLUTION INTRAVENOUS CONTINUOUS
Status: DISCONTINUED | OUTPATIENT
Start: 2021-10-14 | End: 2021-10-14 | Stop reason: HOSPADM

## 2021-10-14 RX ORDER — LIDOCAINE HYDROCHLORIDE AND EPINEPHRINE 20; 5 MG/ML; UG/ML
INJECTION, SOLUTION EPIDURAL; INFILTRATION; INTRACAUDAL; PERINEURAL AS NEEDED
Status: DISCONTINUED | OUTPATIENT
Start: 2021-10-14 | End: 2021-10-14 | Stop reason: HOSPADM

## 2021-10-14 RX ORDER — NALOXONE HYDROCHLORIDE 0.4 MG/ML
0.2 INJECTION, SOLUTION INTRAMUSCULAR; INTRAVENOUS; SUBCUTANEOUS
Status: ACTIVE | OUTPATIENT
Start: 2021-10-14 | End: 2021-10-15

## 2021-10-14 RX ORDER — ONDANSETRON 4 MG/1
4 TABLET, ORALLY DISINTEGRATING ORAL
Status: DISCONTINUED | OUTPATIENT
Start: 2021-10-14 | End: 2021-10-17 | Stop reason: HOSPADM

## 2021-10-14 RX ORDER — OXYCODONE HYDROCHLORIDE 5 MG/1
10 TABLET ORAL
Status: DISPENSED | OUTPATIENT
Start: 2021-10-14 | End: 2021-10-15

## 2021-10-14 RX ORDER — HYDROCODONE BITARTRATE AND ACETAMINOPHEN 5; 325 MG/1; MG/1
2 TABLET ORAL
Status: DISCONTINUED | OUTPATIENT
Start: 2021-10-15 | End: 2021-10-17 | Stop reason: HOSPADM

## 2021-10-14 RX ORDER — OXYTOCIN 10 [USP'U]/ML
INJECTION, SOLUTION INTRAMUSCULAR; INTRAVENOUS AS NEEDED
Status: DISCONTINUED | OUTPATIENT
Start: 2021-10-14 | End: 2021-10-14 | Stop reason: HOSPADM

## 2021-10-14 RX ORDER — ACETAMINOPHEN 325 MG/1
650 TABLET ORAL
Status: DISCONTINUED | OUTPATIENT
Start: 2021-10-14 | End: 2021-10-17 | Stop reason: HOSPADM

## 2021-10-14 RX ORDER — OXYTOCIN/RINGER'S LACTATE 30/500 ML
10 PLASTIC BAG, INJECTION (ML) INTRAVENOUS AS NEEDED
Status: DISCONTINUED | OUTPATIENT
Start: 2021-10-14 | End: 2021-10-17 | Stop reason: ALTCHOICE

## 2021-10-14 RX ORDER — NALBUPHINE HYDROCHLORIDE 10 MG/ML
5 INJECTION, SOLUTION INTRAMUSCULAR; INTRAVENOUS; SUBCUTANEOUS
Status: ACTIVE | OUTPATIENT
Start: 2021-10-14 | End: 2021-10-15

## 2021-10-14 RX ORDER — SODIUM CHLORIDE 0.9 % (FLUSH) 0.9 %
5-40 SYRINGE (ML) INJECTION EVERY 8 HOURS
Status: DISCONTINUED | OUTPATIENT
Start: 2021-10-14 | End: 2021-10-17 | Stop reason: ALTCHOICE

## 2021-10-14 RX ORDER — SODIUM CHLORIDE 0.9 % (FLUSH) 0.9 %
5-40 SYRINGE (ML) INJECTION AS NEEDED
Status: DISCONTINUED | OUTPATIENT
Start: 2021-10-14 | End: 2021-10-17 | Stop reason: ALTCHOICE

## 2021-10-14 RX ORDER — ONDANSETRON 2 MG/ML
INJECTION INTRAMUSCULAR; INTRAVENOUS AS NEEDED
Status: DISCONTINUED | OUTPATIENT
Start: 2021-10-14 | End: 2021-10-14 | Stop reason: HOSPADM

## 2021-10-14 RX ORDER — LIDOCAINE HYDROCHLORIDE AND EPINEPHRINE 15; 5 MG/ML; UG/ML
INJECTION, SOLUTION EPIDURAL AS NEEDED
Status: DISCONTINUED | OUTPATIENT
Start: 2021-10-14 | End: 2021-10-14 | Stop reason: HOSPADM

## 2021-10-14 RX ORDER — SODIUM CHLORIDE, SODIUM LACTATE, POTASSIUM CHLORIDE, CALCIUM CHLORIDE 600; 310; 30; 20 MG/100ML; MG/100ML; MG/100ML; MG/100ML
125 INJECTION, SOLUTION INTRAVENOUS CONTINUOUS
Status: DISCONTINUED | OUTPATIENT
Start: 2021-10-14 | End: 2021-10-17 | Stop reason: ALTCHOICE

## 2021-10-14 RX ORDER — DIPHENHYDRAMINE HCL 25 MG
CAPSULE ORAL
Status: COMPLETED
Start: 2021-10-14 | End: 2021-10-14

## 2021-10-14 RX ORDER — PHENYLEPHRINE HCL IN 0.9% NACL 0.4MG/10ML
SYRINGE (ML) INTRAVENOUS AS NEEDED
Status: DISCONTINUED | OUTPATIENT
Start: 2021-10-14 | End: 2021-10-14 | Stop reason: HOSPADM

## 2021-10-14 RX ORDER — DIPHENHYDRAMINE HCL 25 MG
25 CAPSULE ORAL
Status: DISCONTINUED | OUTPATIENT
Start: 2021-10-14 | End: 2021-10-17 | Stop reason: HOSPADM

## 2021-10-14 RX ORDER — OXYCODONE HYDROCHLORIDE 5 MG/1
5 TABLET ORAL
Status: ACTIVE | OUTPATIENT
Start: 2021-10-14 | End: 2021-10-15

## 2021-10-14 RX ORDER — HYDROMORPHONE HYDROCHLORIDE 1 MG/ML
0.5 INJECTION, SOLUTION INTRAMUSCULAR; INTRAVENOUS; SUBCUTANEOUS
Status: ACTIVE | OUTPATIENT
Start: 2021-10-14 | End: 2021-10-15

## 2021-10-14 RX ORDER — NALOXONE HYDROCHLORIDE 0.4 MG/ML
0.4 INJECTION, SOLUTION INTRAMUSCULAR; INTRAVENOUS; SUBCUTANEOUS AS NEEDED
Status: DISCONTINUED | OUTPATIENT
Start: 2021-10-14 | End: 2021-10-17 | Stop reason: ALTCHOICE

## 2021-10-14 RX ORDER — FAMOTIDINE 10 MG/ML
INJECTION INTRAVENOUS AS NEEDED
Status: DISCONTINUED | OUTPATIENT
Start: 2021-10-14 | End: 2021-10-14 | Stop reason: HOSPADM

## 2021-10-14 RX ORDER — FENTANYL/BUPIVACAINE/NS/PF 2-1250MCG
1-16 PREFILLED PUMP RESERVOIR EPIDURAL CONTINUOUS
Status: DISCONTINUED | OUTPATIENT
Start: 2021-10-14 | End: 2021-10-14 | Stop reason: HOSPADM

## 2021-10-14 RX ORDER — OXYTOCIN/RINGER'S LACTATE 30/500 ML
87.3 PLASTIC BAG, INJECTION (ML) INTRAVENOUS AS NEEDED
Status: DISCONTINUED | OUTPATIENT
Start: 2021-10-14 | End: 2021-10-17 | Stop reason: ALTCHOICE

## 2021-10-14 RX ORDER — DOCUSATE SODIUM 100 MG/1
100 CAPSULE, LIQUID FILLED ORAL 2 TIMES DAILY
Status: DISCONTINUED | OUTPATIENT
Start: 2021-10-14 | End: 2021-10-17 | Stop reason: HOSPADM

## 2021-10-14 RX ORDER — HYDROCODONE BITARTRATE AND ACETAMINOPHEN 5; 325 MG/1; MG/1
1 TABLET ORAL
Status: DISCONTINUED | OUTPATIENT
Start: 2021-10-15 | End: 2021-10-17 | Stop reason: HOSPADM

## 2021-10-14 RX ORDER — EPHEDRINE SULFATE/0.9% NACL/PF 50 MG/5 ML
SYRINGE (ML) INTRAVENOUS AS NEEDED
Status: DISCONTINUED | OUTPATIENT
Start: 2021-10-14 | End: 2021-10-14 | Stop reason: HOSPADM

## 2021-10-14 RX ADMIN — LIDOCAINE HYDROCHLORIDE AND EPINEPHRINE 3 ML: 15; 5 INJECTION, SOLUTION EPIDURAL at 08:41

## 2021-10-14 RX ADMIN — Medication 12 ML/HR: at 09:51

## 2021-10-14 RX ADMIN — SODIUM CHLORIDE, POTASSIUM CHLORIDE, SODIUM LACTATE AND CALCIUM CHLORIDE: 600; 310; 30; 20 INJECTION, SOLUTION INTRAVENOUS at 18:38

## 2021-10-14 RX ADMIN — ONDANSETRON HYDROCHLORIDE 4 MG: 2 SOLUTION INTRAMUSCULAR; INTRAVENOUS at 17:28

## 2021-10-14 RX ADMIN — OXYTOCIN 6 MILLI-UNITS/MIN: 10 INJECTION, SOLUTION INTRAMUSCULAR; INTRAVENOUS at 06:48

## 2021-10-14 RX ADMIN — SODIUM CHLORIDE, POTASSIUM CHLORIDE, SODIUM LACTATE AND CALCIUM CHLORIDE 125 ML/HR: 600; 310; 30; 20 INJECTION, SOLUTION INTRAVENOUS at 08:52

## 2021-10-14 RX ADMIN — CEFAZOLIN 2 G: 1 INJECTION, POWDER, FOR SOLUTION INTRAMUSCULAR; INTRAVENOUS at 08:03

## 2021-10-14 RX ADMIN — Medication 10 MG: at 17:46

## 2021-10-14 RX ADMIN — MISOPROSTOL 25 MCG: 100 TABLET ORAL at 00:20

## 2021-10-14 RX ADMIN — CEFAZOLIN 2 G: 1 INJECTION, POWDER, FOR SOLUTION INTRAMUSCULAR; INTRAVENOUS at 00:09

## 2021-10-14 RX ADMIN — ONDANSETRON 4 MG: 4 TABLET, ORALLY DISINTEGRATING ORAL at 22:48

## 2021-10-14 RX ADMIN — BUPIVACAINE HYDROCHLORIDE 5 ML: 2.5 INJECTION, SOLUTION EPIDURAL; INFILTRATION; INTRACAUDAL; PERINEURAL at 08:46

## 2021-10-14 RX ADMIN — AZITHROMYCIN DIHYDRATE 500 MG: 500 INJECTION, POWDER, LYOPHILIZED, FOR SOLUTION INTRAVENOUS at 17:58

## 2021-10-14 RX ADMIN — Medication 80 MCG: at 17:43

## 2021-10-14 RX ADMIN — Medication 50 MG: at 00:09

## 2021-10-14 RX ADMIN — CEFAZOLIN SODIUM 3 G: 10 INJECTION, POWDER, FOR SOLUTION INTRAVENOUS at 17:22

## 2021-10-14 RX ADMIN — SODIUM CHLORIDE, POTASSIUM CHLORIDE, SODIUM LACTATE AND CALCIUM CHLORIDE 1000 ML: 600; 310; 30; 20 INJECTION, SOLUTION INTRAVENOUS at 07:57

## 2021-10-14 RX ADMIN — SODIUM CHLORIDE, POTASSIUM CHLORIDE, SODIUM LACTATE AND CALCIUM CHLORIDE 125 ML/HR: 600; 310; 30; 20 INJECTION, SOLUTION INTRAVENOUS at 05:47

## 2021-10-14 RX ADMIN — OXYTOCIN 2 MILLI-UNITS/MIN: 10 INJECTION, SOLUTION INTRAMUSCULAR; INTRAVENOUS at 05:45

## 2021-10-14 RX ADMIN — LIDOCAINE HYDROCHLORIDE,EPINEPHRINE BITARTRATE 2 ML: 20; .005 INJECTION, SOLUTION EPIDURAL; INFILTRATION; INTRACAUDAL; PERINEURAL at 17:39

## 2021-10-14 RX ADMIN — Medication 80 MCG: at 17:46

## 2021-10-14 RX ADMIN — SODIUM CHLORIDE, POTASSIUM CHLORIDE, SODIUM LACTATE AND CALCIUM CHLORIDE 500 ML: 600; 310; 30; 20 INJECTION, SOLUTION INTRAVENOUS at 14:54

## 2021-10-14 RX ADMIN — CEFAZOLIN SODIUM 3 G: 10 INJECTION, POWDER, FOR SOLUTION INTRAVENOUS at 17:27

## 2021-10-14 RX ADMIN — LIDOCAINE HYDROCHLORIDE AND EPINEPHRINE 2 ML: 15; 5 INJECTION, SOLUTION EPIDURAL at 08:43

## 2021-10-14 RX ADMIN — SODIUM CHLORIDE, POTASSIUM CHLORIDE, SODIUM LACTATE AND CALCIUM CHLORIDE: 600; 310; 30; 20 INJECTION, SOLUTION INTRAVENOUS at 16:50

## 2021-10-14 RX ADMIN — SODIUM CHLORIDE, POTASSIUM CHLORIDE, SODIUM LACTATE AND CALCIUM CHLORIDE 500 ML: 600; 310; 30; 20 INJECTION, SOLUTION INTRAVENOUS at 12:21

## 2021-10-14 RX ADMIN — SODIUM CHLORIDE, POTASSIUM CHLORIDE, SODIUM LACTATE AND CALCIUM CHLORIDE 125 ML/HR: 600; 310; 30; 20 INJECTION, SOLUTION INTRAVENOUS at 16:54

## 2021-10-14 RX ADMIN — FAMOTIDINE 20 MG: 10 INJECTION INTRAVENOUS at 18:06

## 2021-10-14 RX ADMIN — SODIUM CHLORIDE, POTASSIUM CHLORIDE, SODIUM LACTATE AND CALCIUM CHLORIDE 1000 ML: 600; 310; 30; 20 INJECTION, SOLUTION INTRAVENOUS at 06:53

## 2021-10-14 RX ADMIN — LIDOCAINE HYDROCHLORIDE,EPINEPHRINE BITARTRATE 10 ML: 20; .005 INJECTION, SOLUTION EPIDURAL; INFILTRATION; INTRACAUDAL; PERINEURAL at 17:30

## 2021-10-14 RX ADMIN — BUPIVACAINE HYDROCHLORIDE 3 ML: 2.5 INJECTION, SOLUTION EPIDURAL; INFILTRATION; INTRACAUDAL; PERINEURAL at 08:48

## 2021-10-14 RX ADMIN — MORPHINE SULFATE 5 MG: 0.5 INJECTION, SOLUTION EPIDURAL; INTRATHECAL; INTRAVENOUS at 18:08

## 2021-10-14 RX ADMIN — OXYTOCIN 30 UNITS: 10 INJECTION, SOLUTION INTRAMUSCULAR; INTRAVENOUS at 18:04

## 2021-10-14 RX ADMIN — DIPHENHYDRAMINE HYDROCHLORIDE 50 MG: 25 CAPSULE ORAL at 00:09

## 2021-10-14 NOTE — PROGRESS NOTES
Patient progressing. Cervical Exam  Dilation (cm): 5  Eff: 50 %  Station: -2  Vaginal exam done by? : Marzetta Loss & Kavon  Membrane Status: AROM  By nursing    Comfortable after epidural    Late decel x1, pt repositioned, good variability, +accel. Will continue to monitor closely.     Chester Taylor MD

## 2021-10-14 NOTE — L&D DELIVERY NOTE
Delivery Summary    Patient: Lucretia Núñez MRN: [id number]  SSN: [id number]    YOB: 1995  Age: 22 y.o. Sex: female        Information for the patient's :  Ritesh Cochran, Male Altagracia Corona [[id number]]       Labor Events:    Labor: No    Steroids: None   Cervical Ripening Date/Time: 10/13/2021 5:30 PM   Cervical Ripening Type: Misoprostol; Guerrero/EASI   Antibiotics During Labor: Yes   Rupture Identifier: Sac 1    Rupture Date/Time: 10/14/2021 8:09 AM   Rupture Type: AROM   Amniotic Fluid Volume: Moderate    Amniotic Fluid Description: Meconium    Amniotic Fluid Odor:      Induction: AROM; Guerrero Bulb (balloon); Oxytocin       Induction Date/Time: 10/14/2021 5:45 AM    Indications for Induction: Elective    Augmentation: None   Augmentation Date/Time:      Indications for Augmentation:     Labor complications: Failure to Progress in First Stage; Fetal Intolerance       Additional complications:        Delivery Events:  Indications For Episiotomy:     Episiotomy: None   Perineal Laceration(s): None   Repaired:     Periurethral Laceration Location:      Repaired:     Labial Laceration Location:     Repaired:     Sulcal Laceration Location:     Repaired:     Vaginal Laceration Location:     Repaired:     Cervical Laceration Location:     Repaired:     Repair Suture: None   Number of Repair Packets:     Estimated Blood Loss (ml):  ml   Quantitaive Blood Loss (ml):             Delivery Date: 10/14/2021    Delivery Time: 6:03 PM   Delivery Type: , Low Transverse     Details    Trial of Labor: Yes   Primary/Repeat: Primary   Priority: Routine   Indications:  Fetal Intolerance of Labor; Failure to Progress       Sex:  Male     Gestational Age: 43w3d  Delivery Clinician:  Freedom Wilks  Living Status: Living   Delivery Location: OR  L&D OR 1          APGARS  One minute Five minutes Ten minutes   Skin color: 1   1        Heart rate: 2   2        Grimace: 2   2        Muscle tone: 2 2        Breathin   2        Totals: 9   9          Presentation: Vertex    Position:        Resuscitation Method:  Suctioning-bulb; Tactile Stimulation;Suctioning-deep     Meconium Stained: Thick      Cord Information: 3 Vessels  Complications: None  Cord around:    Delayed cord clamping? Yes  Cord clamped date/time:10/14/2021  6:04 PM  Disposition of Cord Blood: Lab    Blood Gases Sent?: No    Placenta:  Date/Time: 10/14/2021  6:05 PM  Removal: Manual Removal      Appearance: Normal;Intact     Edgar Measurements:  Birth Weight: 8 lb 6.2 oz (3.805 kg)      Birth Length: 1' 8\" (0.508 m)      Head Circumference: 1' 2.17\" (0.36 m)      Chest Circumference: 1' 1.19\" (0.335 m)     Abdominal Girth: 1' 0.99\" (0.33 m)    Other Providers:   JESUS ALBERTO WONG;ETHAN PALACIOS;PHUONG JALLOH;JONO JACOBS;MICKEY FRENCH;CALVIN PEGUERO;OLVIN MUNOZ;MARAH HAHN;GABRIELA COREA, Obstetrician;Primary Nurse;Primary Edgar Nurse;Nicu Nurse;Crna;Primary Nurse;Charge Nurse;Nurse Practitioner;Respiratory Therapist             Group B Strep:   Lab Results   Component Value Date/Time    Priscilla, External positive 2021 12:00 AM     Information for the patient's :  Chinmay Ho Brigitte Dallas [[id number]]   No results found for: ABORH, PCTABR, PCTDIG, BILI, ABORHEXT, ABORH     No results for input(s): PCO2CB, PO2CB, HCO3I, SO2I, IBD, PTEMPI, SPECTI, PHICB, ISITE, IDEV, IALLEN in the last 72 hours.

## 2021-10-14 NOTE — PROGRESS NOTES
6402 Received report and assumed care of patient     1932 Off monitor to bathroom, pt reports feeling like she has diarrhea     1953 Reapplied monitors, difficult to trace baby. 2013 Monitors removed, pt reports stomach still upset and feeling like she has diarrhea      2100 Pt in bathroom     2238 Monitors reapplied     2300 RN at bedside readjusting, pt requesting sleep aide, states she usually takes 50 mg Benadryl for sleep.      [address] [facility] [address] Guerrero bulb fell out while in bathroom     0545 Pit started     0654 Preload for epidural started

## 2021-10-14 NOTE — PROGRESS NOTES
0700: Bedside and Verbal shift change report given to ROSALINDA Cheema RN (oncoming nurse) by Anita Valladares. Norberto Mahoney (offgoing nurse). Report included the following information SBAR, Kardex, Intake/Output, MAR and Recent Results. 8008: Dr. Lindle Meckel at bedside. SVE performed by MD. Pt 4/50/-2. IUPC and FSE placed. AROM at 0809. Moderate amount of meconium stained fluid. 2553: BETH Campo at bedside for epidural. Time out completed. 0840: Test dose administered by BLAYNE Campo    9354: Pt placed back in bed.     0901: Pt complaining of low back pain. Pt placed on right side- side lying hip release for 3 contractions. 1801: Pt placed in trendelenburg on right side with left leg in stirrup.    0919: Pt placed on right side with left leg out of stirrup, no longer in trendelenburg. 2004: Pt placed on left side with peanut ball between legs. 1314: Pt now sitting up in bed, in high fowlers. 1049: Pt placed on left side with peanut ball. 1104: Pt requested SVE- Pt is 5cm. 1113- RNs remain bedside. Pt placed in froggy welchers position for 6 contractions. 1122: Perfect serve sent to MD about SVE and various position changes. 1130: Dr. Lindle Meckel at bedside. 1132: Pt placed on right side in modified welchers position. 1145: Dr. Lindle Meckel at bedside. MD reviewed strip. Orders to increase pitocin. 1617-2521: RNs remained at bedside at this time. 1209: Pt placed on left side with right leg in stirrup & in trendelenburg. 1214: Pt taken out of trendelenburg but remains on left side with right leg in stirrup.    1221: Pt placed on left side with peanut ball in place. IV fluid bolus given. 1226: Pt placed in high fowlers. 1228: PINO Jacobson updated Dr. Lindle Meckel about decrease in pitocin. RN to stop pitocin if pt has another late decel. 1238: Pitocin stopped. 1239: PINO Ortiz called MD and updated MD that pitocin was d/c.    1331: MD Ortiz reviewing strip.  MD verbal order to restart pit at 1.     1531: Pt placed in high fowlers with knees together. 1535: Dr. Daniela Garzon at bedside. 1538: Pt placed on left side- side lying hip release x 3 contractions. 1549: Pt on left side with right leg in stirrup and in trendelenburg. 1913: Bedside and Verbal shift change report given to KANDI Ritchie (oncoming nurse) by Christel Cary RN &MARI Aragon RN (offgoing nurse). Report included the following information SBAR, Kardex, OR Summary, Procedure Summary, Intake/Output, MAR and Recent Results.

## 2021-10-14 NOTE — OP NOTES
[address] [place] [address] OB-GYN  [email or web address]  [phone]    Ely Javed MD, FACOG       Pre-operative Diagnosis:  delivery indicated due to breech presentation [O32.1XX0]  Polyhydramnios, improved  Unstable lie, vertex presentation  Fetal intolerance of labor  Failed IOL     Post-operative Diagnosis:   Same, OP asynclitic presentation    Procedure: Low transverse  section, primary    Surgeon: Ely Javed MD    Assistant: labor and delivery staff, None    EBL: 700 cc    Urine output: per anesthesia records    Anesthesia: CSE    Prophylactic Antibiotics: ancef and azithromycin    DVT Prophylaxis: Sequential Compression Devices    Complications: none    Implants: none      Procedure Detail:      After proper patient identification and consent, the patient was taken to the operating room, where CSE anesthesia was administered and found to be adequate. A marshall catheter had been previously placed using sterile technique. The patient was prepped and draped using a Traxi retractor in the normal sterile fashion for abdominopelvic surgery. A Pfannenstiel skin incision was made with a scalpel and carried down to the rectus fascia with blunt and sharp dissection. The rectus fascia was opened in the midline with the scalpel and extended laterally with the Love scissors. The superior aspect of the fascial incision was then grasped with two Kocher clamps, elevated and the underlying rectus muscles were dissected off with blunt and sharp dissection. In a similar fashion, the inferior aspect of the fascial incision was grasped with two Kocher clamps and the underlying rectus muscle were dissected off with blunt and sharp dissection. The rectus muscles were  bluntly in the midline. The peritoneum was elevated and entered bluntly well superior to the bladder without any apparent injury. A bladder blade was inserted and the vesicouterine peritoneum was identified.   The bladder flap was created without difficulty with blunt and sharp dissection and the bladder blade was replaced. A low transverse uterine incision was made with the scalpel and extended with digital traction. Hysterotomy and aminotomy was performed and the fluid was medium amount meconium fluid. The 's hand was placed into the hysterotomy and the 's vertex was grasped, flexed, and brought atraumatically through the hysterotomy incision. The nose and mouth were bulb suctioned. The rest of the baby was delivered without difficulty. The cord was doubly clamped and cut and the  was handed off to  staff in attendance. The placenta was then removed from the uterus. The uterus was curettaged with a moist lap pad and cleared of all clots and debris. The uterine incision was closed with 0 Vicryl suture, first in a running locking fashion, followed by a second embricating layer, with good hemostasis was obtained. The lateral gutters were irrigated with warm normal saline and clot and debris was removed and normal tubes and ovaries were noted bilaterally. The incision was reevaluated and good hemostasis was again reassured. The rectus muscles were then reapproximated with a 2-0 Vicryl in a horizontal mattress suture. The fascia was closed with #1 looped PDS in a running fashion. Good hemostasis of the fascial incision was assured. The subcutaneous tissue was irrigated and hemostasis of the overlying subcutaneous tissue was assured with pressure and the bovie. The subcutaneous tissue was re approximated with interrupted 2-0 plain cut sutures. The skin was closed with a Insorb absorbable staple closure. The patient tolerated the procedure well. Sponge, lap, and needle counts were correct times three and the patient and baby were taken to recovery/postpartum room in stable condition.       Claude Comber, MD    2021  4:44 PM

## 2021-10-14 NOTE — H&P
[facility]  [email or web address]  [phone]    Oleksandr Mccarthy MD, FACOG     Labor and Delivery History & Physical  Template updated 4374    Name: Shelli Mayfield MRN: [id number]  SSN: [id number]    YOB: 1995  Age: 22 y.o. Sex: female        Subjective:     Estimated Date of Delivery: 10/17/21  OB History        2    Para   1    Term   1            AB        Living   1       SAB        TAB        Ectopic        Molar        Multiple   0    Live Births   1          Obstetric Comments   Menarche:  10. LMP: current. # of Children:  0. Age at Delivery of First Child:  na.   Hysterectomy/oophorectomy:  NO/NO. Breast Bx:  no.  Hx of Breast Feeding:  na. BCP:  yes. Hormone therapy:  no.   P1 spontaneous labor, efw 9pounds               Ms. Markel Presley is arrived to L and D from the office with a pregnancy at 39w3d for induction of labor. Prenatal course was complicated by polyhydramnios and unstable lie and obesity and suspected LGA. Please see prenatal records for details. Patient reports good fetal movement, no loss or gush of fluid, no vaginal bleeding, no significant or increase in vaginal discharge, no significant contractions or abdominal pain     Patient denies chest pain, shortness of breath, right upper quadrant pain, vision changes     Pt denies and s/sx of genital herpes outbreak.       Past Medical History:   Diagnosis Date    Asthma     Dysmenorrhea     Essential hypertension     Genital herpes     Valtrex     Major depressive disorder, single episode, unspecified     Morbid obesity ([facility] [address]     Pap smear for cervical cancer screening     17 neg 21 neg    Pituitary abnormality (HCC)     Postpartum depression     Seasonal allergies     Unspecified symptom associated with female genital organs      Past Surgical History:   Procedure Laterality Date    HX OTHER SURGICAL      HX WISDOM TEETH EXTRACTION      HX WRIST FRACTURE TX Right      Social History     Socioeconomic History    Marital status: SINGLE     Spouse name: Not on file    Number of children: Not on file    Years of education: Not on file    Highest education level: Not on file   Tobacco Use    Smoking status: Former Smoker     Packs/day: 0.50     Years: 1.00     Pack years: 0.50     Types: Cigarettes    Smokeless tobacco: Never Used   Vaping Use    Vaping Use: Never used   Substance and Sexual Activity    Alcohol use: No     Alcohol/week: 0.0 standard drinks     Comment: former user just sips now and then   Jamesetta Medal Drug use: No    Sexual activity: Yes     Partners: Male     Birth control/protection: Pill   Other Topics Concern     Social Determinants of Health     Financial Resource Strain:     Difficulty of Paying Living Expenses:    Food Insecurity:     Worried About Running Out of Food in the Last Year:     Ran Out of Food in the Last Year:    Transportation Needs:     Lack of Transportation (Medical):      Lack of Transportation (Non-Medical):    Physical Activity:     Days of Exercise per Week:     Minutes of Exercise per Session:    Stress:     Feeling of Stress :    Social Connections:     Frequency of Communication with Friends and Family:     Frequency of Social Gatherings with Friends and Family:     Attends Latter-day Services:     Active Member of Clubs or Organizations:     Attends Club or Organization Meetings:     Marital Status:      Family History   Problem Relation Age of Onset    Alcohol abuse Father     Cancer Paternal Redell Beery Other Mother         endometriosis    Migraines Mother         tumer in brain       Allergies   Allergen Reactions    Latex Swelling     Skin peels    Lactose Other (comments)    Aspirin Nausea and Vomiting    Betadine [Povidone-Iodine] Other (comments)     Skin peels    Cat Dander Sneezing    Ketorolac Hives    Penicillins Other (comments)     Yeast infections  Ivp Dye [Iodinated Contrast Media] Other (comments)     Prior to Admission medications    Medication Sig Start Date End Date Taking? Authorizing Provider   fluticasone propionate (FLONASE) 50 mcg/actuation nasal spray SHAKE LIQUID AND USE 2 SPRAYS IN EACH NOSTRIL DAILY 10/6/21  Yes Apurva Johnson MD   Omeprazole delayed release (PRILOSEC D/R) 20 mg tablet Take 1 Tablet by mouth two (2) times a day. 10/2/21  Yes Shakira Juarez MD   PNV Comb #2-Iron-FA-Omega 3 29-1-400 mg cmpk Take  by mouth. Yes Provider, Historical   B.infantis-B.ani-B.long-B.bifi (Probiotic 4X) 10-15 mg TbEC Take  by mouth. Yes Provider, Historical   OTHER Prebiotic with cranberry   Yes Provider, Historical   valACYclovir (VALTREX) 1 gram tablet Take 1 Tab by mouth daily. 2/8/21  Yes Kortney Lopez MD   albuterol (PROVENTIL HFA, VENTOLIN HFA, PROAIR HFA) 90 mcg/actuation inhaler Take 1 Puff by inhalation every four (4) hours as needed for Wheezing. Patient not taking: Reported on 8/10/2021 3/26/20   Rigoberto Madden NP        Active Hospital Problems    Diagnosis Date Noted    Obesity in pregnancy 10/13/2021       Review of Systems: A comprehensive review of systems was negative except for that written in the HPI.   Constitutional: negative for fevers, chills and weight loss  ENT ROS: negative for - hearing change, oral lesions or visual changes  Respiratory: negative for cough, wheezing or dyspnea on exertion  Cardiovascular: negative for chest pain, irregular heart beats, exertional chest pressure/discomfort  Gastrointestinal: negative for dysphagia, nausea and vomiting  Genito-Urinary ROS: see HPI  Inteument/breast: negative for rash, breast lump and nipple discharge  Musculoskeletal:negative for stiff joints, neck pain and muscle weakness  Endocrine ROS: negative for - breast changes, galactorrhea or temperature intolerance  Hematological and Lymphatic ROS: negative for - bruising or swollen lymph nodes      Objective: Vitals:  Patient Vitals for the past 4 hrs:   Temp Pulse Resp BP   10/13/21 1959 98.1 °F (36.7 °C) 91 20 (!) 119/57         Physical Exam: form office 10/13/2021    Patient without distress. Heart: Regular rate and rhythm or S1S2 present  Lung: clear to auscultation throughout lung fields, no wheezes, no rales, no rhonchi and normal respiratory effort  Abdomen: soft, nontender  Fundus: soft and non tender  Cervical Exam: 3  vtx confirmed by US in office   Lower Extremities: no c/t, trace edema b/l  EGBUS; no lesions noted    Membranes:  Intact    Prenatal Labs:   Lab Results   Component Value Date/Time    Rubella, External Immune 2018 12:00 AM    GrBStrep, External positive 2021 12:00 AM    HBsAg, External Negative 2018 12:00 AM    HIV, External Negative 2018 12:00 AM    Gonorrhea, External Negative 2018 12:00 AM    Chlamydia, External Negative 2018 12:00 AM          Recent Results (from the past 12 hour(s))   CBC WITH AUTOMATED DIFF    Collection Time: 10/13/21  6:27 PM   Result Value Ref Range    WBC 9.6 3.6 - 11.0 K/uL    RBC 4.09 3.80 - 5.20 M/uL    HGB 12.6 11.5 - 16.0 g/dL    HCT 37.1 35.0 - 47.0 %    MCV 90.7 80.0 - 99.0 FL    MCH 30.8 26.0 - 34.0 PG    MCHC 34.0 30.0 - 36.5 g/dL    RDW 13.8 11.5 - 14.5 %    PLATELET 445 891 - 296 K/uL    MPV 9.1 8.9 - 12.9 FL    NRBC 0.0 0  WBC    ABSOLUTE NRBC 0.00 0.00 - 0.01 K/uL    NEUTROPHILS 77 (H) 32 - 75 %    LYMPHOCYTES 14 12 - 49 %    MONOCYTES 8 5 - 13 %    EOSINOPHILS 0 0 - 7 %    BASOPHILS 0 0 - 1 %    IMMATURE GRANULOCYTES 1 (H) 0.0 - 0.5 %    ABS. NEUTROPHILS 7.4 1.8 - 8.0 K/UL    ABS. LYMPHOCYTES 1.4 0.8 - 3.5 K/UL    ABS. MONOCYTES 0.8 0.0 - 1.0 K/UL    ABS. EOSINOPHILS 0.0 0.0 - 0.4 K/UL    ABS. BASOPHILS 0.0 0.0 - 0.1 K/UL    ABS. IMM.  GRANS. 0.1 (H) 0.00 - 0.04 K/UL    DF AUTOMATED             Assessment/Plan:   22 y.o.  39w3d  Polyhydramnios, improved  Unstable lie  Suspected LGA with AC>95%  Obesity in pregnancy  The patient does not have anemia  GBS positive    Reassuring fetal surveillance    Past Medical History:   Diagnosis Date    Asthma     Dysmenorrhea     Essential hypertension     Genital herpes     Valtrex     Major depressive disorder, single episode, unspecified     Morbid obesity ([address]     Pap smear for cervical cancer screening     17 neg 21 neg    Pituitary abnormality ([facility] [address]     Postpartum depression     Seasonal allergies     Unspecified symptom associated with female genital organs          Plan: Admit for IOL   Sp Cook catheter placed in office 80cc/60cc. CEFM/TOCO  Disc r/b/a of induction and pitocin use and increase risk of longer labor,  section, bleeding and infection.    Anticipate   Epidural in am or prn with labor  Cefazolin for GBS    Signed By:  Elizabeth Gannon MD     2021          []

## 2021-10-14 NOTE — PROGRESS NOTES
Pt comfortable. Visit Vitals  /65   Pulse 69   Temp 97.9 °F (36.6 °C)   Resp 16   Ht 5' 6\" (1.676 m)   Wt (!) 375 lb (170.1 kg)   SpO2 99%   BMI 60.53 kg/m²       Cervix unchanged, head not engaged. Will proceed with primary CS.     Candie Perez MD

## 2021-10-14 NOTE — PROGRESS NOTES
Pt co of more UC. Nurses report difficulty monitoring fetus/UC.   Awaiting epidural    Visit Vitals  BP (!) 115/56 (BP 1 Location: Right arm, BP Patient Position: At rest)   Pulse 80   Temp 98.8 °F (37.1 °C)   Resp 18   Ht 5' 6\" (1.676 m)   Wt (!) 375 lb (170.1 kg)   SpO2 98%   BMI 60.53 kg/m²     Cervical Exam  Dilation (cm): 4  Eff: 50 %  Station: -2  Vaginal exam done by? : [place]  Membrane Status: AROM  Meconium  Vertex    fse and iupc placed without difficulty for improved fetal monitoring.    25 y.o.  39w4d  Meconium on AROM  Sp cook catheter    Epidural prn  NICU for delivery  Anticipate   D/w nursing preparation for delivery for suspected LGA and inc AC and inc risk for shoulder dystocia    Jeet Rios MD

## 2021-10-14 NOTE — PROGRESS NOTES
Multiple attempts for IOL and pitocin AOL not tolerated because of NRFS. Unable to resume pitocin despite multiple attempts. Position changed made. Disc options with patient. Because of lack of cervical change, NRFS when augmenting, and inadequate labor/MVU without augmentation with suspected LGA and enlarged AC, will proceed with primary CS. Can recheck pt prior to CS to see if spontaneous cervical change.     Adrianna Forman MD

## 2021-10-14 NOTE — ANESTHESIA PROCEDURE NOTES
Epidural Block    Patient location during procedure: OB  Start time: 10/14/2021 8:30 AM  End time: 10/14/2021 8:51 AM  Reason for block: labor epidural  Staffing  Performed: CRNA   Resident/CRNA: Desiree Rossi CRNA  Preanesthetic Checklist  Completed: patient identified, IV checked, site marked, risks and benefits discussed, surgical consent, monitors and equipment checked, pre-op evaluation and timeout performed  Block Placement  Patient position: sitting  Prep: ChloraPrep  Sterility prep: cap, drape, gloves, gown and hand  Sedation level: no sedation  Patient monitoring: continuous pulse oximetry, frequent blood pressure checks and heart rate  Approach: midline  Location: lumbar  Lumbar location: L3-L4  Epidural  Loss of resistance technique: air  Guidance: landmark technique  Needle  Needle type: Tuohy   Needle gauge: 17 G  Needle length: 13 cm  Needle insertion depth: 8.5 cm  Catheter type: end hole  Catheter size: 20 G  Catheter at skin depth: 13 cm  Catheter securement method: clear occlusive dressing, liquid medical adhesive and surgical tape  Test dose: negative  Assessment  Number of attempts: 1  Procedure assessment: patient tolerated procedure well with no immediate complications  Additional Notes  Long cindy as prior record suggested

## 2021-10-14 NOTE — PROGRESS NOTES
1437-Dr. Ortiz at bedside, VE 5/50/-3, MD states that is unchanged from previous exam, orders received to let primary nurse know that pitocin can be increased as FHR has been reactive. This RN verbalizes understanding.

## 2021-10-14 NOTE — BRIEF OP NOTE
Brief Postoperative Note    Patient: Viviana Lovell  YOB: 1995  MRN: [id number]    Date of Procedure: 10/13/2021     Pre-Op Diagnosis:   Failed IOL  Polyhydramnios, improved  Maternal obesity  Unstable lie  Fetal intolerance to labor    Post-Op Diagnosis: Same as preoperative diagnosis.       Procedure(s):   SECTION    Surgeon(s):  Pradeep Mackey MD    Surgical Assistant: None    Anesthesia: Spinal     Estimated Blood Loss (mL): 888 cc    Complications: None    Specimens: * No specimens in log *     Implants: * No implants in log *    Drains: * No LDAs found *    Findings: LBMI, acynclitic, ROP, meconium amniotic fluid    Electronically Signed by Abeba Shay MD on 10/14/2021 at 4:44 PM

## 2021-10-14 NOTE — ANESTHESIA PREPROCEDURE EVALUATION
Relevant Problems   ENDOCRINE   (+) Obesity   (+) Obesity in pregnancy   (+) Obesity, morbid ([address]       Anesthetic History   No history of anesthetic complications            Review of Systems / Medical History  Patient summary reviewed, nursing notes reviewed and pertinent labs reviewed    Pulmonary            Asthma : well controlled       Neuro/Psych   Within defined limits           Cardiovascular  Within defined limits                     GI/Hepatic/Renal  Within defined limits              Endo/Other        Morbid obesity     Other Findings                   Anesthesia Plan

## 2021-10-14 NOTE — PROGRESS NOTES
[facility] bedside patient turned left lateral, pitocin off, bed in trendelenburg, LR bolus initiated. 5 Dr. Drew Johnson notified. 1455 Patient placed supine. 1501 Patient placed in high fowlers.

## 2021-10-14 NOTE — PROGRESS NOTES
10/14/2021  11:41 AM    CM met with NICOLÁS to complete initial assessment and begin discharge planning. MOB verified and confirmed demographics. NICOLÁS lives with Selvin Calabrese ([phone]), along with NICOLÁS's 21 yr old, at the address on file. NICOLÁS is employed and plans to take 16wks off from work. FOLAMONT is also employed and will be taking 10 days off. NICOLÁS reports she has good family support, and feels like she has the support she needs when she returns home. NICOLÁS plans to bottle feed baby. Dr. Noe Mejia will provide follow up care for infant. NICOLÁS has car seat, bassinet/crib, clothing, bottles and all necessary supplies for baby. NICOLÁS has [place] Medicaid, and will be adding baby to this policy. CM discussed process to add baby to insurance, MOB verbalized understanding. NICOLÁS is also enrolled in [facility] services and understands how to add baby to program.  Care Management Interventions  PCP Verified by CM: Yes ([place])  Mode of Transport at Discharge:  Other (see comment)  Transition of Care Consult (CM Consult): Discharge Planning  Support Systems: Other Family Member(s), Spouse/Significant Other  Confirm Follow Up Transport: Family  Discharge Location  Discharge Placement: Home with family assistance  Cm Parish

## 2021-10-15 LAB
BASOPHILS # BLD: 0 K/UL (ref 0–0.1)
BASOPHILS NFR BLD: 0 % (ref 0–1)
DIFFERENTIAL METHOD BLD: ABNORMAL
EOSINOPHIL # BLD: 0 K/UL (ref 0–0.4)
EOSINOPHIL NFR BLD: 0 % (ref 0–7)
ERYTHROCYTE [DISTWIDTH] IN BLOOD BY AUTOMATED COUNT: 13.6 % (ref 11.5–14.5)
HCT VFR BLD AUTO: 37.9 % (ref 35–47)
HGB BLD-MCNC: 12.4 G/DL (ref 11.5–16)
IMM GRANULOCYTES # BLD AUTO: 0 K/UL (ref 0–0.04)
IMM GRANULOCYTES NFR BLD AUTO: 0 % (ref 0–0.5)
LYMPHOCYTES # BLD: 1.5 K/UL (ref 0.8–3.5)
LYMPHOCYTES NFR BLD: 12 % (ref 12–49)
MCH RBC QN AUTO: 31.1 PG (ref 26–34)
MCHC RBC AUTO-ENTMCNC: 32.7 G/DL (ref 30–36.5)
MCV RBC AUTO: 95 FL (ref 80–99)
MONOCYTES # BLD: 0.9 K/UL (ref 0–1)
MONOCYTES NFR BLD: 8 % (ref 5–13)
NEUTS SEG # BLD: 9.6 K/UL (ref 1.8–8)
NEUTS SEG NFR BLD: 79 % (ref 32–75)
NRBC # BLD: 0 K/UL (ref 0–0.01)
NRBC BLD-RTO: 0 PER 100 WBC
PLATELET # BLD AUTO: 238 K/UL (ref 150–400)
PMV BLD AUTO: 9.1 FL (ref 8.9–12.9)
RBC # BLD AUTO: 3.99 M/UL (ref 3.8–5.2)
WBC # BLD AUTO: 12 K/UL (ref 3.6–11)

## 2021-10-15 PROCEDURE — 77030019905 HC CATH URETH INTMIT MDII -A

## 2021-10-15 PROCEDURE — 85025 COMPLETE CBC W/AUTO DIFF WBC: CPT

## 2021-10-15 PROCEDURE — 74011250637 HC RX REV CODE- 250/637: Performed by: ANESTHESIOLOGY

## 2021-10-15 PROCEDURE — 74011250637 HC RX REV CODE- 250/637: Performed by: OBSTETRICS & GYNECOLOGY

## 2021-10-15 PROCEDURE — 36415 COLL VENOUS BLD VENIPUNCTURE: CPT

## 2021-10-15 PROCEDURE — 74011250636 HC RX REV CODE- 250/636: Performed by: OBSTETRICS & GYNECOLOGY

## 2021-10-15 PROCEDURE — 65270000029 HC RM PRIVATE

## 2021-10-15 RX ORDER — NYSTATIN 100000 [USP'U]/G
POWDER TOPICAL 2 TIMES DAILY
Status: DISCONTINUED | OUTPATIENT
Start: 2021-10-15 | End: 2021-10-17 | Stop reason: HOSPADM

## 2021-10-15 RX ORDER — FLUCONAZOLE 100 MG/1
150 TABLET ORAL
Status: COMPLETED | OUTPATIENT
Start: 2021-10-15 | End: 2021-10-15

## 2021-10-15 RX ORDER — HYDROCODONE BITARTRATE AND ACETAMINOPHEN 5; 325 MG/1; MG/1
1 TABLET ORAL
Qty: 20 TABLET | Refills: 0 | Status: SHIPPED | OUTPATIENT
Start: 2021-10-15 | End: 2021-10-22

## 2021-10-15 RX ORDER — ENOXAPARIN SODIUM 100 MG/ML
40 INJECTION SUBCUTANEOUS EVERY 12 HOURS
Qty: 33.6 ML | Refills: 0 | Status: SHIPPED | OUTPATIENT
Start: 2021-10-15 | End: 2021-11-26

## 2021-10-15 RX ORDER — PEPPERMINT OIL
OIL (ML) MISCELLANEOUS
Status: DISPENSED
Start: 2021-10-15 | End: 2021-10-16

## 2021-10-15 RX ORDER — ENOXAPARIN SODIUM 100 MG/ML
40 INJECTION SUBCUTANEOUS EVERY 12 HOURS
Status: DISCONTINUED | OUTPATIENT
Start: 2021-10-15 | End: 2021-10-17 | Stop reason: HOSPADM

## 2021-10-15 RX ADMIN — ACETAMINOPHEN 650 MG: 325 TABLET ORAL at 10:13

## 2021-10-15 RX ADMIN — OXYCODONE 10 MG: 5 TABLET ORAL at 16:44

## 2021-10-15 RX ADMIN — DIPHENHYDRAMINE HYDROCHLORIDE 25 MG: 25 CAPSULE ORAL at 00:19

## 2021-10-15 RX ADMIN — SIMETHICONE 80 MG: 80 TABLET, CHEWABLE ORAL at 22:40

## 2021-10-15 RX ADMIN — ENOXAPARIN SODIUM 40 MG: 40 INJECTION SUBCUTANEOUS at 16:44

## 2021-10-15 RX ADMIN — SIMETHICONE 80 MG: 80 TABLET, CHEWABLE ORAL at 10:13

## 2021-10-15 RX ADMIN — ACETAMINOPHEN 650 MG: 325 TABLET ORAL at 04:42

## 2021-10-15 RX ADMIN — HYDROCODONE BITARTRATE AND ACETAMINOPHEN 1 TABLET: 5; 325 TABLET ORAL at 22:39

## 2021-10-15 RX ADMIN — ACETAMINOPHEN 650 MG: 325 TABLET ORAL at 16:45

## 2021-10-15 RX ADMIN — DOCUSATE SODIUM 100 MG: 100 CAPSULE, LIQUID FILLED ORAL at 18:02

## 2021-10-15 RX ADMIN — FLUCONAZOLE 150 MG: 100 TABLET ORAL at 21:39

## 2021-10-15 RX ADMIN — SIMETHICONE 80 MG: 80 TABLET, CHEWABLE ORAL at 16:45

## 2021-10-15 RX ADMIN — DOCUSATE SODIUM 100 MG: 100 CAPSULE, LIQUID FILLED ORAL at 10:13

## 2021-10-15 RX ADMIN — NYSTATIN: 100000 POWDER TOPICAL at 21:39

## 2021-10-15 RX ADMIN — OXYCODONE 5 MG: 5 TABLET ORAL at 04:42

## 2021-10-15 NOTE — PROGRESS NOTES
Called to see pt regarding red incision. Inc: clean with steris, no pus or foul odor.   Red at crease in panus extending beyond line of incision c/w yeast, no superior or inferior extension      A/P likely yeast.  Diflucan, nystatin, continue to monitor

## 2021-10-15 NOTE — PROGRESS NOTES
Abdominal folds reddened, inflamed concerned about possible yeast. Spoke with Dr. Yvette Luna to come assess. Dr. Yvette Luna to floor. Ordered nystatin powder only to be applied to abdominal folds on edges, not directly to incision site. Also ordered diflucan PO.

## 2021-10-15 NOTE — DISCHARGE SUMMARY
Obstetrical Discharge Summary     Name: Jazmyn Funes MRN: [id number]  SSN: [id number]    YOB: 1995  Age: 22 y.o. Sex: female      Admit Date: 10/13/2021    Discharge Date: 10/17/2021  2:27 PM    Admitting Physician: Arti Alejandre MD     Attending Physician:  Polo Collier MD     Admission Diagnoses:  delivery indicated due to breech presentation [O32.1XX0]; Obesity in pregnancy [O99.210]; Pregnancy [Z34.90]    Condition on Discharge: Stable    Procedures: LTCS    Disposition: to home    Follow up: Follow-up Appointments   Procedures    FOLLOW UP VISIT Appointment in: 6 Weeks And two weeks for incision check     And two weeks for incision check     Standing Status:   Standing     Number of Occurrences:   1     Order Specific Question:   Appointment in     Answer:   6 Weeks        Discharge Diagnoses:   Information for the patient's :  Jeannine Ortiz, Male Amita Levo [[id number]]   Delivery of a 8 lb 6.2 oz (3.805 kg) male infant via , Low Transverse on 10/14/2021 at 6:03 PM  by Sruthi Marvin. Apgars were 9  and 9 . Additional Diagnoses:   Hospital Problems  Date Reviewed: 10/13/2021        Codes Class Noted POA    Pregnancy ICD-10-CM: Z34.90  ICD-9-CM: V22.2  10/14/2021 Unknown        Obesity in pregnancy ICD-10-CM: O99.210  ICD-9-CM: 649.10  10/13/2021 Unknown             Lab Results   Component Value Date/Time    Rubella, External Immune 2021 12:00 AM    GrBStrep, External positive 2021 12:00 AM       Hospital Course: Normal hospital course following the delivery. Her diet was advanced postoperative and she was tolerating general diet on the day of discharge. Her marshall was discontinued and she was able to void spontaneously after some urinary retention initially. Her pain was controlled with oral pain medications once she was able to tolerate oral intake. The patient was begun on lovenox on postoperative day 1 for DVT prophylaxis.     Patient Instructions:   Current Discharge Medication List      START taking these medications    Details   HYDROcodone-acetaminophen (NORCO) 5-325 mg per tablet Take 1 Tablet by mouth every four (4) hours as needed for Pain for up to 7 days. Max Daily Amount: 6 Tablets. Qty: 20 Tablet, Refills: 0    Associated Diagnoses: S/P       enoxaparin (LOVENOX) 40 mg/0.4 mL 0.4 mL by SubCUTAneous route every twelve (12) hours every twelve (12) hours for 42 days. Qty: 33.6 mL, Refills: 0         CONTINUE these medications which have NOT CHANGED    Details   fluticasone propionate (FLONASE) 50 mcg/actuation nasal spray SHAKE LIQUID AND USE 2 SPRAYS IN EACH NOSTRIL DAILY  Qty: 16 g, Refills: 5      Omeprazole delayed release (PRILOSEC D/R) 20 mg tablet Take 1 Tablet by mouth two (2) times a day. Qty: 60 Tablet, Refills: 1      PNV Comb #2-Iron-FA-Omega 3 29-1-400 mg cmpk Take  by mouth. B.infantis-B.ani-B.long-B.bifi (Probiotic 4X) 10-15 mg TbEC Take  by mouth. OTHER Prebiotic with cranberry      valACYclovir (VALTREX) 1 gram tablet Take 1 Tab by mouth daily. Qty: 90 Tab, Refills: 4      albuterol (PROVENTIL HFA, VENTOLIN HFA, PROAIR HFA) 90 mcg/actuation inhaler Take 1 Puff by inhalation every four (4) hours as needed for Wheezing. Qty: 1 Inhaler, Refills: 0    Associated Diagnoses: Mild persistent asthma without complication             Reference my discharge instructions.       Signed By:  Rashid Cortés MD     October 15, 2021

## 2021-10-15 NOTE — PROGRESS NOTES
Post-Operative  Progress Note      Information for the patient's :  Karen Frances, Male Aline David [[id number]]   , Low Transverse      Patient doing well without significant complaint. Nausea and vomiting resolved. She is tolerating oral intake. Pain well controlled but having trouble urinating after catheter removed. Delivery information:  Information for the patient's :  Karen Frances, Male Aline David [[id number]]   Delivery of a 8 lb 6.2 oz (3.805 kg) male infant via , Low Transverse on 10/14/2021 at 6:03 PM  by [facility]. Apgars were 9  and 9 . Vitals:  Patient Vitals for the past 12 hrs:   Temp Pulse Resp BP SpO2   10/15/21 1538 98.2 °F (36.8 °C) 91 16 113/60 94 %   10/15/21 1053 98.6 °F (37 °C) 83 18 (!) 116/58 96 %   10/15/21 0640 98.3 °F (36.8 °C) (!) 113 20 129/79 96 %   10/15/21 0419 97.6 °F (36.4 °C) 86 16 128/70 99 %       Temp (24hrs), Av.1 °F (36.7 °C), Min:97.6 °F (36.4 °C), Max:98.6 °F (37 °C)      Last 24hr Input/Output:    Intake/Output Summary (Last 24 hours) at 10/15/2021 1544  Last data filed at 10/15/2021 0930  Gross per 24 hour   Intake 1250 ml   Output 2800 ml   Net -1550 ml        Exam:    Gen: Patient without distress    Labs:   Recent Results (from the past 24 hour(s))   CBC WITH AUTOMATED DIFF    Collection Time: 10/15/21  4:21 AM   Result Value Ref Range    WBC 12.0 (H) 3.6 - 11.0 K/uL    RBC 3.99 3.80 - 5.20 M/uL    HGB 12.4 11.5 - 16.0 g/dL    HCT 37.9 35.0 - 47.0 %    MCV 95.0 80.0 - 99.0 FL    MCH 31.1 26.0 - 34.0 PG    MCHC 32.7 30.0 - 36.5 g/dL    RDW 13.6 11.5 - 14.5 %    PLATELET 759 478 - 594 K/uL    MPV 9.1 8.9 - 12.9 FL    NRBC 0.0 0  WBC    ABSOLUTE NRBC 0.00 0.00 - 0.01 K/uL    NEUTROPHILS 79 (H) 32 - 75 %    LYMPHOCYTES 12 12 - 49 %    MONOCYTES 8 5 - 13 %    EOSINOPHILS 0 0 - 7 %    BASOPHILS 0 0 - 1 %    IMMATURE GRANULOCYTES 0 0.0 - 0.5 %    ABS. NEUTROPHILS 9.6 (H) 1.8 - 8.0 K/UL    ABS.  LYMPHOCYTES 1.5 0.8 - 3.5 K/UL ABS. MONOCYTES 0.9 0.0 - 1.0 K/UL    ABS. EOSINOPHILS 0.0 0.0 - 0.4 K/UL    ABS. BASOPHILS 0.0 0.0 - 0.1 K/UL    ABS. IMM. GRANS. 0.0 0.00 - 0.04 K/UL    DF AUTOMATED         Lab Results   Component Value Date/Time    HGB 12.4 10/15/2021 04:21 AM    Hgb, External 13.3 2018 12:00 AM       Assessment:   Post-Op , stable  POP1  Urinary retention   Plan:     1. Routine post-operative care  2. Encouraged out of bed and ambulation  3. Oral pain medications  4. Advance diet  5. Continue postpartum and  teaching by nursing  6.  Straight cath if unable to void spontaneously    Saúl Dooley MD

## 2021-10-15 NOTE — ROUTINE PROCESS
Bedside and Verbal shift change report given to kristopher rivera rn (oncoming nurse) by griselda mohan rn (offgoing nurse). Report included the following information SBAR, Kardex, OR Summary, Procedure Summary, Intake/Output, MAR, Accordion and Recent Results.

## 2021-10-15 NOTE — PROGRESS NOTES
1950: SBAR report given to this RN by L-3 Sid. RN assuming care of pt at this time. 2212: TRANSFER - OUT REPORT:    Verbal report given to Federico RN(name) on Baldev Franchesca  being transferred to MIU(unit) for routine progression of care       Report consisted of patients Situation, Background, Assessment and   Recommendations(SBAR). Information from the following report(s) SBAR, Kardex, OR Summary, Procedure Summary, Intake/Output, MAR, Accordion, Recent Results, Med Rec Status, Quality Measures and Dual Neuro Assessment was reviewed with the receiving nurse. Lines:   Peripheral IV 10/14/21 Right Antecubital (Active)   Site Assessment Clean, dry, & intact 10/14/21 2035   Phlebitis Assessment 0 10/14/21 2035   Infiltration Assessment 0 10/14/21 2035   Dressing Status Clean, dry, & intact 10/14/21 2035   Dressing Type Tape;Transparent 10/14/21 2035   Hub Color/Line Status Infusing 10/14/21 2035        Opportunity for questions and clarification was provided.       Patient transported with:   Registered Nurse

## 2021-10-16 PROCEDURE — 74011250637 HC RX REV CODE- 250/637: Performed by: OBSTETRICS & GYNECOLOGY

## 2021-10-16 PROCEDURE — 65270000029 HC RM PRIVATE

## 2021-10-16 PROCEDURE — 77030012930 HC DRSG ANTIMIC COLO -B

## 2021-10-16 PROCEDURE — 74011250636 HC RX REV CODE- 250/636: Performed by: OBSTETRICS & GYNECOLOGY

## 2021-10-16 RX ADMIN — NYSTATIN: 100000 POWDER TOPICAL at 08:54

## 2021-10-16 RX ADMIN — ENOXAPARIN SODIUM 40 MG: 40 INJECTION SUBCUTANEOUS at 18:18

## 2021-10-16 RX ADMIN — HYDROCODONE BITARTRATE AND ACETAMINOPHEN 2 TABLET: 5; 325 TABLET ORAL at 18:19

## 2021-10-16 RX ADMIN — HYDROCODONE BITARTRATE AND ACETAMINOPHEN 2 TABLET: 5; 325 TABLET ORAL at 08:49

## 2021-10-16 RX ADMIN — HYDROCODONE BITARTRATE AND ACETAMINOPHEN 1 TABLET: 5; 325 TABLET ORAL at 03:23

## 2021-10-16 RX ADMIN — DOCUSATE SODIUM 100 MG: 100 CAPSULE, LIQUID FILLED ORAL at 18:19

## 2021-10-16 RX ADMIN — NYSTATIN: 100000 POWDER TOPICAL at 18:19

## 2021-10-16 RX ADMIN — HYDROCODONE BITARTRATE AND ACETAMINOPHEN 2 TABLET: 5; 325 TABLET ORAL at 13:39

## 2021-10-16 RX ADMIN — ENOXAPARIN SODIUM 40 MG: 40 INJECTION SUBCUTANEOUS at 05:56

## 2021-10-16 RX ADMIN — DOCUSATE SODIUM 100 MG: 100 CAPSULE, LIQUID FILLED ORAL at 08:49

## 2021-10-16 RX ADMIN — HYDROCODONE BITARTRATE AND ACETAMINOPHEN 2 TABLET: 5; 325 TABLET ORAL at 21:57

## 2021-10-16 RX ADMIN — SIMETHICONE 80 MG: 80 TABLET, CHEWABLE ORAL at 08:49

## 2021-10-16 RX ADMIN — SIMETHICONE 80 MG: 80 TABLET, CHEWABLE ORAL at 13:39

## 2021-10-16 RX ADMIN — SIMETHICONE 80 MG: 80 TABLET, CHEWABLE ORAL at 21:57

## 2021-10-16 NOTE — PROGRESS NOTES
Post-Operative Day Number 2 Progress Note    Patient doing well post-op day 2 from  delivery without significant complaints. Pain controlled on current medication. Voiding without difficulty, normal lochia. Vitals:  Patient Vitals for the past 8 hrs:   BP Temp Pulse Resp SpO2   10/16/21 0842 (!) 115/56 98.7 °F (37.1 °C) 75 16 100 %     Temp (24hrs), Av.6 °F (37 °C), Min:98.2 °F (36.8 °C), Max:98.7 °F (37.1 °C)      Vital signs stable, afebrile. Exam:  Patient without distress. Abdomen soft, fundus firm at level of umbilicus, nontender. Incision dry and clean without erythema. Lower extremities are negative for swelling, cords or tenderness. Assessment and Plan:  Patient appears to be having uncomplicated post- course. Continue routine post-op care and maternal education.

## 2021-10-16 NOTE — ROUTINE PROCESS
Bedside and Verbal shift change report given to rhett rivera rn (oncoming nurse) by griselda mohan rn (offgoing nurse). Report included the following information SBAR, Kardex, OR Summary, Procedure Summary, Intake/Output, MAR, Accordion and Recent Results.

## 2021-10-17 VITALS
BODY MASS INDEX: 47.09 KG/M2 | SYSTOLIC BLOOD PRESSURE: 118 MMHG | HEART RATE: 89 BPM | DIASTOLIC BLOOD PRESSURE: 74 MMHG | WEIGHT: 293 LBS | HEIGHT: 66 IN | TEMPERATURE: 98.3 F | OXYGEN SATURATION: 98 % | RESPIRATION RATE: 20 BRPM

## 2021-10-17 PROCEDURE — 77030036554

## 2021-10-17 PROCEDURE — 74011250636 HC RX REV CODE- 250/636: Performed by: OBSTETRICS & GYNECOLOGY

## 2021-10-17 PROCEDURE — 74011250637 HC RX REV CODE- 250/637: Performed by: OBSTETRICS & GYNECOLOGY

## 2021-10-17 RX ORDER — NYSTATIN 100000 [USP'U]/G
POWDER TOPICAL 2 TIMES DAILY
Qty: 30 G | Refills: 1 | Status: SHIPPED | OUTPATIENT
Start: 2021-10-17 | End: 2022-05-02

## 2021-10-17 RX ADMIN — DOCUSATE SODIUM 100 MG: 100 CAPSULE, LIQUID FILLED ORAL at 09:54

## 2021-10-17 RX ADMIN — HYDROCODONE BITARTRATE AND ACETAMINOPHEN 2 TABLET: 5; 325 TABLET ORAL at 05:38

## 2021-10-17 RX ADMIN — HYDROCODONE BITARTRATE AND ACETAMINOPHEN 2 TABLET: 5; 325 TABLET ORAL at 09:54

## 2021-10-17 RX ADMIN — ENOXAPARIN SODIUM 40 MG: 40 INJECTION SUBCUTANEOUS at 05:22

## 2021-10-17 RX ADMIN — SIMETHICONE 80 MG: 80 TABLET, CHEWABLE ORAL at 09:54

## 2021-10-17 NOTE — DISCHARGE INSTRUCTIONS
[facility]  [email or web address]  [phone]    Antonieta Ontiveros MD, FACOG     POST DELIVERY DISCHARGE INSTRUCTIONS  FROM YOUR PHYSICIAN    Name: Eladio Garcia  YOB: 1995    General:     Read all discharge information provided by the hospital    Diet/Diet Restrictions:  Eat healthy meals and snacks as desired. Eat foods that are high in fiber and low in fat and cholesterol. Drink eight 8-ounce glasses of water daily; avoid excessive caffeine intake. [email or web address]    Medications:   See discharge medication list and read instructions carefully. Breast Feeding:  See instructions from your lactation consult. Call [phone] for more information or to locate a lactation consultant. [email or web address]    Vaccines:  If you received the MMR vaccine postpartum you should wait three months until you get pregnant again. You, and close contacts, should make sure that the Tdap vaccine is up to date. This vaccine can decrease the risk of your baby getting pertussis or \"whooping cough. \"    You, and close contacts, should receive the influenza vaccine during flu season when appropriate. SalaryStart.[email or web address]    Tobacco Use: If you (or other people around the baby) smoke or use tobacco products, please try to  use and quit to improve your health and decrease risk to your baby. [email or web address] htm    Swelling in your Legs:  There are many fluid changes after delivery and you may have more swelling the first few days after delivery  Continue to drink plenty of water, avoid sitting or standing in one position for too long and elevate your feet above your heart, to help reduce some the pressure you may be feeling in your ankles and legs.      Section Incision:  Steri-strips or tape strips may be removed gently at home approximately 7- 10 days after surgery. Soaking the strips with a warm, wet cloth or taking a shower may make the strips easier to remove. Metal staples are usually removed within 3 to 10 days, either before you leave the hospital or in the office. Make an appointment if needed. Insorb absorbable staples may be used under the skin but you may see small white pieces as they dissolve. Skin glue or dermabond will fall off with time. Abdominal incisions should be kept clean by showering. It is not necessary to put soap on the incision; plain tap water is adequate. Avoid scrubbing the area and pat dry. The way your scar looks will change over time and may not reach its final appearance for up to a year. The area may feel either numb or sensitive to touch, which is normal.    Lovenox: We will teach you how to administer Lovenox injections twice a day to help prevent blood clots during the postoperative period. Physical Activity / Restrictions / Safety:     Avoid heavy lifting, no more that 10 pounds, for 2-3 weeks. No driving while taking narcotic pain medication, of if you can not slam on the brakes. No intercourse for 4-6 weeks, no douching or tampon use until seen by your doctor for your postpartum visit. Use condoms as needed for contraception with sexual activity. You may resume normal exercise after you are cleared by your physician at your postpartum check. You may walk for exercise, as tolerated. Discharge Instructions/Special Treatment/Home Care Needs:     Continue your prenatal vitamins while breast feeding or pumping. Continue to use a squirt bottle with warm water on your perineum/bottom/episiotomy after each bathroom use until bleeding stops. Take stool softeners daily. For example, docusate over the counter stool softener.   This is especially important if you are taking narcotic pain medications, because they can cause constipation. Call your doctor for the following: If you have a fever over 100.4 degrees by mouth on two readings. If you have persistent vaginal bleeding heavier than a heavy menstrual period or persistent large clots or if you are bleeding so heavy it is making you feel weak. It is normal to pass larger clots when you first get out of bed: but if they persist, notify your physician. If you have red streaks or increased swelling of legs, painful red streaks on your breast.  If you have painful urination, or increased pain, redness or discharge with your incision. If you have any questions or concerns. Pain Management:     Take Acetaminophen (Tylenol), Ibuprofen (Advil, Motrin), prescribed pain medications as directed for pain. Do not take Perocet with Tylenol, they both contain acetaminophen. Use a warm water Sitz bath 3 times daily to relieve episiotomy, bottom/perineum or hemorrhoidal discomfort. Apply heating pad to  incision as needed. For hemorrhoidal discomfort, you can use Tucks and Anusol cream as needed and directed.     NSAID information for patients:  [email or web address]    Pain medication/narcotic information for patients:   Take your medicine exactly as prescribed   Store your medicine away from children and in a safe  place   Do not give your medicine to others   Do not drink alcohol while taking this medicine    Follow-Up Care:     Appointment with MD:  Dr. Dejan Ramos  [phone]  Schedule your postpartum visit for six weeks        Additional Discharge Instructions     Please read all of your discharge instructions   Follow all of your medication instructions carefully   Call our office on the next business day to schedule your follow-up appointment   If you have any questions or concerns, please contact us at [phone] or if the situation is urgent contact [phone]   Become a [facility] My Chart user so you can access information, results and appointments: go to [email or web address]. You may receive a survey about your delivery. Please take a minute to give us your feedback, and we hope that you were fully satisfied with your care. If you did not receive excellent communication, compassionate care and an outstanding patient experience, please notify James Colvin, the practice manger, or myself at [phone] or discuss your concerns with me at your next visit so that we can always meet our mission and your expectations. Thank you. Dr. Bethanie Walden MD  [address] [place] [address], [address]  [email or web address]   [phone]   Good Help to Those in [address] [place] [address]      Patient Education        Enoxaparin (Lovenox): Care Instructions  Your Care Instructions        Enoxaparin (Lovenox) is an anticoagulant medicine. It is one of a class of anticoagulants called low molecular weight heparin. Many people call these medicines blood thinners. They don't actually thin the blood, but they increase the time it takes a blood clot to form. This reduces the chance of a blood clot in the leg veins (deep vein thrombosis) or in the lungs (pulmonary embolism). Enoxaparin is a shot (injection). You or someone caring for you will inject it once or twice a day. Most people need shots for 5 to 10 days, but in some cases it can be longer. Your doctor will tell you how long you need to have the shots. Enoxaparin is used to:  · Treat deep vein thrombosis (DVT), which is a blood clot in the legs, pelvis, or arms. · Reduce the chance of getting blood clots after certain surgeries. For example, you may take enoxaparin after knee or hip replacement surgery.   · Reduce the chance of getting blood clots in people who are likely to get them and who are not active for a long period of time. For example, you may need enoxaparin if you need to stay in bed for a long time because of a health problem. · Reduce the chance of blood clots when another blood thinner is stopped for a short time. For example, if you take warfarin and need surgery, your doctor may ask you to stop taking warfarin for a short time before the surgery. If you have a high risk of blood clots during this time, you may take enoxaparin before the surgery. After the surgery, your doctor will tell you when it is safe to start taking warfarin again. This is called bridge therapy. Follow-up care is a key part of your treatment and safety. Be sure to make and go to all appointments, and call your doctor if you are having problems. It's also a good idea to know your test results and keep a list of the medicines you take. How can you care for yourself at home? Follow your doctor's instructions for how often to inject the medicine. Depending on what your doctor prescribed, you may give yourself a shot once or twice a day using prefilled syringes. Prefilled means that the syringes already have the medicine in them. Inject the medicine at the same time every day unless your doctor gives you other instructions. Giving the shot  1. Gather your prefilled syringe and an alcohol wipe or a cotton ball dipped in alcohol. 2. Wash and dry your hands. 3. Sit or lie in a position that lets you see your belly. 4. Choose a site on the right or left side of your belly, at least 2 inches from your belly button. 5. Clean the injection site with the alcohol wipe or cotton ball. Let it dry. 6. Remove the cap from the needle. 7. Hold the syringe like a pencil in one hand, keeping your fingers off the plunger. You may see an air bubble. It's okay. Unless your doctor tells you to, you don't have to remove the bubble.   8. With your other hand, slightly pinch a fold of skin at the injection site between your fingers and thumb. 9. Hold the syringe at a 90-degree angle to your skin so the needle is pointing straight at the injection site. 10. Quickly push the needle all the way into the pinched-up fold of skin. Then push the plunger all the way in, so that the medicine empties out of the syringe. As you're giving the shot, keep holding the fold of skin so that you don't inject the medicine into muscle. 11. Pull the needle straight out and let go of the skin. 12. Point the needle away from you. Follow the  instructions for safely disposing of the needle and syringe. Don't use the same needle more than one time. Throw away the needle and the syringe in a safe place, such as a special container for needles. 13. If you bleed a little, apply pressure over the shot area with your finger, a cotton ball, or a piece of gauze. To help avoid bruising, do not rub the area. 14. Slightly change the spot where you give the shot each time you do it. Being safe  · Call your doctor if you think you are having a problem with your medicine. · Don't stop taking your medicine without talking to your doctor. · If you miss a dose, call your doctor. Your doctor can tell you exactly what to do so you do not take too much or too little blood thinner. Then you will be as safe as possible. · Check with your doctor or pharmacist before you use any other medicines, including prescription and over-the-counter medicines. Make sure your doctor knows all of the medicines, vitamins, herbal products, and supplements you take. Taking some medicines together can cause problems. · Try to avoid injuries to help prevent bleeding. For example, be careful when you are exercising. · Store your medicine at room temperature. Don't put it in the refrigerator or freezer. When should you call for help? Call 911 anytime you think you may need emergency care. For example, call if:    · You passed out (lost consciousness).      · You have signs of severe bleeding, such as:  ? A severe headache that is different from past headaches. ? Vomiting blood or what looks like coffee grounds. ? Passing maroon or very bloody stools. Call your doctor now or seek immediate medical care if:    · You have unexpected bleeding, including:  ? Blood in stools or black stools that look like tar.  ? Blood in your urine. ? Bruises or blood spots under the skin.     · You feel dizzy or lightheaded. Watch closely for changes in your health, and be sure to contact your doctor if:    · You do not get better as expected. Where can you learn more? Go to http://www.gray.com/  Enter P266 in the search box to learn more about \"Enoxaparin (Lovenox): Care Instructions. \"  Current as of: April 29, 2021               Content Version: 13.0  © 7975-8206 Healthwise, Incorporated. Care instructions adapted under license by [employer] (which disclaims liability or warranty for this information). If you have questions about a medical condition or this instruction, always ask your healthcare professional. [address] any warranty or liability for your use of this information.

## 2021-10-17 NOTE — ROUTINE PROCESS
Patient off unit in stable condition via wheelchair with volunteers for discharge home per  MD.  Patient is to follow up in2 weeks for incision check and again in 6 weeks and is aware. Prescriptions called to patients pharmacy. Patient taught and redemonstrated for this RN lovenox administration, wriiten instructions also provided. Patient denies H/A, dizziness, nausea and or vomiting or pain at this time. Infant in car seat with mom.

## 2021-10-17 NOTE — PROGRESS NOTES
Post-Operative  Day 3    Mala Mejia         Information for the patient's :  Minerva Patel [[id number]]   , Low Transverse    Patient doing well without significant complaint. Tolerating diet, passing flatus, voiding and ambulating without difficulty    Vitals:  Visit Vitals  /69   Pulse 78   Temp 98.2 °F (36.8 °C)   Resp 16   Ht 5' 6\" (1.676 m)   Wt (!) 375 lb (170.1 kg)   LMP 01/10/2021 (Exact Date)   SpO2 99%   Breastfeeding Unknown   BMI 60.53 kg/m²     Temp (24hrs), Av.1 °F (36.7 °C), Min:97.5 °F (36.4 °C), Max:98.4 °F (36.9 °C)        Exam:    Patient without distress. Abdomen, bowel sounds present, soft, expected tenderness, fundus firm  Wound incision clean, dry and intact- erythema significantly improved. Lower extremities are negative for swelling, cords or tenderness.     Labs:   Lab Results   Component Value Date/Time    WBC 12.0 (H) 10/15/2021 04:21 AM    WBC 9.6 10/13/2021 06:27 PM    WBC 8.3 08/10/2021 03:57 PM    WBC 8.0 2021 12:09 PM    WBC 7.2 2021 09:37 AM    WBC 9.1 2020 04:12 PM    WBC 15.9 (H) 2018 06:28 PM    WBC 8.4 10/19/2018 10:20 AM    WBC 8.8 2018 02:41 PM    WBC 7.0 2018 03:42 PM    WBC 8.1 02/10/2014 03:14 PM    WBC 6.7 2013 02:15 PM    WBC 7.2 2013 02:55 PM    WBC 7.3 2013 11:35 AM    WBC 8.7 2011 10:23 AM    WBC 6.4 10/14/2010 09:37 AM    HGB 12.4 10/15/2021 04:21 AM    HGB 12.6 10/13/2021 06:27 PM    HGB 12.5 08/10/2021 03:57 PM    HGB 11.9 2021 12:09 PM    HGB 12.7 2021 09:37 AM    HGB 13.8 2020 04:12 PM    HGB 12.1 2018 06:28 PM    HGB 11.5 10/19/2018 10:20 AM    HGB 12.0 2018 02:41 PM    HGB 13.3 2018 03:42 PM    HGB 13.4 02/10/2014 03:14 PM    HGB 13.7 2013 02:15 PM    HGB 13.6 2013 02:55 PM    HGB 14.3 2013 11:35 AM    HGB 13.3 2011 10:23 AM    HGB 12.8 10/14/2010 09:37 AM    HCT 37.9 10/15/2021 04:21 AM HCT 37.1 10/13/2021 06:27 PM    HCT 40.5 08/10/2021 03:57 PM    HCT 38.3 07/19/2021 12:09 PM    HCT 41.3 03/01/2021 09:37 AM    HCT 42.3 02/29/2020 04:12 PM    HCT 37.0 12/29/2018 06:28 PM    HCT 34.4 10/19/2018 10:20 AM    HCT 37.7 08/17/2018 02:41 PM    HCT 40.1 05/24/2018 03:42 PM    HCT 40.5 02/10/2014 03:14 PM    HCT 41.5 09/26/2013 02:15 PM    HCT 41.2 03/12/2013 02:55 PM    HCT 43.5 02/01/2013 11:35 AM    HCT 41.9 08/18/2011 10:23 AM    HCT 39.7 10/14/2010 09:37 AM    PLATELET 246 17/27/1821 04:21 AM    PLATELET 038 65/42/1711 06:27 PM    PLATELET 906 05/14/2097 03:57 PM    PLATELET 058 08/07/1833 12:09 PM    PLATELET 313 78/27/3425 09:37 AM    PLATELET 004 54/47/8110 04:12 PM    PLATELET 042 06/91/4740 06:28 PM    PLATELET 258 50/69/0744 10:20 AM    PLATELET 653 16/71/2400 02:41 PM    PLATELET 467 77/47/4843 03:42 PM    PLATELET 479 67/00/5483 03:14 PM    PLATELET 223 99/04/2361 02:15 PM    PLATELET 238 80/84/2054 02:55 PM    PLATELET 450 37/27/9811 11:35 AM    PLATELET 457 (H) 85/65/2525 10:23 AM    PLATELET 334 (H) 16/08/6374 09:37 AM    Hgb, External 13.3 05/24/2018 12:00 AM    Hct, External 40.7 05/24/2018 12:00 AM    Platelet cnt., External 288 05/24/2018 12:00 AM       No results found for this or any previous visit (from the past 24 hour(s)). Assessment: Post-Op day 3, doing well    Plan:   1. Discharge home today  2. Follow up in office in 6 weeks with Nancy Peters MD  3. Post partum activity/wound care advised, diet as tolerated  4. Yeast along incision line significantly improved  5.  Lovenox teaching

## 2021-10-18 ENCOUNTER — TELEPHONE (OUTPATIENT)
Dept: OBGYN CLINIC | Age: 26
End: 2021-10-18

## 2021-10-18 NOTE — TELEPHONE ENCOUNTER
Call received at 3:30Pm  TP patient       22year old patient delivered on 10/14/2021 by       Patient calling to say that she has not had a BM since having the baby and is wondering if she can take dulcolax liquid      Patient was advised of increasing po fluids, eating fruits and vegetables and increasing fiber intake      Please advise        2 week follow appointment made on 10/28/2021 at 10:50am

## 2021-10-20 ENCOUNTER — OFFICE VISIT (OUTPATIENT)
Dept: OBGYN CLINIC | Age: 26
End: 2021-10-20
Payer: MEDICAID

## 2021-10-20 ENCOUNTER — TELEPHONE (OUTPATIENT)
Dept: OBGYN CLINIC | Age: 26
End: 2021-10-20

## 2021-10-20 VITALS — WEIGHT: 293 LBS | BODY MASS INDEX: 59.07 KG/M2

## 2021-10-20 DIAGNOSIS — L76.82 INCISIONAL PAIN: Primary | ICD-10-CM

## 2021-10-20 PROCEDURE — 0502F SUBSEQUENT PRENATAL CARE: CPT | Performed by: OBSTETRICS & GYNECOLOGY

## 2021-10-20 NOTE — TELEPHONE ENCOUNTER
Call received at 8:30am    TP  Patient      22year old patient delivered by  on 10/14/2021  Patient calling to say that she is noticing a yellow discharge coming form her incision that has odor and increased pain on the left    Patient not able to really see if the incision is open    Patient also having trouble giving in herself the enoxaparin (LOVENOX) 40 mg/0.4 mL    Injections    Patient placed on the schedule to be seen by the work in MD at 3:30pM ( ok per CS) today      Patient verbalized understanding

## 2021-10-20 NOTE — PROGRESS NOTES
Postop  Evaluation  Lilian Rooney is a 22 y.o. female returns for a routine post-operative follow-up visit after undergoing a  section which was done 6 days ago. She had the following pathology results: none sent. Since the patient's surgery, she has had typical postoperative discomfort but no significant symptoms or problems since the surgery. The patient's incision is having yellow drainage and some blood. Pt says the drainage has an odor. Pt also c/o yellow discharge w/ odor. She states since the procedure, she has been in severe pain. On a pain scale 10/10.    PHYSICAL EXAMINATION    Gastrointestinal  · Abdominal Examination: abdomen non-tender to palpation, incision intact and healing well, normal bowel sounds, no masses present  · Incision moist, steri strips moist, cloth over incision wet- odor under pannus  · Liver and spleen: no hepatomegaly present, spleen not palpable  · Hernias: no hernias identified    Skin  · General Inspection: no rash, no lesions identified    Neurologic/Psychiatric  · Mental Status:  · Orientation: grossly oriented to person, place and time  · Mood and Affect: mood normal, affect appropriate    Assessment:  Normal postop  checkup  Incision without evidence of infection but moist from being under pannus    Plan:  Keep incision dry - wash with peroxide prior to shower, blow dry, use a maxi pad under pannus and change frequently  Fu with Dr. Nancy Romero next week

## 2021-10-25 ENCOUNTER — OFFICE VISIT (OUTPATIENT)
Dept: OBGYN CLINIC | Age: 26
End: 2021-10-25
Payer: MEDICAID

## 2021-10-25 VITALS — BODY MASS INDEX: 59.07 KG/M2 | DIASTOLIC BLOOD PRESSURE: 80 MMHG | WEIGHT: 293 LBS | SYSTOLIC BLOOD PRESSURE: 124 MMHG

## 2021-10-25 DIAGNOSIS — L76.82 PAIN AT SURGICAL INCISION: Primary | ICD-10-CM

## 2021-10-25 PROCEDURE — 99212 OFFICE O/P EST SF 10 MIN: CPT | Performed by: OBSTETRICS & GYNECOLOGY

## 2021-10-25 NOTE — PROGRESS NOTES
Postop  Evaluation  Piedad Helm is a 22 y.o. female returns for a routine post-operative follow-up visit after undergoing a  section which was done 10/13/2021. Patient states that she has been using peroxide and maxi pads, still has yellow discharge but less, odor is gone  She had the following pathology results: none sent. Since the patient's surgery, she has had typical postoperative discomfort but no significant symptoms or problems since the surgery. She states since the procedure, she has returned to most daily activities, ambulating, and not lifting or exercising.   PHYSICAL EXAMINATION    Gastrointestinal  · Abdominal Examination: abdomen non-tender to palpation, incision with poor approximation on the left side, minimal drainage  · Still wet under pannus, odor gone, no redness  Skin  · General Inspection: no rash, no lesions identified    Neurologic/Psychiatric  · Mental Status:  · Orientation: grossly oriented to person, place and time  · Mood and Affect: mood normal, affect appropriate    Assessment:  Still having hard time     Plan:  Cleaned today with peroxide  Advised to keep area very dry - had maxi pad but needs to change frequently  See Dr. Britton Meyer in a week

## 2021-10-28 ENCOUNTER — OFFICE VISIT (OUTPATIENT)
Dept: OBGYN CLINIC | Age: 26
End: 2021-10-28
Payer: MEDICAID

## 2021-10-28 VITALS
WEIGHT: 293 LBS | HEART RATE: 76 BPM | DIASTOLIC BLOOD PRESSURE: 86 MMHG | BODY MASS INDEX: 47.09 KG/M2 | SYSTOLIC BLOOD PRESSURE: 149 MMHG | HEIGHT: 66 IN

## 2021-10-28 DIAGNOSIS — R30.0 DYSURIA: ICD-10-CM

## 2021-10-28 DIAGNOSIS — Z98.891 S/P C-SECTION: Primary | ICD-10-CM

## 2021-10-28 DIAGNOSIS — R03.0 ELEVATED BLOOD PRESSURE READING: ICD-10-CM

## 2021-10-28 LAB
BILIRUB UR QL STRIP: NEGATIVE
GLUCOSE UR-MCNC: NEGATIVE MG/DL
KETONES P FAST UR STRIP-MCNC: NEGATIVE MG/DL
PH UR STRIP: 5 [PH] (ref 4.6–8)
PROT UR QL STRIP: NEGATIVE
SP GR UR STRIP: 1.02 (ref 1–1.03)
UA UROBILINOGEN AMB POC: NORMAL (ref 0.2–1)
URINALYSIS CLARITY POC: CLEAR
URINALYSIS COLOR POC: YELLOW
URINE BLOOD POC: NORMAL
URINE LEUKOCYTES POC: NORMAL
URINE NITRITES POC: NEGATIVE

## 2021-10-28 PROCEDURE — 81002 URINALYSIS NONAUTO W/O SCOPE: CPT | Performed by: OBSTETRICS & GYNECOLOGY

## 2021-10-28 PROCEDURE — 99212 OFFICE O/P EST SF 10 MIN: CPT | Performed by: OBSTETRICS & GYNECOLOGY

## 2021-10-28 NOTE — PATIENT INSTRUCTIONS
Section: What to Expect at [address] [place] [address]     A  section, or , is surgery to deliver your baby through a cut that the doctor makes in your lower belly and uterus. The cut is called an incision. You may have some pain in your lower belly and need pain medicine for 1 to 2 weeks. You can expect some vaginal bleeding for several weeks. You will probably need about 6 weeks to fully recover. It's important to take it easy while the incision heals. Avoid heavy lifting, strenuous activities, and exercises that strain the belly muscles while you recover. Ask a family member or friend for help with housework, cooking, and shopping. This care sheet gives you a general idea about how long it will take for you to recover. But each person recovers at a different pace. Follow the steps below to get better as quickly as possible. How can you care for yourself at home? Activity    · Rest when you feel tired. Getting enough sleep will help you recover.     · Try to walk each day. Start by walking a little more than you did the day before. Bit by bit, increase the amount you walk. Walking boosts blood flow and helps prevent pneumonia, constipation, and blood clots.     · Avoid strenuous activities, such as bicycle riding, jogging, weightlifting, and aerobic exercise, for 6 weeks or until your doctor says it is okay.     · Until your doctor says it is okay, do not lift anything heavier than your baby.     · Do not do sit-ups or other exercises that strain the belly muscles for 6 weeks or until your doctor says it is okay.     · Hold a pillow over your incision when you cough or take deep breaths. This will support your belly and decrease your pain.     · You may shower as usual. Pat the incision dry when you are done.     · You will have some vaginal bleeding. Wear sanitary pads.  Do not douche or use tampons until your doctor says it is okay.     · Ask your doctor when you can drive again.     · You will probably need to take at least 6 weeks off work. It depends on the type of work you do and how you feel.     · Ask your doctor when it is okay for you to have sex. Diet    · You can eat your normal diet. If your stomach is upset, try bland, low-fat foods like plain rice, broiled chicken, toast, and yogurt.     · Drink plenty of fluids (unless your doctor tells you not to).     · You may notice that your bowel movements are not regular right after your surgery. This is common. Try to avoid constipation and straining with bowel movements. You may want to take a fiber supplement every day. If you have not had a bowel movement after a couple of days, ask your doctor about taking a mild laxative.     · If you are breastfeeding, limit alcohol. Alcohol can cause a lack of energy and other health problems for the baby when a breastfeeding woman drinks heavily. It can also get in the way of a mom's ability to feed her baby or to care for the child in other ways. There isn't a lot of research about exactly how much alcohol can harm a baby. Having no alcohol is the safest choice for your baby. If you choose to have a drink now and then, have only one drink, and limit the number of occasions that you have a drink. Wait to breastfeed at least 2 hours after you have a drink to reduce the amount of alcohol the baby may get in the milk. Medicines    · Your doctor will tell you if and when you can restart your medicines. He or she will also give you instructions about taking any new medicines.     · If you take aspirin or some other blood thinner, ask your doctor if and when to start taking it again. Make sure that you understand exactly what your doctor wants you to do.     · Take pain medicines exactly as directed. ? If the doctor gave you a prescription medicine for pain, take it as prescribed.   ? If you are not taking a prescription pain medicine, ask your doctor if you can take an over-the-counter medicine.     · If you think your pain medicine is making you sick to your stomach:  ? Take your medicine after meals (unless your doctor has told you not to). ? Ask your doctor for a different pain medicine.     · If your doctor prescribed antibiotics, take them as directed. Do not stop taking them just because you feel better. You need to take the full course of antibiotics. Incision care    · If you have strips of tape on the incision, leave the tape on for a week or until it falls off.     · Wash the area daily with warm, soapy water, and pat it dry. Don't use hydrogen peroxide or alcohol, which can slow healing. You may cover the area with a gauze bandage if it weeps or rubs against clothing. Change the bandage every day.     · Keep the area clean and dry. Other instructions    · If you breastfeed your baby, you may be more comfortable while you are healing if you place the baby so that he or she is not resting on your belly. Try tucking your baby under your arm, with his or her body along the side you will be feeding on. Support your baby's upper body with your arm. With that hand you can control your baby's head to bring his or her mouth to your breast. This is sometimes called the football hold. Follow-up care is a key part of your treatment and safety. Be sure to make and go to all appointments, and call your doctor if you are having problems. It's also a good idea to know your test results and keep a list of the medicines you take. When should you call for help? Call 911  anytime you think you may need emergency care. For example, call if:    · You have thoughts of harming yourself, your baby, or another person.     · You passed out (lost consciousness).     · You have chest pain, are short of breath, or cough up blood.     · You have a seizure.    Call your doctor now or seek immediate medical care if:    · You have pain that does not get better after you take pain medicine.     · You have severe vaginal bleeding.     · You are dizzy or lightheaded, or you feel like you may faint.     · You have new or worse pain in your belly or pelvis.     · You have loose stitches, or your incision comes open.     · You have symptoms of infection, such as:  ? Increased pain, swelling, warmth, or redness. ? Red streaks leading from the incision. ? Pus draining from the incision. ? A fever.     · You have symptoms of a blood clot in your leg (called a deep vein thrombosis), such as:  ? Pain in your calf, back of the knee, thigh, or groin. ? Redness and swelling in your leg or groin.     · You have signs of preeclampsia, such as:  ? Sudden swelling of your face, hands, or feet. ? New vision problems (such as dimness, blurring, or seeing spots). ? A severe headache. Watch closely for changes in your health, and be sure to contact your doctor if:    · You do not get better as expected. Where can you learn more? Go to http://www.howe.com/  Enter M806 in the search box to learn more about \" Section: What to Expect at Home. \"  Current as of: 2021               Content Version: 13.0   Healthwise, Incorporated. Care instructions adapted under license by [employer] (which disclaims liability or warranty for this information). If you have questions about a medical condition or this instruction, always ask your healthcare professional. [place] [address] any warranty or liability for your use of this information.

## 2021-10-28 NOTE — PROGRESS NOTES
[facility]  [email or web address]  [address] [place], FACOG     INCISION CHECK PROGRESS NOTE    Subjective:     Chief Complaint   Patient presents with    Wound Check             Judge Allen is a 22 y.o. female who presents today for wound check. She has a surgical incision wound which is located on her abdomen. She had a  procedure on 10/14/2021. Pt was given Lovenox injections because of a blood disorder that the pt does not have; she was given the injections for a week when she was not supposed to which she is concerned about; it also left a bruise on her abdomen. She has been having headaches since. Her wound is draining a yellow color. The incision is soar. Denies fever. Pt does have abnormal discharge; denies odor. Pt does have dysuria &  Pulling sensation when she urinates since the procedure. Current symptoms: wound healing as expected. Interventions to date: none. Past Medical History:   Diagnosis Date    Asthma     Dysmenorrhea     Essential hypertension     Genital herpes     Valtrex     Major depressive disorder, single episode, unspecified     Morbid obesity ([facility] [address]     Pap smear for cervical cancer screening     17 neg 21 neg    Pituitary abnormality (HCC)     Postpartum depression     Seasonal allergies     Unspecified symptom associated with female genital organs      Past Surgical History:   Procedure Laterality Date    HX OTHER SURGICAL      HX WISDOM TEETH EXTRACTION      HX WRIST FRACTURE TX Right      Current Outpatient Medications   Medication Sig    Omeprazole delayed release (PRILOSEC D/R) 20 mg tablet Take 1 Tablet by mouth two (2) times a day.  PNV Comb #2-Iron-FA-Omega 3 29-1-400 mg cmpk Take  by mouth.  B.infantis-B.ani-B.long-B.bifi (Probiotic 4X) 10-15 mg TbEC Take  by mouth.     OTHER Prebiotic with cranberry    valACYclovir (VALTREX) 1 gram tablet Take 1 Tab by mouth daily.    nystatin (MYCOSTATIN) powder Apply  to affected area two (2) times a day. (Patient not taking: Reported on 10/28/2021)    enoxaparin (LOVENOX) 40 mg/0.4 mL 0.4 mL by SubCUTAneous route every twelve (12) hours every twelve (12) hours for 42 days. (Patient not taking: Reported on 10/28/2021)    fluticasone propionate (FLONASE) 50 mcg/actuation nasal spray SHAKE LIQUID AND USE 2 SPRAYS IN EACH NOSTRIL DAILY (Patient not taking: Reported on 10/28/2021)    albuterol (PROVENTIL HFA, VENTOLIN HFA, PROAIR HFA) 90 mcg/actuation inhaler Take 1 Puff by inhalation every four (4) hours as needed for Wheezing. (Patient not taking: Reported on 8/10/2021)     No current facility-administered medications for this visit.      Allergies   Allergen Reactions    Latex Swelling     Skin peels    Lactose Other (comments)    Aspirin Nausea and Vomiting    Betadine [Povidone-Iodine] Other (comments)     Skin peels    Cat Dander Sneezing    Ketorolac Hives    Penicillins Other (comments)     Yeast infections    Ivp Dye [Iodinated Contrast Media] Other (comments)     Family History   Problem Relation Age of Onset    Alcohol abuse Father     Cancer Paternal Grandmother     Other Mother         endometriosis   [employer] Migra[employer] Mother         tumor in brain     Social History     Socioeconomic History    Marital status: SINGLE     Spouse name: Not on file    Number of children: Not on file    Years of education: Not on file    Highest education level: Not on file   Occupational History    Not on file   Tobacco Use    Smoking status: Former Smoker     Packs/day: 0.50     Years: 1.00     Pack years: 0.50     Types: Cigarettes    Smokeless tobacco: Never Used   Vaping Use    Vaping Use: Never used   Substance and Sexual Activity    Alcohol use: No     Alcohol/week: 0.0 standard drinks     Comment: former user just sips now and then   [employer] Drug use: No    Sexual activity: Yes     Partners: Male     Birth control/protection: Pill   Other Topics Concern     Service Not Asked    Blood Transfusions Not Asked    Caffeine Concern Not Asked    Occupational Exposure Not Asked    Hobby Hazards Not Asked    Sleep Concern Not Asked    Stress Concern Not Asked    Weight Concern Not Asked    Special Diet Not Asked    Back Care Not Asked    Exercise Not Asked    Bike Helmet Not Asked    [address] Not Asked    Self-Exams Not Asked   Social History Narrative    Not on file     Social Determinants of Health     Financial Resource Strain:     Difficulty of Paying Living Expenses:    Food Insecurity:     Worried About Running Out of Food in the Last Year:     [address] in the Last Year:    Transportation Needs:     Lack of Transportation (Medical):  Lack of Transportation (Non-Medical):    Physical Activity:     Days of Exercise per Week:     Minutes of Exercise per Session:    Stress:     Feeling of Stress :    Social Connections:     Frequency of Communication with Friends and Family:     Frequency of Social Gatherings with Friends and Family:     Attends [employer] Services:     Active Member of Clubs or Organizations:     Attends Club or Organization Meetings:     Marital Status:    Intimate Partner Violence:     Fear of Current or Ex-Partner:     Emotionally Abused:     Physically Abused:     Sexually Abused:      OB History        2    Para   2    Term   2            AB        Living   2       SAB        TAB        Ectopic        Molar        Multiple   0    Live Births   2          Obstetric Comments   Menarche:  8. LMP: current. # of Children:  0. Age at Delivery of First Child:  na.   Hysterectomy/oophorectomy:  NO/NO. Breast Bx:  no.  Hx of Breast Feeding:  na. BCP:  yes.  Hormone therapy:  no.   P1 spontaneous labor, efw 9pounds                 Review of Systems - History obtained from the patient  Constitutional: negative for weight loss, fever, night sweats  HEENT: negative for hearing loss, earache, congestion, snoring, sorethroat  CV: negative for chest pain, palpitations, edema  Resp: negative for cough, shortness of breath, wheezing  GI: negative for change in bowel habits, abdominal pain, black or bloody stools  : negative for frequency, dysuria, hematuria, vaginal discharge  MSK: negative for back pain, joint pain, muscle pain  Breast: negative for breast lumps, nipple discharge, galactorrhea  Skin :negative for itching, rash, hives  Neuro: negative for dizziness, headache, confusion, weakness  Psych: negative for anxiety, depression, change in mood  Heme/lymph: negative for bleeding, bruising, pallor      Objective:     Visit Vitals  BP (!) 142/78 (BP 1 Location: Right arm)   Pulse 76   Ht 5' 6\" (1.676 m)   Wt (!) 353 lb 9.6 oz (160.4 kg)   LMP 01/10/2021 (Exact Date)   Breastfeeding No   BMI 57.07 kg/m²       General appearance: alert, well appearing, and in no distress. Abdominal exam: soft, nontender, nondistended, no masses or organomegaly. Wound:   wound margins intact and healing well. No signs of infection. Exam of extremities: LE: no cords, no tender, trace edema bilaterally    steristrips on left side removed        Assessment:     Postoperative wound check s/p CS  Interventions today extra steristrips removed. Elevated BP    Plan:     1. Discussed appropriate home care of this wound. 2. Follow up: 1 weeks. BP check  3. Keep skin clean and dry and disc typical pop healing  4. PIH precautions  5. [address] labs  6. Reviewed indications for POP lovenox, pt does not want to resume    On this date, 10/28/2021,  I have spent 12 minutes reviewing previous notes, test results and face to face with the patient discussing the diagnosis and importance of compliance with the treatment plan as well as documenting on the day of the visit.

## 2021-10-29 LAB
ALBUMIN SERPL-MCNC: 4.1 G/DL (ref 3.9–5)
ALBUMIN/GLOB SERPL: 1.7 {RATIO} (ref 1.2–2.2)
ALP SERPL-CCNC: 124 IU/L (ref 44–121)
ALT SERPL-CCNC: 23 IU/L (ref 0–32)
AST SERPL-CCNC: 17 IU/L (ref 0–40)
BACTERIA SPEC CULT: NORMAL
BILIRUB SERPL-MCNC: 0.2 MG/DL (ref 0–1.2)
BUN SERPL-MCNC: 13 MG/DL (ref 6–20)
BUN/CREAT SERPL: 15 (ref 9–23)
CALCIUM SERPL-MCNC: 9.1 MG/DL (ref 8.7–10.2)
CHLORIDE SERPL-SCNC: 103 MMOL/L (ref 96–106)
CO2 SERPL-SCNC: 24 MMOL/L (ref 20–29)
CREAT SERPL-MCNC: 0.84 MG/DL (ref 0.57–1)
ERYTHROCYTE [DISTWIDTH] IN BLOOD BY AUTOMATED COUNT: 12.4 % (ref 11.7–15.4)
GLOBULIN SER CALC-MCNC: 2.4 G/DL (ref 1.5–4.5)
GLUCOSE SERPL-MCNC: 71 MG/DL (ref 65–99)
HCT VFR BLD AUTO: 40.7 % (ref 34–46.6)
HGB BLD-MCNC: 13 G/DL (ref 11.1–15.9)
MCH RBC QN AUTO: 29.4 PG (ref 26.6–33)
MCHC RBC AUTO-ENTMCNC: 31.9 G/DL (ref 31.5–35.7)
MCV RBC AUTO: 92 FL (ref 79–97)
PLATELET # BLD AUTO: 385 X10E3/UL (ref 150–450)
POTASSIUM SERPL-SCNC: 4.9 MMOL/L (ref 3.5–5.2)
PROT SERPL-MCNC: 6.5 G/DL (ref 6–8.5)
RBC # BLD AUTO: 4.42 X10E6/UL (ref 3.77–5.28)
SERVICE CMNT-IMP: NORMAL
SODIUM SERPL-SCNC: 143 MMOL/L (ref 134–144)
WBC # BLD AUTO: 6.9 X10E3/UL (ref 3.4–10.8)

## 2021-11-03 LAB
CLINDAMYCIN ISLT KB: NORMAL
Lab: NORMAL
ORGANISM IDENTIFICATION, 188761: NORMAL

## 2021-11-04 ENCOUNTER — OFFICE VISIT (OUTPATIENT)
Dept: OBGYN CLINIC | Age: 26
End: 2021-11-04
Payer: MEDICAID

## 2021-11-04 VITALS
HEIGHT: 66 IN | BODY MASS INDEX: 47.09 KG/M2 | HEART RATE: 111 BPM | DIASTOLIC BLOOD PRESSURE: 80 MMHG | SYSTOLIC BLOOD PRESSURE: 134 MMHG | WEIGHT: 293 LBS

## 2021-11-04 DIAGNOSIS — Z98.891 S/P C-SECTION: ICD-10-CM

## 2021-11-04 DIAGNOSIS — N89.8 VAGINAL ITCHING: ICD-10-CM

## 2021-11-04 DIAGNOSIS — N89.8 VAGINAL DISCHARGE: Primary | ICD-10-CM

## 2021-11-04 PROCEDURE — 99212 OFFICE O/P EST SF 10 MIN: CPT | Performed by: OBSTETRICS & GYNECOLOGY

## 2021-11-04 RX ORDER — CETIRIZINE HYDROCHLORIDE 10 MG/1
CAPSULE, LIQUID FILLED ORAL
COMMUNITY
End: 2022-04-13 | Stop reason: SDUPTHER

## 2021-11-04 NOTE — PATIENT INSTRUCTIONS
Postpartum: Care Instructions  Overview  After childbirth (postpartum period), your body goes through many changes. Some of these changes happen over several weeks. In the hours after delivery, your body will begin to recover from childbirth while it prepares to breastfeed your . You may feel emotional during this time. Your hormones can shift your mood without warning for no clear reason. In the first couple of weeks after childbirth, it's common to have emotions that change from happy to sad. You may find it hard to sleep. You may cry a lot. This is called the \"baby blues. \" These overwhelming emotions often go away within a couple of days or weeks. But it's important to discuss your feelings with your doctor. It's easy to get too tired and overwhelmed during the first weeks after childbirth. Don't try to do too much. Get rest whenever you can, accept help from others, and eat well and drink plenty of fluids. In the first couple of weeks after you give birth, your doctor or midwife may want to check in with you and make a plan for any follow-up care you may need. You will likely have a complete postpartum visit in the first 3 months after delivery. At that time, your doctor or midwife will check on your recovery from childbirth and see how you're doing with your emotions. You may also discuss your concerns or questions. Follow-up care is a key part of your treatment and safety. Be sure to make and go to all appointments, and call your doctor if you are having problems. It's also a good idea to know your test results and keep a list of the medicines you take. How can you care for yourself at home? · Sleep or rest when your baby sleeps. · Get help with household chores from family or friends, if you can. Don't try to do it all yourself. · If you have hemorrhoids or swelling or pain around the opening of your vagina, try using cold and heat.  You can put ice or a cold pack on the area for 10 to 20 minutes at a time. Put a thin cloth between the ice and your skin. Also try sitting in a few inches of warm water (sitz bath) 3 times a day and after bowel movements. · Take pain medicines exactly as directed. ? If the doctor gave you a prescription medicine for pain, take it as prescribed. ? If you are not taking a prescription pain medicine, ask your doctor if you can take an over-the-counter medicine. · Eat more fiber to avoid constipation. Include foods such as whole-grain breads and cereals, raw vegetables, raw and dried fruits, and beans. · Drink plenty of fluids. If you have kidney, heart, or liver disease and have to limit fluids, talk with your doctor before you increase the amount of fluids you drink. · Do not rinse inside your vagina with fluids (douche). · If you have stitches, keep the area clean by pouring or spraying warm water over the area outside your vagina and anus after you use the toilet. · Keep a list of questions to ask your doctor or midwife. Your questions might be about:  ? Changes in your breasts, such as lumps or soreness. ? When to expect your menstrual period to start again. ? What form of birth control is best for you. ? Weight you have put on during the pregnancy. ? Exercise options. ? What foods and drinks are best for you, especially if you are breastfeeding. ? Problems you might be having with breastfeeding. ? When you can have sex. You may want to talk about lubricants for your vagina. ? Any feelings of sadness or restlessness that you are having. When should you call for help? Call 911  anytime you think you may need emergency care. For example, call if:    · You have thoughts of harming yourself, your baby, or another person.     · You passed out (lost consciousness).     · You have chest pain, are short of breath, or cough up blood.     · You have a seizure.    Call your doctor now or seek immediate medical care if:    · Your vaginal bleeding seems to be getting heavier.     · You are dizzy or lightheaded, or you feel like you may faint.     · You have a fever.     · You have new or more belly pain.     · You have symptoms of a blood clot in your leg (called a deep vein thrombosis), such as:  ? Pain in the calf, back of the knee, thigh, or groin. ? Redness and swelling in your leg or groin.     · You have signs of preeclampsia, such as:  ? Sudden swelling of your face, hands, or feet. ? New vision problems (such as dimness, blurring, or seeing spots). ? A severe headache. Watch closely for changes in your health, and be sure to contact your doctor if:    · You have new or worse vaginal discharge.     · You feel sad or depressed.     · You are having problems with your breasts or breastfeeding. Where can you learn more? Go to http://www.gray.com/  Enter T491 in the search box to learn more about \"Postpartum: Care Instructions. \"  Current as of: June 16, 2021               Content Version: 13.0  © 7887-1202 Healthwise, Incorporated. Care instructions adapted under license by [employer] (which disclaims liability or warranty for this information). If you have questions about a medical condition or this instruction, always ask your healthcare professional. [address] any warranty or liability for your use of this information.

## 2021-11-04 NOTE — PROGRESS NOTES
[address] [facility]  [email or web address]    Myrtle Cid MD, [address] [facility]     OB/GYN Follow-up visit  Chief Complaint: Follow up visit  Chief Complaint   Patient presents with    Wound Check     c-sectoin    Blood Pressure Check       History of Present Illness: Follow up from 10/28/2021  She is having a follow up for  wound check & blood pressure check post partum. She was cleaning her incision yesterday & a \"little white thing\" came from the incision. At night she is more soar & she has noticed more redness & more odor. Hx of irritation using maxi pads in her vaginal area; she is using maxipads to heal her wound. She noticed slight blood coming from inscision. She is having itching & slight odor & discharge & a more orange/brown color for the past 2 days. She reports the symptoms are is unchanged. Aggravating factors include none. Alleviating factors include none. She does not have other concerns. LMP: Patient's last menstrual period was 01/10/2021 (exact date).     PFSH:  Past Medical History:   Diagnosis Date    Asthma     Dysmenorrhea     Essential hypertension     Genital herpes     Valtrex     Major depressive disorder, single episode, unspecified     Morbid obesity ([address]     Pap smear for cervical cancer screening     17 neg 21 neg    Pituitary abnormality (HCC)     Postpartum depression     Seasonal allergies     Unspecified symptom associated with female genital organs      Past Surgical History:   Procedure Laterality Date    HX OTHER SURGICAL      HX WISDOM TEETH EXTRACTION      HX WRIST FRACTURE TX Right      Family History   Problem Relation Age of Onset    Alcohol abuse Father     Cancer Paternal Grandmother     Other Mother         endometriosis   Zannie Cowden Migraines Mother         tumor in brain     Social History     Tobacco Use    Smoking status: Former Smoker     Packs/day: 0.50     Years: 1.00     Pack years: 0.50     Types: Cigarettes    Smokeless tobacco: Never Used   Vaping Use    Vaping Use: Never used   Substance Use Topics    Alcohol use: No     Alcohol/week: 0.0 standard drinks     Comment: former user just sips now and then    Drug use: No     Allergies   Allergen Reactions    Latex Swelling     Skin peels    Lactose Other (comments)    Aspirin Nausea and Vomiting    Betadine [Povidone-Iodine] Other (comments)     Skin peels    Cat Dander Sneezing    Ketorolac Hives    Penicillins Other (comments)     Yeast infections    Ivp Dye [Iodinated Contrast Media] Other (comments)     Current Outpatient Medications   Medication Sig    Cetirizine (ZyrTEC) 10 mg cap Take  by mouth.  Omeprazole delayed release (PRILOSEC D/R) 20 mg tablet Take 1 Tablet by mouth two (2) times a day.  PNV Comb #2-Iron-FA-Omega 3 29-1-400 mg cmpk Take  by mouth.  B.infantis-B.ani-B.long-B.bifi (Probiotic 4X) 10-15 mg TbEC Take  by mouth.  OTHER Prebiotic with cranberry    valACYclovir (VALTREX) 1 gram tablet Take 1 Tab by mouth daily.  nystatin (MYCOSTATIN) powder Apply  to affected area two (2) times a day. (Patient not taking: Reported on 10/28/2021)    enoxaparin (LOVENOX) 40 mg/0.4 mL 0.4 mL by SubCUTAneous route every twelve (12) hours every twelve (12) hours for 42 days. (Patient not taking: Reported on 10/28/2021)    fluticasone propionate (FLONASE) 50 mcg/actuation nasal spray SHAKE LIQUID AND USE 2 SPRAYS IN EACH NOSTRIL DAILY (Patient not taking: Reported on 10/28/2021)    albuterol (PROVENTIL HFA, VENTOLIN HFA, PROAIR HFA) 90 mcg/actuation inhaler Take 1 Puff by inhalation every four (4) hours as needed for Wheezing. (Patient not taking: Reported on 8/10/2021)     No current facility-administered medications for this visit.        Review of Systems:  History obtained from the patient  Constitutional: negative for fevers, chills and weight loss  ENT ROS: negative for - hearing change, oral lesions or visual changes  Respiratory: negative for cough, wheezing or dyspnea on exertion  Cardiovascular: negative for chest pain, irregular heart beats, exertional chest pressure/discomfort  Gastrointestinal: negative for dysphagia, nausea and vomiting  Genito-Urinary ROS: see hpi  Inteument/breast: negative for rash, breast lump and nipple discharge  Musculoskeletal:negative for stiff joints, neck pain and muscle weakness  Endocrine ROS: negative for - breast changes, galactorrhea or temperature intolerance  Hematological and Lymphatic ROS: negative for - blood clots, bruising or swollen lymph nodes      Physical Exam:  Visit Vitals  /80   Pulse (!) 111   Ht 5' 6\" (1.676 m)   Wt 347 lb 9.6 oz (157.7 kg)   BMI 56.10 kg/m²       GENERAL: alert, well appearing, and in no distress  ABDOMEN: soft, nontender, nondistended, no masses or organomegaly   INC c/d/i, healing well, minimal erythema  Several insorb sutures partially extruded: grasped with hemostat and superficial parts removed x 3  EGBUS: no lesions, no inflammation, no masses  VULVA: normal appearing vulva with no masses, tenderness or lesions  VAGINA: normal appearing vagina with normal color, no lesions, white  discharge  CERVIX: normal appearing cervix without discharge or lesions, non tender  UTERUS: uterus is normal size, shape, consistency and nontender   ADNEXA: normal adnexa in size, nontender and no masses  NEURO: alert, oriented, normal speech    Assessment:  Encounter Diagnoses   Name Primary?  Vaginal discharge Yes    Vaginal itching     S/P     BP improved     Plan:  The patient is advised that she should contact the office with any questions or concerns. She should make her routine annual gynecologic appointment if needed. Reviewed PIH precautions  Loose insorb staples removed. We discussed potential causes of vaginal discharge/irritation. We discussed good vulvar hygiene. Recommended avoid vaginal irritants.   Discussed use of mild soaps/detergents. Follow up if NI. Patient will be notified about swab results and prescription sent, if indicated. Disc typical wound healing   FU 1-2 wks and pp visit six weeks        Orders Placed This Encounter    [address] [place] [address] ([address] [place] [address])       No results found for this visit on 11/04/21.     Shaneka Gonzalez MD

## 2021-11-08 NOTE — PROGRESS NOTES
GBS positive, add to prenatal labs and OB problem list.   Notify patient at next visit and give GBS handout. Why did this take a month to get reported?

## 2021-11-08 NOTE — PROGRESS NOTES
The results are normal, no clear evidence of vaginal infection  [facility] message sent if active. Recommend f/u if still having symptoms/problems or has additional concerns.

## 2021-11-18 ENCOUNTER — OFFICE VISIT (OUTPATIENT)
Dept: OBGYN CLINIC | Age: 26
End: 2021-11-18
Payer: MEDICAID

## 2021-11-18 VITALS
WEIGHT: 256.5 LBS | SYSTOLIC BLOOD PRESSURE: 138 MMHG | BODY MASS INDEX: 41.22 KG/M2 | HEIGHT: 66 IN | HEART RATE: 82 BPM | DIASTOLIC BLOOD PRESSURE: 78 MMHG

## 2021-11-18 DIAGNOSIS — N89.8 VAGINAL DISCHARGE: Primary | ICD-10-CM

## 2021-11-18 DIAGNOSIS — N89.8 VAGINA ITCHING: ICD-10-CM

## 2021-11-18 DIAGNOSIS — Z98.891 H/O: C-SECTION: ICD-10-CM

## 2021-11-18 PROCEDURE — 99212 OFFICE O/P EST SF 10 MIN: CPT | Performed by: OBSTETRICS & GYNECOLOGY

## 2021-11-18 NOTE — PROGRESS NOTES
[address] [facility]  [email or web address]    Chandrika Zuniga MD, [address] [facility]     OB/GYN Follow-up visit  Chief Complaint: Follow up visit  No chief complaint on file. History of Present Illness:  She is having a follow up from 2021 for  incision check & BP check. Pt noticed a gush of blood last week followed by yellow discharge & mild itching still. incision is healing well, slight problems on the left side of her inscision. Still using a pad on the incision to keep dry. +SA    She reports the symptoms are is unchanged. Aggravating factors include none. Alleviating factors include none. She does not have other concerns. LMP: Patient's last menstrual period was 01/10/2021 (exact date).     PFSH:  Past Medical History:   Diagnosis Date    Asthma     Dysmenorrhea     Essential hypertension     Genital herpes     Valtrex     Major depressive disorder, single episode, unspecified     Morbid obesity ([address]     Pap smear for cervical cancer screening     17 neg 21 neg    Pituitary abnormality (HCC)     Postpartum depression     Seasonal allergies     Unspecified symptom associated with female genital organs      Past Surgical History:   Procedure Laterality Date    HX OTHER SURGICAL      HX WISDOM TEETH EXTRACTION      HX WRIST FRACTURE TX Right      Family History   Problem Relation Age of Onset    Alcohol abuse Father     Cancer Paternal Grandmother     Other Mother         endometriosis   Amira Curl Migraines Mother         tumor in brain     Social History     Tobacco Use    Smoking status: Former Smoker     Packs/day: 0.50     Years: 1.00     Pack years: 0.50     Types: Cigarettes    Smokeless tobacco: Never Used   Vaping Use    Vaping Use: Never used   Substance Use Topics    Alcohol use: No     Alcohol/week: 0.0 standard drinks     Comment: former user just sips now and then    Drug use: No     Allergies   Allergen Reactions    Latex Swelling Skin peels    Lactose Other (comments)    Aspirin Nausea and Vomiting    Betadine [Povidone-Iodine] Other (comments)     Skin peels    Cat Dander Sneezing    Ketorolac Hives    Penicillins Other (comments)     Yeast infections    Ivp Dye [Iodinated Contrast Media] Other (comments)     Current Outpatient Medications   Medication Sig    Cetirizine (ZyrTEC) 10 mg cap Take  by mouth.  Omeprazole delayed release (PRILOSEC D/R) 20 mg tablet Take 1 Tablet by mouth two (2) times a day.  PNV Comb #2-Iron-FA-Omega 3 29-1-400 mg cmpk Take  by mouth.  OTHER Prebiotic with cranberry    valACYclovir (VALTREX) 1 gram tablet Take 1 Tab by mouth daily.  nystatin (MYCOSTATIN) powder Apply  to affected area two (2) times a day. (Patient not taking: Reported on 10/28/2021)    enoxaparin (LOVENOX) 40 mg/0.4 mL 0.4 mL by SubCUTAneous route every twelve (12) hours every twelve (12) hours for 42 days. (Patient not taking: Reported on 10/28/2021)    fluticasone propionate (FLONASE) 50 mcg/actuation nasal spray SHAKE LIQUID AND USE 2 SPRAYS IN EACH NOSTRIL DAILY (Patient not taking: Reported on 10/28/2021)    B.infantis-B.ani-B.long-B.bifi (Probiotic 4X) 10-15 mg TbEC Take  by mouth. (Patient not taking: Reported on 11/18/2021)    albuterol (PROVENTIL HFA, VENTOLIN HFA, PROAIR HFA) 90 mcg/actuation inhaler Take 1 Puff by inhalation every four (4) hours as needed for Wheezing. (Patient not taking: Reported on 8/10/2021)     No current facility-administered medications for this visit.        Review of Systems:  History obtained from the patient  Constitutional: negative for fevers, chills and weight loss  ENT ROS: negative for - hearing change, oral lesions or visual changes  Respiratory: negative for cough, wheezing or dyspnea on exertion  Cardiovascular: negative for chest pain, irregular heart beats, exertional chest pressure/discomfort  Gastrointestinal: negative for dysphagia, nausea and vomiting  Genito-Urinary ROS: see hpi  Inteument/breast: negative for rash, breast lump and nipple discharge  Musculoskeletal:negative for stiff joints, neck pain and muscle weakness  Endocrine ROS: negative for - breast changes, galactorrhea or temperature intolerance  Hematological and Lymphatic ROS: negative for - blood clots, bruising or swollen lymph nodes      Physical Exam:  Visit Vitals  /78 (BP 1 Location: Right arm)   Pulse 82   Ht 5' 6\" (1.676 m)   Wt 256 lb 8 oz (116.3 kg)   BMI 41.40 kg/m²       GENERAL: alert, well appearing, and in no distress  INC c/d/i, healing well minimal erythema, dry  ABDOMEN: soft, nontender, nondistended, no masses or organomegaly   EGBUS: no lesions, no inflammation, no masses  VULVA: normal appearing vulva with no masses, tenderness or lesions  VAGINA: normal appearing vagina with normal color, no lesions, white discharge  CERVIX: normal appearing cervix without discharge or lesions, non tender  UTERUS: uterus is normal size, shape, consistency and nontender   ADNEXA: normal adnexa in size, nontender and no masses  NEURO: alert, oriented, normal speech    Assessment:  Encounter Diagnoses   Name Primary?  Vaginal discharge Yes    Vagina itching     H/O:         Plan:  The patient is advised that she should contact the office with any questions or concerns. She should make her routine annual gynecologic appointment if needed. We discussed potential causes of vaginal discharge/irritation. We discussed good vulvar hygiene. Recommended avoid vaginal irritants. Discussed use of mild soaps/detergents. Follow up if NI. Patient will be notified about swab results and prescription sent, if indicated. PP visit at six weeks    Orders Placed This Encounter    [address]       No results found for this visit on 21.     Gilma Chamberlain MD

## 2021-11-21 NOTE — PROGRESS NOTES
The results are normal, no clear evidence of vaginal infection  [address] message sent if active. Recommend f/u if still having symptoms/problems or has additional concerns.

## 2021-12-01 ENCOUNTER — OFFICE VISIT (OUTPATIENT)
Dept: OBGYN CLINIC | Age: 26
End: 2021-12-01
Payer: MEDICAID

## 2021-12-01 VITALS
WEIGHT: 293 LBS | BODY MASS INDEX: 47.09 KG/M2 | DIASTOLIC BLOOD PRESSURE: 80 MMHG | HEART RATE: 83 BPM | HEIGHT: 66 IN | SYSTOLIC BLOOD PRESSURE: 139 MMHG

## 2021-12-01 DIAGNOSIS — N89.8 VAGINA ITCHING: ICD-10-CM

## 2021-12-01 DIAGNOSIS — N89.8 VAGINAL DISCHARGE: ICD-10-CM

## 2021-12-01 PROCEDURE — 0503F POSTPARTUM CARE VISIT: CPT | Performed by: OBSTETRICS & GYNECOLOGY

## 2021-12-01 NOTE — PROGRESS NOTES
Jb Damian MD, [address] [facility]     Postpartum visit    Chief Complaint   Patient presents with   [address] is a 32 y.o. female  who presents for a postpartum exam.     She is now 7 weeks from a  section delivery. Patient's last menstrual period was 2021. She has had the following significant problems since her delivery: her cycle was only 4 days. Reports her incision is healing well. Pt noticed some vaginal discharge with itching, unsure if its normal.      She reports her mood as Good. Holiday season is hard, she lost her father in January & her grandfather in February, but she is managing her moods. The patient is breastfeeding/pumping breast milk for her baby. The patient would like to use Nexplanon for birth control. She is due for her next AE in 3 months. Past Medical History:   Diagnosis Date    Asthma     Dysmenorrhea     Essential hypertension     Genital herpes     Valtrex     Major depressive disorder, single episode, unspecified     Morbid obesity ([address]     Pap smear for cervical cancer screening     17 neg 21 neg    Pituitary abnormality (HCC)     Postpartum depression     Seasonal allergies     Unspecified symptom associated with female genital organs      Past Surgical History:   Procedure Laterality Date    HX OTHER SURGICAL      HX WISDOM TEETH EXTRACTION      HX WRIST FRACTURE TX Right      Current Outpatient Medications   Medication Sig    Cetirizine (ZyrTEC) 10 mg cap Take  by mouth.  Omeprazole delayed release (PRILOSEC D/R) 20 mg tablet Take 1 Tablet by mouth two (2) times a day.  PNV Comb #2-Iron-FA-Omega 3 29-1-400 mg cmpk Take  by mouth.  OTHER Prebiotic with cranberry    valACYclovir (VALTREX) 1 gram tablet Take 1 Tab by mouth daily.  nystatin (MYCOSTATIN) powder Apply  to affected area two (2) times a day.  (Patient not taking: Reported on 10/28/2021)    fluticasone propionate (FLONASE) 50 mcg/actuation nasal spray SHAKE LIQUID AND USE 2 SPRAYS IN EACH NOSTRIL DAILY (Patient not taking: Reported on 10/28/2021)    B.infantis-B.ani-B.long-B.bifi (Probiotic 4X) 10-15 mg TbEC Take  by mouth. (Patient not taking: Reported on 11/18/2021)    albuterol (PROVENTIL HFA, VENTOLIN HFA, PROAIR HFA) 90 mcg/actuation inhaler Take 1 Puff by inhalation every four (4) hours as needed for Wheezing. (Patient not taking: Reported on 8/10/2021)     No current facility-administered medications for this visit.      Allergies   Allergen Reactions    Latex Swelling     Skin peels    Lactose Other (comments)    Aspirin Nausea and Vomiting    Betadine [Povidone-Iodine] Other (comments)     Skin peels    Cat Dander Sneezing    Ketorolac Hives    Penicillins Other (comments)     Yeast infections    Ivp Dye [Iodinated Contrast Media] Other (comments)     Family History   Problem Relation Age of Onset    Alcohol abuse Father     Cancer Paternal Grandmother     Other Mother         endometriosis   Blase Liming Migraines Mother         tumor in brain     Social History     Socioeconomic History    Marital status: SINGLE     Spouse name: Not on file    Number of children: Not on file    Years of education: Not on file    Highest education level: Not on file   Occupational History    Not on file   Tobacco Use    Smoking status: Former Smoker     Packs/day: 0.50     Years: 1.00     Pack years: 0.50     Types: Cigarettes    Smokeless tobacco: Never Used   Vaping Use    Vaping Use: Never used   Substance and Sexual Activity    Alcohol use: No     Alcohol/week: 0.0 standard drinks     Comment: former user just sips now and then   Blase Liming Drug use: No    Sexual activity: Yes     Partners: Male     Birth control/protection: Pill   Other Topics Concern     Service Not Asked    Blood Transfusions Not Asked    Caffeine Concern Not Asked    Occupational Exposure Not Asked    Hobby Hazards Not Asked    Sleep Concern Not Asked    Stress Concern Not Asked    Weight Concern Not Asked    Special Diet Not Asked    Back Care Not Asked    Exercise Not Asked    Bike Helmet Not Asked    [address] Not Asked    Self-Exams Not Asked   Social History Narrative    Not on file     Social Determinants of Health     Financial Resource Strain:     Difficulty of Paying Living Expenses: Not on file   Food Insecurity:     Worried About Running Out of Food in the Last Year: Not on file    Radha of Food in the Last Year: Not on file   Transportation Needs:     Lack of Transportation (Medical): Not on file    Lack of Transportation (Non-Medical): Not on file   Physical Activity:     Days of Exercise per Week: Not on file    Minutes of Exercise per Session: Not on file   Stress:     Feeling of Stress : Not on file   Social Connections:     Frequency of Communication with Friends and Family: Not on file    Frequency of Social Gatherings with Friends and Family: Not on file    Attends Church Services: Not on file    Active Member of [address] [place] [address] [employer] or Organizations: Not on file    Attends Club or Organization Meetings: Not on file    Marital Status: Not on file   Intimate Partner Violence:     Fear of Current or Ex-Partner: Not on file    Emotionally Abused: Not on file    Physically Abused: Not on file    Sexually Abused: Not on file   Housing Stability:     Unable to Pay for Housing in the Last Year: Not on file    Number of [place] in the Last Year: Not on file    Unstable Housing in the Last Year: Not on file     OB History        2    Para   2    Term   2            AB        Living   2       SAB        IAB        Ectopic        Molar        Multiple   0    Live Births   2          Obstetric Comments   Menarche:  10. LMP: current. # of Children:  0. Age at Delivery of First Child:  na.   Hysterectomy/oophorectomy:  NO/NO. Breast Bx:  no.  Hx of Breast Feeding:  na. BCP:  yes.  Hormone therapy:  no. P1 spontaneous labor, efw 9pounds               Immunization History   Administered Date(s) Administered    Tdap 10/19/2018, 07/19/2021       Review of Systems:  History obtained from the patient  General ROS: negative for - chills, fever or weight loss  Respiratory ROS: no cough, shortness of breath, or wheezing  Cardiovascular ROS: no chest pain or dyspnea on exertion  Gastrointestinal ROS: negative for - appetite loss, change in bowel habits or nausea/vomiting  Genito-Urinary ROS: negative except for as noted in HPI    PHYSICAL EXAMINATION  Visit Vitals  /80   Pulse 83   Ht 5' 6\" (1.676 m)   Wt (!) 356 lb 6.4 oz (161.7 kg)   Breastfeeding No   BMI 57.52 kg/m²       Constitutional  · Appearance: well-nourished, well developed, alert, in no acute distress    HENT  · Head and Face: appears normal    Neck  · Inspection/Palpation: normal appearance, no masses or tenderness  · Lymph Nodes: no lymphadenopathy present  · Thyroid: gland size normal, nontender, no nodules or masses present on palpation    Chest  clear to auscultation, no wheezes, rales or rhonchi, symmetric air entry. Cardiac/CVS  normal rate, regular rhythm, normal S1, S2, no murmurs, rubs, clicks or gallops.     Breasts  · Inspection of Breasts: breasts symmetrical, no skin changes, no discharge present, nipple appearance normal, no skin retraction present  · Palpation of Breasts and Axillae: no masses present on palpation, no breast tenderness  · Axillary Lymph Nodes: no lymphadenopathy present    Gastrointestinal  · Abdominal Examination: abdomen non-tender to palpation, normal bowel sounds, no masses present  · Liver and spleen: no hepatomegaly present, spleen not palpable  · Hernias: no hernias identified    Genitourinary  · External Genitalia: normal appearance for age, no discharge present, no tenderness present, no inflammatory lesions present, no masses present, no atrophy present  · Vagina: normal vaginal vault without central or paravaginal defects, no discharge present, no inflammatory lesions present, no masses present  · Bladder: non-tender to palpation  · Urethra: appears normal  · Cervix: normal   · Uterus: normal size, shape and consistency  · Adnexa: no adnexal tenderness present, no adnexal masses present  · Perineum: perineum within normal limits, no evidence of trauma, no rashes or skin lesions present  · Anus: anus within normal limits  · Inguinal Lymph Nodes: no lymphadenopathy present    Skin  · General Inspection: no rash, no lesions identified  · Inc c/d/i    Neurologic/Psychiatric  · Mental Status:  · Orientation: grossly oriented to person, place and time  · Mood and Affect: mood normal, affect appropriate    Assessment:  Normal postpartum check  Encounter Diagnoses   Name Primary?  Vagina itching     Vaginal discharge     Postpartum exam Yes       Plan:  RTO for AE or sooner prn  Discussed contraception options; r/b/a. Planned contraception: nexplanon  We discussed progesterone only and non hormonal options for contraception including but not limited to condoms, IUDs, Nexplanon, and depo provera. Larc/nexplanon ho   She should return to normal activity  We recommend healthy balanced diet, regular exercise  We discussed safer sex practices, condom use and risk factors for sexually transmitted diseases. Patient should notify MD if she cannot resume normal activity and exercise  Recommended continuing prenatal vitamins/folic acid  We discussed potential causes of vaginal discharge/irritation. We discussed good vulvar hygiene. Recommended avoid vaginal irritants. Discussed use of mild soaps/detergents. Follow up if NI. Patient will be notified about swab results and prescription sent, if indicated.

## 2021-12-01 NOTE — PATIENT INSTRUCTIONS
Postpartum: Care Instructions  Overview  After childbirth (postpartum period), your body goes through many changes. Some of these changes happen over several weeks. In the hours after delivery, your body will begin to recover from childbirth while it prepares to breastfeed your . You may feel emotional during this time. Your hormones can shift your mood without warning for no clear reason. In the first couple of weeks after childbirth, it's common to have emotions that change from happy to sad. You may find it hard to sleep. You may cry a lot. This is called the \"baby blues. \" These overwhelming emotions often go away within a couple of days or weeks. But it's important to discuss your feelings with your doctor. It's easy to get too tired and overwhelmed during the first weeks after childbirth. Don't try to do too much. Get rest whenever you can, accept help from others, and eat well and drink plenty of fluids. In the first couple of weeks after you give birth, your doctor or midwife may want to check in with you and make a plan for any follow-up care you may need. You will likely have a complete postpartum visit in the first 3 months after delivery. At that time, your doctor or midwife will check on your recovery from childbirth and see how you're doing with your emotions. You may also discuss your concerns or questions. Follow-up care is a key part of your treatment and safety. Be sure to make and go to all appointments, and call your doctor if you are having problems. It's also a good idea to know your test results and keep a list of the medicines you take. How can you care for yourself at home? · Sleep or rest when your baby sleeps. · Get help with household chores from family or friends, if you can. Don't try to do it all yourself. · If you have hemorrhoids or swelling or pain around the opening of your vagina, try using cold and heat.  You can put ice or a cold pack on the area for 10 to 20 minutes at a time. Put a thin cloth between the ice and your skin. Also try sitting in a few inches of warm water (sitz bath) 3 times a day and after bowel movements. · Take pain medicines exactly as directed. ? If the doctor gave you a prescription medicine for pain, take it as prescribed. ? If you are not taking a prescription pain medicine, ask your doctor if you can take an over-the-counter medicine. · Eat more fiber to avoid constipation. Include foods such as whole-grain breads and cereals, raw vegetables, raw and dried fruits, and beans. · Drink plenty of fluids. If you have kidney, heart, or liver disease and have to limit fluids, talk with your doctor before you increase the amount of fluids you drink. · Do not rinse inside your vagina with fluids (douche). · If you have stitches, keep the area clean by pouring or spraying warm water over the area outside your vagina and anus after you use the toilet. · Keep a list of questions to ask your doctor or midwife. Your questions might be about:  ? Changes in your breasts, such as lumps or soreness. ? When to expect your menstrual period to start again. ? What form of birth control is best for you. ? Weight you have put on during the pregnancy. ? Exercise options. ? What foods and drinks are best for you, especially if you are breastfeeding. ? Problems you might be having with breastfeeding. ? When you can have sex. You may want to talk about lubricants for your vagina. ? Any feelings of sadness or restlessness that you are having. When should you call for help? Call 911  anytime you think you may need emergency care. For example, call if:    · You have thoughts of harming yourself, your baby, or another person.     · You passed out (lost consciousness).     · You have chest pain, are short of breath, or cough up blood.     · You have a seizure.    Call your doctor now or seek immediate medical care if:    · Your vaginal bleeding seems to be getting heavier.     · You are dizzy or lightheaded, or you feel like you may faint.     · You have a fever.     · You have new or more belly pain.     · You have symptoms of a blood clot in your leg (called a deep vein thrombosis), such as:  ? Pain in the calf, back of the knee, thigh, or groin. ? Redness and swelling in your leg or groin.     · You have signs of preeclampsia, such as:  ? Sudden swelling of your face, hands, or feet. ? New vision problems (such as dimness, blurring, or seeing spots). ? A severe headache. Watch closely for changes in your health, and be sure to contact your doctor if:    · You have new or worse vaginal discharge.     · You feel sad or depressed.     · You are having problems with your breasts or breastfeeding. Where can you learn more? Go to http://www.gray.com/  Enter I836 in the search box to learn more about \"Postpartum: Care Instructions. \"  Current as of: 2021               Content Version: 13.0  © 0207-7941 [employer]. Care instructions adapted under license by [employer] (which disclaims liability or warranty for this information). If you have questions about a medical condition or this instruction, always ask your healthcare professional. [address] any warranty or liability for your use of this information. Postpartum: Care Instructions  Overview  After childbirth (postpartum period), your body goes through many changes. Some of these changes happen over several weeks. In the hours after delivery, your body will begin to recover from childbirth while it prepares to breastfeed your . You may feel emotional during this time. Your hormones can shift your mood without warning for no clear reason. In the first couple of weeks after childbirth, it's common to have emotions that change from happy to sad. You may find it hard to sleep. You may cry a lot.  This is called the \"baby blues. \" These overwhelming emotions often go away within a couple of days or weeks. But it's important to discuss your feelings with your doctor. It's easy to get too tired and overwhelmed during the first weeks after childbirth. Don't try to do too much. Get rest whenever you can, accept help from others, and eat well and drink plenty of fluids. In the first couple of weeks after you give birth, your doctor or midwife may want to check in with you and make a plan for any follow-up care you may need. You will likely have a complete postpartum visit in the first 3 months after delivery. At that time, your doctor or midwife will check on your recovery from childbirth and see how you're doing with your emotions. You may also discuss your concerns or questions. Follow-up care is a key part of your treatment and safety. Be sure to make and go to all appointments, and call your doctor if you are having problems. It's also a good idea to know your test results and keep a list of the medicines you take. How can you care for yourself at home? · Sleep or rest when your baby sleeps. · Get help with household chores from family or friends, if you can. Don't try to do it all yourself. · If you have hemorrhoids or swelling or pain around the opening of your vagina, try using cold and heat. You can put ice or a cold pack on the area for 10 to 20 minutes at a time. Put a thin cloth between the ice and your skin. Also try sitting in a few inches of warm water (sitz bath) 3 times a day and after bowel movements. · Take pain medicines exactly as directed. ? If the doctor gave you a prescription medicine for pain, take it as prescribed. ? If you are not taking a prescription pain medicine, ask your doctor if you can take an over-the-counter medicine. · Eat more fiber to avoid constipation. Include foods such as whole-grain breads and cereals, raw vegetables, raw and dried fruits, and beans. · Drink plenty of fluids. If you have kidney, heart, or liver disease and have to limit fluids, talk with your doctor before you increase the amount of fluids you drink. · Do not rinse inside your vagina with fluids (douche). · If you have stitches, keep the area clean by pouring or spraying warm water over the area outside your vagina and anus after you use the toilet. · Keep a list of questions to ask your doctor or midwife. Your questions might be about:  ? Changes in your breasts, such as lumps or soreness. ? When to expect your menstrual period to start again. ? What form of birth control is best for you. ? Weight you have put on during the pregnancy. ? Exercise options. ? What foods and drinks are best for you, especially if you are breastfeeding. ? Problems you might be having with breastfeeding. ? When you can have sex. You may want to talk about lubricants for your vagina. ? Any feelings of sadness or restlessness that you are having. When should you call for help? Call 911  anytime you think you may need emergency care. For example, call if:    · You have thoughts of harming yourself, your baby, or another person.     · You passed out (lost consciousness).     · You have chest pain, are short of breath, or cough up blood.     · You have a seizure. Call your doctor now or seek immediate medical care if:    · Your vaginal bleeding seems to be getting heavier.     · You are dizzy or lightheaded, or you feel like you may faint.     · You have a fever.     · You have new or more belly pain.     · You have symptoms of a blood clot in your leg (called a deep vein thrombosis), such as:  ? Pain in the calf, back of the knee, thigh, or groin. ? Redness and swelling in your leg or groin.     · You have signs of preeclampsia, such as:  ? Sudden swelling of your face, hands, or feet. ? New vision problems (such as dimness, blurring, or seeing spots). ? A severe headache.    Watch closely for changes in your health, and be sure to contact your doctor if:    · You have new or worse vaginal discharge.     · You feel sad or depressed.     · You are having problems with your breasts or breastfeeding. Where can you learn more? Go to http://www.gray.com/  Enter S871 in the search box to learn more about \"Postpartum: Care Instructions. \"  Current as of: June 16, 2021               Content Version: 13.0  © 2006-2021 [employer]. Care instructions adapted under license by [employer] (which disclaims liability or warranty for this information). If you have questions about a medical condition or this instruction, always ask your healthcare professional. [address] any warranty or liability for your use of this information.

## 2021-12-08 ENCOUNTER — OFFICE VISIT (OUTPATIENT)
Dept: OBGYN CLINIC | Age: 26
End: 2021-12-08
Payer: MEDICAID

## 2021-12-08 DIAGNOSIS — Z30.017 NEXPLANON INSERTION: Primary | ICD-10-CM

## 2021-12-08 DIAGNOSIS — Z01.812 PRE-PROCEDURE LAB EXAM: ICD-10-CM

## 2021-12-08 LAB
HCG URINE, QL. (POC): NEGATIVE
VALID INTERNAL CONTROL?: YES

## 2021-12-08 PROCEDURE — 81025 URINE PREGNANCY TEST: CPT | Performed by: OBSTETRICS & GYNECOLOGY

## 2021-12-08 PROCEDURE — 11981 INSERTION DRUG DLVR IMPLANT: CPT | Performed by: OBSTETRICS & GYNECOLOGY

## 2021-12-08 NOTE — PROGRESS NOTES
[address] [facility]  [email or web address]  [phone]    Jessica Kelley MD, FACOG       [facility]  OFFICE PROCEDURE PROGRESS NOTE    Chart reviewed for the following:   IYue LPN, have reviewed the History, Physical and updated the Allergic reactions for Mala Mejia     TIME OUT performed immediately prior to start of procedure:   Raya Grant LPN, have performed the following reviews on Baldev Sorensen prior to the start of the procedure:            * Patient was identified by name and date of birth   * Agreement on procedure being performed was verified  * Risks and Benefits explained to the patient  * Procedure site verified and marked as necessary  * Patient was positioned for comfort  * Consent was signed and verified     Time:     Date of procedure: 2021    Procedure performed by: Jessica Kelley MD    Provider assisted by:   Yue Vasquez LPN    Patient assisted by: self    How tolerated by patient: tolerated the procedure well with no complications    Post Procedural Pain Scale: 0 - No Hurt    Comments: none      Procedure note: Nexplanon insertion    Baldev Sorensen is a ,  32 y.o. female [address] [facility] whose Patient's last menstrual period was 2021. was on  presents for office insertion of an [facility] [address] sub-dermal contraceptive implant. She has had an opportunity to read the [address] [place] [address] Avenue \"Patient Labeling and Consent Form\". We counseled the patient about the insertion and removal procedures as well as potential side-effects, benefits and risks. She state she had no further questions and signed the consent form. She has been using none to the present time. She confirmed that she has no allergies to Betadine or Xylocaine. She reclined on the examination table in the supine position with her left arm flexed at the elbow and externally rotated.  The insertion site was identified and marked approximately 6-8 cm proximal to the elbow crease at the inner side of the upper arm in the standard fashion. A second rk was placed 6-8 cm above the first rk. The insertion site was cleansed with Betadine antiseptic. Approximately 4 ml of 1% plain xylocaine were injected just under the skin along the planned insertion canal. The NEXPLANON sterile applicator was carefully removed from its blister pack and kept sterile. I removed the needle cap. I visually verified the presence of NEXPLANON inside the needle tip. The skin at the insertion site was then stretched by my thumb and index finger. I then inserted the needle tip through the skin at the appropriate angle to the skin surface, just until the skin has been penetrated. The needle was gently inserted to its full length. The slider was then pushed all the way and then released. I then removed the needle and palpated the implant in the appropriate location. The patient also palpated the implant in place. Both the patient and I were able to confirm the presence of the Shyla Karlee in its subdermal location by palpation. A small adhesive bandage over the insertion site then wrapped a pressure bandage with sterile gauze. The patient User Card and Patient Chart Label were filled in. She was given the User Card for her records after explaining it to her in detail. I stressed to her that she must have the Shlya Karlee removed before three years from today's date. She was then given the Personal Calendar (Bleeding Diary) with instructions in it's use. The patient received Nexplanon lot number [id number]. The Nexplanon did not come from a speciality pharmacy. Discussed obesity and potential decreased effectiveness of contraception and option to increase effectiveness with additional contraception/back up. Patient plans to continue weight loss attempt.   We discussed typical bleeding patterns and side effects with the Nexplanon  We recommend bleeding/symptom calendar and follow three months to evaluate. We recommended back up contraception x 2 weeks after insertion.

## 2021-12-08 NOTE — PATIENT INSTRUCTIONS
Implant for Birth Control: Care Instructions  Your Care Instructions     The implant is used to prevent pregnancy. It's a thin delphine about the size of a matchstick that is inserted under the skin (subdermal) on the inside of your arm. The implant prevents pregnancy for 3 years. After it is put in, you don't have to do anything else to prevent pregnancy. Follow-up care is a key part of your treatment and safety. Be sure to make and go to all appointments, and call your doctor if you are having problems. It's also a good idea to know your test results and keep a list of the medicines you take. How can you care for yourself at home? How do you use the subdermal implant? · The implant is put in by your doctor or another trained health professional. It only takes a few minutes. This can also be done right after you give birth. Your doctor will remove the implant when it needs to be taken out. · An adhesive bandage and a tight (pressure) bandage will be placed over the site. This helps reduce swelling and bruising. · Ask your doctor if you need to use backup birth control, such as a condom, for a week after insertion. Whether you need to do this depends on where you are in your cycle. How can you care for the insertion site? · Remove the pressure bandage after 24 hours. Keep the area dry. · Keep an adhesive bandage on the site for 3 to 5 days after the procedure. · If you have pain, use an ice pack or take an over-the-counter pain medicine. Some soreness or bruising is normal.  What else do you need to know? · It's safe to use while breastfeeding. · The implant has side effects. ? You may have changes in your period. Your period may stop. You may also have spotting or bleeding between periods. ? You may have mood changes, less interest in sex, or weight gain. · The implant can stay in place for up to 3 years to prevent pregnancy. But your doctor may talk to you about leaving it in for longer.   ? If you don't replace the implant and don't use another form of birth control, you could get pregnant. ? If you have the implant removed, you'll have to find another method of birth control. If you don't, you may get pregnant. ? Even if you are planning to get pregnant, you have to have the implant removed. · Check with your doctor before you use any other medicines. This includes over-the-counter medicines, vitamins, herbal products, and supplements. Birth control hormones may not work as well to prevent pregnancy when combined with other medicines. · The implant doesn't protect against sexually transmitted infections (STIs), such as herpes or HIV/AIDS. If you're not sure if your sex partner might have an STI, use a condom to protect against infection. When should you call for help? Call 911  anytime you think you may need emergency care. For example, call if:    · You have shortness of breath.     · You have chest pain.     · You passed out (lost consciousness).     · You cough up blood. Call your doctor now or seek immediate medical care if:    · You have severe pain or numbness and tingling in the arm where the implant was inserted.     · You have increased pain, swelling, warmth, or redness at the insertion site.     · You have signs of a blood clot in your leg (called a deep vein thrombosis), such as:  ? Pain in your calf, back of the knee, thigh, or groin. ? Redness and swelling in your leg. Watch closely for changes in your health, and be sure to contact your doctor if:    · You can't feel your implant.     · You think your implant might be bent or broken in your arm.     · You think you might be pregnant.     · You have any problems with your birth control method. Where can you learn more? Go to http://www.gray.com/  Enter V768 in the search box to learn more about \"Implant for Birth Control: Care Instructions. \"  Current as of: June 16, 2021               Content Version: 13.0  © 9142-1386 [employer]. Care instructions adapted under license by [employer] (which disclaims liability or warranty for this information). If you have questions about a medical condition or this instruction, always ask your healthcare professional. [address] any warranty or liability for your use of this information.

## 2021-12-14 NOTE — PROGRESS NOTES
Physical Therapy at [facility],   a part of [facility] [address]  Phone: [phone]  Fax: [phone]    Discharge Summary  2-15    Patient name: Ishan Dow  : 1995  Provider#: [id number]  Referral source: Yasmeen Karimi MD      Medical/Treatment Diagnosis: Pelvic floor dysfunction [M62.89]     Prior Hospitalization: see medical history     Comorbidities: See Plan of Care  Prior Level of Function:See Plan of Care  Medications: Verified on Patient Summary List    Start of Care: 21      Onset Date:2 momths   Visits from Start of Care: 2     Missed Visits: 1  Reporting Period : 2021 to 21      ASSESSMENT/SUMMARY OF CARE: Patient did not return for follow up due to delivery of child, therefore unable to assess goals at this time. Provided with HEP for ongoing symptom management.              RECOMMENDATIONS:  [x]Discontinue therapy: [x]Patient has reached or is progressing toward set goals      []Patient is non-compliant or has abdicated      []Due to lack of appreciable progress towards set goals      []Other    Shay Galaviz, PT 2021

## 2021-12-16 LAB — SARS-COV-2, NAA: NOT DETECTED

## 2022-02-09 ENCOUNTER — OFFICE VISIT (OUTPATIENT)
Dept: OBGYN CLINIC | Age: 27
End: 2022-02-09
Payer: MEDICAID

## 2022-02-09 DIAGNOSIS — N93.9 VAGINA BLEEDING: Primary | ICD-10-CM

## 2022-02-09 DIAGNOSIS — R82.4 URINE KETONES: ICD-10-CM

## 2022-02-09 DIAGNOSIS — Z97.5 NEXPLANON IN PLACE: ICD-10-CM

## 2022-02-09 DIAGNOSIS — N93.9 ABNORMAL UTERINE BLEEDING (AUB): ICD-10-CM

## 2022-02-09 DIAGNOSIS — Z20.828 EXPOSURE TO HERPES SIMPLEX VIRUS (HSV): ICD-10-CM

## 2022-02-09 DIAGNOSIS — Z20.2 EXPOSURE TO SEXUALLY TRANSMITTED DISEASE (STD): ICD-10-CM

## 2022-02-09 PROBLEM — Z34.80 SUPERVISION OF OTHER NORMAL PREGNANCY: Status: RESOLVED | Noted: 2021-03-01 | Resolved: 2022-02-09

## 2022-02-09 PROBLEM — Z34.90 PREGNANCY: Status: RESOLVED | Noted: 2021-10-14 | Resolved: 2022-02-09

## 2022-02-09 PROBLEM — O99.210 OBESITY IN PREGNANCY: Status: RESOLVED | Noted: 2021-10-13 | Resolved: 2022-02-09

## 2022-02-09 LAB
BILIRUB UR QL STRIP: NEGATIVE
GLUCOSE UR-MCNC: NEGATIVE MG/DL
HCG URINE, QL. (POC): NEGATIVE
KETONES P FAST UR STRIP-MCNC: NORMAL MG/DL
PH UR STRIP: 5 [PH] (ref 4.6–8)
PROT UR QL STRIP: NEGATIVE
SP GR UR STRIP: 1 (ref 1–1.03)
UA UROBILINOGEN AMB POC: NORMAL (ref 0.2–1)
URINALYSIS CLARITY POC: CLEAR
URINALYSIS COLOR POC: YELLOW
URINE BLOOD POC: NORMAL
URINE LEUKOCYTES POC: NEGATIVE
URINE NITRITES POC: NEGATIVE
VALID INTERNAL CONTROL?: YES

## 2022-02-09 PROCEDURE — 81025 URINE PREGNANCY TEST: CPT | Performed by: OBSTETRICS & GYNECOLOGY

## 2022-02-09 PROCEDURE — 81002 URINALYSIS NONAUTO W/O SCOPE: CPT | Performed by: OBSTETRICS & GYNECOLOGY

## 2022-02-09 PROCEDURE — 99213 OFFICE O/P EST LOW 20 MIN: CPT | Performed by: OBSTETRICS & GYNECOLOGY

## 2022-02-09 NOTE — PROGRESS NOTES
[address] [facility]  [email or web address]  [phone]    Ruslan Munguia MD, FACOG       OB/GYN Problem visit    Chief Complaint:   Chief Complaint   Patient presents with    Vaginal Bleeding         History of Present Illness: This is a new problem being evaluated by this provider. The patient is a 32 y.o.  female who reports having vaginal bleeding and cramping in lower back and pelvis for 2 months. She reports she has been bleeding since 22. She has intermittent cramping that sometimes feels like small contractions. Her bleeding is super heavy on the days she is supposed to have her periods. She also reports passing a clot a little bit bigger than a golf ball on 2021  She reports the symptoms are is unchanged. Aggravating factors include nexplanon. Alleviating factors include none. She does not have other concerns. She is not breasd  LMP: No LMP recorded.     PFSH:  Past Medical History:   Diagnosis Date    Asthma     Dysmenorrhea     Essential hypertension     Genital herpes     Valtrex     Major depressive disorder, single episode, unspecified     Morbid obesity ([address]     Nexplanon insertion 2021    Pap smear for cervical cancer screening     17 neg 21 neg    Pituitary abnormality (HCC)     Postpartum depression     Seasonal allergies     Unspecified symptom associated with female genital organs      Past Surgical History:   Procedure Laterality Date    HX OTHER SURGICAL      HX WISDOM TEETH EXTRACTION      HX WRIST FRACTURE TX Right      Family History   Problem Relation Age of Onset    Alcohol abuse Father     Cancer Paternal Grandmother     Other Mother         endometriosis   [place] Migraines Mother         tumor in brain     Social History     Tobacco Use    Smoking status: Former Smoker     Packs/day: 0.50     Years: 1.00     Pack years: 0.50     Types: Cigarettes    Smokeless tobacco: Never Used   Vaping Use    Vaping Use: Never used   Substance Use Topics    Alcohol use: No     Alcohol/week: 0.0 standard drinks     Comment: former user just sips now and then    Drug use: No     Allergies   Allergen Reactions    Latex Swelling     Skin peels    Lactose Other (comments)    Aspirin Nausea and Vomiting    Betadine [Povidone-Iodine] Other (comments)     Skin peels    Cat Dander Sneezing    Ketorolac Hives    Penicillins Other (comments)     Yeast infections    Ivp Dye [Iodinated Contrast Media] Other (comments)     Current Outpatient Medications   Medication Sig    Cetirizine (ZyrTEC) 10 mg cap Take  by mouth.  nystatin (MYCOSTATIN) powder Apply  to affected area two (2) times a day. (Patient not taking: Reported on 10/28/2021)    fluticasone propionate (FLONASE) 50 mcg/actuation nasal spray SHAKE LIQUID AND USE 2 SPRAYS IN EACH NOSTRIL DAILY (Patient not taking: Reported on 10/28/2021)    Omeprazole delayed release (PRILOSEC D/R) 20 mg tablet Take 1 Tablet by mouth two (2) times a day.  PNV Comb #2-Iron-FA-Omega 3 29-1-400 mg cmpk Take  by mouth.  B.infantis-B.ani-B.long-B.bifi (Probiotic 4X) 10-15 mg TbEC Take  by mouth. (Patient not taking: Reported on 11/18/2021)    OTHER Prebiotic with cranberry    valACYclovir (VALTREX) 1 gram tablet Take 1 Tab by mouth daily.  albuterol (PROVENTIL HFA, VENTOLIN HFA, PROAIR HFA) 90 mcg/actuation inhaler Take 1 Puff by inhalation every four (4) hours as needed for Wheezing. (Patient not taking: Reported on 8/10/2021)     No current facility-administered medications for this visit.        Review of Systems:  History obtained from the patient  Constitutional: negative for fevers, chills and weight loss  ENT ROS: negative for - hearing change, oral lesions or visual changes  Respiratory: negative for cough, wheezing or dyspnea on exertion  Cardiovascular: negative for chest pain, irregular heart beats, exertional chest pressure/discomfort  Gastrointestinal: negative for dysphagia, nausea and vomiting  Genito-Urinary ROS:  see HPI  Inteument/breast: negative for rash, breast lump and nipple discharge  Musculoskeletal:negative for stiff joints, neck pain and muscle weakness  Endocrine ROS: negative for - breast changes, galactorrhea or temperature intolerance  Hematological and Lymphatic ROS: negative for - blood clots, bruising or swollen lymph nodes    Physical Exam:  Visit Vitals  /79 (BP 1 Location: Right arm, BP Patient Position: Sitting, BP Cuff Size: Adult)   Pulse 88       GENERAL: alert, well appearing, and in no distress  HEAD: normocephalic, atraumatic. ABDOMEN: soft, nontender, nondistended, no masses or organomegaly   EGBUS: no lesions, no inflammation, no masses  VULVA: normal appearing vulva with no masses, tenderness or lesions  VAGINA: normal appearing vagina with normal color, no lesions, blood tinged discharge  CERVIX: normal appearing cervix without discharge or lesions, non tender  UTERUS: uterus is normal size, shape, consistency and nontender   ADNEXA: normal adnexa in size, nontender and no masses  NEURO: alert, oriented, normal speech    Assessment:  Encounter Diagnoses   Name Primary?  Vagina bleeding Yes    Exposure to herpes simplex virus (HSV)     Exposure to sexually transmitted disease (STD)     Urine ketones     Abnormal uterine bleeding (AUB)     Nexplanon in place        Plan:  The patient is advised that she should contact the office if she does not note improvement or if symptoms recur  Recommend follow up with PCP for non-gynecologic complaints and chronic medical problems. She should contact our office with any questions or concerns  She could keep her routine annual exam appointment. Labs  We discussed potential causes of symptomatic bleeding: including but not limited to hormonal, medical, infection/inflammation and structural etiologies.   We discussed options for managing symptoms including but not limited to observation, NSAIDS, hormonal management, IUDs, ablation, and hysterectomy. Disc typical bleeding patterns with Nexplanon and options of NSAID, ERT, OCP  Disc weight based contraception effectiveness  Disc healthy diet/exercise/weight loss  FU and US  Bleeding precautions  Disc endo fu, will do labs, pt overdue for PRL      On this date, 2/9/2022,  I have spent 25 minutes reviewing previous notes, test results and face to face with the patient discussing the diagnosis and importance of compliance with the treatment plan as well as documenting on the day of the visit.       Orders Placed This Encounter    CULTURE, URINE    CT/NG/T.VAGINALIS AMPLIFICATION    PROLACTIN    CBC W/O DIFF    TSH 3RD GENERATION    AMB POC URINALYSIS DIP STICK MANUAL W/O MICRO    AMB POC URINE PREGNANCY TEST, VISUAL COLOR COMPARISON       Results for orders placed or performed in visit on 02/09/22   AMB POC URINALYSIS DIP STICK MANUAL W/O MICRO   Result Value Ref Range    Color (UA POC) Yellow     Clarity (UA POC) Clear     Glucose (UA POC) Negative Negative    Bilirubin (UA POC) Negative Negative    Ketones (UA POC) 3+ Negative    Specific gravity (UA POC) 1.005 1.001 - 1.035    Blood (UA POC) 4+ Negative    pH (UA POC) 5.0 4.6 - 8.0    Protein (UA POC) Negative Negative    Urobilinogen (UA POC) normal 0.2 - 1    Nitrites (UA POC) Negative Negative    Leukocyte esterase (UA POC) Negative Negative   AMB POC URINE PREGNANCY TEST, VISUAL COLOR COMPARISON   Result Value Ref Range    VALID INTERNAL CONTROL POC Yes     HCG urine, Ql. (POC) Negative Negative

## 2022-02-10 VITALS
HEART RATE: 88 BPM | SYSTOLIC BLOOD PRESSURE: 111 MMHG | BODY MASS INDEX: 55.75 KG/M2 | DIASTOLIC BLOOD PRESSURE: 79 MMHG | WEIGHT: 293 LBS

## 2022-02-11 ENCOUNTER — TELEPHONE (OUTPATIENT)
Dept: OBGYN CLINIC | Age: 27
End: 2022-02-11

## 2022-02-11 LAB
BACTERIA UR CULT: ABNORMAL
BACTERIA UR CULT: ABNORMAL
ERYTHROCYTE [DISTWIDTH] IN BLOOD BY AUTOMATED COUNT: 13.4 % (ref 11.7–15.4)
HCT VFR BLD AUTO: 42.7 % (ref 34–46.6)
HGB BLD-MCNC: 14.1 G/DL (ref 11.1–15.9)
MCH RBC QN AUTO: 29.7 PG (ref 26.6–33)
MCHC RBC AUTO-ENTMCNC: 33 G/DL (ref 31.5–35.7)
MCV RBC AUTO: 90 FL (ref 79–97)
PLATELET # BLD AUTO: 304 X10E3/UL (ref 150–450)
PROLACTIN SERPL-MCNC: 25.4 NG/ML (ref 4.8–23.3)
RBC # BLD AUTO: 4.74 X10E6/UL (ref 3.77–5.28)
TSH SERPL DL<=0.005 MIU/L-ACNC: 0.91 UIU/ML (ref 0.45–4.5)
WBC # BLD AUTO: 7.5 X10E3/UL (ref 3.4–10.8)

## 2022-02-12 LAB
C TRACH RRNA SPEC QL NAA+PROBE: NEGATIVE
N GONORRHOEA RRNA SPEC QL NAA+PROBE: NEGATIVE
T VAGINALIS DNA SPEC QL NAA+PROBE: NEGATIVE

## 2022-02-15 ENCOUNTER — TELEPHONE (OUTPATIENT)
Dept: OBGYN CLINIC | Age: 27
End: 2022-02-15

## 2022-02-15 NOTE — TELEPHONE ENCOUNTER
Patient called 2/15/22 requesting nurse or MD to return call regarding several questions about recent labs that appeared on mychart.  Informed patient that labs have not yet been reviewed by MD. Please call patient

## 2022-02-15 NOTE — TELEPHONE ENCOUNTER
[address] message sent to pt informing her Dr. Zara Camejo would send her a message regarding results as soon as she reviews it.

## 2022-02-16 NOTE — PROGRESS NOTES
Abnormal results. Fooala message sent, if active. Notify patient, if Fooala message not read, or not active on [address].   Update chart, PN labs/problem list, if needed  No UTI  Cx swab neg for infection  Thyroid screen normal  No anemia  PRL abnormal: rec fu with endo (has seen in past, I believe)

## 2022-02-19 ENCOUNTER — TELEPHONE (OUTPATIENT)
Dept: OBGYN CLINIC | Age: 27
End: 2022-02-19

## 2022-02-21 ENCOUNTER — TELEPHONE (OUTPATIENT)
Dept: OBGYN CLINIC | Age: 27
End: 2022-02-21

## 2022-02-21 RX ORDER — HYDROCORTISONE 25 MG/G
CREAM TOPICAL
Qty: 30 G | OUTPATIENT
Start: 2022-02-21

## 2022-02-21 RX ORDER — VALACYCLOVIR HYDROCHLORIDE 1 G/1
TABLET, FILM COATED ORAL
Qty: 90 TABLET | Refills: 0 | Status: SHIPPED | OUTPATIENT
Start: 2022-02-21 | End: 2022-02-28 | Stop reason: SDUPTHER

## 2022-02-21 NOTE — TELEPHONE ENCOUNTER
Patient was advised that prescription was sent by MD and of need to scheduled well woman exam      Patient was offered to schedule appointment and states she is in the hospital with her soon and will call back later to schedule    Patient was advised that her upcoming appointment is for a problem visit    Patient verbalized understanding.

## 2022-02-21 NOTE — TELEPHONE ENCOUNTER
32year old patient seen in office on 12/1/2021 for pp visit    ?  Refill has pended from the pharmacy    Prescription last sent on 8/2021    Patient has been seen recently for problem visit and will call to scheduled ae    Patient son in hospital at the current time    Please advise    Thank you

## 2022-02-21 NOTE — TELEPHONE ENCOUNTER
32year old patient last seen in the office on 2/9/2022    ?  Ok to refill has requested from the pharmacy      Please advise        Patient has pp visit on 12/1/2021

## 2022-02-21 NOTE — TELEPHONE ENCOUNTER
Rec exam if having ongoing issue. I would not recommend long term of steroid/may need to see GI.       Kong Leong MD

## 2022-02-28 ENCOUNTER — PATIENT MESSAGE (OUTPATIENT)
Dept: OBGYN CLINIC | Age: 27
End: 2022-02-28

## 2022-02-28 RX ORDER — AZITHROMYCIN 250 MG/1
TABLET, FILM COATED ORAL
COMMUNITY
Start: 2021-12-14 | End: 2022-05-02

## 2022-02-28 NOTE — PROGRESS NOTES
[facility]  [email or web address]  [phone]    Adina Blanton MD, FACOG       Annual Gynecologic Exam:  WWE <40  Chief Complaint   Patient presents with    Well Woman    Pelvic Pain    Ultrasound    Follow-up         Félix Fernando is a 32 y.o.  [address] [facility] [place] female who presents for an annual well woman exam.  No LMP recorded. Lawernce Roughen Ultrasound:  DIFFICULT SCAN DUE TO PATIENT BODY HABITUS. TA AND TV SCANS PERFORMED FOR BEST VISUALIZATION. UTERUS IS ANTEVERTED, NORMAL IN SIZE AND ECHOGENICITY. ENDOMETRIUM MEASURES 3-4MM IN THICKNESS. NO EVIDENCE OF MASSES OR ABNORMALITIES ARE SEEN. RIGHT OVARY APPEARS WITHIN NORMAL LIMITS. A FOLLICULAR CYST IS SEEN. LEFT OVARY APPEARS WITHIN NORMAL LIMITS. NO FREE FLUID SEEN IN THE CDS. She reports the following additional concerns: since the the ultrasound at 12:30pm today, she has felt extra crampy. She is still bleeding. Pt had nexplanon inserted 2021. Last seen 22 & pt c/o having vaginal bleeding and cramping in lower back and pelvis for 2 months. She reports she has been bleeding since 22. She has intermittent cramping that sometimes feels like small contractions. Her bleeding is super heavy on the days she is supposed to have her periods. She also reports passing a clot a little bit bigger than a golf ball on 2021. Pt reports the nexplanon has been pinching her arm more than usual. She reports increased mood changes (emotional/sad). Pt thinks her bleeding may be d/t the nexplanon. She is interested in the [address] IUD. Pt is on keto diet & is losing weight. 22 pt had neg gc/ch/t NuSwab. No new partners since. Declines STD testing. Menstrual status:  She does not report dysmenorrhea/painful menses. She does not report heavy menses. She does not report irregular bleeding.       Sexual history and Contraception:  Social History     Substance and Sexual Activity   Sexual Activity Yes    Partners: Male    Birth control/protection: Pill       She does reports new sexual partner(s) in the last year. Preventive Medicine History:  Her most recent Pap smear result: normal was obtained in 2021  Her most recent HR HPV screen was Negative obtained in     She does not have a history of ODIN 2, 3 or cervical cancer. Past Medical History:   Diagnosis Date    Asthma     Dysmenorrhea     Essential hypertension     Genital herpes     Valtrex     Major depressive disorder, single episode, unspecified     Morbid obesity ([facility] [address]     Nexplanon insertion 2021    Pap smear for cervical cancer screening     17 neg 21 neg    Pituitary abnormality (HCC)     Postpartum depression     Seasonal allergies     Unspecified symptom associated with female genital organs      OB History    Para Term  AB Living   2 2 2     2   SAB IAB Ectopic Molar Multiple Live Births           0 2      # Outcome Date GA Lbr Gil/2nd Weight Sex Delivery Anes PTL Lv   2 Term 10/14/21 39w4d  8 lb 6.2 oz (3.805 kg) M CS-LTranv EPI N PENNY      Complications: Failure to Progress in First Stage, Fetal Intolerance   1 Term 18 37w6d / 00:12 6 lb 3.8 oz (2.83 kg) M Vag-Spont EPIDURAL AN N PENNY      Obstetric Comments   Menarche:  10. LMP: current. # of Children:  0. Age at Delivery of First Child:  na.   Hysterectomy/oophorectomy:  NO/NO. Breast Bx:  no.  Hx of Breast Feeding:  na. BCP:  yes.  Hormone therapy:  no.    P1 spontaneous labor, efw 9pounds     Past Surgical History:   Procedure Laterality Date    HX OTHER SURGICAL      HX WISDOM TEETH EXTRACTION      HX WRIST FRACTURE TX Right      Family History   Problem Relation Age of Onset    Alcohol abuse Father     Cancer Paternal Grandmother     Other Mother         endometriosis    Migraines Mother         tumor in brain     Social History     Socioeconomic History    Marital status: SINGLE     Spouse name: Not on file    Number of children: Not on file    Years of education: Not on file    Highest education level: Not on file   Occupational History    Not on file   Tobacco Use    Smoking status: Former Smoker     Packs/day: 0.50     Years: 1.00     Pack years: 0.50     Types: Cigarettes    Smokeless tobacco: Never Used   Vaping Use    Vaping Use: Never used   Substance and Sexual Activity    Alcohol use: No     Alcohol/week: 0.0 standard drinks     Comment: former user just sips now and then    Drug use: No    Sexual activity: Yes     Partners: Male     Birth control/protection: Pill   Other Topics Concern     Service Not Asked    Blood Transfusions Not Asked    Caffeine Concern Not Asked    Occupational Exposure Not Asked    Hobby Hazards Not Asked    Sleep Concern Not Asked    Stress Concern Not Asked    Weight Concern Not Asked    Special Diet Not Asked    Back Care Not Asked    Exercise Not Asked    Bike Helmet Not Asked    Seat Belt Not Asked    Self-Exams Not Asked   Social History Narrative    Not on file     Social Determinants of Health     Financial Resource Strain:     Difficulty of Paying Living Expenses: Not on file   Food Insecurity:     Worried About Running Out of Food in the Last Year: Not on file    Radha of Food in the Last Year: Not on file   Transportation Needs:     Lack of Transportation (Medical): Not on file    Lack of Transportation (Non-Medical):  Not on file   Physical Activity:     Days of Exercise per Week: Not on file    Minutes of Exercise per Session: Not on file   Stress:     Feeling of Stress : Not on file   Social Connections:     Frequency of Communication with Friends and Family: Not on file    Frequency of Social Gatherings with Friends and Family: Not on file    Attends Evangelical Services: Not on file    Active Member of Clubs or Organizations: Not on file    Attends Club or Organization Meetings: Not on file    Marital Status: Not on file   Intimate Partner Violence:     Fear of Current or Ex-Partner: Not on file    Emotionally Abused: Not on file    Physically Abused: Not on file    Sexually Abused: Not on file   Housing Stability:     Unable to Pay for Housing in the Last Year: Not on file    Number of [place] in the Last Year: Not on file    Unstable Housing in the Last Year: Not on file       Allergies   Allergen Reactions    Latex Swelling     Skin peels    Lactose Other (comments)    Aspirin Nausea and Vomiting    Betadine [Povidone-Iodine] Other (comments)     Skin peels    Cat Dander Sneezing    Ketorolac Hives    Penicillins Other (comments)     Yeast infections    Ivp Dye [Iodinated Contrast Media] Other (comments)       Current Outpatient Medications   Medication Sig    valACYclovir (VALTREX) 1 gram tablet Take 1 Tablet by mouth daily.  azithromycin (ZITHROMAX) 250 mg tablet     Cetirizine (ZyrTEC) 10 mg cap Take  by mouth.  nystatin (MYCOSTATIN) powder Apply  to affected area two (2) times a day. (Patient not taking: Reported on 10/28/2021)    fluticasone propionate (FLONASE) 50 mcg/actuation nasal spray SHAKE LIQUID AND USE 2 SPRAYS IN EACH NOSTRIL DAILY (Patient not taking: Reported on 10/28/2021)    Omeprazole delayed release (PRILOSEC D/R) 20 mg tablet Take 1 Tablet by mouth two (2) times a day.  PNV Comb #2-Iron-FA-Omega 3 29-1-400 mg cmpk Take  by mouth.  B.infantis-B.ani-B.long-B.bifi (Probiotic 4X) 10-15 mg TbEC Take  by mouth. (Patient not taking: Reported on 11/18/2021)    OTHER Prebiotic with cranberry    albuterol (PROVENTIL HFA, VENTOLIN HFA, PROAIR HFA) 90 mcg/actuation inhaler Take 1 Puff by inhalation every four (4) hours as needed for Wheezing. (Patient not taking: Reported on 8/10/2021)     No current facility-administered medications for this visit.        Patient Active Problem List   Diagnosis Code    Obesity E66.9    Dysmenorrhea N94.6    Constipation K59.00    Chronic pelvic pain in female R10.2, G89.29    Panic attacks F41.0    Galactorrhea N64.3    Hyperprolactinemia (HCC) E22.1    Pituitary adenoma (HCC) D35.2    Obesity, morbid (HCC) E66.01    Nerve compression syndrome G58.9    Peripheral edema R60.9    Pleurodynia R07.81    Engages in vaping Z72.89         Review of Systems - History obtained from the patient and patient filled out questionnaire   Constitutional/general, HEENT, CV, Resp, GI, MSK, Neuro, Psych, Heme/lymph, Skin, Breast ROS: no significant complaints except as noted on HPI    Physical Exam  Visit Vitals  BP (!) 176/90   Pulse (!) 123   Ht 5' 6\" (1.676 m)   Wt 335 lb (152 kg)   BMI 54.07 kg/m²       Constitutional  · Appearance: well-nourished, well developed, alert, in no acute distress    HENT  · Head and Face: appears normal    Neck  · Inspection/Palpation: normal appearance, no masses or tenderness  · Lymph Nodes: no lymphadenopathy present  · Thyroid: gland size normal, nontender, no nodules or masses present on palpation    Chest  · Respiratory Effort: breathing unlabored  · Auscultation: normal breath sounds    Cardiovascular  · Heart:  · Auscultation: regular rate and rhythm without murmur    Breasts  · Inspection of Breasts: breasts symmetrical, no skin changes, no discharge present, nipple appearance normal, no skin retraction present  · Palpation of Breasts and Axillae: no masses present on palpation, no breast tenderness  · Axillary Lymph Nodes: no lymphadenopathy present    Gastrointestinal  · Abdominal Examination: abdomen non-tender to palpation, normal bowel sounds, no masses present  · Liver and spleen: no hepatomegaly present, spleen not palpable  · Hernias: no hernias identified    Genitourinary  · External Genitalia: normal appearance for age, no discharge present, no tenderness present, no inflammatory lesions present, no masses present  · Vagina: normal vaginal vault without central or paravaginal defects, minimal discharge present, no inflammatory lesions present, no masses present  · Bladder: non-tender to palpation  · Urethra: appears normal  · Cervix: normal   · Uterus: normal size, shape and consistency  · Adnexa: no adnexal tenderness present, no adnexal masses present  · Perineum: perineum within normal limits, no evidence of trauma, no rashes or skin lesions present  · Anus: anus within normal limits, no hemorrhoids present  · Inguinal Lymph Nodes: no lymphadenopathy present    Skin  · General Inspection: no rash, no lesions identified  · nexplanon in place LUE    Neurologic/Psychiatric  · Mental Status:  · Orientation: grossly oriented to person, place and time  · Mood and Affect: mood normal, affect appropriate    Assessment:  32 y.o.  for well woman exam  Encounter Diagnoses   Name Primary?  Vaginal bleeding     Exposure to sexually transmitted disease (STD)     Encounter for gynecological examination (general) (routine) without abnormal findings Yes    Abnormal uterine bleeding     Nexplanon in place        Plan:  The patient was counseled about diet, exercise, healthy lifestyle  We discussed current pap smear and HR HPV testing guidelines. I recommended follow up one year for routine annual gynecologic exam or sooner prn  Handouts were given to the patient  I recommended follow up with a primary care physician for chronic medical problems and evaluation of non-gynecologic concerns and to please contact our office with any GYN questions or concerns. I recommended testing per CDC guidelines and at patient request.   Disc typical bleeding/aub with nexplanon  Pt wants to try IUD  Larc ho  FU 30 min IUD insert/mirena.  nexplanon insertion  Reviewed us  rec endo fu: abnl prl  Rec PCP fu: bp check: repeat improved: will add to chart  Daily valtrex suppression    Folllow up:  [x] return for annual well woman exam in one year or sooner if she is having problems  [] follow up and ultrasound  [] 6 months  [] 3 months  [] 6 weeks   [] 1 month    Orders Placed This Encounter    AMB POC URINE PREGNANCY TEST, VISUAL COLOR COMPARISON    valACYclovir (VALTREX) 1 gram tablet       Results for orders placed or performed in visit on 03/02/22   US TRANSVAGINAL    Narrative    See impression. Impression    Impression: The final result for this exam can be located in this patient's chart with  results from [facility].

## 2022-02-28 NOTE — PATIENT INSTRUCTIONS
Well Visit, Ages 25 to 48: Care Instructions  Overview     Well visits can help you stay healthy. Your doctor has checked your overall health and may have suggested ways to take good care of yourself. Your doctor also may have recommended tests. At home, you can help prevent illness with healthy eating, regular exercise, and other steps. Follow-up care is a key part of your treatment and safety. Be sure to make and go to all appointments, and call your doctor if you are having problems. It's also a good idea to know your test results and keep a list of the medicines you take. How can you care for yourself at home? · Get screening tests that you and your doctor decide on. Screening helps find diseases before any symptoms appear. · Eat healthy foods. Choose fruits, vegetables, whole grains, protein, and low-fat dairy foods. Limit fat, especially saturated fat. Reduce salt in your diet. · Limit alcohol. If you are a man, have no more than 2 drinks a day or 14 drinks a week. If you are a woman, have no more than 1 drink a day or 7 drinks a week. · Get at least 30 minutes of physical activity on most days of the week. Walking is a good choice. You also may want to do other activities, such as running, swimming, cycling, or playing tennis or team sports. Discuss any changes in your exercise program with your doctor. · Reach and stay at a healthy weight. This will lower your risk for many problems, such as obesity, diabetes, heart disease, and high blood pressure. · Do not smoke or allow others to smoke around you. If you need help quitting, talk to your doctor about stop-smoking programs and medicines. These can increase your chances of quitting for good. · Care for your mental health. It is easy to get weighed down by worry and stress. Learn strategies to manage stress, like deep breathing and mindfulness, and stay connected with your family and community.  If you find you often feel sad or hopeless, talk with your doctor. Treatment can help. · Talk to your doctor about whether you have any risk factors for sexually transmitted infections (STIs). You can help prevent STIs if you wait to have sex with a new partner (or partners) until you've each been tested for STIs. It also helps if you use condoms (male or female condoms) and if you limit your sex partners to one person who only has sex with you. Vaccines are available for some STIs, such as HPV. · Use birth control if it's important to you to prevent pregnancy. Talk with your doctor about the choices available and what might be best for you. · If you think you may have a problem with alcohol or drug use, talk to your doctor. This includes prescription medicines (such as amphetamines and opioids) and illegal drugs (such as cocaine and methamphetamine). Your doctor can help you figure out what type of treatment is best for you. · Protect your skin from too much sun. When you're outdoors from 10 a.m. to 4 p.m., stay in the shade or cover up with clothing and a hat with a wide brim. Wear sunglasses that block UV rays. Even when it's cloudy, put broad-spectrum sunscreen (SPF 30 or higher) on any exposed skin. · See a dentist one or two times a year for checkups and to have your teeth cleaned. · Wear a seat belt in the car. When should you call for help? Watch closely for changes in your health, and be sure to contact your doctor if you have any problems or symptoms that concern you. Where can you learn more? Go to http://www.[employer].com/  Enter P072 in the search box to learn more about \"Well Visit, Ages 25 to 48: Care Instructions. \"  Current as of: February 11, 2021               Content Version: 13.0  © 8297-2956 Healthwise, Incorporated. Care instructions adapted under license by [employer] (which disclaims liability or warranty for this information).  If you have questions about a medical condition or this instruction, always ask your healthcare professional. [place] [address] any warranty or liability for your use of this information.

## 2022-03-02 ENCOUNTER — OFFICE VISIT (OUTPATIENT)
Dept: OBGYN CLINIC | Age: 27
End: 2022-03-02

## 2022-03-02 DIAGNOSIS — Z01.419 ENCOUNTER FOR GYNECOLOGICAL EXAMINATION (GENERAL) (ROUTINE) WITHOUT ABNORMAL FINDINGS: Primary | ICD-10-CM

## 2022-03-02 DIAGNOSIS — Z97.5 NEXPLANON IN PLACE: ICD-10-CM

## 2022-03-02 DIAGNOSIS — N93.9 ABNORMAL UTERINE BLEEDING: ICD-10-CM

## 2022-03-02 DIAGNOSIS — N93.9 VAGINAL BLEEDING: ICD-10-CM

## 2022-03-02 DIAGNOSIS — Z20.2 EXPOSURE TO SEXUALLY TRANSMITTED DISEASE (STD): ICD-10-CM

## 2022-03-02 PROCEDURE — 99395 PREV VISIT EST AGE 18-39: CPT | Performed by: OBSTETRICS & GYNECOLOGY

## 2022-03-02 PROCEDURE — 81025 URINE PREGNANCY TEST: CPT | Performed by: OBSTETRICS & GYNECOLOGY

## 2022-03-02 RX ORDER — VALACYCLOVIR HYDROCHLORIDE 1 G/1
1000 TABLET, FILM COATED ORAL DAILY
Qty: 90 TABLET | Refills: 2 | Status: SHIPPED | OUTPATIENT
Start: 2022-03-02 | End: 2022-08-19

## 2022-03-03 VITALS
WEIGHT: 293 LBS | BODY MASS INDEX: 47.09 KG/M2 | DIASTOLIC BLOOD PRESSURE: 80 MMHG | HEART RATE: 123 BPM | SYSTOLIC BLOOD PRESSURE: 151 MMHG | HEIGHT: 66 IN

## 2022-03-03 LAB
HCG URINE, QL. (POC): NEGATIVE
VALID INTERNAL CONTROL?: YES

## 2022-03-04 NOTE — PATIENT INSTRUCTIONS
Intrauterine Device (IUD) Insertion: Care Instructions  Your Care Instructions     An intrauterine device (IUD) is a very effective method of birth control. It is a small, plastic, T-shaped device that contains copper or hormones. The doctor inserts the IUD into your uterus. You can have an IUD inserted at any time, as long as you aren't pregnant and you don't have a pelvic infection. If you and your doctor discuss it before you give birth, this can be done right after you have your baby. A plastic string tied to the end of the IUD hangs down through the cervix into the vagina. Your doctor may have you feel for the IUD string right after insertion, to be sure you know what it feels like. There are two types of IUDs. The copper IUD works for up to 10 years. The hormonal IUD works for either 3 years or 6 years, depending on which IUD is used. But your doctor may talk to you about leaving it in for longer. The hormonal IUD also reduces menstrual bleeding and cramping. Follow-up care is a key part of your treatment and safety. Be sure to make and go to all appointments, and call your doctor if you are having problems. It's also a good idea to know your test results and keep a list of the medicines you take. How can you care for yourself at home? · It's safe to use while breastfeeding. · You may experience some mild cramping and light bleeding (spotting) for 1 or 2 days. Use a hot water bottle or a heating pad set on low on your belly for pain. · Take an over-the-counter pain medicine, such as acetaminophen (Tylenol), ibuprofen (Advil, Motrin), and naproxen (Aleve) if needed. Read and follow all instructions on the label. · Do not take two or more pain medicines at the same time unless the doctor told you to. Many pain medicines have acetaminophen, which is Tylenol. Too much acetaminophen (Tylenol) can be harmful.   · If you want to check the string of your IUD, insert a finger into your vagina and feel for the cervix, which is at the top of the vagina and feels harder than the rest of your vagina. You should be able to feel the thin, plastic string coming out of the opening of your cervix. If you cannot feel the string, use another form of birth control and make an appointment with your doctor to have the string checked. · If the IUD comes out, save it and call your doctor. Be sure to use another form of birth control while the IUD is out. · Use latex condoms to protect against sexually transmitted infections (STIs), such as gonorrhea and chlamydia. An IUD does not protect you from STIs. Having one sex partner (who does not have STIs and does not have sex with anyone else) is a good way to avoid STIs. When should you call for help? Call 911 anytime you think you may need emergency care. For example, call if:    · You passed out (lost consciousness).     · You have sudden, severe pain in your belly or pelvis. Call your doctor now or seek immediate medical care if:    · You have new belly or pelvic pain.     · You have severe vaginal bleeding. This means that you are soaking through your usual pads or tampons each hour for 2 or more hours.     · You are dizzy or lightheaded, or you feel like you may faint.     · You have a fever and pelvic pain or vaginal discharge.     · You have pelvic pain that is getting worse. Watch closely for changes in your health, and be sure to contact your doctor if:    · You cannot feel the string, or the IUD comes out.     · You feel sick to your stomach, or you vomit.     · You think you may be pregnant. Where can you learn more? Go to http://www.gray.com/  Enter W926 in the search box to learn more about \"Intrauterine Device (IUD) Insertion: Care Instructions. \"  Current as of: June 16, 2021               Content Version: 13.0  © 0898-3715 Healthwise, Incorporated.    Care instructions adapted under license by [employer] (which disclaims liability or warranty for this information). If you have questions about a medical condition or this instruction, always ask your healthcare professional. [address] any warranty or liability for your use of this information. Learning About Birth Control: Intrauterine Device (IUD)  What is an intrauterine device (IUD)? The intrauterine device (IUD) is used to prevent pregnancy. It's a small, plastic, T-shaped device. Your doctor places the IUD in your uterus. If you and your doctor discuss it before you give birth, this can be done right after you have your baby. You have a choice between a hormonal IUD and a copper IUD. The hormonal IUD can prevent pregnancy for 3 to 6 years, depending on which IUD is used. But your doctor may talk to you about leaving it in for longer. When you have it, you don't have to do anything else to prevent pregnancy. The copper IUD can prevent pregnancy for 10 years. But your doctor may talk to you about leaving it in for longer. When you have it, you don't have to do anything else to prevent pregnancy. A string tied to the end of the IUD hangs down through the opening of the uterus (called the cervix) into the vagina. You can check that the IUD is in place by feeling for the string. The IUD usually stays in the uterus until your doctor removes it. How well does an IUD for birth control work? In the first year of use:  · When the hormonal IUD is used exactly as directed, fewer than 1 out of 100 women have an unplanned pregnancy. · When the copper IUD is used exactly as directed, fewer than 1 out of 100 women have an unplanned pregnancy. Be sure to tell your doctor about any health problems you have or medicines you take. Your doctor can help you choose the birth control method that's right for you. What are the advantages of an IUD? · An IUD is one of the most effective methods of birth control.   · It prevents pregnancy for 3 to 10 years, depending on the type. You don't have to worry about birth control during this time. · It's safe to use while breastfeeding. · IUDs don't contain estrogen. So you can use an IUD if you don't want to take estrogen or can't take estrogen because you have certain health problems or concerns. · An IUD is convenient. It is always providing birth control. You don't need to remember to take a pill or get a shot. You don't have to interrupt sex to protect against pregnancy. · A hormonal IUD may reduce heavy bleeding and cramping. What are the disadvantages of an IUD? · An IUD doesn't protect against sexually transmitted infections (STIs), such as herpes or HIV/AIDS. If you aren't sure if your sex partner might have an STI, use a condom to protect against disease. · A copper IUD may cause periods with more bleeding and cramping. · You have to see a doctor to have an IUD inserted and removed. Where can you learn more? Go to http://www.gray.com/  Enter B486 in the search box to learn more about \"Learning About Birth Control: Intrauterine Device (IUD). \"  Current as of: June 16, 2021               Content Version: 13.0  © 2006-2021 Healthwise, Incorporated. Care instructions adapted under license by [employer] (which disclaims liability or warranty for this information). If you have questions about a medical condition or this instruction, always ask your healthcare professional. [place] [address] any warranty or liability for your use of this information.

## 2022-03-04 NOTE — PROGRESS NOTES
_ [facility]  [email or web address]  [phone]    Cate Howell MD, FACOG       Procedure: Nexplanon Removal        Chart reviewed for the following:   Kip MENESES, have reviewed the History, Physical and updated the Allergic reactions for Mala Mejia     TIME OUT performed immediately prior to start of procedure:   Kip MENESES, have performed the following reviews on [facility] prior to the start of the procedure:            * Patient was identified by name and date of birth   * Agreement on procedure being performed was verified  * Risks and Benefits explained to the patient  * Procedure site verified and marked as necessary  * Patient was positioned for comfort  * Consent was signed and verified     Time: 8:10am    Date of procedure: 3/3/2022    Procedure performed by: Darren Fried MD    Provider assisted by:   Kip Arias, [address] [place] [address]    Patient assisted by: self    How tolerated by patient: tolerated the procedure well with no complications    Post Procedural Pain Scale: 0 - No Hurt    Comments: none    Procedure: The patient presents for office removal of a NEXPLANON sub-dermal contraceptive implant. Patient elects removal because AUB & switching to Mirena IUD. She was positioned so the site of her implant was visable and easily accessible. The implant was located by palpation. The end of the implant nearest the elbow was marked with a sterile marker. The operative site was cleansed with zephiran per patient request.  Using a 27 gauge needle on a 5 cc syringe and 1% lidocaine, 3 cc were infiltrated as a intradermal wheal and underneath the end of the implant closest to the elbow. Downward pressure was applied on the end of the implant nearest the axilla and a 2-3mm incision was made in the longitudinal direction of the arm at the tip of the implant closest to the elbow.  The implant was then pushed gently toward the incision until the tip was visible. The fibrous capsule was opened with a combination of blunt and sharp dissection. The implant was grasped with mosquito forceps and removed intact. It measured a full 4 cm in length. The skin was cleansed and dried. A steristrip was applied then topped with a folded 4x4 gauze and a Curlex pressure dressing. There were no complication or problems. She demonstrated full active range of movement of her elbow, wrist, all five digits and denied numbness and tingling. Post-procedure:  She was told to remove the pressure dressing in 12-24 hours, to keep the incision area dry for 24 hours and to remove the Steristrip in several days. She was instructed to contact the office with any questions or concerns and bleeding and infection precautions were reviewed with her. We discussed typical bleeding patterns after removal of the Nexplanon. Recommend follow up for well woman exam or sooner prn.   See Mirena insertion note

## 2022-03-04 NOTE — PROGRESS NOTES
[facility]  [email or web address]  [address] MD Ayo, FACOG       MIRENA IUD INSERTION  OFFICE PROCEDURE PROGRESS NOTE    [facility]  OFFICE PROCEDURE PROGRESS NOTE    Chart reviewed for the following:   I, [employer], have reviewed the History, Physical and updated the Allergic reactions for Mala Mejia     TIME OUT performed immediately prior to start of procedure:   I, [employer], have performed the following reviews on Baldev Sorensen prior to the start of the procedure:            * Patient was identified by name and date of birth   * Agreement on procedure being performed was verified  * Risks and Benefits explained to the patient  * Procedure site verified and marked as necessary  * Patient was positioned for comfort  * Consent was signed and verified     Time: 8:10am    Date of procedure: 3/3/2022    Procedure performed by: Dav Rubio MD    Provider assisted by:   [employer], [place]    Patient assisted by:   self    How tolerated by patient: tolerated the procedure well with no complications    Post Procedural Pain Scale: 0 - No Hurt    Comments: none        IUD INSERTION PROCEDURE  Last well woman exam: 2022. A urine pregnancy test today was Negative. She has not had unprotected intercourse in the last 14 days. Baldev Sorensen is a ,  32 y.o. female WHITE/NON-     LMP: No LMP recorded. , abnormal     She has a menstrual history positive for abnormal bleeding  She presents for insertion of an IUD. The risks, benefits and alternatives of IUD insertion were discussed in detail and all questions were answered. The patient has reviewed the IUD  provided information and consent. She has elected to proceed with the insertion today and she states she has no further questions. Procedure: On bimanual exam the uterus was anteverted and normal in size with no tenderness present.  A speculum was inserted into the vagina and the cervix was visualized. The cervix was prepped with zephiran solution. The anterior lip of the cervix was grasped with a single toothed tenaculum. It was not necessary to dilate the cervix. The uterus was sounded with a pipelle to 8 centimeters and 2cc 1% lidocaine was injected into the cavity. The IUD was then inserted without difficulty. The strings were cut to approximately 3 centimeters and demonstrated to the patient. She experienced a mild  amount of cramping. Post Procedure Status:   She tolerated the procedure with mild discomfort. The patient was observed for 15 minutes after the insertion. There were no complications. Patient was discharged in stable condition. Patient was given routine post-IUD insertion instructions. She was advised to contact the MD for worsening pain, heavy bleeding, unexplained fever, if she cannot feel the IUD strings, if she thinks she may be pregnant, or if she is concerned she may have an STD. We recommend nothing per vagina x 7 days. We recommend follow up in six weeks with US or sooner if needed. The patient received  MIRENA IUD, lot number [id number]. The IUD did not come from a [employer]. No results found for any visits on 03/07/22. Discussed typical bleeding patterns after IUD insertion.

## 2022-03-07 ENCOUNTER — OFFICE VISIT (OUTPATIENT)
Dept: OBGYN CLINIC | Age: 27
End: 2022-03-07
Payer: MEDICAID

## 2022-03-07 VITALS
HEIGHT: 66 IN | WEIGHT: 293 LBS | DIASTOLIC BLOOD PRESSURE: 80 MMHG | BODY MASS INDEX: 47.09 KG/M2 | SYSTOLIC BLOOD PRESSURE: 134 MMHG

## 2022-03-07 DIAGNOSIS — Z76.89 ENCOUNTER FOR MENSTRUAL REGULATION: ICD-10-CM

## 2022-03-07 DIAGNOSIS — Z30.46 NEXPLANON REMOVAL: ICD-10-CM

## 2022-03-07 DIAGNOSIS — N93.9 ABNORMAL UTERINE BLEEDING (AUB): ICD-10-CM

## 2022-03-07 DIAGNOSIS — Z01.812 PRE-PROCEDURAL LABORATORY EXAMINATION: Primary | ICD-10-CM

## 2022-03-07 DIAGNOSIS — Z30.430 ENCOUNTER FOR IUD INSERTION: ICD-10-CM

## 2022-03-07 LAB
HCG URINE, QL. (POC): NEGATIVE
VALID INTERNAL CONTROL?: YES

## 2022-03-07 PROCEDURE — 11982 REMOVE DRUG IMPLANT DEVICE: CPT | Performed by: OBSTETRICS & GYNECOLOGY

## 2022-03-07 PROCEDURE — 58300 INSERT INTRAUTERINE DEVICE: CPT | Performed by: OBSTETRICS & GYNECOLOGY

## 2022-03-07 PROCEDURE — 81025 URINE PREGNANCY TEST: CPT | Performed by: OBSTETRICS & GYNECOLOGY

## 2022-03-19 PROBLEM — R60.0 PERIPHERAL EDEMA: Status: ACTIVE | Noted: 2020-02-19

## 2022-03-19 PROBLEM — Z72.89 ENGAGES IN VAPING: Status: ACTIVE | Noted: 2021-08-12

## 2022-03-19 PROBLEM — G58.9 NERVE COMPRESSION SYNDROME: Status: ACTIVE | Noted: 2020-02-19

## 2022-03-19 PROBLEM — E66.01 OBESITY, MORBID (HCC): Status: ACTIVE | Noted: 2018-02-05

## 2022-03-19 PROBLEM — R60.9 PERIPHERAL EDEMA: Status: ACTIVE | Noted: 2020-02-19

## 2022-03-20 PROBLEM — R07.81 PLEURODYNIA: Status: ACTIVE | Noted: 2020-02-19

## 2022-04-05 NOTE — TELEPHONE ENCOUNTER
Pt was LVM to advise that her appointment on 02/14/22 has to be rescheduled because the provider has surgery that morning. Pt. Was advised that we can schedule her in the afternoon on 03/02/22 and if she wanted a different day to give the office a call to change the appointment.
minimum assist (75% patients effort)

## 2022-04-13 RX ORDER — CETIRIZINE HYDROCHLORIDE 10 MG/1
CAPSULE, LIQUID FILLED ORAL
Qty: 30 CAPSULE | Refills: 3 | Status: SHIPPED | OUTPATIENT
Start: 2022-04-13 | End: 2022-09-15

## 2022-04-26 RX ORDER — FLUTICASONE PROPIONATE 50 MCG
SPRAY, SUSPENSION (ML) NASAL
Qty: 16 G | Refills: 5 | Status: SHIPPED | OUTPATIENT
Start: 2022-04-26

## 2022-04-29 ENCOUNTER — OFFICE VISIT (OUTPATIENT)
Dept: ENDOCRINOLOGY | Age: 27
End: 2022-04-29
Payer: MEDICAID

## 2022-04-29 VITALS
BODY MASS INDEX: 47.09 KG/M2 | OXYGEN SATURATION: 100 % | WEIGHT: 293 LBS | TEMPERATURE: 98 F | SYSTOLIC BLOOD PRESSURE: 127 MMHG | HEIGHT: 66 IN | HEART RATE: 78 BPM | DIASTOLIC BLOOD PRESSURE: 69 MMHG

## 2022-04-29 DIAGNOSIS — E66.01 MORBID OBESITY (HCC): ICD-10-CM

## 2022-04-29 DIAGNOSIS — D35.2 PITUITARY ADENOMA (HCC): ICD-10-CM

## 2022-04-29 DIAGNOSIS — E22.1 HYPERPROLACTINEMIA (HCC): Primary | ICD-10-CM

## 2022-04-29 PROCEDURE — 99214 OFFICE O/P EST MOD 30 MIN: CPT | Performed by: INTERNAL MEDICINE

## 2022-04-29 RX ORDER — PROPRANOLOL HYDROCHLORIDE 10 MG/1
TABLET ORAL
COMMUNITY
Start: 2022-04-28

## 2022-04-29 RX ORDER — LAMOTRIGINE 25 MG/1
TABLET ORAL
COMMUNITY
Start: 2022-04-28 | End: 2022-09-15

## 2022-04-29 RX ORDER — TRAZODONE HYDROCHLORIDE 50 MG/1
TABLET ORAL
COMMUNITY
Start: 2022-04-28

## 2022-04-29 RX ORDER — HYDROXYZINE HYDROCHLORIDE 10 MG/1
TABLET, FILM COATED ORAL
COMMUNITY
Start: 2022-04-28

## 2022-04-29 NOTE — PROGRESS NOTES
Jennyfer Pace MD        Patient Information  Date:4/30/2022  Name : Tiago Mejia 32 y.o.     YOB: 1995         Referred by: Nathaly Echols MD         Chief Complaint   Patient presents with    Hyperprolactinemia    Pituitary Problem       History of Present Illness: Cheryl Reyes is a 32 y.o. female here for fu    She was referred by Nathaly Echols MD for evaluation and management of galactorrhea. She was on oral contraceptives in July 2016 for dysmenorrhea.    MRI showed pituitary adenoma 7 mm   She delivered a healthy baby in December 2018, 2 nd child Oct 14 2021  Bothersome galactorrhea and hence was on cabergoline in the past, she is off cabergoline    Has IUD, prolactin mildly elevated  No galactorrhea  Not lactating  Lost weight      Wt Readings from Last 3 Encounters:   04/29/22 325 lb 9.6 oz (147.7 kg)   03/07/22 337 lb 12.8 oz (153.2 kg)   03/02/22 335 lb (152 kg)       BP Readings from Last 3 Encounters:   04/29/22 127/69   03/07/22 134/80   03/03/22 (!) 151/80           Past Medical History:   Diagnosis Date    Asthma     Dysmenorrhea     Encounter for IUD insertion 03/07/2022    Mirena Due out 3/7/2025    Essential hypertension     Genital herpes     Valtrex     Major depressive disorder, single episode, unspecified     Morbid obesity ([facility] [address]     Nexplanon insertion 12/08/2021    Nexplanon removal 03/07/2022    Pap smear for cervical cancer screening     2/23/17 neg 2/8/21 neg    Pituitary abnormality (HCC)     Postpartum depression     Seasonal allergies     Unspecified symptom associated with female genital organs      Current Outpatient Medications   Medication Sig    hydrOXYzine HCL (ATARAX) 10 mg tablet     lamoTRIgine (LaMICtal) 25 mg tablet     propranoloL (INDERAL) 10 mg tablet     traZODone (DESYREL) 50 mg tablet     fluticasone propionate (FLONASE) 50 mcg/actuation nasal spray SHAKE LIQUID AND USE 2 SPRAYS IN EACH NOSTRIL DAILY    Cetirizine (ZyrTEC) 10 mg cap Take  by mouth.  valACYclovir (VALTREX) 1 gram tablet Take 1 Tablet by mouth daily.  Omeprazole delayed release (PRILOSEC D/R) 20 mg tablet Take 1 Tablet by mouth two (2) times a day.  OTHER Prebiotic with cranberry    azithromycin (ZITHROMAX) 250 mg tablet  (Patient not taking: Reported on 3/7/2022)    nystatin (MYCOSTATIN) powder Apply  to affected area two (2) times a day. (Patient not taking: Reported on 10/28/2021)    PNV Comb #2-Iron-FA-Omega 3 29-1-400 mg cmpk Take  by mouth. (Patient not taking: Reported on 4/29/2022)    B.infantis-B.ani-B.long-B.bifi (Probiotic 4X) 10-15 mg TbEC Take  by mouth. (Patient not taking: Reported on 11/18/2021)    albuterol (PROVENTIL HFA, VENTOLIN HFA, PROAIR HFA) 90 mcg/actuation inhaler Take 1 Puff by inhalation every four (4) hours as needed for Wheezing. (Patient not taking: Reported on 8/10/2021)     No current facility-administered medications for this visit. Allergies   Allergen Reactions    Latex Swelling     Skin peels    Lactose Other (comments)    Aspirin Nausea and Vomiting    Betadine [Povidone-Iodine] Other (comments)     Skin peels    Cat Dander Sneezing    Ketorolac Hives    Penicillins Other (comments)     Yeast infections    Ivp Dye [Iodinated Contrast Media] Other (comments)         Review of Systems:  - Per HPI    Physical Examination:   Blood pressure 127/69, pulse 78, temperature 98 °F (36.7 °C), temperature source Temporal, height 5' 6\" (1.676 m), weight 325 lb 9.6 oz (147.7 kg), SpO2 100 %, not currently breastfeeding. Estimated body mass index is 52.55 kg/m² as calculated from the following:    Height as of this encounter: 5' 6\" (1.676 m). -   Weight as of this encounter: 325 lb 9.6 oz (147.7 kg).   - General: pleasant, no distress, good eye contact  - HEENT: no pallor, no periorbital edema, EOMI  - Neck: supple,  - Cardiovascular: regular, normal rate, normal S1 and S2,   - Respiratory: clear to auscultation bilaterally  - Integumentary: ,no edema,  - Neurological: ,alert and oriented  - Psychiatric: normal mood and affect  - Skin: color, texture, turgor normal.         Assessment/Plan:     1. Hyperprolactinemia ([facility] [address]    2. Pituitary adenoma ([facility] [address]          7 mm Pituitary microadenoma - could be incidental finding, prolactin is very minimally elevated   Screening for other pituitary hormones - neg  Off cabergoline  Mild prolactinemia: No indication for therapy  Reassured patient that headache is not due to pituitary microadenoma  Biochemically euthyroid      Obesity Body mass index is 52.55 kg/m². Low-carb diet  Increase activity  Could not tolerate phentermine  She has tried different diets in the past with little success  Losing weight        Follow-up and Dispositions    · Return in about 1 year (around 4/29/2023) for labs before next visit and follow up. Thank you for allowing me to participate in the care of this patient.     Leighton Tavarez MD      Patient verbalized understanding

## 2022-04-29 NOTE — PROGRESS NOTES
Lizzette Garzon is a 32 y.o. female here for   Chief Complaint   Patient presents with    Hyperprolactinemia    Pituitary Problem       1. Have you been to the ER, urgent care clinic since your last visit? Hospitalized since your last visit? -October 14, 2021 [facility]    2. Have you seen or consulted any other health care providers outside of the [address] [place] [address] since your last visit?   Include any pap smears or colon screening.- no

## 2022-05-01 ENCOUNTER — PATIENT MESSAGE (OUTPATIENT)
Dept: OBGYN CLINIC | Age: 27
End: 2022-05-01

## 2022-05-02 ENCOUNTER — OFFICE VISIT (OUTPATIENT)
Dept: OBGYN CLINIC | Age: 27
End: 2022-05-02

## 2022-05-02 VITALS
SYSTOLIC BLOOD PRESSURE: 140 MMHG | HEART RATE: 88 BPM | HEIGHT: 66 IN | BODY MASS INDEX: 47.09 KG/M2 | DIASTOLIC BLOOD PRESSURE: 76 MMHG | WEIGHT: 293 LBS

## 2022-05-02 DIAGNOSIS — Z97.5 IUD (INTRAUTERINE DEVICE) IN PLACE: ICD-10-CM

## 2022-05-02 DIAGNOSIS — R10.2 PELVIC PAIN: ICD-10-CM

## 2022-05-02 DIAGNOSIS — Z76.89 ENCOUNTER FOR MENSTRUAL REGULATION: Primary | ICD-10-CM

## 2022-05-02 PROCEDURE — 99212 OFFICE O/P EST SF 10 MIN: CPT | Performed by: OBSTETRICS & GYNECOLOGY

## 2022-05-02 NOTE — PROGRESS NOTES
[address] [facility]  [email or web address]    Biju Adam MD, [address] [facility]     OB/GYN Follow-up visit, IUD check with ultrasound    Chief Complaint: Follow up visit  Chief Complaint   Patient presents with    Checkup IUD    Ultrasound       Ultrasound:  DIFFICULT SCAN DUE TO PATIENT BODY HABITUS. TA AND TV SCANS PERFORMED FOR BEST VISUALIZATION. UTERUS IS ANTEVERTED, NORMAL IN SIZE AND ECHOGENICITY. ENDOMETRIUM MEASURES 4-5MM IN THICKNESS. NO EVIDENCE OF MASSES OR ABNORMALITIES ARE SEEN. IUD IS SEEN IN THE PROPER POSITION IN THE UTERINE FUNDUS. RIGHT OVARY APPEARS WITHIN NORMAL LIMITS. LEFT OVARY APPEARS WITHIN NORMAL LIMITS. NO FREE FLUID SEEN IN THE CDS. History of Present Illness: This is a follow up visit from an IUD insertion. The Mirena IUD was inserted on March 7, 2022. A few days before her cycle she had an intense sharp pain from her uterus to her back once a day for 3 days in a row, none since. She does not complain about abnormal bleeding. She does not not complain about pain/cramping. She does not have other concerns.     [address] [facility]:  Past Medical History:   Diagnosis Date    Asthma     Dysmenorrhea     Encounter for IUD insertion 03/07/2022    Mirena Due out 3/7/2025    Essential hypertension     Genital herpes     Valtrex     Major depressive disorder, single episode, unspecified     Morbid obesity ([address]     Nexplanon insertion 12/08/2021    Nexplanon removal 03/07/2022    Pap smear for cervical cancer screening     2/23/17 neg 2/8/21 neg    Pituitary abnormality (HCC)     Postpartum depression     Seasonal allergies     Unspecified symptom associated with female genital organs      Past Surgical History:   Procedure Laterality Date    HX OTHER SURGICAL      HX WISDOM TEETH EXTRACTION      HX WRIST FRACTURE TX Right      Family History   Problem Relation Age of Onset    Alcohol abuse Father     Cancer Paternal [place] Other Mother endometriosis    Migraines Mother         tumor in brain     Social History     Tobacco Use    Smoking status: Former Smoker     Packs/day: 0.50     Years: 1.00     Pack years: 0.50     Types: Cigarettes    Smokeless tobacco: Never Used   Vaping Use    Vaping Use: Never used   Substance Use Topics    Alcohol use: No     Alcohol/week: 0.0 standard drinks     Comment: former user just sips now and then    Drug use: No     Allergies   Allergen Reactions    Latex Swelling     Skin peels    Lactose Other (comments)    Aspirin Nausea and Vomiting    Betadine [Povidone-Iodine] Other (comments)     Skin peels    Cat Dander Sneezing    Ketorolac Hives    Penicillins Other (comments)     Yeast infections    Ivp Dye [Iodinated Contrast Media] Other (comments)     Current Outpatient Medications   Medication Sig    hydrOXYzine HCL (ATARAX) 10 mg tablet     lamoTRIgine (LaMICtal) 25 mg tablet     propranoloL (INDERAL) 10 mg tablet     traZODone (DESYREL) 50 mg tablet     fluticasone propionate (FLONASE) 50 mcg/actuation nasal spray SHAKE LIQUID AND USE 2 SPRAYS IN EACH NOSTRIL DAILY    Cetirizine (ZyrTEC) 10 mg cap Take  by mouth.  valACYclovir (VALTREX) 1 gram tablet Take 1 Tablet by mouth daily.  nystatin (MYCOSTATIN) powder Apply  to affected area two (2) times a day. (Patient not taking: Reported on 10/28/2021)    Omeprazole delayed release (PRILOSEC D/R) 20 mg tablet Take 1 Tablet by mouth two (2) times a day.  PNV Comb #2-Iron-FA-Omega 3 29-1-400 mg cmpk Take  by mouth. (Patient not taking: Reported on 4/29/2022)    OTHER Prebiotic with cranberry    albuterol (PROVENTIL HFA, VENTOLIN HFA, PROAIR HFA) 90 mcg/actuation inhaler Take 1 Puff by inhalation every four (4) hours as needed for Wheezing. (Patient not taking: Reported on 8/10/2021)     No current facility-administered medications for this visit.        Review of Systems:  History obtained from the patient  Constitutional: negative for fevers, chills and weight loss  ENT ROS: negative for - hearing change, oral lesions or visual changes  Respiratory: negative for cough, wheezing or dyspnea on exertion  Cardiovascular: negative for chest pain, irregular heart beats, exertional chest pressure/discomfort  Gastrointestinal: negative for dysphagia, nausea and vomiting  Genito-Urinary ROS: no dysuria, trouble voiding, or hematuria  Inteument/breast: negative for rash, breast lump and nipple discharge  Musculoskeletal:negative for stiff joints, neck pain and muscle weakness  Endocrine ROS: negative for - breast changes, galactorrhea or temperature intolerance  Hematological and Lymphatic ROS: negative for - blood clots, bruising or swollen lymph nodes    Physical Exam:  Visit Vitals  BP (!) 140/76   Pulse 88   Ht 5' 6\" (1.676 m)   Wt 323 lb 12.8 oz (146.9 kg)   BMI 52.26 kg/m²       GENERAL: alert, well appearing, and in no distress  ABDOMEN: soft, nontender, nondistended, no masses or organomegaly   EGBUS: no lesions, no inflammation, no masses  VULVA: normal appearing vulva with no masses, tenderness or lesions  VAGINA: normal appearing vagina with normal color, no lesions, no discharge  CERVIX: normal appearing cervix without discharge or lesions, non tender  · IUD strings seen and appropriate length  UTERUS: uterus is normal size, shape, consistency and nontender   ADNEXA: normal adnexa in size, nontender and no masses  NEURO: alert, oriented, normal speech    Assessment:  Encounter Diagnoses   Name Primary?  Encounter for menstrual regulation Yes    IUD (intrauterine device) in place     Pelvic pain        Plan:  The patient is advised that she should contact the office with any questions or concerns. She should make her routine annual gynecologic appointment if needed. Disc typical sx/bleeding patterns with IUD. Disc how to check IUD strings and screening with AE. Notify MD if any concerns. Disc typical sx with IUD  Rec BP log.   Saw Endo    No orders of the defined types were placed in this encounter. No results found for this visit on 05/02/22. Justa Anderson MD    Physician review of ultrasound performed by technician    Today's ultrasound report and images were reviewed and discussed with the patient.   Please see images and imaging report entered by technician in PACS for more detail and progress note and diagnosis entered by MD.    Daphne Domniguez MD

## 2022-05-02 NOTE — PATIENT INSTRUCTIONS
Learning About Birth Control: Intrauterine Device (IUD)  What is an intrauterine device (IUD)? The intrauterine device (IUD) is used to prevent pregnancy. It's a small, plastic, T-shaped device. Your doctor places the IUD in your uterus. If you and your doctor discuss it before you give birth, this can be done right after you have your baby. You have a choice between a hormonal IUD and a copper IUD. The hormonal IUD can prevent pregnancy for 3 to 6 years, depending on which IUD is used. But your doctor may talk to you about leaving it in for longer. When you have it, you don't have to do anything else to prevent pregnancy. The copper IUD can prevent pregnancy for 10 years. But your doctor may talk to you about leaving it in for longer. When you have it, you don't have to do anything else to prevent pregnancy. A string tied to the end of the IUD hangs down through the opening of the uterus (called the cervix) into the vagina. You can check that the IUD is in place by feeling for the string. The IUD usually stays in the uterus until your doctor removes it. How well does an IUD for birth control work? In the first year of use:  · When the hormonal IUD is used exactly as directed, fewer than 1 out of 100 women have an unplanned pregnancy. · When the copper IUD is used exactly as directed, fewer than 1 out of 100 women have an unplanned pregnancy. Be sure to tell your doctor about any health problems you have or medicines you take. Your doctor can help you choose the birth control method that's right for you. What are the advantages of an IUD? · An IUD is one of the most effective methods of birth control. · It prevents pregnancy for 3 to 10 years, depending on the type. You don't have to worry about birth control during this time. · It's safe to use while breastfeeding. · IUDs don't contain estrogen.  So you can use an IUD if you don't want to take estrogen or can't take estrogen because you have certain health problems or concerns. · An IUD is convenient. It is always providing birth control. You don't need to remember to take a pill or get a shot. You don't have to interrupt sex to protect against pregnancy. · A hormonal IUD may reduce heavy bleeding and cramping. What are the disadvantages of an IUD? · An IUD doesn't protect against sexually transmitted infections (STIs), such as herpes or HIV/AIDS. If you aren't sure if your sex partner might have an STI, use a condom to protect against disease. · A copper IUD may cause periods with more bleeding and cramping. · You have to see a doctor to have an IUD inserted and removed. Where can you learn more? Go to http://www.gray.com/  Enter B486 in the search box to learn more about \"Learning About Birth Control: Intrauterine Device (IUD). \"  Current as of: June 16, 2021               Content Version: 13.2  © 2006-2022 Healthwise, [facility]. Care instructions adapted under license by [employer] (which disclaims liability or warranty for this information). If you have questions about a medical condition or this instruction, always ask your healthcare professional. [address] any warranty or liability for your use of this information.

## 2022-05-20 ENCOUNTER — OFFICE VISIT (OUTPATIENT)
Dept: OBGYN CLINIC | Age: 27
End: 2022-05-20
Payer: MEDICAID

## 2022-05-20 VITALS
DIASTOLIC BLOOD PRESSURE: 83 MMHG | WEIGHT: 293 LBS | BODY MASS INDEX: 47.09 KG/M2 | HEIGHT: 66 IN | HEART RATE: 87 BPM | SYSTOLIC BLOOD PRESSURE: 136 MMHG

## 2022-05-20 DIAGNOSIS — N89.8 VAGINAL DISCHARGE: Primary | ICD-10-CM

## 2022-05-20 DIAGNOSIS — N89.8 VAGINAL ODOR: ICD-10-CM

## 2022-05-20 DIAGNOSIS — Z11.3 SCREENING EXAMINATION FOR VENEREAL DISEASE: ICD-10-CM

## 2022-05-20 DIAGNOSIS — Z20.2 EXPOSURE TO SEXUALLY TRANSMITTED DISEASE (STD): ICD-10-CM

## 2022-05-20 PROCEDURE — 99212 OFFICE O/P EST SF 10 MIN: CPT | Performed by: OBSTETRICS & GYNECOLOGY

## 2022-05-20 NOTE — PROGRESS NOTES
[facility]  [email or web address]  [phone]    Justa Anderson MD, [address] [facility]       OB/GYN Problem visit    Chief Complaint:   Chief Complaint   Patient presents with    Vaginal Discharge       Last or next WWE is: 3/2/22    History of Present Illness: This is a new problem being evaluated by this provider. Pt reports she had used summers efrain & now she feels vaginal itching & vaginal odor. Since around  she has been having sx, she used monistat around  & thought she was feeling better. Pt reports she felt a uterus sharp pain with discharge. Breast soreness before cycles starts. Worried about trich. Mirena IUD inserted 22    The patient is a 32 y.o.  female  She reports the symptoms are is unchanged. Aggravating factors include none. Alleviating factors include none. She does not have other concerns. LMP: Patient's last menstrual period was 2022.     PFSH:  Past Medical History:   Diagnosis Date    Asthma     Bipolar affective disorder ([address]     Dysmenorrhea     Encounter for IUD insertion 2022    Mirena Due out 3/7/2025    Essential hypertension     Genital herpes     Valtrex     Major depressive disorder, single episode, unspecified     Morbid obesity ([address]     Nexplanon insertion 2021    Nexplanon removal 2022    Pap smear for cervical cancer screening     17 neg 21 neg    Pituitary abnormality (HCC)     Postpartum depression     Seasonal allergies     Unspecified symptom associated with female genital organs      Past Surgical History:   Procedure Laterality Date    HX OTHER SURGICAL      HX WISDOM TEETH EXTRACTION      HX WRIST FRACTURE TX Right      Family History   Problem Relation Age of Onset    Alcohol abuse Father     Cancer Paternal Grandmother     Other Mother         endometriosis    Migraines Mother         tumor in brain     Social History     Tobacco Use    Smoking status: Former Smoker     Packs/day: 0.50     Years: 1.00     Pack years: 0.50     Types: Cigarettes    Smokeless tobacco: Never Used   Vaping Use    Vaping Use: Never used   Substance Use Topics    Alcohol use: No     Alcohol/week: 0.0 standard drinks     Comment: former user just sips now and then    Drug use: No     Allergies   Allergen Reactions    Latex Swelling     Skin peels    Lactose Other (comments)    Aspirin Nausea and Vomiting    Betadine [Povidone-Iodine] Other (comments)     Skin peels    Cat Dander Sneezing    Ketorolac Hives    Penicillins Other (comments)     Yeast infections    Ivp Dye [Iodinated Contrast Media] Other (comments)     Current Outpatient Medications   Medication Sig    hydrOXYzine HCL (ATARAX) 10 mg tablet     lamoTRIgine (LaMICtal) 25 mg tablet     propranoloL (INDERAL) 10 mg tablet     fluticasone propionate (FLONASE) 50 mcg/actuation nasal spray SHAKE LIQUID AND USE 2 SPRAYS IN EACH NOSTRIL DAILY    Cetirizine (ZyrTEC) 10 mg cap Take  by mouth. (Patient taking differently: Take  by mouth. wayzayao)    valACYclovir (VALTREX) 1 gram tablet Take 1 Tablet by mouth daily.  Omeprazole delayed release (PRILOSEC D/R) 20 mg tablet Take 1 Tablet by mouth two (2) times a day.  OTHER Prebiotic with cranberry    traZODone (DESYREL) 50 mg tablet  (Patient not taking: Reported on 5/20/2022)     No current facility-administered medications for this visit.        Review of Systems:  History obtained from the patient  Constitutional: negative for fevers, chills and weight loss  ENT ROS: negative for - hearing change, oral lesions or visual changes  Respiratory: negative for cough, wheezing or dyspnea on exertion  Cardiovascular: negative for chest pain, irregular heart beats, exertional chest pressure/discomfort  Gastrointestinal: negative for dysphagia, nausea and vomiting  Genito-Urinary ROS:  see HPI  Inteument/breast: negative for rash, breast lump and nipple discharge  Musculoskeletal:negative for stiff joints, neck pain and muscle weakness  Endocrine ROS: negative for - breast changes, galactorrhea or temperature intolerance  Hematological and Lymphatic ROS: negative for - blood clots, bruising or swollen lymph nodes    Physical Exam:  Visit Vitals  /83   Pulse 87   Ht 5' 6\" (1.676 m)   Wt 320 lb 12.8 oz (145.5 kg)   Breastfeeding No   BMI 51.78 kg/m²       GENERAL: alert, well appearing, and in no distress  HEAD: normocephalic, atraumatic. ABDOMEN: soft, nontender, nondistended, no masses or organomegaly   EGBUS: no lesions, no inflammation, no masses  VULVA: normal appearing vulva with no masses, tenderness or lesions  VAGINA: normal appearing vagina with normal color, no lesions, white and thin discharge  CERVIX: normal appearing cervix without discharge or lesions, non tender  UTERUS: uterus is normal size, shape, consistency and nontender   ADNEXA: normal adnexa in size, nontender and no masses  NEURO: alert, oriented, normal speech    Assessment:  Encounter Diagnoses   Name Primary?  Vaginal discharge Yes    Vaginal odor     Exposure to sexually transmitted disease (STD)     Screening examination for venereal disease        Plan:  The patient is advised that she should contact the office if she does not note improvement or if symptoms recur  Recommend follow up with PCP for non-gynecologic complaints and chronic medical problems. She should contact our office with any questions or concerns  She could keep her routine annual exam appointment. We discussed potential causes of vaginal discharge/irritation. We discussed good vulvar hygiene. Recommended avoid vaginal irritants. Discussed use of mild soaps/detergents. Follow up if NI. Patient will be notified about swab results and prescription sent, if indicated.    Pt prefers to hold on rx for results      Orders Placed This Encounter    NUSWAB VAGINITIS PLUS ([facility])    HEP B SURFACE AG  T PALLIDUM SCREEN W/REFLEX    HIV SCREEN, 4TH GEN. W/REFLEX CONFIRM       No results found for this visit on 05/20/22.

## 2022-05-23 LAB
HBV SURFACE AG SERPL QL IA: NEGATIVE
HIV 1+2 AB+HIV1 P24 AG SERPL QL IA: NON REACTIVE
TREPONEMA PALLIDUM IGG+IGM AB [PRESENCE] IN SERUM OR PLASMA BY IMMUNOASSAY: NON REACTIVE

## 2022-05-23 NOTE — PROGRESS NOTES
Normal, negative STD panel  [facility] message sent, if active.

## 2022-07-19 ENCOUNTER — PATIENT MESSAGE (OUTPATIENT)
Dept: OBGYN CLINIC | Age: 27
End: 2022-07-19

## 2022-07-19 NOTE — TELEPHONE ENCOUNTER
Received an incoming call from patient. Patient name and Ivan Araujo has been verified. Spoke with patient states that she is currently on [place]. Patient states that she has been bleeding for the last 12 days and has to change her tampon edin 4 hours. Patient also  States that she has cramp and the pain can go up to 6-7. Tricia Valdez Patient will call back in the morning to schedule an appointment .  Patient also sent a [employer] message

## 2022-07-21 NOTE — TELEPHONE ENCOUNTER
Patient returned call and offered appointment tomorrow at 810 AM. Patient accepted and verbalized understanding.

## 2022-07-22 ENCOUNTER — OFFICE VISIT (OUTPATIENT)
Dept: OBGYN CLINIC | Age: 27
End: 2022-07-22
Payer: MEDICAID

## 2022-07-22 VITALS — WEIGHT: 293 LBS | BODY MASS INDEX: 53.94 KG/M2 | DIASTOLIC BLOOD PRESSURE: 82 MMHG | SYSTOLIC BLOOD PRESSURE: 129 MMHG

## 2022-07-22 DIAGNOSIS — N93.9 VAGINAL BLEEDING: ICD-10-CM

## 2022-07-22 DIAGNOSIS — Z30.432 ENCOUNTER FOR IUD REMOVAL: ICD-10-CM

## 2022-07-22 DIAGNOSIS — N89.8 VAGINAL DISCHARGE: ICD-10-CM

## 2022-07-22 DIAGNOSIS — N93.9 ABNORMAL UTERINE BLEEDING: Primary | ICD-10-CM

## 2022-07-22 LAB
ERYTHROCYTE [DISTWIDTH] IN BLOOD BY AUTOMATED COUNT: 13.4 % (ref 11.5–14.5)
HCG URINE, QL. (POC): NEGATIVE
HCT VFR BLD AUTO: 41 % (ref 35–47)
HGB BLD-MCNC: 13.1 G/DL (ref 11.5–16)
MCH RBC QN AUTO: 30.3 PG (ref 26–34)
MCHC RBC AUTO-ENTMCNC: 32 G/DL (ref 30–36.5)
MCV RBC AUTO: 94.9 FL (ref 80–99)
NRBC # BLD: 0 K/UL (ref 0–0.01)
NRBC BLD-RTO: 0 PER 100 WBC
PLATELET # BLD AUTO: 281 K/UL (ref 150–400)
PMV BLD AUTO: 9.1 FL (ref 8.9–12.9)
RBC # BLD AUTO: 4.32 M/UL (ref 3.8–5.2)
VALID INTERNAL CONTROL?: YES
WBC # BLD AUTO: 8.3 K/UL (ref 3.6–11)

## 2022-07-22 PROCEDURE — 99213 OFFICE O/P EST LOW 20 MIN: CPT | Performed by: OBSTETRICS & GYNECOLOGY

## 2022-07-22 PROCEDURE — 58301 REMOVE INTRAUTERINE DEVICE: CPT | Performed by: OBSTETRICS & GYNECOLOGY

## 2022-07-22 PROCEDURE — 81025 URINE PREGNANCY TEST: CPT | Performed by: OBSTETRICS & GYNECOLOGY

## 2022-07-22 RX ORDER — OXCARBAZEPINE 150 MG/1
150 TABLET, FILM COATED ORAL DAILY
COMMUNITY

## 2022-07-22 RX ORDER — DROSPIRENONE 4 MG/1
1 TABLET, FILM COATED ORAL DAILY
Qty: 90 EACH | Refills: 0 | Status: SHIPPED | OUTPATIENT
Start: 2022-07-22 | End: 2022-08-21

## 2022-07-22 RX ORDER — ONDANSETRON HYDROCHLORIDE 8 MG/1
8 TABLET, FILM COATED ORAL
COMMUNITY
End: 2022-09-15

## 2022-07-22 RX ORDER — ESCITALOPRAM OXALATE 5 MG/1
5 TABLET ORAL DAILY
COMMUNITY

## 2022-07-22 NOTE — PROGRESS NOTES
[facility]  [email or web address]  [phone]    Jb Damian MD, FACOG       OB/GYN Problem visit    Chief Complaint:   Chief Complaint   Patient presents with    Checkup IUD    Vaginal Bleeding       Last or next WWE is: 3/2/22    History of Present Illness: This is not a new problem being evaluated by this provider. The patient is a 32 y.o.  female who reports having a menstraul cycle that has lasted for two weeks as well as increased pain and pressure for 2 weeks. Had IUD placed 3/7/22  She reports the symptoms are is unchanged. Aggravating factors include none. Alleviating factors include none. Endo fu wnl. She reports no new sexual partners. Using condoms. +VD. She does not have other concerns. LMP: Patient's last menstrual period was 2022.     PFSH:  Past Medical History:   Diagnosis Date    Asthma     Bipolar affective disorder ([address]     Dysmenorrhea     Encounter for IUD insertion 2022    Mirena Due out 3/7/2025    Essential hypertension     Genital herpes     Valtrex     Major depressive disorder, single episode, unspecified     Morbid obesity ([address]     Nexplanon insertion 2021    Nexplanon removal 2022    Pap smear for cervical cancer screening     17 neg 21 neg    Pituitary abnormality ([address]     Postpartum depression     Seasonal allergies     Unspecified symptom associated with female genital organs      Past Surgical History:   Procedure Laterality Date    HX OTHER SURGICAL      HX WISDOM TEETH EXTRACTION      HX WRIST FRACTURE TX Right      Family History   Problem Relation Age of Onset    Alcohol abuse Father     Cancer Paternal Grandmother     Other Mother         endometriosis    Migraines Mother         tumor in brain     Social History     Tobacco Use    Smoking status: Former     Packs/day: 0.50     Years: 1.00     Pack years: 0.50     Types: Cigarettes    Smokeless tobacco: Never   Vaping Use    Vaping Use: Never used   Substance Use Topics    Alcohol use: No     Alcohol/week: 0.0 standard drinks     Comment: former user just sips now and then    Drug use: No     Allergies   Allergen Reactions    Latex Swelling     Skin peels    Lactose Other (comments)    Aspirin Nausea and Vomiting    Betadine [Povidone-Iodine] Other (comments)     Skin peels    Cat Dander Sneezing    Ketorolac Hives    Penicillins Other (comments)     Yeast infections    Ivp Dye [Iodinated Contrast Media] Other (comments)     Current Outpatient Medications   Medication Sig    escitalopram oxalate (Lexapro) 5 mg tablet Take 5 mg by mouth in the morning. OXcarbazepine (TRILEPTAL) 150 mg tablet Take 150 mg by mouth in the morning. ondansetron hcl (Zofran) 8 mg tablet Take 8 mg by mouth every eight (8) hours as needed for Nausea or Vomiting. drospirenone, contraceptive, (Slynd) 4 mg (28) tab Take 1 Tablet by mouth in the morning for 30 days. hydrOXYzine HCL (ATARAX) 10 mg tablet     propranoloL (INDERAL) 10 mg tablet     fluticasone propionate (FLONASE) 50 mcg/actuation nasal spray SHAKE LIQUID AND USE 2 SPRAYS IN EACH NOSTRIL DAILY    Cetirizine (ZyrTEC) 10 mg cap Take  by mouth. (Patient taking differently: Take  by mouth. wayzar)    valACYclovir (VALTREX) 1 gram tablet Take 1 Tablet by mouth daily. Omeprazole delayed release (PRILOSEC D/R) 20 mg tablet Take 1 Tablet by mouth two (2) times a day. OTHER Prebiotic with cranberry    lamoTRIgine (LaMICtal) 25 mg tablet  (Patient not taking: Reported on 7/22/2022)    traZODone (DESYREL) 50 mg tablet  (Patient not taking: No sig reported)     No current facility-administered medications for this visit.        Review of Systems:  History obtained from the patient  Constitutional: negative for fevers, chills and weight loss  ENT ROS: negative for - hearing change, oral lesions or visual changes  Respiratory: negative for cough, wheezing or dyspnea on exertion  Cardiovascular: negative for chest pain, irregular heart beats, exertional chest pressure/discomfort  Gastrointestinal: negative for dysphagia, nausea and vomiting  Genito-Urinary ROS:  see HPI  Inteument/breast: negative for rash, breast lump and nipple discharge  Musculoskeletal:negative for stiff joints, neck pain and muscle weakness  Endocrine ROS: negative for - breast changes, galactorrhea or temperature intolerance  Hematological and Lymphatic ROS: negative for - blood clots, bruising or swollen lymph nodes    Physical Exam:  Visit Vitals  /82   Wt 334 lb 3.2 oz (151.6 kg)   BMI 53.94 kg/m²       GENERAL: alert, well appearing, and in no distress  HEAD: normocephalic, atraumatic. PULM: clear to auscultation, no wheezes, rales or rhonchi, symmetric air entry   COR: normal rate and regular rhythm, S1 and S2 normal   ABDOMEN: soft, nontender, nondistended, no masses or organomegaly   EGBUS: no lesions, no inflammation, no masses  VULVA: normal appearing vulva with no masses, tenderness or lesions  VAGINA: normal appearing vagina with normal color, no lesions,  yellow/pink  discharge  IUD strings seen and appropriate length    CERVIX: normal appearing cervix without discharge or lesions, non tender  UTERUS: uterus is normal size, shape, consistency and nontender   ADNEXA: normal adnexa in size, nontender and no masses  NEURO: alert, oriented, normal speech    Assessment:  Encounter Diagnoses   Name Primary? Abnormal uterine bleeding Yes    Vaginal bleeding     Vaginal discharge     Encounter for IUD removal        Plan:  The patient is advised that she should contact the office if she does not note improvement or if symptoms recur  Recommend follow up with PCP for non-gynecologic complaints and chronic medical problems. She should contact our office with any questions or concerns  She could keep her routine annual exam appointment.    We discussed progesterone only and non hormonal options for contraception including but not limited to condoms, IUDs, Nexplanon, and depo provera.   Use back up with slynd due to other meds  Labs  Notify MD if NI       Orders Placed This Encounter    REMOVE INTRAUTERINE DEVICE    NUSWAB VAGINITIS PLUS ([facility])    CBC W/O DIFF    AMB POC URINE PREGNANCY TEST, VISUAL COLOR COMPARISON    drospirenone, contraceptive, (Slynd) 4 mg (28) tab       Results for orders placed or performed in visit on 07/22/22   AMB POC URINE PREGNANCY TEST, VISUAL COLOR COMPARISON   Result Value Ref Range    VALID INTERNAL CONTROL POC Yes     HCG urine, Ql. (POC) Negative Negative

## 2022-07-22 NOTE — PROGRESS NOTES
[facility]  [email or web address]  [phone]    Chandrika Zuniga MD, [facility]     IUD REMOVAL  OFFICE PROCEDURE PROGRESS NOTE      Chart reviewed for the following:   Tyler MENESES LPN, have reviewed the History, Physical and updated the Allergic reactions for Mala Mejia     TIME OUT performed immediately prior to start of procedure:   Long Brwon LPN, have performed the following reviews on Baldev Sorensen prior to the start of the procedure:            * Patient was identified by name and date of birth   * Agreement on procedure being performed was verified  * Risks and Benefits explained to the patient  * Procedure site verified and marked as necessary  * Patient was positioned for comfort  * Consent was signed and verified     Time:     Date of procedure: 2022    Procedure performed by: Arti Alejandre MD    Provider assisted by:   Tyler Dc LPN    Patient assisted by: self    How tolerated by patient: tolerated the procedure well with no complications    Comments: none      Procedure Note: IUD REMOVAL    Baldev Sorensen is a ,  32 y.o. female [address] [facility] whose Patient's last menstrual period was 2022. was on 2022. who presents today for IUD removal. She requests removal of the IUD because the IUD because  AUB/heavy bleeding . The IUD removal procedure was discussed with the patient and she had no further questions. Procedure: The patient was placed in a dorsal lithotomy position and appropriately draped. A speculum exam was performed and the cervix was visualized. The cervix was prepped with zephiran solution. The IUD string was visualized. Using ring forceps , the string was grasped and the IUD removed intact. The IUD was shown to the patient and discarded. On bimanual exam the uterus was anterior and was normal in size with no tenderness present.     She was given routine post-removal instructions and instructed to notify MD with any questions or concerns.

## 2022-08-08 RX ORDER — CEPHALEXIN 500 MG/1
1000 CAPSULE ORAL 2 TIMES DAILY
Qty: 40 CAPSULE | Refills: 0 | Status: SHIPPED | OUTPATIENT
Start: 2022-08-08 | End: 2022-08-08 | Stop reason: SDUPTHER

## 2022-08-08 RX ORDER — CEPHALEXIN 500 MG/1
1000 CAPSULE ORAL 2 TIMES DAILY
Qty: 40 CAPSULE | Refills: 0 | Status: SHIPPED | OUTPATIENT
Start: 2022-08-08 | End: 2022-08-18

## 2022-09-10 ENCOUNTER — HOSPITAL ENCOUNTER (EMERGENCY)
Age: 27
Discharge: HOME | End: 2022-09-10
Attending: EMERGENCY MEDICINE
Payer: MEDICAID

## 2022-09-10 ENCOUNTER — APPOINTMENT (OUTPATIENT)
Dept: ULTRASOUND IMAGING | Age: 27
End: 2022-09-10
Attending: EMERGENCY MEDICINE
Payer: MEDICAID

## 2022-09-10 VITALS
DIASTOLIC BLOOD PRESSURE: 79 MMHG | HEIGHT: 66 IN | HEART RATE: 73 BPM | RESPIRATION RATE: 15 BRPM | TEMPERATURE: 98.9 F | WEIGHT: 293 LBS | BODY MASS INDEX: 47.09 KG/M2 | SYSTOLIC BLOOD PRESSURE: 130 MMHG | OXYGEN SATURATION: 99 %

## 2022-09-10 DIAGNOSIS — R10.2 PELVIC PAIN: Primary | ICD-10-CM

## 2022-09-10 LAB
APPEARANCE UR: ABNORMAL
BILIRUB UR QL: NEGATIVE
CLUE CELLS VAG QL WET PREP: NORMAL
COLOR UR: ABNORMAL
GLUCOSE UR STRIP.AUTO-MCNC: NEGATIVE MG/DL
HCG UR QL: NEGATIVE
HGB UR QL STRIP: NEGATIVE
KETONES UR QL STRIP.AUTO: 40 MG/DL
LEUKOCYTE ESTERASE UR QL STRIP.AUTO: NEGATIVE
NITRITE UR QL STRIP.AUTO: NEGATIVE
PH UR STRIP: 6 [PH] (ref 5–8)
PROT UR STRIP-MCNC: NEGATIVE MG/DL
SP GR UR REFRACTOMETRY: 1.02 (ref 1–1.03)
T VAGINALIS VAG QL WET PREP: NORMAL
UROBILINOGEN UR QL STRIP.AUTO: 0.2 EU/DL (ref 0.2–1)

## 2022-09-10 PROCEDURE — 76856 US EXAM PELVIC COMPLETE: CPT

## 2022-09-10 PROCEDURE — 81003 URINALYSIS AUTO W/O SCOPE: CPT

## 2022-09-10 PROCEDURE — 87210 SMEAR WET MOUNT SALINE/INK: CPT

## 2022-09-10 PROCEDURE — 76830 TRANSVAGINAL US NON-OB: CPT

## 2022-09-10 PROCEDURE — 99284 EMERGENCY DEPT VISIT MOD MDM: CPT

## 2022-09-10 PROCEDURE — 87491 CHLMYD TRACH DNA AMP PROBE: CPT

## 2022-09-10 PROCEDURE — 81025 URINE PREGNANCY TEST: CPT

## 2022-09-10 NOTE — ED TRIAGE NOTES
Pt arrives with c/o suprapubic pain that radiates to the back. Pt states that she had intercourse 2 days ago then pain began. Pt reports having pinkish \"bloody vaginal discharge\" for 2 hours. Pt states that it is painful to sit. Pt reports last pap smear within a year.

## 2022-09-11 ENCOUNTER — PATIENT MESSAGE (OUTPATIENT)
Dept: OBGYN CLINIC | Age: 27
End: 2022-09-11

## 2022-09-11 NOTE — ED PROVIDER NOTES
HPI   51-year-old female with a past medical history of bipolar disorder, dysmenorrhea, and hypertension who presents to the emergency department due to vaginal bleeding and pelvic pain. After intercourse yesterday the patient noticed some mild bleeding when wiping. She says there is some pink blood on the toilet paper. She says her sexual encounter was not different than previous. Then today she noted suprapubic pain and pain just to the right. She says the pain radiates through to her back. Has improved somewhat with Tylenol. She has not had any further bleeding. She denies vaginal discharge. She is monogamous with 1 partner and uses condoms. She has no concern for sexually transmitted infections at this time. She has never had symptoms like this in the past.  She previously had an IUD but this was taken out secondary to dysmenorrhea. She contacted her OB/GYN Dr. Marianne Adams about her bleeding yesterday and she says she has an ultrasound scheduled for October, but came to the emergency department because she developed pain.   Past Medical History:   Diagnosis Date    Asthma     Bipolar affective disorder ([address]     Dysmenorrhea     Encounter for IUD insertion 03/07/2022    Mirena Due out 3/7/2025    Essential hypertension     Genital herpes     Valtrex     Major depressive disorder, single episode, unspecified     Morbid obesity ([address]     Nexplanon insertion 12/08/2021    Nexplanon removal 03/07/2022    Pap smear for cervical cancer screening     2/23/17 neg 2/8/21 neg    Pituitary abnormality ([address]     Postpartum depression     Seasonal allergies     Unspecified symptom associated with female genital organs        Past Surgical History:   Procedure Laterality Date    HX OTHER SURGICAL      HX WISDOM TEETH EXTRACTION      HX WRIST FRACTURE TX Right          Family History:   Problem Relation Age of Onset    Alcohol abuse Father     Cancer Paternal Grandmother     Other Mother         endometriosis Migraines Mother         tumor in brain       Social History     Socioeconomic History    Marital status: SINGLE     Spouse name: Not on file    Number of children: Not on file    Years of education: Not on file    Highest education level: Not on file   Occupational History    Not on file   Tobacco Use    Smoking status: Former     Packs/day: 0.50     Years: 1.00     Pack years: 0.50     Types: Cigarettes    Smokeless tobacco: Current   Vaping Use    Vaping Use: Never used   Substance and Sexual Activity    Alcohol use: No     Alcohol/week: 0.0 standard drinks     Comment: former user just sips now and then    Drug use: No    Sexual activity: Yes     Partners: Male     Birth control/protection: Condom   Other Topics Concern     Service Not Asked    Blood Transfusions Not Asked    Caffeine Concern Not Asked    Occupational Exposure Not Asked    Hobby Hazards Not Asked    Sleep Concern Not Asked    Stress Concern Not Asked    Weight Concern Not Asked    Special Diet Not Asked    Back Care Not Asked    Exercise Not Asked    Bike Helmet Not Asked    Seat Belt Not Asked    Self-Exams Not Asked   Social History Narrative    Not on file     Social Determinants of Health     Financial Resource Strain: Not on file   Food Insecurity: Not on file   Transportation Needs: Not on file   Physical Activity: Not on file   Stress: Not on file   Social Connections: Not on file   Intimate Partner Violence: Not on file   Housing Stability: Not on file         ALLERGIES: Latex, Lactose, Aspirin, Betadine [povidone-iodine], Cat dander, Ketorolac, Penicillins, and Ivp dye [iodinated contrast media]    Review of Systems  All systems reviewed are negative unless otherwise documented in the HPI  Vitals:    09/10/22 1929   BP: (!) 160/88   Pulse: 68   Resp: 16   Temp: 98.9 °F (37.2 °C)   SpO2: 99%   Weight: 142.9 kg (315 lb)   Height: 5' 6\" (1.676 m)            Physical Exam  Constitutional:       Comments: Resting comfortably no acute distress   HENT:      Mouth/Throat:      Comments: Moist mucous membranes  Eyes:      General: No scleral icterus. Extraocular Movements: Extraocular movements intact. Neck:      Comments: Trachea midline  Cardiovascular:      Comments: Regular rate and rhythm  Pulmonary:      Effort: Pulmonary effort is normal. No respiratory distress. Abdominal:      Comments: Soft. Mild suprapubic tenderness. Tenderness or guarding. Genitourinary:     Comments: Normal external genitalia. Cervix is closed. No blood or abnormal discharge in the vaginal vault. No cervical motion or adnexal tenderness noted  Musculoskeletal:         General: No deformity. Normal range of motion. Cervical back: Normal range of motion. Skin:     General: Skin is warm and dry. Neurological:      Comments: Awake and alert. GCS 15        MDM  51-year-old female presenting with the above chief complaint. Vitals are stable. She has mild suprapubic tenderness but no other tenderness on exam.  Pelvic exam and pelvic ultrasound ordered. Pelvic ultrasound unremarkable. Patient is not pregnant and her urinalysis is unremarkable. Patient deemed an appropriate candidate for discharge. She is instructed to follow-up with her OB/GYN doctor. She was discharged in stable condition.        Procedures

## 2022-09-11 NOTE — DISCHARGE INSTRUCTIONS
Return to the emergency department with any new or worsening symptoms you find concerning.   Otherwise please follow-up with Dr. Aisha Deng

## 2022-09-12 ENCOUNTER — OFFICE VISIT (OUTPATIENT)
Dept: OBGYN CLINIC | Age: 27
End: 2022-09-12
Payer: MEDICAID

## 2022-09-12 VITALS — WEIGHT: 293 LBS | DIASTOLIC BLOOD PRESSURE: 83 MMHG | SYSTOLIC BLOOD PRESSURE: 142 MMHG | BODY MASS INDEX: 53.81 KG/M2

## 2022-09-12 DIAGNOSIS — R10.2 PELVIC PAIN: Primary | ICD-10-CM

## 2022-09-12 DIAGNOSIS — N93.9 ABNORMAL UTERINE BLEEDING (AUB): ICD-10-CM

## 2022-09-12 LAB
BILIRUB UR QL STRIP: NEGATIVE
C TRACH DNA SPEC QL NAA+PROBE: NEGATIVE
GLUCOSE UR-MCNC: NEGATIVE MG/DL
KETONES P FAST UR STRIP-MCNC: NEGATIVE MG/DL
N GONORRHOEA DNA SPEC QL NAA+PROBE: NEGATIVE
PH UR STRIP: 7 [PH] (ref 4.6–8)
PROT UR QL STRIP: NEGATIVE
SAMPLE TYPE: NORMAL
SERVICE CMNT-IMP: NORMAL
SP GR UR STRIP: 1 (ref 1–1.03)
SPECIMEN SOURCE: NORMAL
UA UROBILINOGEN AMB POC: NORMAL (ref 0.2–1)
URINALYSIS CLARITY POC: CLEAR
URINALYSIS COLOR POC: YELLOW
URINE BLOOD POC: NEGATIVE
URINE LEUKOCYTES POC: NEGATIVE
URINE NITRITES POC: NEGATIVE

## 2022-09-12 PROCEDURE — 81002 URINALYSIS NONAUTO W/O SCOPE: CPT | Performed by: OBSTETRICS & GYNECOLOGY

## 2022-09-12 PROCEDURE — 99213 OFFICE O/P EST LOW 20 MIN: CPT | Performed by: OBSTETRICS & GYNECOLOGY

## 2022-09-12 NOTE — PROGRESS NOTES
[facility]  [email or web address]/  [phone]    Shaneka Gonzalez MD, FACOG       OB/GYN Problem visit    Chief Complaint:   Chief Complaint   Patient presents with    Pelvic Pain       Last or next WWE is: 3/2023    History of Present Illness: This is a new problem being evaluated by this provider. The patient is a 32 y.o.  female who reports having pelvic pain  for 4 days. She reports the symptoms are is unchanged. Aggravating factors include none. Alleviating factors include none. Pain worse 2 d after intercourse, seen in ER  Stopped slynd  AUB resolved. She does not have other concerns. LMP: Patient's last menstrual period was 2022.     PFSH:  Past Medical History:   Diagnosis Date    Asthma     Bipolar affective disorder ([address]     Dysmenorrhea     Encounter for IUD insertion 2022    Mirena Due out 3/7/2025    Essential hypertension     Genital herpes     Valtrex     Major depressive disorder, single episode, unspecified     Morbid obesity ([address]     Nexplanon insertion 2021    Nexplanon removal 2022    Pap smear for cervical cancer screening     17 neg 21 neg    Pituitary abnormality ([address]     Postpartum depression     Seasonal allergies     Unspecified symptom associated with female genital organs      Past Surgical History:   Procedure Laterality Date    HX OTHER SURGICAL      HX WISDOM TEETH EXTRACTION      HX WRIST FRACTURE TX Right      Family History   Problem Relation Age of Onset    Alcohol abuse Father     Cancer Paternal Grandmother     Other Mother         endometriosis    Migraines Mother         tumor in brain     Social History     Tobacco Use    Smoking status: Former     Packs/day: 0.50     Years: 1.00     Pack years: 0.50     Types: Cigarettes    Smokeless tobacco: Current   Vaping Use    Vaping Use: Never used   Substance Use Topics    Alcohol use: No     Alcohol/week: 0.0 standard drinks     Comment: former user just sips now and then    Drug use: No     Allergies   Allergen Reactions    Latex Swelling     Skin peels    Lactose Other (comments)    Aspirin Nausea and Vomiting    Betadine [Povidone-Iodine] Other (comments)     Skin peels    Cat Dander Sneezing    Ketorolac Hives    Penicillins Other (comments)     Yeast infections    Ivp Dye [Iodinated Contrast Media] Other (comments)     Current Outpatient Medications   Medication Sig    valACYclovir (VALTREX) 1 gram tablet Take 1 Tablet by mouth daily. escitalopram oxalate (LEXAPRO) 5 mg tablet Take 5 mg by mouth in the morning. OXcarbazepine (TRILEPTAL) 150 mg tablet Take 150 mg by mouth in the morning.    hydrOXYzine HCL (ATARAX) 10 mg tablet     propranoloL (INDERAL) 10 mg tablet     traZODone (DESYREL) 50 mg tablet     fluticasone propionate (FLONASE) 50 mcg/actuation nasal spray SHAKE LIQUID AND USE 2 SPRAYS IN EACH NOSTRIL DAILY    Cetirizine (ZyrTEC) 10 mg cap Take  by mouth. (Patient taking differently: Take  by mouth. wayzar)    Omeprazole delayed release (PRILOSEC D/R) 20 mg tablet Take 1 Tablet by mouth two (2) times a day. OTHER Prebiotic with cranberry    ondansetron hcl (ZOFRAN) 8 mg tablet Take 8 mg by mouth every eight (8) hours as needed for Nausea or Vomiting. (Patient not taking: No sig reported)    lamoTRIgine (LaMICtal) 25 mg tablet  (Patient not taking: No sig reported)     No current facility-administered medications for this visit.        Review of Systems:  History obtained from the patient  Constitutional: negative for fevers, chills and weight loss  ENT ROS: negative for - hearing change, oral lesions or visual changes  Respiratory: negative for cough, wheezing or dyspnea on exertion  Cardiovascular: negative for chest pain, irregular heart beats, exertional chest pressure/discomfort  Gastrointestinal: negative for dysphagia, nausea and vomiting  Genito-Urinary ROS:  see HPI  Inteument/breast: negative for rash, breast lump and nipple discharge  Musculoskeletal:negative for stiff joints, neck pain and muscle weakness  Endocrine ROS: negative for - breast changes, galactorrhea or temperature intolerance  Hematological and Lymphatic ROS: negative for - blood clots, bruising or swollen lymph nodes    Physical Exam:  Visit Vitals  BP (!) 142/83   Wt 333 lb 6.4 oz (151.2 kg)   BMI 53.81 kg/m²       GENERAL: alert, well appearing, and in no distress  HEAD: normocephalic, atraumatic. PULM: clear to auscultation, no wheezes, rales or rhonchi, symmetric air entry   COR: normal rate and regular rhythm, S1 and S2 normal   ABDOMEN: soft, tender ruq/rlq to deep palpation, mild, nondistended, no masses or organomegaly   EGBUS: no lesions, no inflammation, no masses  VULVA: normal appearing vulva with no masses, tenderness or lesions  VAGINA: normal appearing vagina with normal color, no lesions,  white  discharge  CERVIX: normal appearing cervix without discharge or lesions, non tender no CMT  UTERUS: uterus is normal size, shape, consistency and nontender   ADNEXA: normal adnexa in size, nontender and no masses  NEURO: alert, oriented, normal speech    Assessment:  Encounter Diagnoses   Name Primary? Pelvic pain Yes    Abnormal uterine bleeding (AUB)     Comment: resolved        Plan:  The patient is advised that she should contact the office if she does not note improvement or if symptoms recur  Recommend follow up with PCP for non-gynecologic complaints and chronic medical problems. She should contact our office with any questions or concerns  She could keep her routine annual exam appointment.    Labs  Reviewed er us; benign pelvis, small amount of fluid  Reviewed labs: gc/chl pending, urine: ket no blood  No labs done in ER  We discussed potential causes of lower abdominal/pelvic pain: GYN, GI, , musculoskeletal, infectious process, adhesions, or other etiology  We discussed evaluation of lower abdominal/pelvic pain: including but not limited to observation, surgical evaluation/laparoscopy, imaging   We discussed treatment of lower abdominal/pelvic pain: including but not limited to: pain medication, hormonal management, surgical intervention, bowel regimen. Since pain can be a symptoms of an underlying abnormal process she is encouraged to contact my office with persistent symptoms for additional evaluation and treatment if needed. Disc s/sx of endometriosis    On this date, 9/12/2022,  I have spent 20 minutes reviewing previous notes, test results and face to face with the patient discussing the diagnosis and importance of compliance with the treatment plan as well as documenting on the day of the visit. Consider hormonal management for pain vs evaluate for other causes: GI/MS w PCP   Rec to ER if sx severe    We discussed elevated blood pressure reading. I recommend BP log/home BP cuff and PCP follow up for additional management/surveillance.       Orders Placed This Encounter    CBC WITH AUTOMATED DIFF    METABOLIC PANEL, COMPREHENSIVE    AMB POC URINALYSIS DIP STICK MANUAL W/O MICRO       Results for orders placed or performed in visit on 09/12/22   AMB POC URINALYSIS DIP STICK MANUAL W/O MICRO   Result Value Ref Range    Color (UA POC) Yellow     Clarity (UA POC) Clear     Glucose (UA POC) Negative Negative    Bilirubin (UA POC) Negative Negative    Ketones (UA POC) Negative Negative    Specific gravity (UA POC) 1.005 1.001 - 1.035    Blood (UA POC) Negative Negative    pH (UA POC) 7.0 4.6 - 8.0    Protein (UA POC) Negative Negative    Urobilinogen (UA POC) normal 0.2 - 1    Nitrites (UA POC) Negative Negative    Leukocyte esterase (UA POC) Negative Negative

## 2022-09-13 LAB
ALBUMIN SERPL-MCNC: 3.9 G/DL (ref 3.5–5)
ALBUMIN/GLOB SERPL: 1.3 {RATIO} (ref 1.1–2.2)
ALP SERPL-CCNC: 80 U/L (ref 45–117)
ALT SERPL-CCNC: 21 U/L (ref 12–78)
ANION GAP SERPL CALC-SCNC: 6 MMOL/L (ref 5–15)
AST SERPL-CCNC: 13 U/L (ref 15–37)
BASOPHILS # BLD: 0 K/UL (ref 0–0.1)
BASOPHILS NFR BLD: 0 % (ref 0–1)
BILIRUB SERPL-MCNC: 0.2 MG/DL (ref 0.2–1)
BUN SERPL-MCNC: 8 MG/DL (ref 6–20)
BUN/CREAT SERPL: 13 (ref 12–20)
CALCIUM SERPL-MCNC: 9.3 MG/DL (ref 8.5–10.1)
CHLORIDE SERPL-SCNC: 105 MMOL/L (ref 97–108)
CO2 SERPL-SCNC: 27 MMOL/L (ref 21–32)
CREAT SERPL-MCNC: 0.63 MG/DL (ref 0.55–1.02)
DIFFERENTIAL METHOD BLD: NORMAL
EOSINOPHIL # BLD: 0.1 K/UL (ref 0–0.4)
EOSINOPHIL NFR BLD: 1 % (ref 0–7)
ERYTHROCYTE [DISTWIDTH] IN BLOOD BY AUTOMATED COUNT: 13.8 % (ref 11.5–14.5)
GLOBULIN SER CALC-MCNC: 2.9 G/DL (ref 2–4)
GLUCOSE SERPL-MCNC: 79 MG/DL (ref 65–100)
HCT VFR BLD AUTO: 41.1 % (ref 35–47)
HGB BLD-MCNC: 13 G/DL (ref 11.5–16)
IMM GRANULOCYTES # BLD AUTO: 0 K/UL (ref 0–0.04)
IMM GRANULOCYTES NFR BLD AUTO: 0 % (ref 0–0.5)
LYMPHOCYTES # BLD: 1.7 K/UL (ref 0.8–3.5)
LYMPHOCYTES NFR BLD: 27 % (ref 12–49)
MCH RBC QN AUTO: 30.3 PG (ref 26–34)
MCHC RBC AUTO-ENTMCNC: 31.6 G/DL (ref 30–36.5)
MCV RBC AUTO: 95.8 FL (ref 80–99)
MONOCYTES # BLD: 0.5 K/UL (ref 0–1)
MONOCYTES NFR BLD: 7 % (ref 5–13)
NEUTS SEG # BLD: 4.2 K/UL (ref 1.8–8)
NEUTS SEG NFR BLD: 65 % (ref 32–75)
NRBC # BLD: 0 K/UL (ref 0–0.01)
NRBC BLD-RTO: 0 PER 100 WBC
PLATELET # BLD AUTO: 288 K/UL (ref 150–400)
PMV BLD AUTO: 9.9 FL (ref 8.9–12.9)
POTASSIUM SERPL-SCNC: 4.5 MMOL/L (ref 3.5–5.1)
PROT SERPL-MCNC: 6.8 G/DL (ref 6.4–8.2)
RBC # BLD AUTO: 4.29 M/UL (ref 3.8–5.2)
SODIUM SERPL-SCNC: 138 MMOL/L (ref 136–145)
WBC # BLD AUTO: 6.5 K/UL (ref 3.6–11)

## 2022-09-14 ENCOUNTER — PATIENT MESSAGE (OUTPATIENT)
Dept: OBGYN CLINIC | Age: 27
End: 2022-09-14

## 2022-09-14 DIAGNOSIS — N92.6 IRREGULAR MENSES: Primary | ICD-10-CM

## 2022-09-15 RX ORDER — MEDROXYPROGESTERONE ACETATE 150 MG/ML
150 INJECTION, SUSPENSION INTRAMUSCULAR ONCE
Qty: 1 ML | Refills: 1 | Status: SHIPPED | OUTPATIENT
Start: 2022-09-15 | End: 2022-09-15

## 2022-09-16 ENCOUNTER — TELEPHONE (OUTPATIENT)
Dept: OBGYN CLINIC | Age: 27
End: 2022-09-16

## 2022-09-16 NOTE — TELEPHONE ENCOUNTER
32year old patient last seen in the office on 3/2/2022 for ae    Patient states she has been discussing with Md about restarting the depo injection    Patient as advised of no unprotected intercourse for 2 weeks prior to injection and to call the office when she is on her cycle to schedule and a urine pregnancy test will be done prior to the injection    Patient verbalized understanding

## 2022-09-22 ENCOUNTER — CLINICAL SUPPORT (OUTPATIENT)
Dept: OBGYN CLINIC | Age: 27
End: 2022-09-22
Payer: MEDICAID

## 2022-09-22 VITALS
SYSTOLIC BLOOD PRESSURE: 141 MMHG | WEIGHT: 293 LBS | DIASTOLIC BLOOD PRESSURE: 81 MMHG | BODY MASS INDEX: 53.36 KG/M2 | HEART RATE: 87 BPM

## 2022-09-22 DIAGNOSIS — Z76.89 ENCOUNTER FOR MENSTRUAL REGULATION: Primary | ICD-10-CM

## 2022-09-22 LAB
HCG URINE, QL. (POC): NEGATIVE
VALID INTERNAL CONTROL?: YES

## 2022-09-22 PROCEDURE — 96372 THER/PROPH/DIAG INJ SC/IM: CPT | Performed by: OBSTETRICS & GYNECOLOGY

## 2022-09-22 PROCEDURE — 81025 URINE PREGNANCY TEST: CPT | Performed by: OBSTETRICS & GYNECOLOGY

## 2022-09-22 RX ORDER — MEDROXYPROGESTERONE ACETATE 150 MG/ML
150 INJECTION, SUSPENSION INTRAMUSCULAR ONCE
Status: COMPLETED | OUTPATIENT
Start: 2022-09-22 | End: 2022-09-22

## 2022-09-22 RX ADMIN — MEDROXYPROGESTERONE ACETATE 150 MG: 150 INJECTION, SUSPENSION INTRAMUSCULAR at 12:58

## 2022-09-22 NOTE — PROGRESS NOTES
Last Depo-Provera injection: 9/12/2022   Side Effects if any: none  Serum HCG indicated? Yes, negative  Depo-Provera 150 mg IM given by: Hilario Garza, LPN in deltoid ( left). Next Depo-Provera injection due: 12/8/22 - 12/22/22    Administered 150mg/mL Depo-Provera per orders of Elizabeth Gannon MD . Verbal consent received by Ms. Mejia & injection given. Patient tolerated well, no complications and no side effects. Encouraged her to remain in clinic for 15 minutes and immediately report any adverse reactions. Patient informed of next injection date ranges and encouraged to schedule. She verbalized understanding and expressed intent to comply.

## 2022-10-14 ENCOUNTER — TELEPHONE (OUTPATIENT)
Dept: OBGYN CLINIC | Age: 27
End: 2022-10-14

## 2022-10-14 NOTE — TELEPHONE ENCOUNTER
Pt called name and  verified. Pt states she is on her period and she had something come out she took a picture of wants  to look at. Patient sent picture through [facility] Chart. Pt states she received her depo shot a month ago.       Please advise Thank you

## 2022-11-03 ENCOUNTER — HOSPITAL ENCOUNTER (EMERGENCY)
Age: 27
Discharge: HOME | End: 2022-11-04
Attending: EMERGENCY MEDICINE
Payer: MEDICAID

## 2022-11-03 VITALS
TEMPERATURE: 99.1 F | RESPIRATION RATE: 16 BRPM | SYSTOLIC BLOOD PRESSURE: 119 MMHG | WEIGHT: 293 LBS | HEART RATE: 98 BPM | DIASTOLIC BLOOD PRESSURE: 83 MMHG | BODY MASS INDEX: 47.09 KG/M2 | OXYGEN SATURATION: 100 % | HEIGHT: 66 IN

## 2022-11-03 DIAGNOSIS — J11.1 INFLUENZA-LIKE ILLNESS: ICD-10-CM

## 2022-11-03 DIAGNOSIS — J06.9 VIRAL URI: Primary | ICD-10-CM

## 2022-11-03 LAB
FLUAV AG NPH QL IA: NEGATIVE
FLUBV AG NOSE QL IA: NEGATIVE

## 2022-11-03 PROCEDURE — U0005 INFEC AGEN DETEC AMPLI PROBE: HCPCS

## 2022-11-03 PROCEDURE — 99283 EMERGENCY DEPT VISIT LOW MDM: CPT

## 2022-11-03 PROCEDURE — 87804 INFLUENZA ASSAY W/OPTIC: CPT

## 2022-11-03 NOTE — Clinical Note
Nancy Wakefield  [facility] EM[facility]ENCY DEPT  [address] [id number]  [phone]    Work/School Note    Date: 11/3/2022    To Whom It May concern:    Liban aBrboza was seen and treated today in the emergency room by the following provider(s):  Attending Provider: Miguel Wong MD.      Liban Barboza is excused from work/school on 11/03/22 and 11/04/22. She is medically clear to return to work/school on 11/5/2022.        Sincerely,          Rupa Bradshaw MD

## 2022-11-04 LAB
SARS-COV-2, XPLCVT: NOT DETECTED
SOURCE, COVRS: NORMAL

## 2022-11-04 NOTE — ED PROVIDER NOTES
70-year-old female who presents to the ER with a complaint of general malaise, headache, sore throat, congestion, body aches and chills. The patient is in the ER with her son who has similar symptoms. She denies any nausea or vomiting, diarrhea constipation, chest pain shortness of breath, fevers or recent travel.        Past Medical History:   Diagnosis Date    Asthma     Bipolar affective disorder ([facility]     Dysmenorrhea     Encounter for IUD insertion 03/07/2022    Mirena Due out 3/7/2025    Essential hypertension     Genital herpes     Valtrex     Major depressive disorder, single episode, unspecified     Morbid obesity [address]     Nexplanon insertion 12/08/2021    Nexplanon removal 03/07/2022    Pap smear for cervical cancer screening     2/23/17 neg 2/8/21 neg    Pituitary abnormality ([facility]     Postpartum depression     Seasonal allergies     Unspecified symptom associated with female genital organs        Past Surgical History:   Procedure Laterality Date    HX OTHER SURGICAL      HX WISDOM TEETH EXTRACTION      HX WRIST FRACTURE TX Right          Family History:   Problem Relation Age of Onset    Alcohol abuse Father     Cancer Paternal Grandmother     Other Mother         endometriosis    Migraines Mother         tumor in brain       Social History     Socioeconomic History    Marital status: SINGLE     Spouse name: Not on file    Number of children: Not on file    Years of education: Not on file    Highest education level: Not on file   Occupational History    Not on file   Tobacco Use    Smoking status: Former     Packs/day: 0.50     Years: 1.00     Pack years: 0.50     Types: Cigarettes    Smokeless tobacco: Current   Vaping Use    Vaping Use: Never used   Substance and Sexual Activity    Alcohol use: No     Alcohol/week: 0.0 standard drinks     Comment: former user just sips now and then    Drug use: No    Sexual activity: Yes     Partners: Male     Birth control/protection: Condom   Other Topics Concern  Service Not Asked    Blood Transfusions Not Asked    Caffeine Concern Not Asked    Occupational Exposure Not Asked    Hobby Hazards Not Asked    Sleep Concern Not Asked    Stress Concern Not Asked    Weight Concern Not Asked    Special Diet Not Asked    Back Care Not Asked    Exercise Not Asked    Bike Helmet Not Asked    Seat Belt Not Asked    Self-Exams Not Asked   Social History Narrative    Not on file     Social Determinants of Health     Financial Resource Strain: Not on file   Food Insecurity: Not on file   Transportation Needs: Not on file   Physical Activity: Not on file   Stress: Not on file   Social Connections: Not on file   Intimate Partner Violence: Not on file   Housing Stability: Not on file         ALLERGIES: Latex, Lactose, Aspirin, Betadine [povidone-iodine], Cat dander, Ketorolac, Penicillins, and Ivp dye [iodinated contrast media]    Review of Systems   All other systems reviewed and are negative. Vitals:    11/03/22 2117   BP: 119/83   Pulse: 98   Resp: 16   Temp: 99.1 °F (37.3 °C)   SpO2: 100%   Weight: 145.2 kg (320 lb)   Height: 5' 6\" (1.676 m)            Physical Exam  Vitals reviewed. CONSTITUTIONAL: Well-appearing; well-nourished; in no apparent distress  HEAD: Normocephalic; atraumatic  EYES: PERRL; EOM intact; conjunctiva and sclera are clear bilaterally. ENT: No rhinorrhea; normal pharynx with no tonsillar hypertrophy; mucous membranes pink/moist, no erythema, no exudate. NECK: Supple; non-tender; no cervical lymphadenopathy  CARD: Normal S1, S2; no murmurs, rubs, or gallops. Regular rate and rhythm. RESP: Normal respiratory effort; breath sounds clear and equal bilaterally; no wheezes, rhonchi, or rales. ABD: Normal bowel sounds; non-distended; non-tender; no palpable organomegaly, no masses, no bruits. Back Exam: Normal inspection; no vertebral point tenderness, no CVA tenderness. Normal range of motion.   EXT: Normal ROM in all four extremities; non-tender to palpation; no swelling or deformity; distal pulses are normal, no edema. SKIN: Warm; dry; no rash. NEURO:Alert and oriented x 3, coherent, KELLIE-XII grossly intact, sensory and motor are non-focal.        MDM  Number of Diagnoses or Management Options  Influenza-like illness  Viral URI  Diagnosis management comments: Assessment: 51-year-old female presents to the ER with evaluation for viral URI and influenza-like illness. She has no focal exam hemodynamically stable. Plan: Education, reassurance, symptomatic treatment/serial exam/discharge outpatient referral as needed/ Monitor and Reevaluate. Risk of Complications, Morbidity, and/or Mortality  Presenting problems: low  Diagnostic procedures: low  Management options: low    Patient Progress  Patient progress: stable         Procedures    Progress Note:   Pt has been reexamined by Blanca Sewell MD. Pt is feeling much better. Symptoms have improved. All available results have been reviewed with pt and any available family. Pt understands sx, dx, and tx in ED. Care plan has been outlined and questions have been answered. Pt is ready to go home. Will send home on acute febrile illness and viral URI instruction. . Outpatient referral with PCP as needed. Written by Blanca Sewell MD,6:59 AM    .   .

## 2022-11-16 ENCOUNTER — OFFICE VISIT (OUTPATIENT)
Dept: OBGYN CLINIC | Age: 27
End: 2022-11-16

## 2022-11-16 VITALS — BODY MASS INDEX: 54.01 KG/M2 | WEIGHT: 293 LBS | DIASTOLIC BLOOD PRESSURE: 84 MMHG | SYSTOLIC BLOOD PRESSURE: 129 MMHG

## 2022-11-16 DIAGNOSIS — N93.9 ABNORMAL UTERINE BLEEDING (AUB): ICD-10-CM

## 2022-11-16 DIAGNOSIS — R23.2 HOT FLASHES: ICD-10-CM

## 2022-11-16 DIAGNOSIS — G44.82 HEADACHE ASSOCIATED WITH ORGASM: ICD-10-CM

## 2022-11-16 DIAGNOSIS — R93.89 ABNORMAL GENITOURINARY ULTRASOUND: ICD-10-CM

## 2022-11-16 DIAGNOSIS — R10.2 PELVIC PAIN IN FEMALE: Primary | ICD-10-CM

## 2022-11-16 LAB
BILIRUB UR QL STRIP: NEGATIVE
GLUCOSE UR-MCNC: NEGATIVE MG/DL
HCG URINE, QL. (POC): NEGATIVE
KETONES P FAST UR STRIP-MCNC: NEGATIVE MG/DL
PH UR STRIP: 5 [PH] (ref 4.6–8)
PROT UR QL STRIP: NEGATIVE
SP GR UR STRIP: 1.02 (ref 1–1.03)
UA UROBILINOGEN AMB POC: NORMAL (ref 0.2–1)
URINALYSIS CLARITY POC: CLEAR
URINALYSIS COLOR POC: YELLOW
URINE BLOOD POC: NEGATIVE
URINE LEUKOCYTES POC: NEGATIVE
URINE NITRITES POC: NEGATIVE
VALID INTERNAL CONTROL?: YES

## 2022-11-16 PROCEDURE — 81025 URINE PREGNANCY TEST: CPT | Performed by: OBSTETRICS & GYNECOLOGY

## 2022-11-16 PROCEDURE — 81002 URINALYSIS NONAUTO W/O SCOPE: CPT | Performed by: OBSTETRICS & GYNECOLOGY

## 2022-11-16 PROCEDURE — 99214 OFFICE O/P EST MOD 30 MIN: CPT | Performed by: OBSTETRICS & GYNECOLOGY

## 2022-11-16 RX ORDER — ELAGOLIX 150 MG/1
1 TABLET, FILM COATED ORAL DAILY
Qty: 90 TABLET | Refills: 0 | Status: SHIPPED | OUTPATIENT
Start: 2022-11-16 | End: 2023-02-14

## 2022-11-16 RX ORDER — DEXTROAMPHETAMINE SACCHARATE, AMPHETAMINE ASPARTATE, DEXTROAMPHETAMINE SULFATE AND AMPHETAMINE SULFATE 5; 5; 5; 5 MG/1; MG/1; MG/1; MG/1
20 TABLET ORAL
COMMUNITY

## 2022-11-16 NOTE — PROGRESS NOTES
[facility]  [email or web address]  [phone]    Man Duarte MD, FACOG       OB/GYN Problem visit    Chief Complaint:   Chief Complaint   Patient presents with    Pelvic Pain    Back Pain    Vaginal Bleeding       Last or next WWE is:     History of Present Illness: This is a new problem being evaluated by this provider. The patient is a 32 y.o.  female who reports having pelvic pain, lower back pain, vaginal bleeding since 11/3/22, states when she orgasms that she has a migraine for 3 weeks. She reports the symptoms are is moderately worse. Aggravating factors include none. Alleviating factors include none. She does not want another baby, may want a hysterectomy. She does not have other concerns. LMP: Patient's last menstrual period was 2022.     PFSH:  Past Medical History:   Diagnosis Date    Asthma     Bipolar affective disorder ([address]     Dysmenorrhea     Encounter for IUD insertion 2022    Mirena Due out 3/7/2025    Essential hypertension     Genital herpes     Valtrex     Major depressive disorder, single episode, unspecified     Morbid obesity ([address]     Nexplanon insertion 2021    Nexplanon removal 2022    Pap smear for cervical cancer screening     17 neg 21 neg    Pituitary abnormality ([address]     Postpartum depression     Seasonal allergies     Unspecified symptom associated with female genital organs      Past Surgical History:   Procedure Laterality Date    HX OTHER SURGICAL      HX WISDOM TEETH EXTRACTION      HX WRIST FRACTURE TX Right      Family History   Problem Relation Age of Onset    Alcohol abuse Father     Cancer Paternal Grandmother     Other Mother         endometriosis    Migraines Mother         tumor in brain     Social History     Tobacco Use    Smoking status: Former     Packs/day: 0.50     Types: Cigarettes    Smokeless tobacco: Current   Vaping Use    Vaping Use: Never used   Substance Use Topics Alcohol use: No     Alcohol/week: 0.0 standard drinks     Comment: former user just sips now and then    Drug use: No     Allergies   Allergen Reactions    Latex Swelling     Skin peels    Lactose Other (comments)     Pt states she is not allergic anymore    Aspirin Nausea and Vomiting    Betadine [Povidone-Iodine] Other (comments)     Skin peels    Cat Dander Sneezing    Ketorolac Hives    Penicillins Other (comments)     Yeast infections    Ivp Dye [Iodinated Contrast Media] Other (comments)     Current Outpatient Medications   Medication Sig    dextroamphetamine-amphetamine (AdderalL) 20 mg tablet Take 20 mg by mouth. elagolix (Orilissa) 150 mg tab Take 1 Tablet by mouth daily for 90 days. valACYclovir (VALTREX) 1 gram tablet Take 1 Tablet by mouth daily. escitalopram oxalate (LEXAPRO) 5 mg tablet Take 5 mg by mouth in the morning. OXcarbazepine (TRILEPTAL) 150 mg tablet Take 150 mg by mouth in the morning.    hydrOXYzine HCL (ATARAX) 10 mg tablet     propranoloL (INDERAL) 10 mg tablet     traZODone (DESYREL) 50 mg tablet     fluticasone propionate (FLONASE) 50 mcg/actuation nasal spray SHAKE LIQUID AND USE 2 SPRAYS IN EACH NOSTRIL DAILY    Omeprazole delayed release (PRILOSEC D/R) 20 mg tablet Take 1 Tablet by mouth two (2) times a day. OTHER Prebiotic with cranberry     No current facility-administered medications for this visit.        Review of Systems:  History obtained from the patient  Constitutional: negative for fevers, chills and weight loss  ENT ROS: negative for - hearing change, oral lesions or visual changes  Respiratory: negative for cough, wheezing or dyspnea on exertion  Cardiovascular: negative for chest pain, irregular heart beats, exertional chest pressure/discomfort  Gastrointestinal: negative for dysphagia, nausea and vomiting  Genito-Urinary ROS:  see HPI  Inteument/breast: negative for rash, breast lump and nipple discharge  Musculoskeletal:negative for stiff joints, neck pain and muscle weakness  Endocrine ROS: negative for - breast changes, galactorrhea or temperature intolerance  Hematological and Lymphatic ROS: negative for - blood clots, bruising or swollen lymph nodes    Physical Exam:  Visit Vitals  /84   Wt 334 lb 9.6 oz (151.8 kg)   BMI 54.01 kg/m²       GENERAL: alert, well appearing, and in no distress  HEAD: normocephalic, atraumatic. ABDOMEN: soft, nontender, nondistended, no masses or organomegaly   EGBUS: no lesions, no inflammation, no masses  VULVA: normal appearing vulva with no masses, tenderness or lesions  VAGINA: normal appearing vagina with normal color, no lesions,  minimal  discharge  CERVIX: normal appearing cervix without discharge or lesions, non tender  UTERUS: uterus is normal size, shape, consistency and nontender   ADNEXA: normal adnexa in size, nontender and no masses  NEURO: alert, oriented, normal speech    Assessment:  Encounter Diagnoses   Name Primary? Pelvic pain in female Yes    Abnormal uterine bleeding (AUB)     Headache associated with orgasm     Hot flashes     Abnormal genitourinary ultrasound    Fh endometriosis  Possibly adenomyosis     Plan:  The patient is advised that she should contact the office if she does not note improvement or if symptoms recur  Recommend follow up with PCP for non-gynecologic complaints and chronic medical problems. She should contact our office with any questions or concerns  She could keep her routine annual exam appointment. We discussed potential causes of lower abdominal/pelvic pain: GYN, GI, , musculoskeletal, infectious process, adhesions, or other etiology  We discussed evaluation of lower abdominal/pelvic pain: including but not limited to observation, surgical evaluation/laparoscopy, imaging   We discussed treatment of lower abdominal/pelvic pain: including but not limited to: pain medication, hormonal management, surgical intervention, bowel regimen.   Since pain can be a symptoms of an underlying abnormal process she is encouraged to contact my office with persistent symptoms for additional evaluation and treatment if needed. Disc management options  We discussed progesterone only and non hormonal options for contraception including but not limited to condoms, IUDs, Nexplanon, and depo provera. Pt wants to try Ronita Lazaro, can start in December when depo due. Rec back up contraception on orilissa,   Orlissa ho given disc rba: pt wants to try. Disc option for definitive management  Monitor sx  Rec neuro consult for HA; due to [place] and persistence even while on depo provera/suppression      On this date, 11/16/2022,  I have spent 20 minutes reviewing previous notes, test results, educating patient, answering patient questions and with face to face with the patient discussing the diagnosis and importance of compliance with the treatment plan as well as documenting on the day of the visit. Physician review of ultrasound performed by technician  DIFFICULT SCAN DUE TO PATIENT BODY HABITUS. TA AND TV SCANS PERFORMED FOR BEST VISUALIZATION. UTERUS IS ANTEVERTED, NORMAL IN SIZE AND ECHOGENICITY. THERE APPEARS TO BE BLOODFLOW IN THE CERVIX. A POLYP OR MASS CANNOT BE RULED OUT. THERE APPEARS TO BE A HETEROGENEOUS AREA WITH MULTIPLE CALCIFICATIONS IN THE ALYSHA. ENDOMETRIUM MEASURES 6-7MM IN THICKNESS. NO EVIDENCE OF MASSES OR ABNORMALITIES ARE SEEN. RIGHT OVARY APPEARS TO HAVE A SIMPLE CYST THAT MEASURES 27 X 30 X 32MM. LEFT OVARY APPEARS WITHIN NORMAL LIMITS. NO FREE FLUID SEEN IN THE CDS. Today's ultrasound report and images were reviewed and discussed with the patient.   Please see images and imaging report entered by technician in PACS for more detail and progress note and diagnosis entered by MD.    Summer Escobedo MD      Orders Placed This Encounter    CULTURE, URINE    TSH 3RD GENERATION    TSH 3RD GENERATION    AMB POC URINE PREGNANCY TEST, VISUAL COLOR COMPARISON    AMB POC URINALYSIS DIP STICK MANUAL W/O MICRO    elagolix (Orilissa) 150 mg tab       Results for orders placed or performed in visit on 11/16/22   AMB POC URINE PREGNANCY TEST, VISUAL COLOR COMPARISON   Result Value Ref Range    VALID INTERNAL CONTROL POC Yes     HCG urine, Ql. (POC) Negative Negative   AMB POC URINALYSIS DIP STICK MANUAL W/O MICRO   Result Value Ref Range    Color (UA POC) Yellow     Clarity (UA POC) Clear     Glucose (UA POC) Negative Negative    Bilirubin (UA POC) Negative Negative    Ketones (UA POC) Negative Negative    Specific gravity (UA POC) 1.020 1.001 - 1.035    Blood (UA POC) Negative Negative    pH (UA POC) 5.0 4.6 - 8.0    Protein (UA POC) Negative Negative    Urobilinogen (UA POC) 0.2 mg/dL 0.2 - 1    Nitrites (UA POC) Negative Negative    Leukocyte esterase (UA POC) Negative Negative

## 2022-11-17 LAB — TSH SERPL DL<=0.05 MIU/L-ACNC: 1.63 UIU/ML (ref 0.36–3.74)

## 2022-11-17 RX ORDER — PREDNISONE 10 MG/1
TABLET ORAL
Qty: 1 DOSE PACK | Refills: 0 | Status: SHIPPED | OUTPATIENT
Start: 2022-11-17

## 2022-11-17 RX ORDER — CEFDINIR 300 MG/1
300 CAPSULE ORAL 2 TIMES DAILY
Qty: 20 CAPSULE | Refills: 0 | Status: SHIPPED | OUTPATIENT
Start: 2022-11-17 | End: 2022-11-27

## 2022-11-18 LAB
BACTERIA SPEC CULT: NORMAL
CC UR VC: NORMAL
SERVICE CMNT-IMP: NORMAL

## 2022-11-20 NOTE — PROGRESS NOTES
No UTI  If EOB urine: update PNL. If FOB but not EOB ur cx: add to OB PL: neg ur cx with date  [address] message sent if active.

## 2022-11-28 RX ORDER — CETIRIZINE HYDROCHLORIDE 10 MG/1
10 TABLET ORAL DAILY
Qty: 30 TABLET | Refills: 5 | Status: SHIPPED | OUTPATIENT
Start: 2022-11-28

## 2022-12-12 ENCOUNTER — TELEPHONE (OUTPATIENT)
Dept: OBGYN CLINIC | Age: 27
End: 2022-12-12

## 2022-12-14 ENCOUNTER — OFFICE VISIT (OUTPATIENT)
Dept: FAMILY MEDICINE CLINIC | Age: 27
End: 2022-12-14
Payer: MEDICAID

## 2022-12-14 VITALS
TEMPERATURE: 98.8 F | OXYGEN SATURATION: 98 % | SYSTOLIC BLOOD PRESSURE: 146 MMHG | DIASTOLIC BLOOD PRESSURE: 91 MMHG | HEART RATE: 86 BPM | HEIGHT: 66 IN | BODY MASS INDEX: 47.09 KG/M2 | WEIGHT: 293 LBS

## 2022-12-14 DIAGNOSIS — R10.2 PELVIC CRAMPING: ICD-10-CM

## 2022-12-14 DIAGNOSIS — E66.01 OBESITY, MORBID (HCC): ICD-10-CM

## 2022-12-14 DIAGNOSIS — R35.0 FREQUENT URINATION: ICD-10-CM

## 2022-12-14 DIAGNOSIS — E22.1 HYPERPROLACTINEMIA (HCC): ICD-10-CM

## 2022-12-14 DIAGNOSIS — D35.2 PITUITARY ADENOMA (HCC): ICD-10-CM

## 2022-12-14 DIAGNOSIS — M54.50 CHRONIC BILATERAL LOW BACK PAIN WITHOUT SCIATICA: ICD-10-CM

## 2022-12-14 DIAGNOSIS — G89.29 CHRONIC BILATERAL LOW BACK PAIN WITHOUT SCIATICA: ICD-10-CM

## 2022-12-14 DIAGNOSIS — K21.9 GASTROESOPHAGEAL REFLUX DISEASE, UNSPECIFIED WHETHER ESOPHAGITIS PRESENT: Primary | ICD-10-CM

## 2022-12-14 DIAGNOSIS — N76.0 ACUTE VAGINITIS: ICD-10-CM

## 2022-12-14 DIAGNOSIS — N80.03 ADENOMYOSIS: ICD-10-CM

## 2022-12-14 LAB
BILIRUB UR QL STRIP: NEGATIVE
GLUCOSE UR-MCNC: NEGATIVE MG/DL
KETONES P FAST UR STRIP-MCNC: NEGATIVE MG/DL
PH UR STRIP: 7 [PH] (ref 4.6–8)
PROT UR QL STRIP: NEGATIVE
SP GR UR STRIP: 1.02 (ref 1–1.03)
UA UROBILINOGEN AMB POC: NORMAL (ref 0.2–1)
URINALYSIS CLARITY POC: CLEAR
URINALYSIS COLOR POC: YELLOW
URINE BLOOD POC: NEGATIVE
URINE LEUKOCYTES POC: NEGATIVE
URINE NITRITES POC: NEGATIVE

## 2022-12-14 RX ORDER — FAMOTIDINE 20 MG/1
20 TABLET, FILM COATED ORAL 2 TIMES DAILY
Qty: 60 TABLET | Refills: 1 | Status: SHIPPED | OUTPATIENT
Start: 2022-12-14

## 2022-12-14 RX ORDER — BISMUTH SUBSALICYLATE 262 MG
1 TABLET,CHEWABLE ORAL DAILY
COMMUNITY

## 2022-12-14 RX ORDER — OMEPRAZOLE 40 MG/1
40 CAPSULE, DELAYED RELEASE ORAL 2 TIMES DAILY
Qty: 60 CAPSULE | Refills: 1 | Status: SHIPPED | OUTPATIENT
Start: 2022-12-14

## 2022-12-14 NOTE — ASSESSMENT & PLAN NOTE
Chronic, no acute changes  No personal or family history of inflammatory arthritis  Labs and imaging to assess further  Consider PT, medications, specialist referral for management

## 2022-12-14 NOTE — PATIENT INSTRUCTIONS
You should purchase a blood pressure cuff to check your blood pressure at home. Check first thing in the morning. You should be relaxed, seated with back supported, feet flat on the floor, arm with cuff resting at heart height. You should check your blood pressure 1-3 times. Please write down your blood pressures and bring this record and your blood pressure cuff/machine to your follow-up visit. I would like for your blood pressure to be less than 130 for the top number and less than 90 for the bottom number. Please follow-up sooner for consistently high blood pressure readings at home.

## 2022-12-14 NOTE — ASSESSMENT & PLAN NOTE
Uncontrolled  Increase omeprazole, add Pepcid  Refer to GI  Reviewed behavioral modifications to minimize reflux

## 2022-12-14 NOTE — PROGRESS NOTES
Progress Note    she is a 32y.o. year old female who presents for evalution. Assessment/ Plan:   Diagnoses and all orders for this visit:    1. Gastroesophageal reflux disease, unspecified whether esophagitis present  Assessment & Plan:  Uncontrolled  Increase omeprazole, add Pepcid  Refer to GI  Reviewed behavioral modifications to minimize reflux    Orders:  -     omeprazole (PRILOSEC) 40 mg capsule; Take 1 Capsule by mouth two (2) times a day. -     famotidine (PEPCID) 20 mg tablet; Take 1 Tablet by mouth two (2) times a day. Take 30 minutes before eating.  -     REFERRAL TO GASTROENTEROLOGY    2. Chronic bilateral low back pain without sciatica  Assessment & Plan:  Chronic, no acute changes  No personal or family history of inflammatory arthritis  Labs and imaging to assess further  Consider PT, medications, specialist referral for management    Orders:  -     XR SPINE LUMB 2 OR 3 V; Future  -     XR SACRUM AND COCCYX; Future  -     CYCLIC CITRUL PEPTIDE AB, IGG; Future  -     KALYANI, DIRECT, W/REFLEX; Future  -     SED RATE (ESR); Future  -     C REACTIVE PROTEIN, QT; Future  -     HLA-B27; Future  -     RHEUMATOID FACTOR, QL    3. Obesity, morbid ([facility]  Assessment & Plan:   uncontrolled, lifestyle modifications recommended  Plans to start keto diet  At follow-up, and to discuss medication to help with weight management  Labs to assess for comorbidities    Orders:  -     HEMOGLOBIN A1C WITH EAG; Future  -     LIPID PANEL; Future  -     TSH 3RD GENERATION; Future  -     CBC WITH AUTOMATED DIFF; Future  -     METABOLIC PANEL, COMPREHENSIVE; Future    4. Adenomyosis  Assessment & Plan:  Following with Dr. Qiana Steven and Dr. Michael Bose for hysterectomy  Upcoming MRI to assess for scar tissue      5. Pituitary adenoma ([facility]    6.  Hyperprolactinemia ([facility]  Assessment & Plan:  Follows with Dr. Litzy Aiken, recent prolactin levels and MRI (2019) reviewed         Patient is fasting for labs today.    Follow-up and Dispositions    Return in about 2 months (around 2023) for follow-up reflux and weight 1-2 months. I have discussed the diagnosis with the patient and the intended plan as seen in the above orders. The patient has received an after-visit summary and questions were answered concerning future plans. Pt conveyed understanding of plan. Medication Side Effects and Warnings were discussed with patient        Subjective:     Chief Complaint   Patient presents with    Complete Physical     Has a pituitary tumor and has a prolactin issue, last check level was 25. Labs     Is fasting for labs    GERD     Has takren omeprazole OTC, ranitidine. Both have stopped working and she would like to discuss something else    Medication Problem     States that she takes weed gummies at night called Delta 8 as they are the only thing that helps with her pain     Still following with Dr. Maty Arellano  Has established with Dr. Ese Burleson with [facility] as well   In case of hysterectomy, mom had hysterectomy in her 25s as well  He is sending her for MRI to assess for scar tissue to determine if ovaries can be preserved. Adenomyosis noted on ultrasound with Dr. Maty Arellano   Recommended hormone treatment or hysterectomy. Has been on hormones her entire life   Painful, heavy periods  She has 2 kids and 1 miscarriage in between those. Son Baldev Cornelius is 4 on ,   Son Nydia Jackson is 1 as of 10/14, emergency  for failure to progress with induction    Following with Dr. Venita Penaloza annually for pituitary tumor  Last MRI 2019  Last labs 2022, prolactin 25.4  No vision changes  Previously with spontaneous milk production before her kids, now only with expression  Experiences some aching in left breast, similar to engorgement. Has had reflux \"as long as I can remember\"  Started on ranitidine as a teenager  Started on omeprazole early 25s  Currently only on omeprazole.   Doesn't find relief with tums or other prn rx    Food insecurity on SDOH questions  Currently on SNAP and WIC    Low back tenderness, chronic   Bilateral   Overlying SI region  Any pressure feels excruciating  History of falls (I'm very clumsy) with possible back injury  No previous imaging    Has multiple different skin cysts  R shoulder - looked like it might have a head, but couldn't pop it  L upper arm - changed recently and now itchy  L inner thigh      Reviewed PmHx, RxHx, FmHx, SocHx, AllgHx and updated and dated in the chart. Review of Systems - negative except as listed above in the HPI    Objective:     Vitals:    12/14/22 0822   BP: (!) 146/91   Pulse: 86   Temp: 98.8 °F (37.1 °C)   SpO2: 98%   Weight: 339 lb 12.8 oz (154.1 kg)   Height: 5' 6\" (1.676 m)       Current Outpatient Medications   Medication Sig    multivitamin (ONE A DAY) tablet Take 1 Tablet by mouth daily. omeprazole (PRILOSEC) 40 mg capsule Take 1 Capsule by mouth two (2) times a day. famotidine (PEPCID) 20 mg tablet Take 1 Tablet by mouth two (2) times a day. Take 30 minutes before eating. cetirizine (ZYRTEC) 10 mg tablet Take 1 Tablet by mouth daily. ibuprofen (MOTRIN) 800 mg tablet Take 1 Tablet by mouth every eight (8) hours as needed for Pain. Take with food. For up to five days. dextroamphetamine-amphetamine (ADDERALL) 20 mg tablet Take 20 mg by mouth. valACYclovir (VALTREX) 1 gram tablet Take 1 Tablet by mouth daily. escitalopram oxalate (LEXAPRO) 5 mg tablet Take 5 mg by mouth in the morning. OXcarbazepine (TRILEPTAL) 150 mg tablet Take 150 mg by mouth in the morning.    hydrOXYzine HCL (ATARAX) 10 mg tablet     propranoloL (INDERAL) 10 mg tablet     fluticasone propionate (FLONASE) 50 mcg/actuation nasal spray SHAKE LIQUID AND USE 2 SPRAYS IN EACH NOSTRIL DAILY    OTHER Prebiotic with cranberry     No current facility-administered medications for this visit. Physical Exam  Vitals and nursing note reviewed. Constitutional:       General: She is not in acute distress. Appearance: Normal appearance. She is obese. She is not ill-appearing, toxic-appearing or diaphoretic. HENT:      Head: Normocephalic and atraumatic. Eyes:      General: No scleral icterus. Right eye: No discharge. Left eye: No discharge. Conjunctiva/sclera: Conjunctivae normal.   Cardiovascular:      Rate and Rhythm: Normal rate and regular rhythm. Pulses: Normal pulses. Heart sounds: Normal heart sounds. Pulmonary:      Effort: Pulmonary effort is normal. No respiratory distress. Breath sounds: Normal breath sounds. Abdominal:      Palpations: Abdomen is soft. Tenderness: There is no abdominal tenderness. There is no guarding or rebound. Musculoskeletal:         General: Tenderness (Lower back, diffuse) present. No swelling or deformity. Cervical back: No rigidity. Right lower leg: No edema. Left lower leg: No edema. Skin:     General: Skin is warm and dry. Findings: Lesion (Few small subcutaneous cysts, mild overlying dry skin) present. Comments: Keratosis pilaris present, bilateral arms. Dry skin   Neurological:      General: No focal deficit present. Mental Status: She is alert. Psychiatric:         Mood and Affect: Mood normal.         Behavior: Behavior normal.         Thought Content:  Thought content normal.         Judgment: Judgment normal.            Karina Arauz MD

## 2022-12-14 NOTE — PROGRESS NOTES
Gema Orozco is a 32 y.o. female , id x 2(name and ). Reviewed record, history, and  medications. Chief Complaint   Patient presents with    Complete Physical     Has a pituitary tumor and has a prolactin issue, last check level was 25. Labs     Is fasting for labs    GERD     Has takren omeprazole OTC, ranitidine. Both have stopped working and she would like to discuss something else    Medication Problem     States that she takes weed gummies at night called Delta 8 as they are the only thing that helps with her pain       Vitals:    22 0822   BP: (!) 146/91   Pulse: 86   Temp: 98.8 °F (37.1 °C)   SpO2: 98%   Weight: 339 lb 12.8 oz (154.1 kg)   Height: 5' 6\" (1.676 m)     In the process of having a hysterectomy     Coordination of Care Questionnaire:   1. Have you been to the ER, urgent care clinic since your last visit? Hospitalized since your last visit? No    2. Have you seen or consulted any other health care providers outside of the [address] [place] [address] since your last visit? Include any pap smears or colon screening.  No      3 most recent PHQ Screens 2022   PHQ Not Done -   Little interest or pleasure in doing things Not at all   Feeling down, depressed, irritable, or hopeless Not at all   Total Score PHQ 2 0       Social Determinants of Health     Tobacco Use: High Risk    Smoking Tobacco Use: Former    Smokeless Tobacco Use: Current    Passive Exposure: Not on file   Alcohol Use: Not on file   Financial Resource Strain: Low Risk     Difficulty of Paying Living Expenses: Not very hard   Food Insecurity: Food Insecurity Present    Worried About Running Out of Food in the Last Year: Sometimes true    Ran Out of Food in the Last Year: Never true   Transportation Needs: Not on file   Physical Activity: Not on file   Stress: Not on file   Social Connections: Not on file   Intimate Partner Violence: Not on file   Depression: Not at risk    PHQ-2 Score: 0   Housing Stability: Not on file       Patient is accompanied by self I have received verbal consent from Baldev Sorensen to discuss any/all medical information while they are present in the room.

## 2022-12-14 NOTE — ASSESSMENT & PLAN NOTE
Following with Dr. Derrick Curry and Dr. Sarah Goodwin for hysterectomy  Upcoming MRI to assess for scar tissue

## 2022-12-14 NOTE — ASSESSMENT & PLAN NOTE
uncontrolled, lifestyle modifications recommended  Plans to start keto diet  At follow-up, and to discuss medication to help with weight management  Labs to assess for comorbidities

## 2022-12-17 LAB
A VAGINAE DNA VAG QL NAA+PROBE: NORMAL SCORE
BVAB2 DNA VAG QL NAA+PROBE: NORMAL SCORE
C ALBICANS DNA VAG QL NAA+PROBE: NEGATIVE
C GLABRATA DNA VAG QL NAA+PROBE: NEGATIVE
C TRACH RRNA SPEC QL NAA+PROBE: NEGATIVE
MEGA1 DNA VAG QL NAA+PROBE: NORMAL SCORE
N GONORRHOEA RRNA SPEC QL NAA+PROBE: NEGATIVE
SPECIMEN SOURCE: NORMAL
T VAGINALIS RRNA SPEC QL NAA+PROBE: NEGATIVE

## 2022-12-30 NOTE — PROGRESS NOTES
Mildly elevated ESR. KALYANI positive, reflex with RNP borderline elevated. Other inflammatory markers normal.  Normal CBC, CMP, A1c, TSH.   Lipids borderline elevated, focus on healthy lifestyle

## 2023-01-18 ENCOUNTER — PATIENT MESSAGE (OUTPATIENT)
Dept: FAMILY MEDICINE CLINIC | Age: 28
End: 2023-01-18

## 2023-01-18 ENCOUNTER — HOSPITAL ENCOUNTER (OUTPATIENT)
Dept: GENERAL RADIOLOGY | Age: 28
Discharge: HOME | End: 2023-01-18
Payer: MEDICAID

## 2023-01-18 DIAGNOSIS — M54.50 CHRONIC BILATERAL LOW BACK PAIN WITHOUT SCIATICA: ICD-10-CM

## 2023-01-18 DIAGNOSIS — G89.29 CHRONIC BILATERAL LOW BACK PAIN WITHOUT SCIATICA: Primary | ICD-10-CM

## 2023-01-18 DIAGNOSIS — M54.50 CHRONIC BILATERAL LOW BACK PAIN WITHOUT SCIATICA: Primary | ICD-10-CM

## 2023-01-18 DIAGNOSIS — G89.29 CHRONIC BILATERAL LOW BACK PAIN WITHOUT SCIATICA: ICD-10-CM

## 2023-01-18 PROCEDURE — 72220 X-RAY EXAM SACRUM TAILBONE: CPT

## 2023-01-18 PROCEDURE — 72100 X-RAY EXAM L-S SPINE 2/3 VWS: CPT

## 2023-01-23 ENCOUNTER — DOCUMENTATION ONLY (OUTPATIENT)
Dept: FAMILY MEDICINE CLINIC | Age: 28
End: 2023-01-23

## 2023-01-27 RX ORDER — DICLOFENAC SODIUM 50 MG/1
50 TABLET, DELAYED RELEASE ORAL 2 TIMES DAILY
Qty: 60 TABLET | Refills: 0 | Status: SHIPPED | OUTPATIENT
Start: 2023-01-27

## 2023-02-24 ENCOUNTER — VIRTUAL VISIT (OUTPATIENT)
Dept: FAMILY MEDICINE CLINIC | Age: 28
End: 2023-02-24
Payer: MEDICAID

## 2023-02-24 DIAGNOSIS — J06.9 ACUTE URI: Primary | ICD-10-CM

## 2023-02-24 RX ORDER — DOXYCYCLINE 100 MG/1
100 TABLET ORAL 2 TIMES DAILY
Qty: 20 TABLET | Refills: 0 | Status: SHIPPED | OUTPATIENT
Start: 2023-02-24 | End: 2023-02-24 | Stop reason: SDUPTHER

## 2023-02-24 RX ORDER — DOXYCYCLINE 100 MG/1
100 TABLET ORAL 2 TIMES DAILY
Qty: 20 TABLET | Refills: 0 | Status: SHIPPED | OUTPATIENT
Start: 2023-02-24 | End: 2023-03-06

## 2023-02-24 NOTE — PROGRESS NOTES
Progress Note    she is a 32y.o. year old female who presents for evalution. Subjective:     Patient states she had surgery done the beginning of February after she got home couple days later she started feeling unwell. She has been having chest congestion with coughing sinus congestion with thick yellow mucus. No fevers but has been having some cold chills. Using over-the-counter's without much relief for the past couple of weeks. Reviewed PmHx, RxHx, FmHx, SocHx, AllgHx and updated and dated in the chart. Review of Systems - negative except as listed above in the HPI    Objective: There were no vitals filed for this visit. Current Outpatient Medications   Medication Sig    doxycycline (ADOXA) 100 mg tablet Take 1 Tablet by mouth two (2) times a day for 10 days. diclofenac EC (VOLTAREN) 50 mg EC tablet Take 1 Tablet by mouth two (2) times a day. multivitamin (ONE A DAY) tablet Take 1 Tablet by mouth daily. omeprazole (PRILOSEC) 40 mg capsule Take 1 Capsule by mouth two (2) times a day. famotidine (PEPCID) 20 mg tablet Take 1 Tablet by mouth two (2) times a day. Take 30 minutes before eating. cetirizine (ZYRTEC) 10 mg tablet Take 1 Tablet by mouth daily. ibuprofen (MOTRIN) 800 mg tablet Take 1 Tablet by mouth every eight (8) hours as needed for Pain. Take with food. For up to five days. dextroamphetamine-amphetamine (ADDERALL) 20 mg tablet Take 20 mg by mouth. valACYclovir (VALTREX) 1 gram tablet Take 1 Tablet by mouth daily. escitalopram oxalate (LEXAPRO) 5 mg tablet Take 5 mg by mouth in the morning.     OXcarbazepine (TRILEPTAL) 150 mg tablet Take 150 mg by mouth in the morning.    hydrOXYzine HCL (ATARAX) 10 mg tablet     propranoloL (INDERAL) 10 mg tablet     fluticasone propionate (FLONASE) 50 mcg/actuation nasal spray SHAKE LIQUID AND USE 2 SPRAYS IN EACH NOSTRIL DAILY    OTHER Prebiotic with cranberry     No current facility-administered medications for this visit. Physical Examination: General appearance - alert, well appearing, and in no distress  Mental status - alert, oriented to person, place, and time      Assessment/ Plan:   Diagnoses and all orders for this visit:    1. Acute URI  -     doxycycline (ADOXA) 100 mg tablet; Take 1 Tablet by mouth two (2) times a day for 10 days. Follow-up and Dispositions    Return if symptoms worsen or fail to improve. I have discussed the diagnosis with the patient and the intended plan as seen in the above orders. The patient has received an after-visit summary and questions were answered concerning future plans. Pt conveyed understanding of plan. Medication Side Effects and Warnings were discussed with patient      Benjaman Levels is being evaluated by a Virtual Visit (video visit) encounter to address concerns as mentioned above. A caregiver was present when appropriate. Due to this being a TeleHealth encounter (During GDLZZ-68 public health emergency), evaluation of the following organ systems was limited: Vitals/Constitutional/EENT/Resp/CV/GI//MS/Neuro/Skin/Heme-Lymph-Imm. Pursuant to the emergency declaration under the [facility], [address] [place] [address] authority and the [employer] and [employer] Act, this Virtual Visit was conducted with patient's (and/or legal guardian's) consent, to reduce the patient's risk of exposure to COVID-19 and provide necessary medical care. The patient (and/or legal guardian) has also been advised to contact this office for worsening conditions or problems, and seek emergency medical treatment and/or call 911 if deemed necessary. Patient identification was verified at the start of the visit: Yes    Services were provided through a video synchronous discussion virtually to substitute for in-person clinic visit. Patient and provider were located at their individual homes.   --Char Saint, DO on 2/24/2023 at 1:57 PM

## 2023-02-27 ENCOUNTER — PATIENT MESSAGE (OUTPATIENT)
Dept: OBGYN CLINIC | Age: 28
End: 2023-02-27

## 2023-02-27 ENCOUNTER — VIRTUAL VISIT (OUTPATIENT)
Dept: FAMILY MEDICINE CLINIC | Age: 28
End: 2023-02-27
Payer: MEDICAID

## 2023-02-27 DIAGNOSIS — K21.9 GASTROESOPHAGEAL REFLUX DISEASE, UNSPECIFIED WHETHER ESOPHAGITIS PRESENT: ICD-10-CM

## 2023-02-27 DIAGNOSIS — Z91.09 ENVIRONMENTAL ALLERGIES: ICD-10-CM

## 2023-02-27 DIAGNOSIS — B96.89 BACTERIAL SINUSITIS: ICD-10-CM

## 2023-02-27 DIAGNOSIS — M54.50 CHRONIC BILATERAL LOW BACK PAIN WITHOUT SCIATICA: Primary | ICD-10-CM

## 2023-02-27 DIAGNOSIS — G89.29 CHRONIC BILATERAL LOW BACK PAIN WITHOUT SCIATICA: Primary | ICD-10-CM

## 2023-02-27 DIAGNOSIS — J32.9 BACTERIAL SINUSITIS: ICD-10-CM

## 2023-02-27 DIAGNOSIS — G56.03 BILATERAL CARPAL TUNNEL SYNDROME: ICD-10-CM

## 2023-02-27 DIAGNOSIS — E66.01 OBESITY, MORBID (HCC): ICD-10-CM

## 2023-02-27 DIAGNOSIS — E22.1 HYPERPROLACTINEMIA (HCC): ICD-10-CM

## 2023-02-27 DIAGNOSIS — N80.03 ADENOMYOSIS: ICD-10-CM

## 2023-02-27 DIAGNOSIS — D35.2 PITUITARY ADENOMA (HCC): ICD-10-CM

## 2023-02-27 PROCEDURE — 99214 OFFICE O/P EST MOD 30 MIN: CPT | Performed by: STUDENT IN AN ORGANIZED HEALTH CARE EDUCATION/TRAINING PROGRAM

## 2023-02-27 RX ORDER — OMEPRAZOLE 40 MG/1
40 CAPSULE, DELAYED RELEASE ORAL 2 TIMES DAILY
Qty: 180 CAPSULE | Refills: 1 | Status: SHIPPED | OUTPATIENT
Start: 2023-02-27

## 2023-02-27 RX ORDER — AMOXICILLIN AND CLAVULANATE POTASSIUM 875; 125 MG/1; MG/1
1 TABLET, FILM COATED ORAL EVERY 12 HOURS
Qty: 14 TABLET | Refills: 0 | Status: SHIPPED | OUTPATIENT
Start: 2023-02-27 | End: 2023-03-06

## 2023-02-27 RX ORDER — FAMOTIDINE 20 MG/1
20 TABLET, FILM COATED ORAL 2 TIMES DAILY
Qty: 180 TABLET | Refills: 1 | Status: SHIPPED | OUTPATIENT
Start: 2023-02-27

## 2023-02-27 RX ORDER — FLUTICASONE PROPIONATE 50 MCG
SPRAY, SUSPENSION (ML) NASAL
Qty: 16 G | Refills: 5 | Status: SHIPPED | OUTPATIENT
Start: 2023-02-27

## 2023-02-27 RX ORDER — FLUCONAZOLE 150 MG/1
150 TABLET ORAL DAILY
Qty: 2 TABLET | Refills: 0 | Status: SHIPPED | OUTPATIENT
Start: 2023-02-27 | End: 2023-02-28

## 2023-02-27 NOTE — PROGRESS NOTES
Belkys Stovall is a 32 y.o. female , id x 2(name and ). Reviewed questionnaires, and  medications. Chief Complaint   Patient presents with    Medication Evaluation     F/up on diclofenac. Had an a/r: hand  and face swelling. 3 most recent PHQ Screens 2023   PHQ Not Done -   Little interest or pleasure in doing things Not at all   Feeling down, depressed, irritable, or hopeless Several days   Total Score PHQ 2 1       1. Have you been to the ER, urgent care clinic since your last visit? Hospitalized since your last visit? No    2. Have you seen or consulted any other health care providers outside of the [address] [place] [address] since your last visit? Include any pap smears or colon screening.  No

## 2023-02-27 NOTE — ASSESSMENT & PLAN NOTE
Did not tolerate diclofenac, has tolerated otc NSAIDs, continue those at this time  After recovery from recent surgery, consider PT or referral to PMR Dr. Kannan Erazo

## 2023-02-27 NOTE — PROGRESS NOTES
Progress Note    she is a 32y.o. year old female who presents for evalution. Assessment/ Plan:   Diagnoses and all orders for this visit:    1. Chronic bilateral low back pain without sciatica  Assessment & Plan:  Did not tolerate diclofenac, has tolerated otc NSAIDs, continue those at this time  After recovery from recent surgery, consider PT or referral to PMR Dr. Franco Doan      2. Bilateral carpal tunnel syndrome  Assessment & Plan:  History c/w carpal tunnel  EMG to assess further    Orders:  -     EMG TWO EXTREMITIES UPPER; Future    3. Bacterial sinusitis  Assessment & Plan:  Not responding to doxy, switch to augmentin  Diflucan for yeast infection, which she is prone to on pcns    Orders:  -     amoxicillin-clavulanate (AUGMENTIN) 875-125 mg per tablet; Take 1 Tablet by mouth every twelve (12) hours for 7 days. -     fluconazole (DIFLUCAN) 150 mg tablet; Take 1 Tablet by mouth daily for 1 day. May repeat after 3 days. FDA advises cautious prescribing of oral fluconazole in pregnancy. 4. Environmental allergies  Assessment & Plan:   well controlled, continue current medications    Orders:  -     fluticasone propionate (FLONASE) 50 mcg/actuation nasal spray; SHAKE LIQUID AND USE 2 SPRAYS IN EACH NOSTRIL DAILY    5. Gastroesophageal reflux disease, unspecified whether esophagitis present  Assessment & Plan:   well controlled, continue current medications    Orders:  -     omeprazole (PRILOSEC) 40 mg capsule; Take 1 Capsule by mouth two (2) times a day. -     famotidine (PEPCID) 20 mg tablet; Take 1 Tablet by mouth two (2) times a day. Take 30 minutes before eating. 6. Pituitary adenoma ([facility] [address]  Assessment & Plan:  Follow-up scheduled with Dr. Minerva Guzmán. No acute concerns      7. Hyperprolactinemia ([address]    8. Adenomyosis  Assessment & Plan:  Recently with surgery to remove scar tissue.   She has noticed improvement in abd/pelvic symptoms as well as other symptoms that did not previously seem connected. Encouraged adequate rest and allowing the body to recover      9. Obesity, morbid ([address]     Follow-up and Dispositions    Return for follow-up pain and weight 1-2 months. I have discussed the diagnosis with the patient and the intended plan as seen in the above orders. The patient has received an after-visit summary and questions were answered concerning future plans. Pt conveyed understanding of plan. Medication Side Effects and Warnings were discussed with patient       Subjective:     Chief Complaint   Patient presents with    Medication Evaluation     F/up on diclofenac. Had an a/r: hand  and face swelling. Started doxycycline after VV  with Dr. Samantha Stark   First dose was that evening  Previously with issues with yeast infections on PCNs  Taking [facility]entials - prebiotic, probiotic, and cranberry    Removal of scar tissue  with Dr. Avani Martinez  She received oxycodone. Felt angry when it wore off. Family history of addiction, dad  of drug overdose in   She was told that there was a piece of scar tissue causing contracture of the uterus and ovary, and things released when it was removed   A lot of problems prior to the surgery got better with the scar tissue removal.  She had follow-up last week because of persistent bleeding. Her menses had started again. He wants to wait 6 months to see how her body does with the feeling  She feels like she is starting to have tugging sensations again.     She took diclofenac for 6-7 days at night   2nd day the feet and hands started swelling  7th day the face started swelling, cheeks and under eyes (a telltale sign of allergy for her)   Swelling would go away after 2 hours of being up and moving around  She has used ibuprofen since without issue of allergic reaction   She has tolerated aleve in the past  Back pain has improved after the surgery, does not feel as tender   Tender to touch still, just not as much  Pain in the hands   Issues with the hands burning and numbness got worse on the diclofenac   Shaking it out helps for a split second    Last saw Dr. Nereyda Delacruz 8/2020, has an appointment in April  No current vision issues or lactating    She is seeing a psychiatrist and psychologist  She was at a very low point before surgery  She sees a tight connection between her mental health and weight management. Reviewed PmHx, RxHx, FmHx, SocHx, AllgHx and updated and dated in the chart. Review of Systems - negative except as listed above in the HPI      Objective: There were no vitals filed for this visit. Current Outpatient Medications   Medication Sig    omeprazole (PRILOSEC) 40 mg capsule Take 1 Capsule by mouth two (2) times a day. famotidine (PEPCID) 20 mg tablet Take 1 Tablet by mouth two (2) times a day. Take 30 minutes before eating. fluticasone propionate (FLONASE) 50 mcg/actuation nasal spray SHAKE LIQUID AND USE 2 SPRAYS IN EACH NOSTRIL DAILY    amoxicillin-clavulanate (AUGMENTIN) 875-125 mg per tablet Take 1 Tablet by mouth every twelve (12) hours for 7 days. fluconazole (DIFLUCAN) 150 mg tablet Take 1 Tablet by mouth daily for 1 day. May repeat after 3 days. FDA advises cautious prescribing of oral fluconazole in pregnancy. doxycycline (ADOXA) 100 mg tablet Take 1 Tablet by mouth two (2) times a day for 10 days. multivitamin (ONE A DAY) tablet Take 1 Tablet by mouth daily. cetirizine (ZYRTEC) 10 mg tablet Take 1 Tablet by mouth daily. dextroamphetamine-amphetamine (ADDERALL) 20 mg tablet Take 20 mg by mouth. valACYclovir (VALTREX) 1 gram tablet Take 1 Tablet by mouth daily. escitalopram oxalate (LEXAPRO) 5 mg tablet Take 10 mg by mouth daily.     OXcarbazepine (TRILEPTAL) 150 mg tablet Take 150 mg by mouth in the morning.    hydrOXYzine HCL (ATARAX) 10 mg tablet     propranoloL (INDERAL) 10 mg tablet     OTHER Prebiotic with cranberry     No current facility-administered medications for this visit. Physical Exam  Vitals and nursing note reviewed. Constitutional:       General: She is not in acute distress. Appearance: Normal appearance. She is not ill-appearing, toxic-appearing or diaphoretic. HENT:      Head: Normocephalic and atraumatic. Eyes:      General: No scleral icterus. Right eye: No discharge. Left eye: No discharge. Conjunctiva/sclera: Conjunctivae normal.   Pulmonary:      Effort: Pulmonary effort is normal. No respiratory distress. Musculoskeletal:      Cervical back: No rigidity. Skin:     Coloration: Skin is not jaundiced or pale. Findings: No rash (Of the visualized areas). Neurological:      Mental Status: She is alert. Comments: No dysarthria, facial asymmetry, or other gross neurological deficit appreciated within limits of virtual encounter   Psychiatric:         Mood and Affect: Mood normal.         Behavior: Behavior normal.         Thought Content: Thought content normal.         Judgment: Judgment normal.             I was in the office while conducting this encounter. Total Time: minutes: 21-30 minutes. Melchor Rodriguez is a 32 y.o. female being evaluated by a Virtual Visit (video visit) encounter to address concerns as mentioned above. A caregiver was present when appropriate. Due to this being a TeleHealth encounter (During [facility]- public health emergency), evaluation of the following organ systems was limited: Vitals/Constitutional/EENT/Resp/CV/GI//MS/Neuro/Skin/Heme-Lymph-Imm. Pursuant to the emergency declaration under the [address] [place] [address] and the [employer] and [employer] General Act, this Virtual Visit was conducted with patient's (and/or legal guardian's) consent, to reduce the risk of exposure to COVID-19 and provide necessary medical care.       Services were provided through a video synchronous discussion virtually to substitute for in-person encounter. --Joe Eason MD on 2/27/2023 at 11:25 AM    An electronic signature was used to authenticate this note.

## 2023-02-27 NOTE — ASSESSMENT & PLAN NOTE
Not responding to doxy, switch to augmentin  Diflucan for yeast infection, which she is prone to on pcns

## 2023-02-27 NOTE — PATIENT INSTRUCTIONS
Physical medicine and rehabilitation Dr. Katherin Fernández at [facility]    I recommend you get the workbook WILD 5, check it out on [address] stands for wellness interventions for life demands  The lifestyle areas to focus on. .. Exercise  Nutrition - focus on how your body and mind respond to what you eat, make adjustments appropriately  Sleep  Mindfulness - deep/diaphragmatic breathing, progressive muscle relaxation, guided imagery, yoga, nicolás chi, meditation, prayer, Confucianism reading, journaling, coloring/art   Social connectedness    Have the EMG done to assess for carpal tunnel. Call [phone] to schedule. Use aleve and advil for your pain, just not at the same time. It would be fine for you to take Aleve or generic twice a day on a schedule. Monitor for any signs of allergic reaction. Ask your insurance about coverage for aquatherapy and preferred locations. We wouldn't do this until you are recovered from surgery, but it would be a good option to work on addressing your chronic pain.

## 2023-02-27 NOTE — ASSESSMENT & PLAN NOTE
Recently with surgery to remove scar tissue. She has noticed improvement in abd/pelvic symptoms as well as other symptoms that did not previously seem connected.   Encouraged adequate rest and allowing the body to recover

## 2023-03-03 ENCOUNTER — OFFICE VISIT (OUTPATIENT)
Dept: OBGYN CLINIC | Age: 28
End: 2023-03-03

## 2023-03-03 VITALS
BODY MASS INDEX: 47.09 KG/M2 | DIASTOLIC BLOOD PRESSURE: 86 MMHG | WEIGHT: 293 LBS | HEART RATE: 86 BPM | SYSTOLIC BLOOD PRESSURE: 128 MMHG | HEIGHT: 66 IN

## 2023-03-03 DIAGNOSIS — N94.6 DYSMENORRHEA: ICD-10-CM

## 2023-03-03 DIAGNOSIS — Z20.2 EXPOSURE TO SEXUALLY TRANSMITTED DISEASE (STD): ICD-10-CM

## 2023-03-03 DIAGNOSIS — N93.9 ABNORMAL UTERINE BLEEDING: ICD-10-CM

## 2023-03-03 DIAGNOSIS — Z11.3 VENEREAL DISEASE SCREENING: ICD-10-CM

## 2023-03-03 DIAGNOSIS — Z01.419 ENCOUNTER FOR GYNECOLOGICAL EXAMINATION (GENERAL) (ROUTINE) WITHOUT ABNORMAL FINDINGS: Primary | ICD-10-CM

## 2023-03-03 RX ORDER — LEVONORGESTREL AND ETHINYL ESTRADIOL 0.15-0.03
1 KIT ORAL DAILY
Qty: 90 TABLET | Refills: 3 | Status: SHIPPED | OUTPATIENT
Start: 2023-03-03 | End: 2023-06-01

## 2023-03-03 NOTE — PROGRESS NOTES
Alicia Lacey is a 32 y.o. female returns for an annual exam     Chief Complaint   Patient presents with    Well Woman       Patient's last menstrual period was 02/03/2023. Her periods are  varies  in flow and can be irregular  She has dysmenorrhea. Problems:  2/8/22 pt had surgery with Dr. Avani Martinez to remove scar tissue, we do not have records yet  LMP 2/3/23, procedure on 2/8/23, started bleeing 2/8/23 & bled until 2/15/22 & had more bleeding for a few days later this month. Pt reports now she has some overy pain on right side, feels like a pulling sensation. Pt reports they confirmed adenomyosis. 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 Pt does not like depo d/t wt gain  Birth Control: none & rhythm. Last Pap: normal obtained 2 year(s) ago. She does not have a history of ODIN 2, 3 or cervical cancer. With regard to the Gardisil vaccine, she has received all 3 injections. She went to PCP for sinus infection & they originally gave her doxycycline & that did not help, then she started Augmentin 2-3 days ago, she reports she thought she was having a UTI d/t low back pain, she feels better after 24 hours of taking Augmentin. 1. Have you been to the ER, urgent care clinic, or hospitalized since your last visit? Yes 2/8/23 for surgery     2. Have you seen or consulted any other health care providers outside of the [address] [place] [address] since your last visit?   Yes Dr. Avani Martinez     Examination chaperoned by Hetal Prado MA.

## 2023-03-03 NOTE — PROGRESS NOTES
[facility]  [email or web address]  [phone]    Vazquez Crook MD, [address]     Annual Gynecologic Exam:  Chief Complaint   Patient presents with    Well Woman       Falguni Arita is a ,  32 y.o. female   Patient's last menstrual period was 2023. She presents for her annual checkup. She is having significant AUB after surgery. + cramping . Per Rooming Note:  Patient's last menstrual period was 2023. Her periods are  varies  in flow and can be irregular  She has dysmenorrhea. Problems:  22 pt had surgery with Dr. Elsa Whitfield to remove scar tissue, we do not have records yet  LMP 2/3/23, procedure on 23, started bleeing 23 & bled until 2/15/22 & had more bleeding for a few days later this month. Pt reports now she has some overy pain on right side, feels like a pulling sensation. Pt reports they confirmed adenomyosis. 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 Pt does not like depo d/t wt gain  Birth Control: none & rhythm. Last Pap: normal obtained 2 year(s) ago. She does not have a history of ODIN 2, 3 or cervical cancer. With regard to the Gardisil vaccine, she has received all 3 injections. Hen Doc; surgery. She went to PCP for sinus infection & they originally gave her doxycycline & that did not help, then she started Augmentin 2-3 days ago, she reports she thought she was having a UTI d/t low back pain, she feels better after 24 hours of taking Augmentin.      Sexual history and Contraception:  Social History     Substance and Sexual Activity   Sexual Activity Yes    Partners: Male    Birth control/protection: Condom       Past Medical History:   Diagnosis Date    Adenomyosis     outside gyn HD, imaging    Asthma     Bipolar affective disorder ([facility] [address]     Dysmenorrhea     Encounter for IUD insertion 2022    Mirena Due out 3/7/2025    Essential hypertension     Genital herpes     Valtrex     Major depressive disorder, single episode, unspecified     Morbid obesity ([facility] [address]     Nexplanon insertion 2021    Nexplanon removal 2022    Pap smear for cervical cancer screening     17 neg 21 neg    Pituitary abnormality (H[facility])     Postpartum depression     Seasonal allergies     Unspecified symptom associated with female genital organs      Current Outpatient Medications   Medication Sig    levonorgestrel-ethinyl estradiol (SEASONALE) 0.15 mg-30 mcg (91) 3MPk Take 1 Tablet by mouth daily for 90 days. omeprazole (PRILOSEC) 40 mg capsule Take 1 Capsule by mouth two (2) times a day. famotidine (PEPCID) 20 mg tablet Take 1 Tablet by mouth two (2) times a day. Take 30 minutes before eating. fluticasone propionate (FLONASE) 50 mcg/actuation nasal spray SHAKE LIQUID AND USE 2 SPRAYS IN EACH NOSTRIL DAILY    amoxicillin-clavulanate (AUGMENTIN) 875-125 mg per tablet Take 1 Tablet by mouth every twelve (12) hours for 7 days. doxycycline (ADOXA) 100 mg tablet Take 1 Tablet by mouth two (2) times a day for 10 days. multivitamin (ONE A DAY) tablet Take 1 Tablet by mouth daily. cetirizine (ZYRTEC) 10 mg tablet Take 1 Tablet by mouth daily. dextroamphetamine-amphetamine (ADDERALL) 20 mg tablet Take 20 mg by mouth. valACYclovir (VALTREX) 1 gram tablet Take 1 Tablet by mouth daily. escitalopram oxalate (LEXAPRO) 5 mg tablet Take 10 mg by mouth daily. OXcarbazepine (TRILEPTAL) 150 mg tablet Take 150 mg by mouth in the morning.    hydrOXYzine HCL (ATARAX) 10 mg tablet     propranoloL (INDERAL) 10 mg tablet     OTHER Prebiotic with cranberry     No current facility-administered medications for this visit.      OB History    Para Term  AB Living   2 2 2     2   SAB IAB Ectopic Molar Multiple Live Births           0 2      # Outcome Date GA Lbr Gil/2nd Weight Sex Delivery Anes PTL Lv   2 Term 10/14/21 39w4d  8 lb 6.2 oz (3.805 kg) M CS-LTranv EPI N PENNY Complications: Failure to Progress in First Stage, Fetal Intolerance   1 Term 12/29/18 37w6d / 00:12 6 lb 3.8 oz (2.83 kg) M Vag-Spont EPIDURAL AN N PENNY      Obstetric Comments   Menarche:  10. LMP: current. # of Children:  0. Age at Delivery of First Child:  na.   Hysterectomy/oophorectomy:  NO/NO. Breast Bx:  no.  Hx of Breast Feeding:  na. BCP:  yes.  Hormone therapy:  no.    P1 spontaneous labor, efw 9pounds     Past Surgical History:   Procedure Laterality Date    HX OTHER SURGICAL      HX PELVIC LAPAROSCOPY  02/08/2023    uterine scar tissue removed    HX WISDOM TEETH EXTRACTION      HX WRIST FRACTURE TX Right      Family History   Problem Relation Age of Onset    Alcohol abuse Father     Cancer Paternal Grandmother     Other Mother         endometriosis    Migraines Mother         tumor in brain     Social History     Socioeconomic History    Marital status: SINGLE     Spouse name: Not on file    Number of children: Not on file    Years of education: Not on file    Highest education level: Not on file   Occupational History    Not on file   Tobacco Use    Smoking status: Former     Packs/day: 0.50     Types: Cigarettes    Smokeless tobacco: Current   Vaping Use    Vaping Use: Never used   Substance and Sexual Activity    Alcohol use: No     Alcohol/week: 0.0 standard drinks     Comment: former user just sips now and then    Drug use: No    Sexual activity: Yes     Partners: Male     Birth control/protection: Condom   Other Topics Concern     Service Not Asked    Blood Transfusions Not Asked    Caffeine Concern Not Asked    Occupational Exposure Not Asked    Hobby Hazards Not Asked    Sleep Concern Not Asked    Stress Concern Not Asked    Weight Concern Not Asked    Special Diet Not Asked    Back Care Not Asked    Exercise Not Asked    Bike Helmet Not Asked    Seat Belt Not Asked    Self-Exams Not Asked   Social History Narrative    Not on file     Social Determinants of Health     Financial Resource Strain: Low Risk     Difficulty of Paying Living Expenses: Not very hard   Food Insecurity: Food Insecurity Present    Worried About Running Out of Food in the Last Year: Sometimes true    Ran Out of Food in the Last Year: Never true   Transportation Needs: Not on file   Physical Activity: Not on file   Stress: Not on file   Social Connections: Not on file   Intimate Partner Violence: Not on file   Housing Stability: Not on file     Tobacco History:  reports that she has quit smoking. Her smoking use included cigarettes. She smoked an average of .5 packs per day. She uses smokeless tobacco.  Alcohol Abuse:  reports no history of alcohol use. Drug Abuse:  reports no history of drug use.   Allergies   Allergen Reactions    Latex Swelling     Skin peels    Lactose Other (comments)     Pt states she is not allergic anymore    Aspirin Nausea and Vomiting    Betadine [Povidone-Iodine] Other (comments)     Skin peels    Cat Dander Sneezing    Diclofenac Swelling     Hand and face     Ketorolac Hives    Penicillins Other (comments)     Yeast infections    Ivp Dye [Iodinated Contrast Media] Other (comments)       Patient Active Problem List   Diagnosis Code    Obesity E66.9    Dysmenorrhea N94.6    Constipation K59.00    Adenomyosis N80.03    Panic attacks F41.0    Galactorrhea N64.3    Hyperprolactinemia ([facility]) E22.1    Pituitary adenoma ([facility]) D35.2    Obesity, morbid ([facility]) E66.01    Nerve compression syndrome G58.9    Peripheral edema R60.9    Pleurodynia R07.81    Engages in vaping Z72.89    Gastroesophageal reflux disease K21.9    Chronic bilateral low back pain without sciatica M54.50, G89.29    Environmental allergies Z91.09    Bilateral carpal tunnel syndrome G56.03    Bacterial sinusitis J32.9, B96.89       Review of Systems - History obtained from the patient and patient filled out questionnaire   Constitutional/general, HEENT, CV, Resp, GI, MSK, Neuro, Psych, Heme/lymph, Skin, Breast ROS: no significant complaints except as noted on HPI    Physical Exam  Visit Vitals  /86   Pulse 86   Ht 5' 6\" (1.676 m)   Wt (!) 351 lb 12.8 oz (159.6 kg)   LMP 02/03/2023   Breastfeeding No   BMI 56.78 kg/m²       Constitutional  Appearance: well-nourished, well developed, alert, in no acute distress    HENT  Head and Face: appears normal    Neck  Inspection/Palpation: normal appearance, no masses or tenderness  Lymph Nodes: no lymphadenopathy present  Thyroid: gland size normal, nontender, no nodules or masses present on palpation    Chest  Respiratory Effort: breathing unlabored  Auscultation: normal breath sounds    Cardiovascular  Heart: Auscultation: regular rate and rhythm without murmur    Breasts  Breast exam: breasts appear normal, no suspicious masses, no skin or nipple changes or axillary nodes.       Gastrointestinal  Abdominal Examination: abdomen non-tender to palpation, normal bowel sounds, no masses present  Liver and spleen: no hepatomegaly present, spleen not palpable  Hernias: no hernias identified    Genitourinary  External Genitalia: normal appearance for age, no discharge present, no tenderness present, no inflammatory lesions present, no masses present  Vagina: normal vaginal vault without central or paravaginal defects, minimal discharge present, no inflammatory lesions present, no masses present  Bladder: non-tender to palpation  Urethra: appears normal  Cervix: normal   Uterus: normal size, shape and consistency  Adnexa: no adnexal tenderness present, no adnexal masses present  Perineum: perineum within normal limits, no evidence of trauma, no rashes or skin lesions present  Anus: anus within normal limits, no hemorrhoids present  Inguinal Lymph Nodes: no lymphadenopathy present    Skin  General Inspection: no rash, no lesions identified    Neurologic/Psychiatric  Mental Status:  Orientation: grossly oriented to person, place and time  Mood and Affect: mood normal, affect appropriate    Assessment:  32 y.o.  for well woman exam  Her current medical status is satisfactory with no evidence of significant gynecologic issues. Encounter Diagnoses   Name Primary? Encounter for gynecological examination (general) (routine) without abnormal findings Yes    Abnormal uterine bleeding     Dysmenorrhea     Venereal disease screening     Exposure to sexually transmitted disease (STD)        Plan:  The patient was counseled about diet, exercise, healthy lifestyle  I recommend annual well woman exams  We discussed current pap smear and HR HPV testing guidelines. I recommended follow up one year for routine annual gynecologic exam or sooner prn  Handouts were given to the patient  I recommended follow up with a primary care physician for chronic medical problems and evaluation of non-gynecologic concerns and to please contact our office with any GYN questions or concerns. I recommended testing per CDC guidelines and at patient request.   Std screening requested  Disc s/sx of adenomyosis and txt options  Pt wants to try ext cycle ocp  Discussed risks, benefits and alternatives of OCP/nuvaring/patch: including but not limited to dvt/pe/mi/cva/ca/gi risks and that smoking, increasing age and other health conditions can increase these risks. Ocp ho given   Rec back up with OCP due to decreased effectiveness with BMI        Folllow up:  [x] return for annual well woman exam in one year or sooner if she is having problems  [] follow up and ultrasound  [] 6 months  [] 3 months  [] 6 weeks   [] 1 month    Orders Placed This Encounter    [address] ([facility])    HEP B SURFACE AG    T PALLIDUM SCREEN W/REFLEX    HIV SCREEN, [address] [place] [address]. W/REFLEX CONFIRM    levonorgestrel-ethinyl estradiol (SEASONALE) 0.15 mg-30 mcg (91) 3MPk         No results found for any visits on 23.

## 2023-04-20 ENCOUNTER — HOSPITAL ENCOUNTER (EMERGENCY)
Age: 28
Discharge: HOME | End: 2023-04-20
Attending: EMERGENCY MEDICINE
Payer: MEDICAID

## 2023-04-20 ENCOUNTER — APPOINTMENT (OUTPATIENT)
Dept: GENERAL RADIOLOGY | Age: 28
End: 2023-04-20
Attending: EMERGENCY MEDICINE
Payer: MEDICAID

## 2023-04-20 VITALS
BODY MASS INDEX: 47.09 KG/M2 | OXYGEN SATURATION: 98 % | HEIGHT: 66 IN | DIASTOLIC BLOOD PRESSURE: 76 MMHG | WEIGHT: 293 LBS | SYSTOLIC BLOOD PRESSURE: 146 MMHG | HEART RATE: 87 BPM | RESPIRATION RATE: 18 BRPM | TEMPERATURE: 98.4 F

## 2023-04-20 DIAGNOSIS — J06.9 ACUTE UPPER RESPIRATORY INFECTION: Primary | ICD-10-CM

## 2023-04-20 PROCEDURE — 99283 EMERGENCY DEPT VISIT LOW MDM: CPT

## 2023-04-20 PROCEDURE — 71046 X-RAY EXAM CHEST 2 VIEWS: CPT

## 2023-04-20 RX ORDER — METHYLPREDNISOLONE 4 MG/1
TABLET ORAL
Qty: 1 DOSE PACK | Refills: 0 | Status: SHIPPED | OUTPATIENT
Start: 2023-04-20

## 2023-04-20 NOTE — ED PROVIDER NOTES
71-year-old female history of asthma, hypertension, morbid obesity presents to the emergency department concern for cough and fever. Child sick with something similar. The history is provided by the patient and medical records. Cough  This is a new problem.       Past Medical History:   Diagnosis Date    Adenomyosis     outside gyn HD, imaging    Asthma     Bipolar affective disorder ([facility] [address]     Dysmenorrhea     Encounter for IUD insertion 03/07/2022    Mirena Due out 3/7/2025    Essential hypertension     Genital herpes     Valtrex     Major depressive disorder, single episode, unspecified     Morbid obesity ([address]     Nexplanon insertion 12/08/2021    Nexplanon removal 03/07/2022    Pap smear for cervical cancer screening     2/23/17 neg 2/8/21 neg    Pituitary abnormality ([facility] [address]     Postpartum depression     Seasonal allergies     Unspecified symptom associated with female genital organs        Past Surgical History:   Procedure Laterality Date    HX OTHER SURGICAL      HX PELVIC LAPAROSCOPY  02/08/2023    uterine scar tissue removed    HX WISDOM TEETH EXTRACTION      HX WRIST FRACTURE TX Right          Family History:   Problem Relation Age of Onset    Alcohol abuse Father     Cancer Paternal Grandmother     Other Mother         endometriosis    Migraines Mother         tumor in brain       Social History     Socioeconomic History    Marital status: SINGLE     Spouse name: Not on file    Number of children: Not on file    Years of education: Not on file    Highest education level: Not on file   Occupational History    Not on file   Tobacco Use    Smoking status: Former     Packs/day: 0.50     Types: Cigarettes    Smokeless tobacco: Current   Vaping Use    Vaping Use: Never used   Substance and Sexual Activity    Alcohol use: No     Alcohol/week: 0.0 standard drinks     Comment: former user just sips now and then    Drug use: No    Sexual activity: Yes     Partners: Male     Birth control/protection: Condom Other Topics Concern     Service Not Asked    Blood Transfusions Not Asked    Caffeine Concern Not Asked    Occupational Exposure Not Asked    Hobby Hazards Not Asked    Sleep Concern Not Asked    Stress Concern Not Asked    Weight Concern Not Asked    Special Diet Not Asked    Back Care Not Asked    Exercise Not Asked    Bike Helmet Not Asked    Seat Belt Not Asked    Self-Exams Not Asked   Social History Narrative    Not on file     Social Determinants of Health     Financial Resource Strain: Low Risk     Difficulty of Paying Living Expenses: Not very hard   Food Insecurity: Food Insecurity Present    Worried About Running Out of Food in the Last Year: Sometimes true    Ran Out of Food in the Last Year: Never true   Transportation Needs: Not on file   Physical Activity: Not on file   Stress: Not on file   Social Connections: Not on file   Intimate Partner Violence: Not on file   Housing Stability: Not on file         ALLERGIES: Latex, Lactose, Aspirin, Betadine [povidone-iodine], Cat dander, Diclofenac, Ketorolac, Penicillins, and Ivp dye [iodinated contrast media]    Review of Systems   Respiratory:  Positive for cough. Vitals:    04/20/23 1402   BP: (!) 143/82   Pulse: 98   Resp: 16   Temp: 98.4 °F (36.9 °C)   SpO2: 99%   Weight: 158.8 kg (350 lb)   Height: 5' 6\" (1.676 m)            Physical Exam  Constitutional:       General: She is not in acute distress. Appearance: She is obese. She is not ill-appearing. Cardiovascular:      Rate and Rhythm: Normal rate. Pulmonary:      Effort: Pulmonary effort is normal. No respiratory distress. Neurological:      Mental Status: She is alert. Medical Decision Making  59-year-old female presents as above with upper respiratory symptoms, multiple sick members. Work-up in the emergency department reassuring.   Will discharge with symptomatic treatment and follow-up with primary care    Amount and/or Complexity of Data Reviewed  Radiology: ordered and independent interpretation performed. Decision-making details documented in ED Course. ED Course as of 04/20/23 1507   Thu Apr 20, 2023   1443 I have independently viewed the obtained radiographic images and note chest x-ray without acute findings. Will await radiology read.  [JM]      ED Course User Index  [MARLY] Mima Mcclelland, MD       Procedures

## 2023-04-24 ENCOUNTER — OFFICE VISIT (OUTPATIENT)
Dept: ENDOCRINOLOGY | Age: 28
End: 2023-04-24
Payer: MEDICAID

## 2023-04-24 VITALS
HEART RATE: 88 BPM | BODY MASS INDEX: 47.09 KG/M2 | OXYGEN SATURATION: 96 % | SYSTOLIC BLOOD PRESSURE: 147 MMHG | TEMPERATURE: 98.1 F | WEIGHT: 293 LBS | RESPIRATION RATE: 18 BRPM | DIASTOLIC BLOOD PRESSURE: 78 MMHG | HEIGHT: 66 IN

## 2023-04-24 DIAGNOSIS — E66.01 MORBID OBESITY (HCC): ICD-10-CM

## 2023-04-24 DIAGNOSIS — D35.2 PITUITARY ADENOMA (HCC): ICD-10-CM

## 2023-04-24 DIAGNOSIS — E22.1 HYPERPROLACTINEMIA (HCC): Primary | ICD-10-CM

## 2023-04-24 PROBLEM — J45.909 ASTHMA: Status: ACTIVE | Noted: 2023-04-24

## 2023-04-24 PROBLEM — N39.0 FREQUENT URINARY TRACT INFECTIONS: Status: ACTIVE | Noted: 2020-02-01

## 2023-04-24 PROBLEM — B00.9 HERPES SIMPLEX VIRUS (HSV) INFECTION: Status: ACTIVE | Noted: 2023-04-24

## 2023-04-24 PROCEDURE — 99215 OFFICE O/P EST HI 40 MIN: CPT | Performed by: INTERNAL MEDICINE

## 2023-04-24 RX ORDER — ARIPIPRAZOLE 2 MG/1
TABLET ORAL
COMMUNITY
Start: 2023-03-16

## 2023-04-24 NOTE — PATIENT INSTRUCTIONS
Wait 3 weeks after last steroid dose then do Salivary cortisol test    Instructions for salivary cortisol test    1. Do not brush teeth or floss  before collecting specimen. 2. Do not eat or drink for 30 minutes prior to specimen collection. 3. 24 hours before collection - do not use any creams or lotion that contains steroids such as         hydrocortisone or use any steroid inhalers. These products may contaminate the absorbent. 4. Avoid activities that may cause gum bleeding before collection      Night 1- Put the absorbent pad under your tongue between 11p-midnight for four (4) minutes, label with name, date of birth, collection date and collection time. Place pad in the freezer. Night 5- Put the absorbent pad under your tongue between 11p-midnight for four (4) minutes, label with name, date of birth, collection date and collection time. Place pad in the freezer. Absorbent pads are to remain frozen until you can drop them off to [facility] with orders or our lab in the office.     [facility] Medically Supervised Weight loss Program    Please call [phone]    Weight loss Physicians and Providers  Dr. Enriqueta Kemp Neigh

## 2023-04-24 NOTE — PROGRESS NOTES
Room 8    Identified pt with two pt identifiers(name and ). Reviewed record in preparation for visit and have obtained necessary documentation. All patient medications has been reviewed. Chief Complaint   Patient presents with    Hyperprolactinemia    Pituitary Problem       3 most recent PHQ Screens 2023   PHQ Not Done -   Little interest or pleasure in doing things Not at all   Feeling down, depressed, irritable, or hopeless Not at all   Total Score PHQ 2 0         Health Maintenance Review: Patient reminded of \"due or due soon\" health maintenance. I have asked the patient to contact his/her primary care provider (PCP) for follow-up on his/her health maintenance. Vitals:    23 1314 23 1319   BP: (!) 147/75 (!) 147/78   Pulse: 88    Resp: 18    Temp: 98.1 °F (36.7 °C)    TempSrc: Temporal    SpO2: 96%    Weight: (!) 351 lb 9.6 oz (159.5 kg)    Height: 5' 6\" (1.676 m)    PainSc:   0 - No pain    LMP: 2023         Lab Results   Component Value Date/Time    Hemoglobin A1c 5.2 2022 09:45 AM       Coordination of Care Questionnaire:   1) Have you been to an emergency room, urgent care, or hospitalized since your last visit? Yes, 23  [facility] ED Acute URI    2. Have seen or consulted any other health care provider since your last visit? YES, Dr Salgado/ OB GYN       Patient is accompanied by self I have received verbal consent from Baldev Sorensen to discuss any/all medical information while they are present in the room.

## 2023-04-24 NOTE — PROGRESS NOTES
Lc Richardson MD        Patient Information  Date:4/24/2023  Name : Ronda Mejia 32 y.o.     YOB: 1995         Referred by: John Shook MD     Chief Complaint   Patient presents with    Hyperprolactinemia    Pituitary Problem     History of Present Illness: Shannan Leyva is a 32 y.o. female here for fu.    4/24/23  Dx with PCOS and Endometriosis - seeing 2 diff GYNs Dr. Radha Coleman and Dr. Janessa Harris ([facility])  Ultrasound done by Dr. Radha Coleman in 2023 did not show PCOS morphology, no hirsutism, she has heavy and frequent cycles  Started on Seasonal -OCPs to regulate cycles, she is thinking about ablation  Tried Keto diet but had heavier cycles, she lost 40 pounds  Facial erythema      Prior history  , She was referred by John Shook MD for evaluation and management of galactorrhea. She was on oral contraceptives in July 2016 for dysmenorrhea.    MRI showed pituitary adenoma 7 mm   She delivered a healthy baby in December 2018, 2nd child Oct 14 2021  Bothersome galactorrhea and hence was on cabergoline in the past, she is off cabergoline    Has IUD, prolactin mildly elevated  No galactorrhea  Not lactating  Lost weight    Wt Readings from Last 3 Encounters:   04/24/23 (!) 351 lb 9.6 oz (159.5 kg)   04/20/23 350 lb (158.8 kg)   03/03/23 (!) 351 lb 12.8 oz (159.6 kg)       BP Readings from Last 3 Encounters:   04/24/23 (!) 147/78   04/20/23 (!) 146/76   03/03/23 128/86     Past Medical History:   Diagnosis Date    Adenomyosis     outside gyn HD, imaging    Asthma     Bipolar affective disorder ([facility] [address]     Dysmenorrhea     Encounter for IUD insertion 03/07/2022    Mirena Due out 3/7/2025    Endometriosis 2023    Essential hypertension     Genital herpes     Valtrex     Herpes simplex virus (HSV) infection 4/24/2023    Major depressive disorder, single episode, unspecified     Morbid obesity ([facility] [address]     Nexplanon insertion 12/08/2021    Nexplanon removal 03/07/2022    Pap smear for cervical cancer screening     2/23/17 neg 2/8/21 neg    PCOS (polycystic ovarian syndrome)     Pituitary abnormality (HCC)     Postpartum depression     Seasonal allergies     Unspecified symptom associated with female genital organs      Current Outpatient Medications   Medication Sig    ARIPiprazole (ABILIFY) 2 mg tablet     methylPREDNISolone (Medrol, Cruz,) 4 mg tablet Take according to package instructions    levonorgestrel-ethinyl estradiol (SEASONALE) 0.15 mg-30 mcg (91) 3MPk Take 1 Tablet by mouth daily for 90 days. omeprazole (PRILOSEC) 40 mg capsule Take 1 Capsule by mouth two (2) times a day. famotidine (PEPCID) 20 mg tablet Take 1 Tablet by mouth two (2) times a day. Take 30 minutes before eating. fluticasone propionate (FLONASE) 50 mcg/actuation nasal spray SHAKE LIQUID AND USE 2 SPRAYS IN EACH NOSTRIL DAILY    multivitamin (ONE A DAY) tablet Take 1 Tablet by mouth daily. cetirizine (ZYRTEC) 10 mg tablet Take 1 Tablet by mouth daily. dextroamphetamine-amphetamine (ADDERALL) 20 mg tablet Take 1 Tablet by mouth. valACYclovir (VALTREX) 1 gram tablet Take 1 Tablet by mouth daily. OXcarbazepine (TRILEPTAL) 150 mg tablet Take 1 Tablet by mouth daily. hydrOXYzine HCL (ATARAX) 10 mg tablet 1 Tablet. At bedtime    propranoloL (INDERAL) 10 mg tablet Take 1 Tablet by mouth. At bedtime    OTHER Prebiotic with cranberry    escitalopram oxalate (LEXAPRO) 5 mg tablet Take 10 mg by mouth daily. No current facility-administered medications for this visit.      Allergies   Allergen Reactions    Latex Swelling     Skin peels    Lactose Other (comments)     Pt states she is not allergic anymore    Aspirin Nausea and Vomiting    Betadine [Povidone-Iodine] Other (comments)     Skin peels    Cat Dander Sneezing    Diclofenac Swelling     Hand and face     Ketorolac Hives    Penicillins Other (comments)     Yeast infections    Ivp Dye [Iodinated Contrast Media] Other (comments)         Review of Systems:  Per HPI    Physical Examination:   Blood pressure (!) 147/78, pulse 88, temperature 98.1 °F (36.7 °C), temperature source Temporal, resp. rate 18, height 5' 6\" (1.676 m), weight (!) 351 lb 9.6 oz (159.5 kg), last menstrual period 04/21/2023, SpO2 96 %, not currently breastfeeding. Estimated body mass index is 56.75 kg/m² as calculated from the following:    Height as of this encounter: 5' 6\" (1.676 m). Weight as of this encounter: 351 lb 9.6 oz (159.5 kg). General: pleasant, no distress, good eye contact  HEENT: no pallor, no periorbital edema, EOMI  Neck: supple,  Cardiovascular: regular, normal rate, normal S1 and S2,   Respiratory: clear to auscultation bilaterally  Integumentary: ,no edema,  Neurological: ,alert and oriented  Psychiatric: normal mood and affect  Skin: color, texture, turgor normal.     Assessment/Plan:     1. Hyperprolactinemia ([address]    2. Pituitary adenoma ([address]    3. Morbid obesity (HCC)        7 mm Pituitary microadenoma -  incidental finding, prolactin is very minimally elevated which could be due to the medications she is on  Screening for other pituitary hormones - neg  Off cabergoline  Mild prolactinemia: No indication for therapy  Reassured patient that headache is not due to pituitary microadenoma  April 2023: Biochemically euthyroid, prolactin normal      Obesity Body mass index is 56.75 kg/m².   Low-carb diet  Increase activity  Could not tolerate phentermine  She has tried different diets in the past with little success  Supervised medical weight loss clinic peripheral  Discussed different medical options    She was diagnosed with PCOS: Does not meet criteria  On OCPs for cycle regulation  Late-night salivary cortisol      Spent > 40 minutes on the day of the visit reviewing chart, examining, ordering/reviewing labs, counseling, discussing therapeutics and documentation in the medical record  Follow-up and Dispositions    Return in about 1 year (around 4/24/2024) for labs before next visit and follow up. Thank you for allowing me to participate in the care of this patient.     Chelo Harris MD      Patient verbalized understanding

## 2023-05-08 DIAGNOSIS — D35.2 PITUITARY ADENOMA (HCC): ICD-10-CM

## 2023-05-08 DIAGNOSIS — E66.01 MORBID OBESITY (HCC): ICD-10-CM

## 2023-05-08 DIAGNOSIS — E22.1 HYPERPROLACTINEMIA (HCC): Primary | ICD-10-CM

## 2023-05-24 ENCOUNTER — TELEPHONE (OUTPATIENT)
Age: 28
End: 2023-05-24

## 2023-05-24 NOTE — TELEPHONE ENCOUNTER
Calling pt & LVM for pt to reply back to my [facility] message i'll send her or to call office back    Pt has been scheduled for next Thursday 6/1/23 at 11am, we need to know her concerns

## 2023-05-24 NOTE — TELEPHONE ENCOUNTER
----- Message from Jatin Reyes.  Jaqueline sent at 5/22/2023 12:13 PM EDT -----  Regarding: Issue  Contact: [phone]  Could you call me it's a long story I know you guys are busy

## 2023-05-26 RX ORDER — DEXTROAMPHETAMINE SACCHARATE, AMPHETAMINE ASPARTATE, DEXTROAMPHETAMINE SULFATE AND AMPHETAMINE SULFATE 5; 5; 5; 5 MG/1; MG/1; MG/1; MG/1
20 TABLET ORAL
COMMUNITY

## 2023-05-26 RX ORDER — OMEPRAZOLE 40 MG/1
40 CAPSULE, DELAYED RELEASE ORAL 2 TIMES DAILY
COMMUNITY
Start: 2023-02-27

## 2023-05-26 RX ORDER — METHYLPREDNISOLONE 4 MG/1
TABLET ORAL
COMMUNITY
Start: 2023-04-20

## 2023-05-26 RX ORDER — ESCITALOPRAM OXALATE 5 MG/1
10 TABLET ORAL DAILY
COMMUNITY

## 2023-05-26 RX ORDER — ARIPIPRAZOLE 2 MG/1
TABLET ORAL
COMMUNITY
Start: 2023-03-16

## 2023-05-26 RX ORDER — CETIRIZINE HYDROCHLORIDE 10 MG/1
10 TABLET ORAL DAILY
COMMUNITY
Start: 2022-11-28

## 2023-05-26 RX ORDER — PROPRANOLOL HYDROCHLORIDE 10 MG/1
1 TABLET ORAL NIGHTLY
COMMUNITY
Start: 2022-04-28

## 2023-05-26 RX ORDER — VALACYCLOVIR HYDROCHLORIDE 1 G/1
1000 TABLET, FILM COATED ORAL DAILY
COMMUNITY
Start: 2022-08-19

## 2023-05-26 RX ORDER — OXCARBAZEPINE 150 MG/1
150 TABLET, FILM COATED ORAL DAILY
COMMUNITY

## 2023-05-26 RX ORDER — HYDROXYZINE HYDROCHLORIDE 10 MG/1
10 TABLET, FILM COATED ORAL
COMMUNITY
Start: 2022-04-28

## 2023-05-26 RX ORDER — FAMOTIDINE 20 MG/1
20 TABLET, FILM COATED ORAL 2 TIMES DAILY
COMMUNITY
Start: 2023-02-27

## 2023-05-26 RX ORDER — LEVONORGESTREL AND ETHINYL ESTRADIOL 0.15-0.03
1 KIT ORAL DAILY
Qty: 30 TABLET | Refills: 2 | COMMUNITY
Start: 2023-03-03 | End: 2023-06-01

## 2023-05-26 RX ORDER — FLUTICASONE PROPIONATE 50 MCG
SPRAY, SUSPENSION (ML) NASAL
COMMUNITY
Start: 2023-02-27

## 2023-06-01 ENCOUNTER — OFFICE VISIT (OUTPATIENT)
Age: 28
End: 2023-06-01
Payer: MEDICAID

## 2023-06-01 VITALS — BODY MASS INDEX: 56.81 KG/M2 | DIASTOLIC BLOOD PRESSURE: 88 MMHG | SYSTOLIC BLOOD PRESSURE: 125 MMHG | WEIGHT: 293 LBS

## 2023-06-01 DIAGNOSIS — N93.9 ABNORMAL UTERINE BLEEDING: ICD-10-CM

## 2023-06-01 DIAGNOSIS — R10.2 PELVIC PAIN: ICD-10-CM

## 2023-06-01 DIAGNOSIS — N76.0 ACUTE VAGINITIS: ICD-10-CM

## 2023-06-01 DIAGNOSIS — N89.8 VAGINAL ITCHING: Primary | ICD-10-CM

## 2023-06-01 PROCEDURE — 99213 OFFICE O/P EST LOW 20 MIN: CPT | Performed by: OBSTETRICS & GYNECOLOGY

## 2023-06-01 RX ORDER — FLUCONAZOLE 150 MG/1
150 TABLET ORAL ONCE
Qty: 1 TABLET | Refills: 0 | Status: SHIPPED | OUTPATIENT
Start: 2023-06-01 | End: 2023-06-01

## 2023-06-01 NOTE — PROGRESS NOTES
Rooming note, gyn problem visit:    Chief Complaint   Patient presents with    Vaginitis    Vaginal Bleeding     Amanda Fernandez is a 32 y.o. female presents for a problem visit. No LMP recorded. Birth Control: none. Last Pap: was normal  Last or next [address] is: 4/2023    The patient is reporting having: spotting, vaginal itching since ablation on 5/4/2023. This is a new problem. She has not experienced this problem before. She reports the symptoms are is unchanged. Aggravating factors include none. And alleviating factors include none. She does not have other concerns.

## 2023-06-02 NOTE — PROGRESS NOTES
[employer]  [email or web address]  [phone]  Oksana Mendosa MD, [address] [place] [address]      OB/GYN Problem visit    HPI  Wally Neves is a No obstetric history on file. , 32 y.o. female who presents for a problem visit. Chief Complaint   Patient presents with    Vaginitis    Vaginal Bleeding       Pt co vaginal irritation, discharge, AUB and pelvic pain. Per Rooming Note:  Wally Neves is a 32 y.o. female presents for a problem visit. No LMP recorded. Birth Control: none. Last Pap: was normal  [facility] [facility] [address] [facility]: 4/2023     The patient is reporting having: spotting, vaginal itching since ablation on 5/4/2023. This is a new problem. She has not experienced this problem before. She reports the symptoms are is unchanged. Aggravating factors include none. And alleviating factors include none. She does not have other concerns.                    Sexual history and Contraception:  Social History     Substance and Sexual Activity   Sexual Activity Not on file       Past Medical History:   Diagnosis Date    Adenomyosis     outside gyn HD, imaging    Asthma     Bipolar affective disorder ([facility])     Dysmenorrhea     Encounter for IUD insertion 03/07/2022    Mirena Due out 3/7/2025    Endometriosis 2023    Essential hypertension     Genital herpes     Valtrex     Herpes simplex virus (HSV) infection 4/24/2023    Major depressive disorder, single episode, unspecified     Morbid obesity ([facility])     Nexplanon insertion 12/08/2021    Nexplanon removal 03/07/2022    Pap smear for cervical cancer screening     2/23/17 neg 2/8/21 neg    PCOS (polycystic ovarian syndrome)     Pituitary abnormality (HCC)     Postpartum depression     Unspecified symptom associated with female genital organs          Current Outpatient Medications:     fluconazole (DIFLUCAN) 150 MG tablet, Take 1 tablet by mouth once for 1 dose, Disp: 1 tablet, Rfl: 0

## 2023-08-19 RX ORDER — OMEPRAZOLE 40 MG/1
CAPSULE, DELAYED RELEASE ORAL
Qty: 180 CAPSULE | Refills: 1 | Status: SHIPPED | OUTPATIENT
Start: 2023-08-19

## 2023-09-15 ENCOUNTER — HOSPITAL ENCOUNTER (EMERGENCY)
Facility: HOSPITAL | Age: 28
Discharge: HOME | End: 2023-09-15
Attending: EMERGENCY MEDICINE
Payer: MEDICAID

## 2023-09-15 VITALS
BODY MASS INDEX: 47.09 KG/M2 | HEART RATE: 96 BPM | TEMPERATURE: 98.8 F | DIASTOLIC BLOOD PRESSURE: 84 MMHG | HEIGHT: 66 IN | SYSTOLIC BLOOD PRESSURE: 148 MMHG | WEIGHT: 293 LBS | OXYGEN SATURATION: 99 % | RESPIRATION RATE: 20 BRPM

## 2023-09-15 DIAGNOSIS — N89.8 VAGINAL DISCHARGE: ICD-10-CM

## 2023-09-15 DIAGNOSIS — N30.00 ACUTE CYSTITIS WITHOUT HEMATURIA: Primary | ICD-10-CM

## 2023-09-15 LAB
APPEARANCE UR: ABNORMAL
BACTERIA URNS QL MICRO: ABNORMAL /HPF
BILIRUB UR QL: NEGATIVE
CLUE CELLS VAG QL WET PREP: NORMAL
COLOR UR: ABNORMAL
EPITH CASTS URNS QL MICRO: ABNORMAL /LPF
GLUCOSE UR STRIP.AUTO-MCNC: NEGATIVE MG/DL
HCG UR QL: NEGATIVE
HGB UR QL STRIP: NEGATIVE
KETONES UR QL STRIP.AUTO: NEGATIVE MG/DL
KOH PREP SPEC: NORMAL
LEUKOCYTE ESTERASE UR QL STRIP.AUTO: NEGATIVE
MUCOUS THREADS URNS QL MICRO: ABNORMAL /LPF
NITRITE UR QL STRIP.AUTO: NEGATIVE
PH UR STRIP: 5.5 (ref 5–8)
PROT UR STRIP-MCNC: NEGATIVE MG/DL
RBC #/AREA URNS HPF: ABNORMAL /HPF
SERVICE CMNT-IMP: NORMAL
SP GR UR REFRACTOMETRY: >1.03 (ref 1–1.03)
T VAGINALIS VAG QL WET PREP: NORMAL
UROBILINOGEN UR QL STRIP.AUTO: 0.2 EU/DL (ref 0.2–1)
WBC URNS QL MICRO: ABNORMAL /HPF (ref 0–4)

## 2023-09-15 PROCEDURE — 6360000002 HC RX W HCPCS

## 2023-09-15 PROCEDURE — 87591 N.GONORRHOEAE DNA AMP PROB: CPT

## 2023-09-15 PROCEDURE — 96372 THER/PROPH/DIAG INJ SC/IM: CPT

## 2023-09-15 PROCEDURE — 81001 URINALYSIS AUTO W/SCOPE: CPT

## 2023-09-15 PROCEDURE — 87491 CHLMYD TRACH DNA AMP PROBE: CPT

## 2023-09-15 PROCEDURE — 81025 URINE PREGNANCY TEST: CPT

## 2023-09-15 PROCEDURE — 87210 SMEAR WET MOUNT SALINE/INK: CPT

## 2023-09-15 PROCEDURE — 99284 EMERGENCY DEPT VISIT MOD MDM: CPT

## 2023-09-15 PROCEDURE — 2500000003 HC RX 250 WO HCPCS

## 2023-09-15 PROCEDURE — 6370000000 HC RX 637 (ALT 250 FOR IP)

## 2023-09-15 RX ORDER — PHENAZOPYRIDINE HYDROCHLORIDE 100 MG/1
100 TABLET, FILM COATED ORAL 3 TIMES DAILY PRN
Qty: 9 TABLET | Refills: 0 | Status: SHIPPED | OUTPATIENT
Start: 2023-09-15 | End: 2023-09-18

## 2023-09-15 RX ORDER — CEPHALEXIN 500 MG/1
500 CAPSULE ORAL 3 TIMES DAILY
Qty: 15 CAPSULE | Refills: 0 | Status: SHIPPED | OUTPATIENT
Start: 2023-09-15 | End: 2023-09-20

## 2023-09-15 RX ORDER — FLUCONAZOLE 100 MG/1
150 TABLET ORAL
Status: COMPLETED | OUTPATIENT
Start: 2023-09-15 | End: 2023-09-15

## 2023-09-15 RX ORDER — AZITHROMYCIN 250 MG/1
1000 TABLET, FILM COATED ORAL
Status: COMPLETED | OUTPATIENT
Start: 2023-09-15 | End: 2023-09-15

## 2023-09-15 RX ADMIN — LIDOCAINE HYDROCHLORIDE 1000 MG: 10 INJECTION, SOLUTION EPIDURAL; INFILTRATION; INTRACAUDAL; PERINEURAL at 22:10

## 2023-09-15 RX ADMIN — AZITHROMYCIN DIHYDRATE 1000 MG: 250 TABLET, FILM COATED ORAL at 22:13

## 2023-09-15 RX ADMIN — FLUCONAZOLE 150 MG: 100 TABLET ORAL at 22:13

## 2023-09-15 ASSESSMENT — ENCOUNTER SYMPTOMS
NAUSEA: 0
VOMITING: 0
ABDOMINAL PAIN: 0
BACK PAIN: 1

## 2023-09-15 ASSESSMENT — LIFESTYLE VARIABLES
HOW OFTEN DO YOU HAVE A DRINK CONTAINING ALCOHOL: NEVER
HOW MANY STANDARD DRINKS CONTAINING ALCOHOL DO YOU HAVE ON A TYPICAL DAY: PATIENT DOES NOT DRINK

## 2023-09-15 ASSESSMENT — PAIN - FUNCTIONAL ASSESSMENT: PAIN_FUNCTIONAL_ASSESSMENT: 0-10

## 2023-09-15 ASSESSMENT — PAIN SCALES - GENERAL: PAINLEVEL_OUTOF10: 0

## 2023-09-16 NOTE — ED NOTES
Pt given discharge instructions, patient education, prescriptions, and follow up information. Pt verbalizes understanding. All questions answered. Patient discharged to home in private vehicle, ambulatory. Pt A&Ox4, RA, pain controlled.       Ami Goldman RN  09/15/23 2276

## 2023-09-16 NOTE — DISCHARGE INSTRUCTIONS
Take the antibiotic as instructed. Follow-up with your OBGYN as soon as possible. Call for appointment as soon as possible. Review labs on MyChart.

## 2023-09-16 NOTE — ED TRIAGE NOTES
Patient here with concern of yellow vaginal discharge and urinary frequency with burning for the past week. Patient reports she accidentally had a bowel movement on herself and the stool got into her vagina about a week ago. Since then she has not been feeling well. Patient had a hysterectomy on 8/22/23.

## 2023-09-17 LAB
C TRACH DNA SPEC QL NAA+PROBE: NEGATIVE
N GONORRHOEA DNA SPEC QL NAA+PROBE: NEGATIVE
SAMPLE TYPE: NORMAL
SERVICE CMNT-IMP: NORMAL
SPECIMEN SOURCE: NORMAL

## 2023-09-28 RX ORDER — VALACYCLOVIR HYDROCHLORIDE 1 G/1
1000 TABLET, FILM COATED ORAL DAILY
Qty: 90 TABLET | OUTPATIENT
Start: 2023-09-28

## 2023-09-29 ENCOUNTER — TELEPHONE (OUTPATIENT)
Age: 28
End: 2023-09-29

## 2023-09-29 DIAGNOSIS — A60.00 GENITAL HERPES SIMPLEX, UNSPECIFIED SITE: Primary | ICD-10-CM

## 2023-09-29 RX ORDER — VALACYCLOVIR HYDROCHLORIDE 1 G/1
1000 TABLET, FILM COATED ORAL DAILY
Qty: 90 TABLET | Refills: 1 | Status: SHIPPED | OUTPATIENT
Start: 2023-09-29

## 2023-09-29 NOTE — TELEPHONE ENCOUNTER
Two patient identifiers used    Dr Richard Morton patient     32year old patient last seen in the office on 3/3/2023 for ae      Patient has history of   Genital herpes             Valtrex    And is taking  valACYclovir (VALTREX) 1 gram tablet  Take 1 Tablet by mouth daily. For suppression    Patient is calling for refill of her valtrex    ?  Ok to refill has pended to get patient to her when next ae due 3/2024      Please review, amend, sign    Thank you

## 2023-09-29 NOTE — TELEPHONE ENCOUNTER
Patient advised of prescription sent by work in [place], Dr York Bachterzeaor, to her confirmed pharmacy    Patient verbalized understanding

## 2023-10-31 ENCOUNTER — TELEMEDICINE (OUTPATIENT)
Age: 28
End: 2023-10-31
Payer: MEDICAID

## 2023-10-31 DIAGNOSIS — J45.21 MILD INTERMITTENT ASTHMA WITH ACUTE EXACERBATION: ICD-10-CM

## 2023-10-31 DIAGNOSIS — J01.10 ACUTE NON-RECURRENT FRONTAL SINUSITIS: Primary | ICD-10-CM

## 2023-10-31 PROCEDURE — 99214 OFFICE O/P EST MOD 30 MIN: CPT | Performed by: NURSE PRACTITIONER

## 2023-10-31 RX ORDER — PREDNISONE 20 MG/1
40 TABLET ORAL DAILY
Qty: 8 TABLET | Refills: 0 | Status: SHIPPED | OUTPATIENT
Start: 2023-10-31 | End: 2023-11-04

## 2023-10-31 RX ORDER — AZITHROMYCIN 250 MG/1
250 TABLET, FILM COATED ORAL SEE ADMIN INSTRUCTIONS
Qty: 6 TABLET | Refills: 0 | Status: SHIPPED | OUTPATIENT
Start: 2023-10-31 | End: 2023-11-05

## 2023-10-31 ASSESSMENT — ENCOUNTER SYMPTOMS
SINUS PAIN: 1
RHINORRHEA: 1
SINUS PRESSURE: 1
COUGH: 1
GASTROINTESTINAL NEGATIVE: 1
ALLERGIC/IMMUNOLOGIC NEGATIVE: 1
WHEEZING: 1
EYES NEGATIVE: 1

## 2023-10-31 ASSESSMENT — PATIENT HEALTH QUESTIONNAIRE - PHQ9
SUM OF ALL RESPONSES TO PHQ QUESTIONS 1-9: 0
SUM OF ALL RESPONSES TO PHQ9 QUESTIONS 1 & 2: 0
SUM OF ALL RESPONSES TO PHQ QUESTIONS 1-9: 0
2. FEELING DOWN, DEPRESSED OR HOPELESS: 0
SUM OF ALL RESPONSES TO PHQ QUESTIONS 1-9: 0
SUM OF ALL RESPONSES TO PHQ QUESTIONS 1-9: 0
1. LITTLE INTEREST OR PLEASURE IN DOING THINGS: 0

## 2023-10-31 NOTE — PROGRESS NOTES
with some relief. Current vape user. Past Medical History:   Diagnosis Date    Adenomyosis     outside gyn HD, imaging    Asthma     Bipolar affective disorder ([address])     Dysmenorrhea     Encounter for IUD insertion 03/07/2022    Mirena Due out 3/7/2025    Endometriosis 2023    Essential hypertension     Genital herpes     Valtrex     Herpes simplex virus (HSV) infection 4/24/2023    Major depressive disorder, single episode, unspecified     Morbid obesity ([address])     Nexplanon insertion 12/08/2021    Nexplanon removal 03/07/2022    Pap smear for cervical cancer screening     2/23/17 neg 2/8/21 neg    PCOS (polycystic ovarian syndrome)     Pituitary abnormality (HCC)     Postpartum depression     Unspecified symptom associated with female genital organs      Past Surgical History:   Procedure Laterality Date    HYSTEROSCOPY      novasure ablation, failed hta (4.5 hour surgery per pt)    OTHER SURGICAL HISTORY      PELVIC LAPAROSCOPY  02/08/2023    uterine scar tissue removed    WISDOM TOOTH EXTRACTION      WRIST FRACTURE SURGERY Right      Family History   Problem Relation Age of Onset    Other Mother         endometriosis    Migraines Mother         tumor in brain    Cancer Paternal Grandmother     Alcohol Abuse Father      Social History     Tobacco Use    Smoking status: Former     Packs/day: .5     Types: Cigarettes    Smokeless tobacco: Current   Substance Use Topics    Alcohol use: No     Alcohol/week: 0.0 standard drinks of alcohol       Review of Systems   Constitutional: Negative. HENT:  Positive for congestion, rhinorrhea, sinus pressure and sinus pain. Eyes: Negative. Respiratory:  Positive for cough and wheezing. Cardiovascular:  Positive for chest pain. Gastrointestinal: Negative. Endocrine: Negative. Genitourinary: Negative. Musculoskeletal: Negative. Skin: Negative. Allergic/Immunologic: Negative. Neurological:  Positive for headaches. Hematological: Negative.

## 2024-03-06 ENCOUNTER — OFFICE VISIT (OUTPATIENT)
Age: 29
End: 2024-03-06
Payer: MEDICAID

## 2024-03-06 VITALS
DIASTOLIC BLOOD PRESSURE: 61 MMHG | OXYGEN SATURATION: 94 % | SYSTOLIC BLOOD PRESSURE: 131 MMHG | RESPIRATION RATE: 18 BRPM | HEIGHT: 66 IN | BODY MASS INDEX: 47.09 KG/M2 | TEMPERATURE: 98.3 F | WEIGHT: 293 LBS | HEART RATE: 88 BPM

## 2024-03-06 DIAGNOSIS — R23.2 HOT FLASHES: ICD-10-CM

## 2024-03-06 DIAGNOSIS — E22.1 HYPERPROLACTINEMIA (HCC): Primary | ICD-10-CM

## 2024-03-06 DIAGNOSIS — E66.01 MORBID OBESITY (HCC): ICD-10-CM

## 2024-03-06 DIAGNOSIS — D35.2 PITUITARY ADENOMA (HCC): ICD-10-CM

## 2024-03-06 PROCEDURE — 99215 OFFICE O/P EST HI 40 MIN: CPT | Performed by: INTERNAL MEDICINE

## 2024-03-06 NOTE — PROGRESS NOTES
[facility] DIABETES [facility] ENDOCRINOLOGY                 Laura Knox MD            Patient Information   Date:4/24/2023   Name : Halle Parsons 27 y.o.       YOB: 1995           Referred by: Arelis Andres MD              History of Present Illness: Halle Parsons is here for follow-up    Feels more tired, irritable, hot flashes since hysterectomy 2023, gained 20 to 30 pounds, recently started on Prozac, she came off Adderall  On Abilify  Has disturbed sleep, more stress, likely sleeping 2 hours  Eating 1- 2 meals , snacks in between   No regular exercise  Snoring   No steroids       4/24/23   Dx with PCOS and Endometriosis - seeing 2 diff GYNs Dr. Acosta and Dr. Quiñones ([facility])   Ultrasound done by Dr. Acosta in 2023 did not show PCOS morphology, no hirsutism, she has heavy and frequent cycles   Tried Keto diet but had heavier cycles, she lost 40 pounds   Facial erythema         Prior history   , She was referred by Arelis Andres MD for evaluation and management of galactorrhea. She was on oral contraceptives in July 2016 for dysmenorrhea.    MRI showed pituitary adenoma 7 mm    She delivered a healthy baby in December 2018, 2nd child Oct 14 2021   Bothersome galactorrhea and hence was on cabergoline in the past, she is off cabergoline      Has IUD, prolactin mildly elevated   No galactorrhea   Not lactating   Lost weight                   Physical Examination:        General: pleasant, no distress, good eye contact    HEENT: no pallor, no periorbital edema, EOMI, facial erythema    Neck: supple,    Cardiovascular: regular, normal rate, normal S1 and S2,     Respiratory: clear to auscultation bilaterally    Integumentary: ,no edema,    Neurological: ,alert and oriented    Psychiatric: normal mood and affect    Skin: color, texture, turgor normal.       Assessment/Plan:   1. Hyperprolactinemia (HCC)    2. Pituitary adenoma (HCC)    3. Hot flashes    4. Morbid obesity (HCC)

## 2024-03-08 LAB
ESTRADIOL SERPL-MCNC: 102 PG/ML
FSH SERPL-ACNC: 11 MIU/ML
LH SERPL-ACNC: 64.1 MIU/ML
PROLACTIN SERPL-MCNC: 19.4 NG/ML (ref 4.8–33.4)
T4 FREE SERPL-MCNC: 1.26 NG/DL (ref 0.82–1.77)
TSH SERPL DL<=0.005 MIU/L-ACNC: 1.27 UIU/ML (ref 0.45–4.5)

## 2024-03-09 LAB — CORTIS AM PEAK SERPL-MCNC: 10.3 UG/DL (ref 6.2–19.4)

## 2024-03-10 LAB — ACTH PLAS-MCNC: 21.3 PG/ML (ref 7.2–63.3)

## 2024-03-13 ENCOUNTER — OFFICE VISIT (OUTPATIENT)
Age: 29
End: 2024-03-13
Payer: MEDICAID

## 2024-03-13 VITALS
BODY MASS INDEX: 47.09 KG/M2 | HEIGHT: 66 IN | SYSTOLIC BLOOD PRESSURE: 167 MMHG | WEIGHT: 293 LBS | OXYGEN SATURATION: 98 % | DIASTOLIC BLOOD PRESSURE: 98 MMHG | RESPIRATION RATE: 17 BRPM

## 2024-03-13 DIAGNOSIS — N76.0 ACUTE VAGINITIS: Primary | ICD-10-CM

## 2024-03-13 DIAGNOSIS — E66.01 OBESITY, MORBID (HCC): ICD-10-CM

## 2024-03-13 DIAGNOSIS — F33.2 SEVERE EPISODE OF RECURRENT MAJOR DEPRESSIVE DISORDER, WITHOUT PSYCHOTIC FEATURES (HCC): ICD-10-CM

## 2024-03-13 PROCEDURE — 99214 OFFICE O/P EST MOD 30 MIN: CPT | Performed by: STUDENT IN AN ORGANIZED HEALTH CARE EDUCATION/TRAINING PROGRAM

## 2024-03-13 SDOH — ECONOMIC STABILITY: INCOME INSECURITY: HOW HARD IS IT FOR YOU TO PAY FOR THE VERY BASICS LIKE FOOD, HOUSING, MEDICAL CARE, AND HEATING?: NOT HARD AT ALL

## 2024-03-13 SDOH — ECONOMIC STABILITY: HOUSING INSECURITY
IN THE LAST 12 MONTHS, WAS THERE A TIME WHEN YOU DID NOT HAVE A STEADY PLACE TO SLEEP OR SLEPT IN A SHELTER (INCLUDING NOW)?: NO

## 2024-03-13 SDOH — ECONOMIC STABILITY: FOOD INSECURITY: WITHIN THE PAST 12 MONTHS, THE FOOD YOU BOUGHT JUST DIDN'T LAST AND YOU DIDN'T HAVE MONEY TO GET MORE.: NEVER TRUE

## 2024-03-13 SDOH — ECONOMIC STABILITY: FOOD INSECURITY: WITHIN THE PAST 12 MONTHS, YOU WORRIED THAT YOUR FOOD WOULD RUN OUT BEFORE YOU GOT MONEY TO BUY MORE.: NEVER TRUE

## 2024-03-13 ASSESSMENT — PATIENT HEALTH QUESTIONNAIRE - PHQ9
SUM OF ALL RESPONSES TO PHQ QUESTIONS 1-9: 0
SUM OF ALL RESPONSES TO PHQ9 QUESTIONS 1 & 2: 0
2. FEELING DOWN, DEPRESSED OR HOPELESS: 0
1. LITTLE INTEREST OR PLEASURE IN DOING THINGS: 0
SUM OF ALL RESPONSES TO PHQ QUESTIONS 1-9: 0

## 2024-03-13 NOTE — PROGRESS NOTES
Progress Note    she is a 28 y.o. year old female who presents for evaluation of Fatigue (Pt states that she has been dealing with fatigue for the last month and a half. She has recently stopped her adderall around this time. She went to the Endocrinologist on 3/6/24 and all her Labs were normal. Her BP was very elevated today. She states she is hot and cold. Had hysterectomy about 1 year ago. Would like to get a vaginal swab today.)        Assessment/ Plan:     1. Acute vaginitis  -     Nuswab Vaginitis Plus (VG+); Future  2. Obesity, morbid (HCC)  Assessment & Plan:  Encouraged to work on healthy eating habits and track oral intake, aim to keep carbs <100 grams to start.   Labs as below to assess for comorbidity   Orders:  -     CBC with Auto Differential; Future  -     Hemoglobin A1C; Future  -     Comprehensive Metabolic Panel; Future  -     Lipid Panel; Future  3. Severe episode of recurrent major depressive disorder, without psychotic features (HCC)  Assessment & Plan:  Encouraged close follow-up with psychiatry and therapy  Reviewed importance of medication, therapy, and lifestyle modifications for the treatment of mood disorders.   Uncontrolled mood is likely the cause of her fatigue        Return in about 2 months (around 5/13/2024) for follow-up blood pressure 1-2 months.      I have discussed the diagnosis with the patient and the intended plan as seen in the above orders.    The patient has received an after-visit summary and questions were answered concerning future plans.   Pt conveyed understanding of plan.    Medication Side Effects and Warnings were discussed with patient        Subjective:     Chief Complaint   Patient presents with    Fatigue     Pt states that she has been dealing with fatigue for the last month and a half. She has recently stopped her adderall around this time. She went to the Endocrinologist on 3/6/24 and all her Labs were normal. Her BP was very elevated today. She states

## 2024-03-13 NOTE — PATIENT INSTRUCTIONS
Contact your psychiatrist to schedule a sooner follow-up.  Check out WILD5 workbook on [employer]    Lifestyle areas to focus on...  Exercise  Nutrition  Sleep  Mindfulness - deep/diaphragmatic breathing, progressive muscle relaxation, guided imagery, yoga, bryan chi, meditation, prayer, Confucianism reading, journaling, coloring/art   Social connectedness       You should check your blood pressure at home.    Check first thing in the morning.  You should be relaxed, seated with back supported, feet flat on the floor, arm with cuff resting at heart height.  You should check your blood pressure 1-3 times.  Please write down your blood pressures and bring this record and your blood pressure cuff/machine to your follow-up visit.     I would like for your blood pressure to be less than 130 for the top number and less than 90 for the bottom number.   Please follow-up sooner for consistently high blood pressure readings at home.

## 2024-03-13 NOTE — ASSESSMENT & PLAN NOTE
Encouraged close follow-up with psychiatry and therapy  Reviewed importance of medication, therapy, and lifestyle modifications for the treatment of mood disorders.   Uncontrolled mood is likely the cause of her fatigue

## 2024-03-13 NOTE — ASSESSMENT & PLAN NOTE
Encouraged to work on healthy eating habits and track oral intake, aim to keep carbs <100 grams to start.   Labs as below to assess for comorbidity

## 2024-03-13 NOTE — PROGRESS NOTES
Chief Complaint   Patient presents with    Fatigue     Pt states that she has been dealing with fatigue for the last month and a half. She has recently stopped her adderall around this time. She went to the Endocrinologist on 3/6/24 and all her Labs were normal. Her BP was very elevated today. She states she is hot and cold. Had hysterectomy about 1 year ago. Would like to get a vaginal swab today.       \"Have you been to the ER, urgent care clinic since your last visit?  Hospitalized since your last visit?\"    NO    “Have you seen or consulted any other health care providers outside of [facility] since your last visit?”    NO            Click Here for Release of Records Request

## 2024-03-14 LAB
ALBUMIN SERPL-MCNC: 3.9 G/DL (ref 3.5–5)
ALBUMIN/GLOB SERPL: 1.1 (ref 1.1–2.2)
ALP SERPL-CCNC: 117 U/L (ref 45–117)
ALT SERPL-CCNC: 33 U/L (ref 12–78)
ANION GAP SERPL CALC-SCNC: 2 MMOL/L (ref 5–15)
AST SERPL-CCNC: 18 U/L (ref 15–37)
BASOPHILS # BLD: 0 K/UL (ref 0–0.1)
BASOPHILS NFR BLD: 0 % (ref 0–1)
BILIRUB SERPL-MCNC: 0.3 MG/DL (ref 0.2–1)
BUN SERPL-MCNC: 10 MG/DL (ref 6–20)
BUN/CREAT SERPL: 14 (ref 12–20)
CALCIUM SERPL-MCNC: 9.5 MG/DL (ref 8.5–10.1)
CHLORIDE SERPL-SCNC: 102 MMOL/L (ref 97–108)
CHOLEST SERPL-MCNC: 215 MG/DL
CO2 SERPL-SCNC: 29 MMOL/L (ref 21–32)
CREAT SERPL-MCNC: 0.73 MG/DL (ref 0.55–1.02)
DIFFERENTIAL METHOD BLD: NORMAL
EOSINOPHIL # BLD: 0.1 K/UL (ref 0–0.4)
EOSINOPHIL NFR BLD: 1 % (ref 0–7)
ERYTHROCYTE [DISTWIDTH] IN BLOOD BY AUTOMATED COUNT: 13.3 % (ref 11.5–14.5)
EST. AVERAGE GLUCOSE BLD GHB EST-MCNC: 85 MG/DL
GLOBULIN SER CALC-MCNC: 3.7 G/DL (ref 2–4)
GLUCOSE SERPL-MCNC: 87 MG/DL (ref 65–100)
HBA1C MFR BLD: 4.6 % (ref 4–5.6)
HCT VFR BLD AUTO: 43.3 % (ref 35–47)
HDLC SERPL-MCNC: 74 MG/DL
HDLC SERPL: 2.9 (ref 0–5)
HGB BLD-MCNC: 14.2 G/DL (ref 11.5–16)
IMM GRANULOCYTES # BLD AUTO: 0 K/UL (ref 0–0.04)
IMM GRANULOCYTES NFR BLD AUTO: 0 % (ref 0–0.5)
LDLC SERPL CALC-MCNC: 120.4 MG/DL (ref 0–100)
LYMPHOCYTES # BLD: 1.9 K/UL (ref 0.8–3.5)
LYMPHOCYTES NFR BLD: 22 % (ref 12–49)
MCH RBC QN AUTO: 30.3 PG (ref 26–34)
MCHC RBC AUTO-ENTMCNC: 32.8 G/DL (ref 30–36.5)
MCV RBC AUTO: 92.5 FL (ref 80–99)
MONOCYTES # BLD: 0.7 K/UL (ref 0–1)
MONOCYTES NFR BLD: 8 % (ref 5–13)
NEUTS SEG # BLD: 5.8 K/UL (ref 1.8–8)
NEUTS SEG NFR BLD: 69 % (ref 32–75)
NRBC # BLD: 0 K/UL (ref 0–0.01)
NRBC BLD-RTO: 0 PER 100 WBC
PLATELET # BLD AUTO: 329 K/UL (ref 150–400)
PMV BLD AUTO: 9.2 FL (ref 8.9–12.9)
POTASSIUM SERPL-SCNC: 5.1 MMOL/L (ref 3.5–5.1)
PROT SERPL-MCNC: 7.6 G/DL (ref 6.4–8.2)
RBC # BLD AUTO: 4.68 M/UL (ref 3.8–5.2)
SODIUM SERPL-SCNC: 133 MMOL/L (ref 136–145)
TRIGL SERPL-MCNC: 103 MG/DL
VLDLC SERPL CALC-MCNC: 20.6 MG/DL
WBC # BLD AUTO: 8.4 K/UL (ref 3.6–11)

## 2024-03-24 LAB
SHBG SERPL-SCNC: 28.9 NMOL/L
TESTOST FREE MFR SERPL: 1.7 %
TESTOST FREE SERPL-MCNC: 3.7 PG/ML
TESTOST SERPL-MCNC: 22 NG/DL

## 2024-03-29 ENCOUNTER — PATIENT MESSAGE (OUTPATIENT)
Age: 29
End: 2024-03-29

## 2024-03-29 DIAGNOSIS — A60.00 GENITAL HERPES SIMPLEX, UNSPECIFIED SITE: ICD-10-CM

## 2024-04-05 ENCOUNTER — OFFICE VISIT (OUTPATIENT)
Age: 29
End: 2024-04-05

## 2024-04-05 VITALS — SYSTOLIC BLOOD PRESSURE: 156 MMHG | WEIGHT: 293 LBS | BODY MASS INDEX: 62.79 KG/M2 | DIASTOLIC BLOOD PRESSURE: 88 MMHG

## 2024-04-05 DIAGNOSIS — Z20.2 EXPOSURE TO SEXUALLY TRANSMITTED DISEASE (STD): ICD-10-CM

## 2024-04-05 DIAGNOSIS — Z11.3 VENEREAL DISEASE SCREENING: ICD-10-CM

## 2024-04-05 DIAGNOSIS — R30.0 DYSURIA: ICD-10-CM

## 2024-04-05 DIAGNOSIS — N76.0 ACUTE VAGINITIS: Primary | ICD-10-CM

## 2024-04-05 LAB
BILIRUBIN, URINE, POC: NEGATIVE
BLOOD URINE, POC: NEGATIVE
GLUCOSE URINE, POC: NEGATIVE
HBV SURFACE AG SER QL: 0.25 INDEX
HBV SURFACE AG SER QL: NEGATIVE
HIV 1+2 AB+HIV1 P24 AG SERPL QL IA: NONREACTIVE
HIV 1/2 RESULT COMMENT: NORMAL
KETONES, URINE, POC: NEGATIVE
LEUKOCYTE ESTERASE, URINE, POC: NEGATIVE
NITRITE, URINE, POC: NEGATIVE
PH, URINE, POC: 6.5 (ref 4.6–8)
PROTEIN,URINE, POC: NEGATIVE
SPECIFIC GRAVITY, URINE, POC: 1.02 (ref 1–1.03)
URINALYSIS CLARITY, POC: CLEAR
URINALYSIS COLOR, POC: YELLOW
UROBILINOGEN, POC: NORMAL

## 2024-04-05 RX ORDER — METRONIDAZOLE 500 MG/1
500 TABLET ORAL 2 TIMES DAILY
Qty: 14 TABLET | Refills: 0 | Status: SHIPPED | OUTPATIENT
Start: 2024-04-05 | End: 2024-04-12

## 2024-04-05 RX ORDER — LITHIUM CARBONATE 300 MG/1
300 TABLET, FILM COATED, EXTENDED RELEASE ORAL 2 TIMES DAILY
COMMUNITY

## 2024-04-05 RX ORDER — VALACYCLOVIR HYDROCHLORIDE 1 G/1
1000 TABLET, FILM COATED ORAL DAILY
Qty: 90 TABLET | Refills: 0 | Status: SHIPPED | OUTPATIENT
Start: 2024-04-05

## 2024-04-05 NOTE — PROGRESS NOTES
[facility]  [email or web address]  [email or web address]  [phone]    aSbi Acosta MD, FACOG      OB/GYN Problem visit    HPI  Halle Parsons is a No obstetric history on file., 28 y.o. female who presents for a problem visit.   Chief Complaint   Patient presents with    Vaginal Itching    Vaginal Discharge     Yellow in color    Urinary Burning       Pt co odor with vaginal discharge.  Ho +SA in hot tub that she thinks may have been a trigger.    Per Rooming Note:  Halle Parsons is a 28 y.o. female presents for a problem visit.     Patient's last menstrual period was 05/19/2023.  Birth Control: status post hysterectomy.     Last or next WWE is: DUE     The patient is reporting having: Patient reports for the past week she has had vaginal itching, yellow vaginal discharge and burning with urination. She reports having intercourse in hot tub.   This is a new problem.  She has not experienced this problem before.     She reports the symptoms are is unchanged.  Aggravating factors include none.  And alleviating factors include none.     She does not have other concerns.    Sexual history and Contraception:  Social History     Substance and Sexual Activity   Sexual Activity Not on file       Past Medical History:   Diagnosis Date    Adenomyosis     outside gyn HD, imaging    ADHD (attention deficit hyperactivity disorder)     Anxiety     Asthma     Bipolar affective disorder (HCC)     Dysmenorrhea     Encounter for IUD insertion 03/07/2022    Mirena Due out 3/7/2025    Endometriosis 2023    Essential hypertension     Genital herpes     Valtrex     Herpes simplex virus (HSV) infection 4/24/2023    Major depressive disorder, single episode, unspecified     Morbid obesity (HCC)     Nexplanon insertion 12/08/2021    Nexplanon removal 03/07/2022    Pap smear for cervical cancer screening     2/23/17 neg 2/8/21 neg    PCOS (polycystic

## 2024-04-05 NOTE — PROGRESS NOTES
Rooming note, gyn problem visit:    Chief Complaint   Patient presents with    Vaginal Itching    Vaginal Discharge     Yellow in color    Urinary Burning     Halle Parsons is a 28 y.o. female presents for a problem visit.    Patient's last menstrual period was 05/19/2023.  Birth Control: status post hysterectomy.    Last or next WWE is: DUE    The patient is reporting having: Patient reports for the past week she has had vaginal itching, yellow vaginal discharge and burning with urination. She reports having intercourse in hot tub.   This is a new problem.  She has not experienced this problem before.    She reports the symptoms are is unchanged.  Aggravating factors include none.  And alleviating factors include none.    She does not have other concerns.      1. Have you been to the ER, urgent care clinic, or hospitalized since your last visit?{  no    2. Have you seen or consulted any other health care providers outside of the [facility] since your last visit?  no

## 2024-04-07 LAB — T PALLIDUM AB SER QL IA: NON REACTIVE

## 2024-04-10 RX ORDER — FLUCONAZOLE 150 MG/1
150 TABLET ORAL ONCE
Qty: 1 TABLET | Refills: 0 | Status: SHIPPED | OUTPATIENT
Start: 2024-04-10 | End: 2024-04-10

## 2024-05-03 RX ORDER — OMEPRAZOLE 40 MG/1
CAPSULE, DELAYED RELEASE ORAL
Qty: 180 CAPSULE | Refills: 3 | Status: SHIPPED | OUTPATIENT
Start: 2024-05-03

## 2024-05-12 ENCOUNTER — PATIENT MESSAGE (OUTPATIENT)
Age: 29
End: 2024-05-12

## 2024-05-14 RX ORDER — FAMOTIDINE 20 MG/1
20 TABLET, FILM COATED ORAL 2 TIMES DAILY
Qty: 180 TABLET | Refills: 1 | Status: SHIPPED | OUTPATIENT
Start: 2024-05-14

## 2024-05-16 ENCOUNTER — APPOINTMENT (OUTPATIENT)
Facility: HOSPITAL | Age: 29
End: 2024-05-16
Payer: MEDICAID

## 2024-05-16 ENCOUNTER — HOSPITAL ENCOUNTER (EMERGENCY)
Facility: HOSPITAL | Age: 29
Discharge: HOME | End: 2024-05-16
Attending: EMERGENCY MEDICINE
Payer: MEDICAID

## 2024-05-16 VITALS
WEIGHT: 293 LBS | OXYGEN SATURATION: 97 % | TEMPERATURE: 97.8 F | BODY MASS INDEX: 47.09 KG/M2 | RESPIRATION RATE: 18 BRPM | DIASTOLIC BLOOD PRESSURE: 88 MMHG | SYSTOLIC BLOOD PRESSURE: 124 MMHG | HEIGHT: 66 IN | HEART RATE: 96 BPM

## 2024-05-16 DIAGNOSIS — S92.352A CLOSED FRACTURE OF BASE OF FIFTH METATARSAL BONE OF LEFT FOOT, INITIAL ENCOUNTER: Primary | ICD-10-CM

## 2024-05-16 PROCEDURE — 73610 X-RAY EXAM OF ANKLE: CPT

## 2024-05-16 PROCEDURE — 99283 EMERGENCY DEPT VISIT LOW MDM: CPT

## 2024-05-16 ASSESSMENT — PAIN SCALES - GENERAL: PAINLEVEL_OUTOF10: 8

## 2024-05-16 ASSESSMENT — LIFESTYLE VARIABLES
HOW MANY STANDARD DRINKS CONTAINING ALCOHOL DO YOU HAVE ON A TYPICAL DAY: PATIENT DOES NOT DRINK
HOW OFTEN DO YOU HAVE A DRINK CONTAINING ALCOHOL: NEVER

## 2024-05-16 ASSESSMENT — PAIN DESCRIPTION - DESCRIPTORS: DESCRIPTORS: ACHING

## 2024-05-16 ASSESSMENT — PAIN DESCRIPTION - LOCATION: LOCATION: ANKLE

## 2024-05-16 ASSESSMENT — PAIN DESCRIPTION - PAIN TYPE: TYPE: ACUTE PAIN

## 2024-05-16 ASSESSMENT — PAIN - FUNCTIONAL ASSESSMENT
PAIN_FUNCTIONAL_ASSESSMENT: 0-10
PAIN_FUNCTIONAL_ASSESSMENT: PREVENTS OR INTERFERES WITH ALL ACTIVE AND SOME PASSIVE ACTIVITIES

## 2024-05-16 ASSESSMENT — PAIN DESCRIPTION - ORIENTATION: ORIENTATION: LEFT

## 2024-05-16 NOTE — DISCHARGE INSTRUCTIONS
We would like for you to follow-up with the foot and ankle specialist, Dr. Gianni Lu, contact information listed here, for further evaluation.  Rest, ice, elevation, and ibuprofen/Tylenol will help with your symptoms.  If symptoms worsen or new concerning symptoms arise, please report to the nearest emergency department    Thank you for allowing us to provide you with medical care today.  We realize that you have many choices for your emergency care needs.  We thank you for choosing [facility].  Please choose us in the future for any continued health care needs.     The exam and treatment you received in the Emergency Department were for an emergent problem and are not intended as complete care. It is important that you follow up with a doctor, nurse practitioner, or physician's assistant for ongoing care. If your symptoms worsen or you do not improve as expected and you are unable to reach your usual health care provider, you should return to the Emergency Department.  We are available 24 hours a day.     Please make an appointment with your health care provider(s) for follow up of your Emergency Department visit.  Take this sheet with you when you go to your follow-up visit.

## 2024-05-16 NOTE — ED TRIAGE NOTES
Patient states her leg was asleep and she got up and fell and felt a snap and a pop in her left ankle and has not been able to bear weight since. Endorsing swelling and bruising. Taking ibuprofen without relief.    Denies any chronic medical problems.

## 2024-05-16 NOTE — ED PROVIDER NOTES
[facility] [place]GENC[place] DEPT  EMERGENCY DEPARTMENT ENCOUNTER      Pt Name: Halle Parsons  MRN: [id number]  Birthdate 1995  Date of evaluation: 5/16/2024  Provider: David Sabillon PA-C    CHIEF COMPLAINT       Chief Complaint   Patient presents with    Ankle Pain     Left         HISTORY OF PRESENT ILLNESS   (Location/Symptom, Timing/Onset, Context/Setting, Quality, Duration, Modifying Factors, Severity)  Note limiting factors.   28 year old female reports to ED with left ankle/foot pain, bruising, and swelling after standing up after her leg asleep when sitting down and collapsed onto her left foot shortly prior to ED arrival, rolling her foot.  Patient was able to catch herself at the end of the fall, denies hitting head, neck, or LOC, or any other complaints.  Denies distal numbness or loss of sensation.                Review of External Medical Records:     Nursing Notes were reviewed.    REVIEW OF SYSTEMS    (2-9 systems for level 4, 10 or more for level 5)     Review of Systems   Neurological:  Negative for dizziness and weakness.       Except as noted above the remainder of the review of systems was reviewed and negative.       PAST MEDICAL HISTORY     Past Medical History:   Diagnosis Date    Adenomyosis     outside gyn HD, imaging    ADHD (attention deficit hyperactivity disorder)     Anxiety     Asthma     Bipolar affective disorder (HCC)     Dysmenorrhea     Encounter for IUD insertion 03/07/2022    Mirena Due out 3/7/2025    Endometriosis 2023    Essential hypertension     Genital herpes     Valtrex     Herpes simplex virus (HSV) infection 4/24/2023    Major depressive disorder, single episode, unspecified     Morbid obesity (HCC)     Nexplanon insertion 12/08/2021    Nexplanon removal 03/07/2022    Pap smear for cervical cancer screening     2/23/17 neg 2/8/21 neg    PCOS (polycystic ovarian syndrome)     Pituitary abnormality (HCC)     Postpartum depression     Unspecified symptom associated  with female genital organs          SURGICAL HISTORY       Past Surgical History:   Procedure Laterality Date     SECTION      HYSTERECTOMY (CERVIX STATUS UNKNOWN)      HYSTERECTOMY, VAGINAL      Still have ovariers    HYSTEROSCOPY      novasure ablation, failed hta (4.5 hour surgery per pt)    OTHER SURGICAL HISTORY      PELVIC LAPAROSCOPY  2023    uterine scar tissue removed    WISDOM TOOTH EXTRACTION      WRIST FRACTURE SURGERY Right          CURRENT MEDICATIONS       Discharge Medication List as of 2024  6:50 PM        CONTINUE these medications which have NOT CHANGED    Details   famotidine (PEPCID) 20 MG tablet Take 1 tablet by mouth 2 times daily, Disp-180 tablet, R-1Normal      omeprazole (PRILOSEC) 40 MG delayed release capsule TAKE 1 CAPSULE BY MOUTH TWICE DAILY, Disp-180 capsule, R-3Normal      lithium (LITHOBID) 300 MG extended release tablet Take 1 tablet by mouth 2 times dailyHistorical Med      valACYclovir (VALTREX) 1 g tablet Take 1 tablet by mouth daily, Disp-90 tablet, R-0Normal      FLUoxetine HCl (PROZAC PO) Take 60 mg by mouth dailyHistorical Med      ARIPiprazole (ABILIFY) 2 MG tablet ceived the following from [facility]: Outside name: ARIPiprazole (ABILIFY) 2 mg tabletHistorical Med      cetirizine (ZYRTEC) 10 MG tablet Take 1 tablet by mouth dailyHistorical Med      fluticasone (FLONASE) 50 MCG/ACT nasal spray SHAKE LIQUID AND USE 2 SPRAYS IN EACH NOSTRIL DAILYHistorical Med      hydrOXYzine HCl (ATARAX) 10 MG tablet Take 1 tablet by mouth nightlyHistorical Med      propranolol (INDERAL) 10 MG tablet Take 1 tablet by mouth nightlyHistorical Med             ALLERGIES     Latex, Lactose, Cat hair extract, Diclofenac, Ketorolac, Penicillins, Povidone-iodine, Aspirin, and Iodinated contrast media    FAMILY HISTORY       Family History   Problem Relation Age of Onset    Other Mother         endometriosis    Migraines Mother         tumor in brain    Cancer

## 2024-05-30 ENCOUNTER — CLINICAL DOCUMENTATION (OUTPATIENT)
Age: 29
End: 2024-05-30

## 2024-05-30 NOTE — PROGRESS NOTES
[facility] endorsement for Bariatric surgery form was put on Dr Andres's desk to process

## 2024-07-03 ENCOUNTER — PATIENT MESSAGE (OUTPATIENT)
Age: 29
End: 2024-07-03

## 2024-07-03 DIAGNOSIS — A60.00 GENITAL HERPES SIMPLEX, UNSPECIFIED SITE: ICD-10-CM

## 2024-07-03 RX ORDER — VALACYCLOVIR HYDROCHLORIDE 1 G/1
1000 TABLET, FILM COATED ORAL DAILY
Qty: 90 TABLET | Refills: 0 | Status: SHIPPED | OUTPATIENT
Start: 2024-07-03

## 2024-07-03 NOTE — TELEPHONE ENCOUNTER
From: Halle Parsons  To: Dr. Sabi Acosta  Sent: 7/3/2024 1:07 PM EDT  Subject: Meds    I had to reschedule my appointment today due to no transportation [place]. Can I get a refill on the valtrex until September

## 2024-09-06 ENCOUNTER — TELEMEDICINE (OUTPATIENT)
Age: 29
End: 2024-09-06
Payer: MEDICAID

## 2024-09-06 DIAGNOSIS — R05.2 SUBACUTE COUGH: Primary | ICD-10-CM

## 2024-09-06 DIAGNOSIS — J45.21 MILD INTERMITTENT ASTHMA WITH ACUTE EXACERBATION: ICD-10-CM

## 2024-09-06 DIAGNOSIS — J06.9 VIRAL URI: ICD-10-CM

## 2024-09-06 PROCEDURE — 99213 OFFICE O/P EST LOW 20 MIN: CPT

## 2024-09-06 RX ORDER — AZITHROMYCIN 250 MG/1
TABLET, FILM COATED ORAL
Qty: 6 TABLET | Refills: 0 | Status: SHIPPED | OUTPATIENT
Start: 2024-09-06 | End: 2024-09-16

## 2024-09-06 RX ORDER — PREDNISONE 50 MG/1
50 TABLET ORAL DAILY
Qty: 5 TABLET | Refills: 0 | Status: SHIPPED | OUTPATIENT
Start: 2024-09-06 | End: 2024-09-11

## 2024-09-06 NOTE — PROGRESS NOTES
Halle Parsons, was evaluated through a synchronous (real-time) audio-video encounter. The patient (or guardian if applicable) is aware that this is a billable service, which includes applicable co-pays. This Virtual Visit was conducted with patient's (and/or legal guardian's) consent. Patient identification was verified, and a caregiver was present when appropriate.   The patient was located at Home: [address] [place] [id number]  Provider was located at Facility (Appt Dept): [address] [facility]  [address] [place] [id number]  Confirm you are appropriately licensed, registered, or certified to deliver care in the state where the patient is located as indicated above. If you are not or unsure, please re-schedule the visit: Yes, I confirm.     Halle Parsons (:  1995) is a Established patient, presenting virtually for evaluation of the following:      Below is the assessment and plan developed based on review of pertinent history, physical exam, labs, studies, and medications.     Assessment & Plan  Subacute cough    At this point, over 2 weeks of symptoms I will  go ahead and treat.  Will do a 5-day steroid burst as well as azithromycin due to allergies to penicillin and amoxicillin.    Orders:    predniSONE (DELTASONE) 50 MG tablet; Take 1 tablet by mouth daily for 5 days    azithromycin (ZITHROMAX) 250 MG tablet; 500mg on day 1 followed by 250mg on days 2 - 5    Viral URI    Recommend continuing conservative treatments in addition to steroid and antibiotics as above.    Orders:    predniSONE (DELTASONE) 50 MG tablet; Take 1 tablet by mouth daily for 5 days    azithromycin (ZITHROMAX) 250 MG tablet; 500mg on day 1 followed by 250mg on days 2 - 5    Mild intermittent asthma with acute exacerbation    Will hit her with a steroid 5-day burst.  I have also recommended Mucinex to be taken around-the-clock.    Orders:    predniSONE (DELTASONE) 50 MG tablet; Take 1 tablet by mouth

## 2024-09-06 NOTE — ASSESSMENT & PLAN NOTE
Will hit her with a steroid 5-day burst.  I have also recommended Mucinex to be taken around-the-clock.    Orders:    predniSONE (DELTASONE) 50 MG tablet; Take 1 tablet by mouth daily for 5 days    azithromycin (ZITHROMAX) 250 MG tablet; 500mg on day 1 followed by 250mg on days 2 - 5

## 2024-09-19 ENCOUNTER — PATIENT MESSAGE (OUTPATIENT)
Age: 29
End: 2024-09-19

## 2024-09-19 DIAGNOSIS — A60.00 GENITAL HERPES SIMPLEX, UNSPECIFIED SITE: ICD-10-CM

## 2024-09-19 RX ORDER — VALACYCLOVIR HYDROCHLORIDE 1 G/1
1000 TABLET, FILM COATED ORAL DAILY
Qty: 90 TABLET | Refills: 0 | Status: SHIPPED | OUTPATIENT
Start: 2024-09-19

## 2024-10-23 ENCOUNTER — OFFICE VISIT (OUTPATIENT)
Age: 29
End: 2024-10-23
Payer: MEDICAID

## 2024-10-23 VITALS — WEIGHT: 293 LBS | SYSTOLIC BLOOD PRESSURE: 146 MMHG | BODY MASS INDEX: 61.5 KG/M2 | DIASTOLIC BLOOD PRESSURE: 92 MMHG

## 2024-10-23 DIAGNOSIS — Z20.2 EXPOSURE TO SEXUALLY TRANSMITTED DISEASE (STD): ICD-10-CM

## 2024-10-23 DIAGNOSIS — Z01.419 ENCOUNTER FOR GYNECOLOGICAL EXAMINATION: ICD-10-CM

## 2024-10-23 DIAGNOSIS — A60.00 GENITAL HERPES SIMPLEX, UNSPECIFIED SITE: ICD-10-CM

## 2024-10-23 DIAGNOSIS — R30.0 DYSURIA: ICD-10-CM

## 2024-10-23 DIAGNOSIS — Z11.3 VENEREAL DISEASE SCREENING: ICD-10-CM

## 2024-10-23 DIAGNOSIS — Z01.411 ENCOUNTER FOR GYNECOLOGICAL EXAMINATION (GENERAL) (ROUTINE) WITH ABNORMAL FINDINGS: Primary | ICD-10-CM

## 2024-10-23 LAB
BILIRUBIN, URINE, POC: NEGATIVE
BLOOD URINE, POC: NEGATIVE
GLUCOSE URINE, POC: NEGATIVE
KETONES, URINE, POC: NORMAL
LEUKOCYTE ESTERASE, URINE, POC: NORMAL
NITRITE, URINE, POC: NEGATIVE
PH, URINE, POC: 5.5 (ref 4.6–8)
PROTEIN,URINE, POC: NEGATIVE
SPECIFIC GRAVITY, URINE, POC: 1.02 (ref 1–1.03)
URINALYSIS CLARITY, POC: CLEAR
URINALYSIS COLOR, POC: YELLOW
UROBILINOGEN, POC: NORMAL

## 2024-10-23 PROCEDURE — 99395 PREV VISIT EST AGE 18-39: CPT | Performed by: OBSTETRICS & GYNECOLOGY

## 2024-10-23 PROCEDURE — 81003 URINALYSIS AUTO W/O SCOPE: CPT | Performed by: OBSTETRICS & GYNECOLOGY

## 2024-10-23 PROCEDURE — 99459 PELVIC EXAMINATION: CPT | Performed by: OBSTETRICS & GYNECOLOGY

## 2024-10-23 RX ORDER — VALACYCLOVIR HYDROCHLORIDE 1 G/1
1000 TABLET, FILM COATED ORAL DAILY
Qty: 90 TABLET | Refills: 4 | Status: CANCELLED | OUTPATIENT
Start: 2024-10-23

## 2024-10-23 RX ORDER — VALACYCLOVIR HYDROCHLORIDE 500 MG/1
500 TABLET, FILM COATED ORAL 2 TIMES DAILY
Qty: 6 TABLET | Refills: 3 | Status: SHIPPED | OUTPATIENT
Start: 2024-10-23 | End: 2024-10-26

## 2024-10-23 NOTE — PROGRESS NOTES
Halle Parsons is a 28 y.o. female returns for an annual exam     Chief Complaint   Patient presents with    Annual Exam       Patient's last menstrual period was 05/19/2023.  Her periods are absent  Problems: problems - burning with urination and urinary odor; std testing; pt would like work up to see if she actually has HSV 2 - positive result 10/31/2019 on swab in chart that was collected for vaginal lesion  Birth Control: status post hysterectomy.  Last Pap: see report obtained 3 year(s) ago.  She does not have a history of RASHAAD 2, 3 or cervical cancer.         1. Have you been to the ER, urgent care clinic, or hospitalized since your last visit? No    2. Have you seen or consulted any other health care providers outside of the [facility] since your last visit? No    Examination chaperoned by Dayanara Langston LPN.  
present, no adnexal masses present  Perineum: normal appearing  Anus: normal appearing    Skin  General Inspection: no significant rash    Neurologic/Psychiatric  Mental Status:  Orientation: grossly oriented and alert  Mood and Affect: mood normal, affect appropriate    Assessment:  28 y.o.  for annual gynecologic exam.  Encounter Diagnoses   Name Primary?    Genital herpes simplex, unspecified site     Venereal disease screening     Exposure to sexually transmitted disease (STD)     Encounter for gynecological examination     Encounter for gynecological examination (general) (routine) with abnormal findings Yes    Dysuria        Plan:  Recommend follow up one year for routine annual gynecologic exam or sooner as needed for any GYN concerns.  Patient should follow up with a primary care physician for chronic medical problems and evaluation of non-gynecologic concerns.  STD testing recommended per CDC guidelines and at patient request.   Follow up: annual gynecologic exam one year.  We discussed potential causes of vaginal discharge/irritation.   We discussed good vulvar hygiene.  Recommended avoid vaginal irritants.  Discussed use of mild soaps/detergents.  Follow up if NI.  Patient will be notified about swab results and prescription sent, if indicated.   UTI precautions, plan observation, notify MD if NI      Folllow up:  [x] return for annual well woman exam in one year or sooner if the patient is having problems  [] follow up and ultrasound  [] 6 months  [] 3 months  [] 6 weeks   [] 1 month  [x] 1 year: WWE    Orders Placed This Encounter    Chlamydia, Gonorrhea, Trichomoniasis    Hepatitis B Surface Antigen     Standing Status:   Future     Number of Occurrences:   1     Standing Expiration Date:   10/23/2025    HIV 1/2 Ag/Ab, 4TH Generation,W Rflx Confirm     Standing Status:   Future     Number of Occurrences:   1     Standing Expiration Date:   10/23/2025    T Pallidum Screen W/Reflex     Standing

## 2024-10-24 LAB
HBV SURFACE AG SER QL: <0.1 INDEX
HBV SURFACE AG SER QL: NEGATIVE
HIV 1+2 AB+HIV1 P24 AG SERPL QL IA: NONREACTIVE
HIV 1/2 RESULT COMMENT: NORMAL

## 2024-10-25 LAB — T PALLIDUM AB SER QL IA: NON REACTIVE

## 2024-11-18 ENCOUNTER — TELEMEDICINE (OUTPATIENT)
Facility: CLINIC | Age: 29
End: 2024-11-18
Payer: MEDICAID

## 2024-11-18 DIAGNOSIS — B96.89 ACUTE BACTERIAL SINUSITIS: Primary | ICD-10-CM

## 2024-11-18 DIAGNOSIS — J01.90 ACUTE BACTERIAL SINUSITIS: Primary | ICD-10-CM

## 2024-11-18 PROCEDURE — 99214 OFFICE O/P EST MOD 30 MIN: CPT

## 2024-11-18 RX ORDER — AZELASTINE 1 MG/ML
2 SPRAY, METERED NASAL 2 TIMES DAILY
Qty: 120 ML | Refills: 1 | Status: SHIPPED | OUTPATIENT
Start: 2024-11-18

## 2024-11-18 RX ORDER — BENZONATATE 100 MG/1
100 CAPSULE ORAL 3 TIMES DAILY PRN
Qty: 30 CAPSULE | Refills: 0 | Status: SHIPPED | OUTPATIENT
Start: 2024-11-18 | End: 2024-11-28

## 2024-11-18 RX ORDER — DOXYCYCLINE HYCLATE 100 MG
100 TABLET ORAL 2 TIMES DAILY
Qty: 20 TABLET | Refills: 0 | Status: SHIPPED | OUTPATIENT
Start: 2024-11-18 | End: 2024-11-28

## 2024-11-18 RX ORDER — PREDNISONE 5 MG/1
TABLET ORAL
Qty: 27 TABLET | Refills: 0 | Status: SHIPPED | OUTPATIENT
Start: 2024-11-18 | End: 2024-11-24

## 2024-11-18 ASSESSMENT — ENCOUNTER SYMPTOMS
ABDOMINAL PAIN: 0
ALLERGIC/IMMUNOLOGIC NEGATIVE: 1
EYES NEGATIVE: 1
SORE THROAT: 0
SINUS PAIN: 0
COUGH: 1
SHORTNESS OF BREATH: 0
DIARRHEA: 0
CONSTIPATION: 0
RHINORRHEA: 0

## 2024-11-18 NOTE — PROGRESS NOTES
Virtual Visit Progress Note    she is a 28 y.o. year old female who presents for evaluation No chief complaint on file.          Subjective:     No chief complaint on file.      Patient normally follows with Dr. Andres in the office.    She stated that she has been suffering from a cough for approximately 2 weeks with intermittent chills.  She states that she has tried over-the-counter Claritin to help with seasonal allergies which has not helped.  She is concerned that she had a viral infection that has been going through the house because her 3 kids all have varying symptoms of fever, chills, sinus symptoms.  Patient states that she has chest pain when coughing only.  Patient denies any shortness of breath, dizziness, nausea, vomiting, diaphoresis, syncopal episodes.    Patient denies any other acute and/or chronic complaints    PMHx, RxHx, FmHx, SocHx, AllgHx were reviewed and updated in the chart.    Review of Systems   Constitutional:  Positive for chills. Negative for activity change, appetite change and fatigue.   HENT:  Negative for postnasal drip, rhinorrhea, sinus pain and sore throat.    Eyes: Negative.    Respiratory:  Positive for cough. Negative for shortness of breath (Productive yellow/Clear).    Cardiovascular:  Positive for chest pain (When coughing only).   Gastrointestinal:  Negative for abdominal pain, constipation and diarrhea.   Endocrine: Negative.    Musculoskeletal:  Negative for arthralgias, joint swelling and myalgias.   Skin: Negative.    Allergic/Immunologic: Negative.    Neurological:  Negative for dizziness, syncope, light-headedness and headaches.   Hematological: Negative.    Psychiatric/Behavioral: Negative.             Objective:     Patient-Reported Vitals  No data recorded     Physical Exam  Constitutional:       Appearance: Normal appearance.   HENT:      Head: Normocephalic.   Neurological:      Mental Status: She is alert and oriented to person, place, and time.

## 2024-11-18 NOTE — PATIENT INSTRUCTIONS
Nasal steroid sprays: flonase (sensimist is unscented), nasacort, nasonex  Nasal antihistamine spray: astepro  Antihistamine: claritin, allegra, xyzal, zyrtec  Prescription medication: Singulair/montelukast  Generics are fine to use!    Warm salt water gargles  Nasal saline spray and neti-pot or sinus rinses.

## 2024-12-27 ENCOUNTER — HOSPITAL ENCOUNTER (EMERGENCY)
Facility: HOSPITAL | Age: 29
Discharge: HOME | End: 2024-12-27
Attending: STUDENT IN AN ORGANIZED HEALTH CARE EDUCATION/TRAINING PROGRAM
Payer: MEDICAID

## 2024-12-27 VITALS
HEIGHT: 66 IN | WEIGHT: 293 LBS | RESPIRATION RATE: 18 BRPM | HEART RATE: 75 BPM | BODY MASS INDEX: 47.09 KG/M2 | DIASTOLIC BLOOD PRESSURE: 78 MMHG | OXYGEN SATURATION: 99 % | TEMPERATURE: 98.1 F | SYSTOLIC BLOOD PRESSURE: 149 MMHG

## 2024-12-27 LAB
APPEARANCE UR: CLEAR
BACTERIA URNS QL MICRO: NEGATIVE /HPF
BILIRUB UR QL: NEGATIVE
CLUE CELLS VAG QL WET PREP: NORMAL
COLOR UR: NORMAL
EPITH CASTS URNS QL MICRO: NORMAL /LPF
GLUCOSE UR STRIP.AUTO-MCNC: NEGATIVE MG/DL
HCG UR QL: NEGATIVE
HGB UR QL STRIP: NEGATIVE
KETONES UR QL STRIP.AUTO: NEGATIVE MG/DL
LEUKOCYTE ESTERASE UR QL STRIP.AUTO: NEGATIVE
NITRITE UR QL STRIP.AUTO: NEGATIVE
PH UR STRIP: 6 (ref 5–8)
PROT UR STRIP-MCNC: NEGATIVE MG/DL
RBC #/AREA URNS HPF: NORMAL /HPF
SP GR UR REFRACTOMETRY: 1.02 (ref 1–1.03)
SPECIMEN HOLD: NORMAL
T VAGINALIS VAG QL WET PREP: NORMAL
UROBILINOGEN UR QL STRIP.AUTO: 0.2 EU/DL (ref 0.2–1)
WBC URNS QL MICRO: NORMAL /HPF (ref 0–4)
YEAST: NORMAL

## 2024-12-27 PROCEDURE — 81001 URINALYSIS AUTO W/SCOPE: CPT

## 2024-12-27 PROCEDURE — 87491 CHLMYD TRACH DNA AMP PROBE: CPT

## 2024-12-27 PROCEDURE — 87210 SMEAR WET MOUNT SALINE/INK: CPT

## 2024-12-27 PROCEDURE — 87591 N.GONORRHOEAE DNA AMP PROB: CPT

## 2024-12-27 PROCEDURE — 81025 URINE PREGNANCY TEST: CPT

## 2024-12-27 ASSESSMENT — PAIN - FUNCTIONAL ASSESSMENT: PAIN_FUNCTIONAL_ASSESSMENT: 0-10

## 2024-12-27 ASSESSMENT — PAIN SCALES - GENERAL: PAINLEVEL_OUTOF10: 0

## 2024-12-28 NOTE — ED TRIAGE NOTES
Patient to triage with complaint of urinary symptoms, vaginal odor and discharge that started 4 days ago.   Patient states she wants to be checked for all the STDs. .

## 2024-12-28 NOTE — ED PROVIDER NOTES
3:20 AM  I was inadvertently assigned to this patient's treatment team.  I did not see this patient nor did I have any contact with this patient.  I had no involvement during the evaluation, treatment or disposition of this patient.  I am signing off this note to indicate only why my name appeared in the record.  MD Virginia Goddard, Yasmeen Mcmahon MD  12/28/24 [id number]

## 2025-01-09 NOTE — PATIENT INSTRUCTIONS
Weeks 18 to 22 of Your Pregnancy: Care Instructions  Your Care Instructions    Your baby is continuing to develop quickly. At this stage, babies can now suck their thumbs,  firmly with their hands, and open and close their eyelids. Sometime between 18 and 22 weeks, you will start to feel your baby move. At first, these small fetal movements feel like fluttering or \"butterflies. \" Some women say that they feel like gas bubbles. As the baby grows, these movements will become stronger. You may also notice that your baby kicks and hiccups. During this time, you may find that your nausea and fatigue are gone. Overall, you may feel better and have more energy than you did in your first trimester. But you may also have new discomforts now, such as sleep problems or leg cramps. This care sheet can help you ease these discomforts. Follow-up care is a key part of your treatment and safety. Be sure to make and go to all appointments, and call your doctor if you are having problems. It's also a good idea to know your test results and keep a list of the medicines you take. How can you care for yourself at home? Ease sleep problems  · Avoid caffeine in drinks or chocolate late in the day. · Get some exercise every day. · Take a warm shower or bath before bed. · Have a light snack or glass of milk at bedtime. · Do relaxation exercises in bed to calm your mind and body. · Support your legs and back with extra pillows. Try a pillow between your legs if you sleep on your side. · Do not use sleeping pills or alcohol. They could harm your baby. Ease leg cramps  · Do not massage your calf during the cramp. · Sit on a firm bed or chair. Straighten your leg, and bend your foot (flex your ankle) slowly upward, toward your knee. Bend your toes up and down. · Stand on a cool, flat surface. Stretch your toes upward, and take small steps walking on your heels.   · Use a heating pad or hot water bottle to help with muscle ache.  Prevent leg cramps  · Be sure to get enough calcium. If you are worried that you are not getting enough, talk to your doctor. · Exercise every day, and stretch your legs before bed. · Take a warm bath before bed, and try leg warmers at night. Where can you learn more? Go to http://sathya-bianka.info/. Enter Z462 in the search box to learn more about \"Weeks 18 to 22 of Your Pregnancy: Care Instructions. \"  Current as of: November 21, 2017  Content Version: 11.7  © 6275-0586 [employer]. Care instructions adapted under license by [employer] (which disclaims liability or warranty for this information). If you have questions about a medical condition or this instruction, always ask your healthcare professional. [address] any warranty or liability for your use of this information. no

## 2025-01-27 ENCOUNTER — HOSPITAL ENCOUNTER (EMERGENCY)
Facility: HOSPITAL | Age: 30
Discharge: HOME | End: 2025-01-27
Attending: EMERGENCY MEDICINE
Payer: MEDICAID

## 2025-01-27 ENCOUNTER — APPOINTMENT (OUTPATIENT)
Facility: HOSPITAL | Age: 30
End: 2025-01-27
Payer: MEDICAID

## 2025-01-27 VITALS
SYSTOLIC BLOOD PRESSURE: 131 MMHG | WEIGHT: 293 LBS | HEIGHT: 66 IN | DIASTOLIC BLOOD PRESSURE: 94 MMHG | BODY MASS INDEX: 47.09 KG/M2 | RESPIRATION RATE: 18 BRPM | TEMPERATURE: 98.1 F | OXYGEN SATURATION: 99 % | HEART RATE: 69 BPM

## 2025-01-27 DIAGNOSIS — R07.89 ATYPICAL CHEST PAIN: Primary | ICD-10-CM

## 2025-01-27 PROCEDURE — 71046 X-RAY EXAM CHEST 2 VIEWS: CPT

## 2025-01-27 PROCEDURE — 99283 EMERGENCY DEPT VISIT LOW MDM: CPT

## 2025-01-27 ASSESSMENT — LIFESTYLE VARIABLES
HOW OFTEN DO YOU HAVE A DRINK CONTAINING ALCOHOL: 2-4 TIMES A MONTH
HOW MANY STANDARD DRINKS CONTAINING ALCOHOL DO YOU HAVE ON A TYPICAL DAY: 3 OR 4

## 2025-01-27 ASSESSMENT — PAIN - FUNCTIONAL ASSESSMENT: PAIN_FUNCTIONAL_ASSESSMENT: 0-10

## 2025-01-27 ASSESSMENT — PAIN SCALES - GENERAL: PAINLEVEL_OUTOF10: 5

## 2025-01-27 NOTE — ED PROVIDER NOTES
[place] EMERGENCY DEPARTMENT  EMERGENCY DEPARTMENT ENCOUNTER      Pt Name: Halle Parsons  MRN: [id number]  Birthdate 1995  Date of evaluation: 1/27/2025  Provider: SIXTO Gomez    CHIEF COMPLAINT       Chief Complaint   Patient presents with    Chest Pain     Sternum         HISTORY OF PRESENT ILLNESS   (Location/Symptom, Timing/Onset, Context/Setting, Quality, Duration, Modifying Factors, Severity)  Note limiting factors.   29-year-old female presenting to the ED for chest pain.  Patient reports that about 2 weeks ago she went to stretch, notes that she felt and heard a pop to the right side of her sternum and notes that since then she has had pain with certain movements or palpation.  Patient specifically denies shortness of breath, hemoptysis, unilateral leg pain/swelling, recent immobilization, exogenous estrogen.    Past medical and past surgical history: Up-to-date per patient    The history is provided by the patient.         Review of External Medical Records:     Nursing Notes were reviewed.    REVIEW OF SYSTEMS    (2-9 systems for level 4, 10 or more for level 5)     Review of Systems   Cardiovascular:  Positive for chest pain.       Except as noted above the remainder of the review of systems was reviewed and negative.       PAST MEDICAL HISTORY     Past Medical History:   Diagnosis Date    Adenomyosis     outside gyn HD, imaging    ADHD (attention deficit hyperactivity disorder)     Anxiety     Asthma     Bipolar affective disorder (HCC)     Dysmenorrhea     Encounter for IUD insertion 03/07/2022    Mirena Due out 3/7/2025    Endometriosis 2023    Essential hypertension     Herpes simplex virus (HSV) infection 4/24/2023    HSV 2 - genital 10/31/2019    swab    Major depressive disorder, single episode, unspecified     Morbid obesity     Nexplanon insertion 12/08/2021    Nexplanon removal 03/07/2022    Pap smear for cervical cancer screening     2/23/17 neg 2/8/21 neg    PCOS

## 2025-01-27 NOTE — ED TRIAGE NOTES
Patient ambulatory to ED c/o sternum movement and pain x 2 weeks after she stretched funny. Smokes marijuana at nighttime, has helped with pain relief.

## 2025-01-27 NOTE — DISCHARGE INSTRUCTIONS
Return to the ER for new or worsening symptoms such as worsening pain, coughing up blood, fever, difficulty breathing, swelling in the legs.  As we discussed, based on the location and characterization of pain, this is likely some inflammation of the cartilage or muscles of the chest wall, possibly costochondritis.  Use an over-the-counter anti-inflammatory of your choice such as Motrin, Advil, naproxen, Aleve.  You may also use an over-the-counter patch containing lidocaine such as Salonpas-you may ask the pharmacist for a recommendation.

## 2025-04-28 ENCOUNTER — TELEMEDICINE (OUTPATIENT)
Facility: CLINIC | Age: 30
End: 2025-04-28
Payer: MEDICAID

## 2025-04-28 DIAGNOSIS — E78.00 HIGH CHOLESTEROL: ICD-10-CM

## 2025-04-28 PROCEDURE — 99213 OFFICE O/P EST LOW 20 MIN: CPT

## 2025-04-28 RX ORDER — PROPRANOLOL HCL 20 MG
20 TABLET ORAL 3 TIMES DAILY
COMMUNITY

## 2025-04-28 RX ORDER — LISDEXAMFETAMINE DIMESYLATE 50 MG
50 CAPSULE ORAL EVERY MORNING
COMMUNITY
Start: 2025-04-21

## 2025-04-28 SDOH — ECONOMIC STABILITY: FOOD INSECURITY: WITHIN THE PAST 12 MONTHS, YOU WORRIED THAT YOUR FOOD WOULD RUN OUT BEFORE YOU GOT MONEY TO BUY MORE.: NEVER TRUE

## 2025-04-28 SDOH — ECONOMIC STABILITY: INCOME INSECURITY: IN THE LAST 12 MONTHS, WAS THERE A TIME WHEN YOU WERE NOT ABLE TO PAY THE MORTGAGE OR RENT ON TIME?: YES

## 2025-04-28 SDOH — ECONOMIC STABILITY: FOOD INSECURITY: WITHIN THE PAST 12 MONTHS, THE FOOD YOU BOUGHT JUST DIDN'T LAST AND YOU DIDN'T HAVE MONEY TO GET MORE.: NEVER TRUE

## 2025-04-28 SDOH — ECONOMIC STABILITY: TRANSPORTATION INSECURITY
IN THE PAST 12 MONTHS, HAS LACK OF TRANSPORTATION KEPT YOU FROM MEETINGS, WORK, OR FROM GETTING THINGS NEEDED FOR DAILY LIVING?: NO

## 2025-04-28 SDOH — ECONOMIC STABILITY: TRANSPORTATION INSECURITY
IN THE PAST 12 MONTHS, HAS THE LACK OF TRANSPORTATION KEPT YOU FROM MEDICAL APPOINTMENTS OR FROM GETTING MEDICATIONS?: NO

## 2025-04-28 ASSESSMENT — PATIENT HEALTH QUESTIONNAIRE - PHQ9
5. POOR APPETITE OR OVEREATING: NOT AT ALL
7. TROUBLE CONCENTRATING ON THINGS, SUCH AS READING THE NEWSPAPER OR WATCHING TELEVISION: NOT AT ALL
1. LITTLE INTEREST OR PLEASURE IN DOING THINGS: NOT AT ALL
2. FEELING DOWN, DEPRESSED OR HOPELESS: NOT AT ALL
SUM OF ALL RESPONSES TO PHQ QUESTIONS 1-9: 2
10. IF YOU CHECKED OFF ANY PROBLEMS, HOW DIFFICULT HAVE THESE PROBLEMS MADE IT FOR YOU TO DO YOUR WORK, TAKE CARE OF THINGS AT HOME, OR GET ALONG WITH OTHER PEOPLE: NOT DIFFICULT AT ALL
9. THOUGHTS THAT YOU WOULD BE BETTER OFF DEAD, OR OF HURTING YOURSELF: NOT AT ALL
6. FEELING BAD ABOUT YOURSELF - OR THAT YOU ARE A FAILURE OR HAVE LET YOURSELF OR YOUR FAMILY DOWN: NOT AT ALL
8. MOVING OR SPEAKING SO SLOWLY THAT OTHER PEOPLE COULD HAVE NOTICED. OR THE OPPOSITE, BEING SO FIGETY OR RESTLESS THAT YOU HAVE BEEN MOVING AROUND A LOT MORE THAN USUAL: NOT AT ALL
4. FEELING TIRED OR HAVING LITTLE ENERGY: NOT AT ALL
3. TROUBLE FALLING OR STAYING ASLEEP: MORE THAN HALF THE DAYS

## 2025-04-28 ASSESSMENT — ENCOUNTER SYMPTOMS
CONSTIPATION: 0
DIARRHEA: 0
ABDOMINAL PAIN: 0
ALLERGIC/IMMUNOLOGIC NEGATIVE: 1
SHORTNESS OF BREATH: 0
EYES NEGATIVE: 1
RHINORRHEA: 0
SORE THROAT: 0
COUGH: 0
SINUS PAIN: 0

## 2025-04-28 ASSESSMENT — JOINT PAIN
TOTAL NUMBER OF TENDER JOINTS: 0
TOTAL NUMBER OF SWOLLEN JOINTS: 0

## 2025-04-28 NOTE — PROGRESS NOTES
Chief Complaint   Patient presents with    Weight Management     Have you been to the ER, urgent care clinic since your last visit?  Hospitalized since your last visit?   NO    Have you seen or consulted any other health care providers outside our system since your last visit?   Psych. Dr Tati Delatorre    PHQ-9 Total Score: 2 (4/28/2025  1:26 PM)  Thoughts that you would be better off dead, or of hurting yourself in some way: 0 (4/28/2025  1:26 PM)                     
appointment consented to the use of AI, including the recording.       --SUZY Anderson on 4/28/25 at 2:01 PM EDT  An electronic signature was used to authenticate this note.

## 2025-04-29 ENCOUNTER — CLINICAL DOCUMENTATION (OUTPATIENT)
Facility: HOSPITAL | Age: 30
End: 2025-04-29

## 2025-04-29 ENCOUNTER — PATIENT MESSAGE (OUTPATIENT)
Facility: CLINIC | Age: 30
End: 2025-04-29

## 2025-04-29 DIAGNOSIS — E66.9 OBESITY, UNSPECIFIED CLASS, UNSPECIFIED OBESITY TYPE, UNSPECIFIED WHETHER SERIOUS COMORBIDITY PRESENT: Primary | ICD-10-CM

## 2025-04-29 NOTE — PROGRESS NOTES
[facility] at [facility]  Specialty Pharmacy Update    Date: 04/29/25    Halle Parsons 1995    Medication: Zepbound     Prior authorization was denied by insurance.     Prior authorization was denied by insurance.     Patient must try/fail a 3 month trial of preferred oral weight loss medication such as: phentermine, benzphetamine, diethylpropion, phendimetrazine OR a 6 month trial of Orlistat without a 10 pound weight loss.    ALSO: There must be documentation of a diet and exercise plan/program.    Brittney Cabrera  [facility] at [address], [address]   [place] [address]  phone: [phone]   fax: [phone]

## 2025-04-30 RX ORDER — PHENTERMINE HYDROCHLORIDE 15 MG/1
15 CAPSULE ORAL EVERY MORNING
Qty: 30 CAPSULE | Refills: 0 | Status: SHIPPED | OUTPATIENT
Start: 2025-04-30 | End: 2025-05-30

## 2025-06-05 ENCOUNTER — OFFICE VISIT (OUTPATIENT)
Facility: CLINIC | Age: 30
End: 2025-06-05
Payer: MEDICAID

## 2025-06-05 VITALS
OXYGEN SATURATION: 99 % | DIASTOLIC BLOOD PRESSURE: 85 MMHG | RESPIRATION RATE: 20 BRPM | TEMPERATURE: 98.8 F | HEIGHT: 66 IN | BODY MASS INDEX: 47.09 KG/M2 | HEART RATE: 63 BPM | WEIGHT: 293 LBS | SYSTOLIC BLOOD PRESSURE: 135 MMHG

## 2025-06-05 DIAGNOSIS — N89.8 VAGINAL DISCHARGE: Primary | ICD-10-CM

## 2025-06-05 LAB
BILIRUBIN, URINE, POC: NEGATIVE
BLOOD URINE, POC: NEGATIVE
GLUCOSE URINE, POC: NEGATIVE
KETONES, URINE, POC: NEGATIVE
LEUKOCYTE ESTERASE, URINE, POC: NEGATIVE
NITRITE, URINE, POC: NEGATIVE
PH, URINE, POC: 7 (ref 4.6–8)
PROTEIN,URINE, POC: NEGATIVE
SPECIFIC GRAVITY, URINE, POC: 1.01 (ref 1–1.03)
URINALYSIS CLARITY, POC: CLEAR
URINALYSIS COLOR, POC: YELLOW
UROBILINOGEN, POC: NORMAL MG/DL

## 2025-06-05 PROCEDURE — 99213 OFFICE O/P EST LOW 20 MIN: CPT | Performed by: PHYSICIAN ASSISTANT

## 2025-06-05 PROCEDURE — PBSHW AMB POC URINALYSIS DIP STICK MANUAL W/O MICRO: Performed by: PHYSICIAN ASSISTANT

## 2025-06-05 PROCEDURE — 81002 URINALYSIS NONAUTO W/O SCOPE: CPT | Performed by: PHYSICIAN ASSISTANT

## 2025-06-05 RX ORDER — NICOTINE 14MG/24HR
PATCH, TRANSDERMAL 24 HOURS TRANSDERMAL
COMMUNITY

## 2025-06-05 RX ORDER — VALACYCLOVIR HYDROCHLORIDE 500 MG/1
500 TABLET, FILM COATED ORAL DAILY
COMMUNITY
Start: 2025-06-01

## 2025-06-05 RX ORDER — LEVOCETIRIZINE DIHYDROCHLORIDE 5 MG/1
5 TABLET, FILM COATED ORAL NIGHTLY
COMMUNITY

## 2025-06-05 RX ORDER — METRONIDAZOLE 500 MG/1
500 TABLET ORAL 2 TIMES DAILY
Qty: 14 TABLET | Refills: 0 | Status: SHIPPED | OUTPATIENT
Start: 2025-06-05 | End: 2025-06-12

## 2025-06-05 RX ORDER — FLUOXETINE 10 MG/1
10 CAPSULE ORAL DAILY
COMMUNITY
Start: 2025-05-16

## 2025-06-05 RX ORDER — ALBUTEROL SULFATE 90 UG/1
INHALANT RESPIRATORY (INHALATION)
COMMUNITY
Start: 2025-03-14

## 2025-06-05 NOTE — PROGRESS NOTES
Halle Parsons (:  1995) is a 29 y.o. female, here for evaluation of the following chief complaint(s):  Urinary Pain (Patient c/o urinary burning, frequency, odor, darker, x1 week.) and Vaginal Discharge (Patient c/o vaginal discharge yellowish, itchy, discomfort with sex, some odor yeasty smell. Possible a fishy smell at times. X1.5 weeks. )         Assessment & Plan  1. Bacterial vaginosis.  - A self-swab will be conducted for further analysis.  - Urine analysis shows no leukocytes, nitrites, protein, or blood.  - Discussed the symptoms and the patient's history of BV, yeast infections, and UTIs. The patient believes the current symptoms are more indicative of BV.  - A prescription for metronidazole 500 mg, to be taken twice daily for 7 days, has been issued. She has been advised to abstain from alcohol consumption during this treatment period. The results of the culture will be communicated upon receipt. If the culture results do not confirm BV, an alternative medication will be prescribed.    Results  Labs   - Urine test: No leukocytes, no nitrites, no protein, no blood  1. Vaginal discharge  -     Bacterial Vaginosis Panel; Future  -     Candida Vaginitis Amplification; Future  -     metroNIDAZOLE (FLAGYL) 500 MG tablet; Take 1 tablet by mouth 2 times daily for 7 days, Disp-14 tablet, R-0Normal  -     AMB POC URINALYSIS DIP STICK MANUAL W/O MICRO    No follow-ups on file.       Subjective   History of Present Illness  The patient presents for evaluation of vaginal discharge.    She began experiencing vaginal discharge approximately 1.5 weeks ago, accompanied by itching. The discharge is described as thick and persistent, with a slight fishy odor that was temporarily alleviated by showering. She has a history of bacterial vaginosis (BV) and yeast infections, but currently suspects BV due to the distinct odor. Additionally, she reports that antibiotics have previously led to yeast

## 2025-06-05 NOTE — PROGRESS NOTES
Halle Parsons is a 29 y.o. female , id x 2(name and ). Reviewed record, history, and  medications.      Chief Complaint   Patient presents with    Urinary Pain     Patient c/o urinary burning, frequency, odor, darker, x1 week.    Vaginal Discharge     Patient c/o vaginal discharge yellowish, itchy, discomfort with sex, some odor yeasty smell. Possible a fishy smell at times. X1.5 weeks.          Vitals:    25 0920   Weight: (!) 169.2 kg (373 lb)   Height: 1.676 m (5' 6\")             2025     1:26 PM   PHQ-9    Little interest or pleasure in doing things 0   Feeling down, depressed, or hopeless 0   Trouble falling or staying asleep, or sleeping too much 2   Feeling tired or having little energy 0   Poor appetite or overeating 0   Feeling bad about yourself - or that you are a failure or have let yourself or your family down 0   Trouble concentrating on things, such as reading the newspaper or watching television 0   Moving or speaking so slowly that other people could have noticed. Or the opposite - being so fidgety or restless that you have been moving around a lot more than usual 0   Thoughts that you would be better off dead, or of hurting yourself in some way 0   PHQ-2 Score 0   PHQ-9 Total Score 2   If you checked off any problems, how difficult have these problems made it for you to do your work, take care of things at home, or get along with other people? 0            No data to display                Social Drivers of Health     Tobacco Use: High Risk (2025)    Patient History     Smoking Tobacco Use: Former     Smokeless Tobacco Use: Current     Passive Exposure: Not on file   Alcohol Use: Alcohol Misuse (2025)    AUDIT-C     Frequency of Alcohol Consumption: 2-4 times a month     Average Number of Drinks: 3 or 4     Frequency of Binge Drinking: Never   Financial Resource Strain: Low Risk  (3/13/2024)    Overall Financial Resource Strain (CARDIA)     Difficulty of Paying Living

## 2025-06-09 ENCOUNTER — RESULTS FOLLOW-UP (OUTPATIENT)
Facility: CLINIC | Age: 30
End: 2025-06-09

## 2025-06-09 LAB
A VAGINAE DNA VAG QL NAA+PROBE: NORMAL SCORE
BVAB2 DNA VAG QL NAA+PROBE: NORMAL SCORE
C ALBICANS DNA VAG QL NAA+PROBE: NEGATIVE
C GLABRATA DNA VAG QL NAA+PROBE: NEGATIVE
MEGA1 DNA VAG QL NAA+PROBE: NORMAL SCORE
SPECIMEN SOURCE: NORMAL

## 2025-07-07 ENCOUNTER — TELEPHONE (OUTPATIENT)
Facility: CLINIC | Age: 30
End: 2025-07-07

## 2025-07-07 RX ORDER — FAMOTIDINE 20 MG/1
20 TABLET, FILM COATED ORAL 2 TIMES DAILY
Qty: 180 TABLET | Refills: 1 | Status: SHIPPED | OUTPATIENT
Start: 2025-07-07

## 2025-08-20 ENCOUNTER — TELEPHONE (OUTPATIENT)
Facility: CLINIC | Age: 30
End: 2025-08-20

## 2025-08-20 RX ORDER — OMEPRAZOLE 40 MG/1
40 CAPSULE, DELAYED RELEASE ORAL 2 TIMES DAILY
Qty: 180 CAPSULE | Refills: 3 | Status: SHIPPED | OUTPATIENT
Start: 2025-08-20

## 2025-08-21 ENCOUNTER — TELEMEDICINE (OUTPATIENT)
Facility: CLINIC | Age: 30
End: 2025-08-21
Payer: MEDICAID

## 2025-08-21 DIAGNOSIS — J06.9 VIRAL URI: Primary | ICD-10-CM

## 2025-08-21 PROCEDURE — 99213 OFFICE O/P EST LOW 20 MIN: CPT

## 2025-08-21 RX ORDER — AZELASTINE 1 MG/ML
2 SPRAY, METERED NASAL 2 TIMES DAILY
Qty: 120 ML | Refills: 1 | Status: SHIPPED | OUTPATIENT
Start: 2025-08-21

## 2025-08-21 RX ORDER — PREDNISONE 10 MG/1
10 TABLET ORAL DAILY
Qty: 10 TABLET | Refills: 0 | Status: SHIPPED | OUTPATIENT
Start: 2025-08-21 | End: 2025-08-31

## 2025-08-21 SDOH — ECONOMIC STABILITY: FOOD INSECURITY: WITHIN THE PAST 12 MONTHS, YOU WORRIED THAT YOUR FOOD WOULD RUN OUT BEFORE YOU GOT MONEY TO BUY MORE.: NEVER TRUE

## 2025-08-21 SDOH — ECONOMIC STABILITY: FOOD INSECURITY: WITHIN THE PAST 12 MONTHS, THE FOOD YOU BOUGHT JUST DIDN'T LAST AND YOU DIDN'T HAVE MONEY TO GET MORE.: NEVER TRUE

## 2025-08-21 ASSESSMENT — PATIENT HEALTH QUESTIONNAIRE - PHQ9
SUM OF ALL RESPONSES TO PHQ QUESTIONS 1-9: 0
SUM OF ALL RESPONSES TO PHQ QUESTIONS 1-9: 0
1. LITTLE INTEREST OR PLEASURE IN DOING THINGS: NOT AT ALL
SUM OF ALL RESPONSES TO PHQ QUESTIONS 1-9: 0
SUM OF ALL RESPONSES TO PHQ QUESTIONS 1-9: 0
2. FEELING DOWN, DEPRESSED OR HOPELESS: NOT AT ALL

## 2025-08-21 ASSESSMENT — ENCOUNTER SYMPTOMS
ALLERGIC/IMMUNOLOGIC NEGATIVE: 1
EYES NEGATIVE: 1
SORE THROAT: 0
ABDOMINAL PAIN: 0
COUGH: 1
SINUS PAIN: 0
CONSTIPATION: 0
SHORTNESS OF BREATH: 0
RHINORRHEA: 0
DIARRHEA: 0

## (undated) DEVICE — TELFA ADHESIVE ISLAND DRESSING: Brand: TELFA

## (undated) DEVICE — TIP CLEANER: Brand: VALLEYLAB

## (undated) DEVICE — SYRINGE IRRIG 60ML SFT PLIABLE BLB EZ TO GRP 1 HND USE W/

## (undated) DEVICE — HANDLE LT SNAP ON ULT DURABLE LENS FOR TRUMPF ALC DISPOSABLE

## (undated) DEVICE — STAPLER SKIN H3.9MM WIRE DIA0.58MM CRWN 6.9MM 35 STPL ROT

## (undated) DEVICE — BLADE ASSEMB CLP HAIR FINE --

## (undated) DEVICE — SOLUTION IV 1000ML 0.9% SOD CHL

## (undated) DEVICE — TOWEL,OR,DSP,ST,BLUE,STD,2/PK,40PK/CS: Brand: MEDLINE

## (undated) DEVICE — SUTURE MCRYL SZ 3-0 L27IN ABSRB UD L60MM KS STR REV CUT Y523H

## (undated) DEVICE — 3000CC GUARDIAN II: Brand: GUARDIAN

## (undated) DEVICE — REM POLYHESIVE ADULT PATIENT RETURN ELECTRODE: Brand: VALLEYLAB

## (undated) DEVICE — ROCKER SWITCH PENCIL HOLSTER: Brand: VALLEYLAB

## (undated) DEVICE — SOLIDIFIER FLUID 3000 CC ABSORB

## (undated) DEVICE — STERILE LATEX POWDER-FREE SURGICAL GLOVESWITH NITRILE COATING: Brand: PROTEXIS

## (undated) DEVICE — SUTURE ABSRB BRAID COAT UD CT NO 1 36IN VCRL J959H

## (undated) DEVICE — TRAY,URINE METER,100% SILICONE,16FR10ML: Brand: MEDLINE

## (undated) DEVICE — SPONGE: LAP 18X18 W  200/CS: Brand: MEDICAL ACTION INDUSTRIES

## (undated) DEVICE — GARMENT,MEDLINE,DVT,INT,CALF,MED, GEN2: Brand: MEDLINE

## (undated) DEVICE — C-SECTION II-LF: Brand: MEDLINE INDUSTRIES, INC.

## (undated) DEVICE — (D)PREP SKN CHLRAPRP APPL 26ML -- CONVERT TO ITEM 371833

## (undated) DEVICE — POOLE SUCTION INSTRUMENT WITH REMOVABLE SHEATH: Brand: POOLE

## (undated) DEVICE — SUTURE VCRL SZ 0 L36IN ABSRB VLT L40MM CT 1/2 CIR J358H